# Patient Record
Sex: FEMALE | Race: WHITE | NOT HISPANIC OR LATINO | Employment: UNEMPLOYED | URBAN - NONMETROPOLITAN AREA
[De-identification: names, ages, dates, MRNs, and addresses within clinical notes are randomized per-mention and may not be internally consistent; named-entity substitution may affect disease eponyms.]

---

## 2017-01-12 ENCOUNTER — TRANSCRIBE ORDERS (OUTPATIENT)
Dept: ADMINISTRATIVE | Facility: HOSPITAL | Age: 27
End: 2017-01-12

## 2017-01-12 ENCOUNTER — HOSPITAL ENCOUNTER (OUTPATIENT)
Dept: GENERAL RADIOLOGY | Facility: HOSPITAL | Age: 27
Discharge: HOME | End: 2017-01-12
Admitting: PHYSICIAN ASSISTANT

## 2017-01-12 DIAGNOSIS — J18.9 PNEUMONIA DUE TO INFECTIOUS ORGANISM, UNSPECIFIED LATERALITY, UNSPECIFIED PART OF LUNG: Primary | ICD-10-CM

## 2017-01-12 DIAGNOSIS — J18.9 PNEUMONIA DUE TO INFECTIOUS ORGANISM, UNSPECIFIED LATERALITY, UNSPECIFIED PART OF LUNG: ICD-10-CM

## 2017-01-12 PROCEDURE — 71020 HC CHEST PA AND LATERAL: CPT

## 2017-01-13 ENCOUNTER — OFFICE VISIT (OUTPATIENT)
Dept: SURGERY | Age: 27
End: 2017-01-13

## 2017-01-13 VITALS — HEART RATE: 76 BPM | SYSTOLIC BLOOD PRESSURE: 104 MMHG | DIASTOLIC BLOOD PRESSURE: 60 MMHG

## 2017-01-13 DIAGNOSIS — D22.5 ATYPICAL NEVUS OF ABDOMINAL WALL: Primary | ICD-10-CM

## 2017-01-13 DIAGNOSIS — D22.5 ATYPICAL NEVUS OF FEMALE BREAST: ICD-10-CM

## 2017-01-13 PROCEDURE — 99024 POSTOP FOLLOW-UP VISIT: CPT | Performed by: PHYSICIAN ASSISTANT

## 2017-02-06 ENCOUNTER — HOSPITAL ENCOUNTER (EMERGENCY)
Facility: HOSPITAL | Age: 27
Discharge: HOME | End: 2017-02-06
Admitting: FAMILY MEDICINE

## 2017-02-06 ENCOUNTER — APPOINTMENT (OUTPATIENT)
Dept: CT IMAGING | Facility: HOSPITAL | Age: 27
End: 2017-02-06

## 2017-02-06 VITALS
HEART RATE: 96 BPM | DIASTOLIC BLOOD PRESSURE: 66 MMHG | RESPIRATION RATE: 20 BRPM | SYSTOLIC BLOOD PRESSURE: 131 MMHG | WEIGHT: 159 LBS | OXYGEN SATURATION: 100 % | BODY MASS INDEX: 28.17 KG/M2 | HEIGHT: 63 IN | TEMPERATURE: 97.9 F

## 2017-02-06 DIAGNOSIS — N39.0 URINARY TRACT INFECTION WITHOUT HEMATURIA, SITE UNSPECIFIED: ICD-10-CM

## 2017-02-06 DIAGNOSIS — R10.9 LEFT FLANK PAIN: Primary | ICD-10-CM

## 2017-02-06 LAB
ALBUMIN SERPL-MCNC: 3.9 G/DL (ref 3.5–5)
ALBUMIN/GLOB SERPL: 1.2 G/DL (ref 1.1–2.5)
ALP SERPL-CCNC: 73 U/L (ref 24–120)
ALT SERPL W P-5'-P-CCNC: 31 U/L (ref 0–54)
AMYLASE SERPL-CCNC: 63 U/L (ref 30–110)
ANION GAP SERPL CALCULATED.3IONS-SCNC: 8 MMOL/L (ref 4–13)
AST SERPL-CCNC: 18 U/L (ref 7–45)
B-HCG UR QL: NEGATIVE
BACTERIA UR QL AUTO: ABNORMAL /HPF
BASOPHILS # BLD AUTO: 0.02 10*3/MM3 (ref 0–0.2)
BASOPHILS NFR BLD AUTO: 0.3 % (ref 0–2)
BILIRUB SERPL-MCNC: 0.4 MG/DL (ref 0.1–1)
BILIRUB UR QL STRIP: ABNORMAL
BUN BLD-MCNC: 14 MG/DL (ref 5–21)
BUN/CREAT SERPL: 18.9 (ref 7–25)
CALCIUM SPEC-SCNC: 9.1 MG/DL (ref 8.4–10.4)
CHLORIDE SERPL-SCNC: 105 MMOL/L (ref 98–110)
CLARITY UR: CLEAR
CO2 SERPL-SCNC: 27 MMOL/L (ref 24–31)
COD CRY URNS QL: ABNORMAL /HPF
COLOR UR: ABNORMAL
CREAT BLD-MCNC: 0.74 MG/DL (ref 0.5–1.4)
DEPRECATED RDW RBC AUTO: 43.6 FL (ref 40–54)
EOSINOPHIL # BLD AUTO: 0.15 10*3/MM3 (ref 0–0.7)
EOSINOPHIL NFR BLD AUTO: 2.4 % (ref 0–4)
ERYTHROCYTE [DISTWIDTH] IN BLOOD BY AUTOMATED COUNT: 12.9 % (ref 12–15)
GFR SERPL CREATININE-BSD FRML MDRD: 96 ML/MIN/1.73
GLOBULIN UR ELPH-MCNC: 3.3 GM/DL
GLUCOSE BLD-MCNC: 103 MG/DL (ref 70–100)
GLUCOSE UR STRIP-MCNC: NEGATIVE MG/DL
HCT VFR BLD AUTO: 34 % (ref 37–47)
HGB BLD-MCNC: 11.2 G/DL (ref 12–16)
HGB UR QL STRIP.AUTO: NEGATIVE
HYALINE CASTS UR QL AUTO: ABNORMAL /LPF
IMM GRANULOCYTES # BLD: 0.01 10*3/MM3 (ref 0–0.03)
IMM GRANULOCYTES NFR BLD: 0.2 % (ref 0–5)
INTERNAL NEGATIVE CONTROL: NEGATIVE
INTERNAL POSITIVE CONTROL: POSITIVE
KETONES UR QL STRIP: NEGATIVE
LEUKOCYTE ESTERASE UR QL STRIP.AUTO: ABNORMAL
LIPASE SERPL-CCNC: 138 U/L (ref 23–203)
LYMPHOCYTES # BLD AUTO: 2.3 10*3/MM3 (ref 0.72–4.86)
LYMPHOCYTES NFR BLD AUTO: 36.3 % (ref 15–45)
Lab: NORMAL
MCH RBC QN AUTO: 30.4 PG (ref 28–32)
MCHC RBC AUTO-ENTMCNC: 32.9 G/DL (ref 33–36)
MCV RBC AUTO: 92.1 FL (ref 82–98)
MONOCYTES # BLD AUTO: 0.6 10*3/MM3 (ref 0.19–1.3)
MONOCYTES NFR BLD AUTO: 9.5 % (ref 4–12)
NEUTROPHILS # BLD AUTO: 3.26 10*3/MM3 (ref 1.87–8.4)
NEUTROPHILS NFR BLD AUTO: 51.3 % (ref 39–78)
NITRITE UR QL STRIP: POSITIVE
PH UR STRIP.AUTO: 5.5 [PH] (ref 5–8)
PLATELET # BLD AUTO: 234 10*3/MM3 (ref 130–400)
PMV BLD AUTO: 10.4 FL (ref 6–12)
POTASSIUM BLD-SCNC: 3.5 MMOL/L (ref 3.5–5.3)
PROT SERPL-MCNC: 7.2 G/DL (ref 6.3–8.7)
PROT UR QL STRIP: ABNORMAL
RBC # BLD AUTO: 3.69 10*6/MM3 (ref 4.2–5.4)
RBC # UR: ABNORMAL /HPF
REF LAB TEST METHOD: ABNORMAL
SODIUM BLD-SCNC: 140 MMOL/L (ref 135–145)
SP GR UR STRIP: 1.02 (ref 1–1.03)
SQUAMOUS #/AREA URNS HPF: ABNORMAL /HPF
UROBILINOGEN UR QL STRIP: ABNORMAL
WBC NRBC COR # BLD: 6.34 10*3/MM3 (ref 4.8–10.8)
WBC UR QL AUTO: ABNORMAL /HPF

## 2017-02-06 PROCEDURE — 82150 ASSAY OF AMYLASE: CPT | Performed by: NURSE PRACTITIONER

## 2017-02-06 PROCEDURE — 80053 COMPREHEN METABOLIC PANEL: CPT | Performed by: NURSE PRACTITIONER

## 2017-02-06 PROCEDURE — 96376 TX/PRO/DX INJ SAME DRUG ADON: CPT

## 2017-02-06 PROCEDURE — 85025 COMPLETE CBC W/AUTO DIFF WBC: CPT | Performed by: NURSE PRACTITIONER

## 2017-02-06 PROCEDURE — 25010000002 MEPERIDINE PER 100 MG: Performed by: NURSE PRACTITIONER

## 2017-02-06 PROCEDURE — 96374 THER/PROPH/DIAG INJ IV PUSH: CPT

## 2017-02-06 PROCEDURE — 96375 TX/PRO/DX INJ NEW DRUG ADDON: CPT

## 2017-02-06 PROCEDURE — 99283 EMERGENCY DEPT VISIT LOW MDM: CPT

## 2017-02-06 PROCEDURE — 81001 URINALYSIS AUTO W/SCOPE: CPT | Performed by: NURSE PRACTITIONER

## 2017-02-06 PROCEDURE — 87086 URINE CULTURE/COLONY COUNT: CPT | Performed by: NURSE PRACTITIONER

## 2017-02-06 PROCEDURE — 25010000002 HYDROMORPHONE PER 4 MG: Performed by: NURSE PRACTITIONER

## 2017-02-06 PROCEDURE — 36415 COLL VENOUS BLD VENIPUNCTURE: CPT | Performed by: NURSE PRACTITIONER

## 2017-02-06 PROCEDURE — 25010000002 HYDROMORPHONE PER 4 MG: Performed by: FAMILY MEDICINE

## 2017-02-06 PROCEDURE — 83690 ASSAY OF LIPASE: CPT | Performed by: NURSE PRACTITIONER

## 2017-02-06 PROCEDURE — 96361 HYDRATE IV INFUSION ADD-ON: CPT

## 2017-02-06 PROCEDURE — 74176 CT ABD & PELVIS W/O CONTRAST: CPT

## 2017-02-06 PROCEDURE — 25010000002 ONDANSETRON PER 1 MG: Performed by: NURSE PRACTITIONER

## 2017-02-06 RX ORDER — MEPERIDINE HYDROCHLORIDE 25 MG/ML
25 INJECTION INTRAMUSCULAR; INTRAVENOUS; SUBCUTANEOUS ONCE
Status: COMPLETED | OUTPATIENT
Start: 2017-02-06 | End: 2017-02-06

## 2017-02-06 RX ORDER — KETOROLAC TROMETHAMINE 30 MG/ML
30 INJECTION, SOLUTION INTRAMUSCULAR; INTRAVENOUS ONCE
Status: DISCONTINUED | OUTPATIENT
Start: 2017-02-06 | End: 2017-02-06

## 2017-02-06 RX ORDER — PHENAZOPYRIDINE HYDROCHLORIDE 200 MG/1
200 TABLET, FILM COATED ORAL 3 TIMES DAILY PRN
Qty: 6 TABLET | Refills: 0 | Status: SHIPPED | OUTPATIENT
Start: 2017-02-06 | End: 2018-11-29

## 2017-02-06 RX ORDER — OXYCODONE AND ACETAMINOPHEN 10; 325 MG/1; MG/1
1 TABLET ORAL EVERY 6 HOURS PRN
Qty: 12 TABLET | Refills: 0 | Status: SHIPPED | OUTPATIENT
Start: 2017-02-06 | End: 2017-02-06 | Stop reason: HOSPADM

## 2017-02-06 RX ORDER — ONDANSETRON 2 MG/ML
4 INJECTION INTRAMUSCULAR; INTRAVENOUS ONCE
Status: COMPLETED | OUTPATIENT
Start: 2017-02-06 | End: 2017-02-06

## 2017-02-06 RX ORDER — CEFUROXIME AXETIL 500 MG/1
500 TABLET ORAL 2 TIMES DAILY
Qty: 20 TABLET | Refills: 0 | Status: SHIPPED | OUTPATIENT
Start: 2017-02-06 | End: 2017-02-06

## 2017-02-06 RX ORDER — CEFUROXIME AXETIL 500 MG/1
500 TABLET ORAL 2 TIMES DAILY
Qty: 20 TABLET | Refills: 0 | Status: SHIPPED | OUTPATIENT
Start: 2017-02-06 | End: 2017-02-16

## 2017-02-06 RX ORDER — PHENAZOPYRIDINE HYDROCHLORIDE 200 MG/1
200 TABLET, FILM COATED ORAL 3 TIMES DAILY PRN
Qty: 6 TABLET | Refills: 0 | Status: SHIPPED | OUTPATIENT
Start: 2017-02-06 | End: 2017-02-06

## 2017-02-06 RX ADMIN — ONDANSETRON HYDROCHLORIDE 4 MG: 2 SOLUTION INTRAMUSCULAR; INTRAVENOUS at 18:37

## 2017-02-06 RX ADMIN — SODIUM CHLORIDE 1000 ML: 9 INJECTION, SOLUTION INTRAVENOUS at 18:36

## 2017-02-06 RX ADMIN — HYDROMORPHONE HYDROCHLORIDE 1 MG: 1 INJECTION, SOLUTION INTRAMUSCULAR; INTRAVENOUS; SUBCUTANEOUS at 21:54

## 2017-02-06 RX ADMIN — MEPERIDINE HYDROCHLORIDE 25 MG: 25 INJECTION, SOLUTION INTRAMUSCULAR; INTRAVENOUS; SUBCUTANEOUS at 18:36

## 2017-02-06 RX ADMIN — HYDROMORPHONE HYDROCHLORIDE 1 MG: 1 INJECTION, SOLUTION INTRAMUSCULAR; INTRAVENOUS; SUBCUTANEOUS at 19:05

## 2017-02-07 NOTE — ED NOTES
Patient discharged home  with family at side ambulatory to personal car. No distress noted. Personal belongings with patient. Patient/family voice understanding to instructions given.        Maddy Lee RN  02/06/17 1077

## 2017-02-07 NOTE — ED PROVIDER NOTES
Subjective   HPI Comments: Patient's 26-year-old white female presents with left flank pain for the past days.  She has been on Cipro for urinary tract infection and was called by her PCP today and advised to go the emergency department because she had a moderate amount of blood in her urine and that she had Escherichia coli in her urine and there were that she had a kidney stone.  She denies any fever or chills.  She has had some nausea no vomiting.  She does have a history of multiple kidney stones and urinary tract infections.    Patient is a 26 y.o. female presenting with flank pain.   History provided by:  Patient   used: No    Flank Pain       Review of Systems   Constitutional: Negative.    HENT: Negative.    Eyes: Negative.    Respiratory: Negative.    Cardiovascular: Negative.    Gastrointestinal: Negative.    Endocrine: Negative.    Genitourinary: Positive for flank pain.        Pt has had left flank pain for the past 6 days. She states that she was seen by her pcp 4 days ago and started on cipro. She states that her pcp called her today and advised her that she needed to go to er for further eval because of blood in urine and that she has ecoli in urine as well. She has had nausea- no vomiting. Pt has hx of kidney stones and mult uti. She denies any fever or chills.    Skin: Negative.    Allergic/Immunologic: Negative.    Neurological: Negative.    Hematological: Negative.    Psychiatric/Behavioral: Negative.    All other systems reviewed and are negative.      Past Medical History   Diagnosis Date   • Intracranial hypertension    • Kidney stones        Allergies   Allergen Reactions   • Morphine And Related Shortness Of Breath       Past Surgical History   Procedure Laterality Date   • Other surgical history       Intracranial shunt   • Cystoscopy electrohydraulic lithotripsy     •  shunt insertion         History reviewed. No pertinent family history.    Social History     Social  "History   • Marital status: Single     Spouse name: N/A   • Number of children: 1   • Years of education: N/A     Social History Main Topics   • Smoking status: Current Every Day Smoker     Types: Cigarettes   • Smokeless tobacco: Never Used      Comment: 5 CIGARETTS/DAY   • Alcohol use No   • Drug use: No   • Sexual activity: Defer     Other Topics Concern   • None     Social History Narrative   • None       Prior to Admission medications    Medication Sig Start Date End Date Taking? Authorizing Provider   ALPRAZolam (XANAX) 0.5 MG tablet Take 0.5 mg by mouth Daily.    Historical Provider, MD   diazepam (VALIUM) 2 MG tablet Take 2 mg by mouth daily.    Historical Provider, MD   fexofenadine (ALLEGRA) 60 MG tablet Take 60 mg by mouth Daily.    Historical Provider, MD   HYDROcodone-acetaminophen (NORCO)  MG per tablet Take 1 tablet by mouth 3 (three) times a day.    Historical Provider, MD   linaclotide (LINZESS) 290 MCG capsule capsule Take 290 mcg by mouth as needed.    Historical Provider, MD   NON FORMULARY daily. BIRTH CONTROL PILL    Historical Provider, MD       Visit Vitals   • /68 (BP Location: Left arm, Patient Position: Sitting)   • Pulse 114   • Temp 97.3 °F (36.3 °C) (Tympanic)   • Resp 20   • Ht 63\" (160 cm)   • Wt 159 lb (72.1 kg)   • LMP 01/16/2017 (Exact Date)   • SpO2 99%   • Breastfeeding No   • BMI 28.17 kg/m2       Objective   Physical Exam   Constitutional: She is oriented to person, place, and time. Vital signs are normal. She appears well-developed and well-nourished.  Non-toxic appearance. No distress.   HENT:   Head: Normocephalic. Head is without raccoon's eyes, without Alvares's sign, without abrasion, without contusion and without laceration.   Right Ear: Tympanic membrane and external ear normal.   Left Ear: Tympanic membrane and external ear normal.   Nose: Nose normal.   Mouth/Throat: Oropharynx is clear and moist.   Eyes: Conjunctivae and EOM are normal. Pupils are equal, " round, and reactive to light.   Neck: Trachea normal, normal range of motion and full passive range of motion without pain. Neck supple. No JVD present. No spinous process tenderness and no muscular tenderness present. Carotid bruit is not present. No tracheal deviation and normal range of motion present.   Cardiovascular: Normal rate, regular rhythm, normal heart sounds, intact distal pulses and normal pulses.  PMI is not displaced.    Pulmonary/Chest: Effort normal and breath sounds normal. No accessory muscle usage or stridor. No apnea and no tachypnea. No respiratory distress. Chest wall is not dull to percussion. She exhibits no mass, no tenderness, no bony tenderness, no laceration, no crepitus, no deformity and no swelling.   Abdominal: Soft. Normal aorta and bowel sounds are normal. There is no hepatosplenomegaly. There is no tenderness. There is no CVA tenderness.   Left cva tenderness on percussion. abd soft, nondistended.    Musculoskeletal: Normal range of motion.        Cervical back: Normal. She exhibits normal range of motion, no tenderness, no bony tenderness, no spasm and normal pulse.        Thoracic back: Normal. She exhibits normal range of motion, no tenderness, no bony tenderness, no spasm and normal pulse.        Lumbar back: She exhibits tenderness. She exhibits normal range of motion, no bony tenderness, no pain, no spasm and normal pulse.   Neurological: She is alert and oriented to person, place, and time. She has normal strength and normal reflexes. No cranial nerve deficit or sensory deficit. GCS eye subscore is 4. GCS verbal subscore is 5. GCS motor subscore is 6.   Skin: Skin is warm, dry and intact. No abrasion, no ecchymosis and no laceration noted.   Psychiatric: She has a normal mood and affect. Her speech is normal and behavior is normal.   Nursing note and vitals reviewed.      Procedures         Lab Results (last 24 hours)     ** No results found for the last 24 hours. **           CT Abdomen Pelvis Without Contrast    (Results Pending)       ED Course  ED Course   Comment By Time   Pending urinalysis and ct scan of abd/pelvis. Reviewed pt and pt care plan with dr nalini ferrari. Care of pt transferred at this time Martha Wolff, APRN 02/06 1932          [facility]    Final diagnoses:   Left flank pain   Urinary tract infection without hematuria, site unspecified          Charles Reese PA-C  02/06/17 [id number]

## 2017-02-08 LAB — BACTERIA SPEC AEROBE CULT: NORMAL

## 2017-02-10 ENCOUNTER — TELEPHONE (OUTPATIENT)
Dept: UROLOGY | Facility: CLINIC | Age: 27
End: 2017-02-10

## 2017-02-10 DIAGNOSIS — N20.0 KIDNEY STONE: Primary | ICD-10-CM

## 2017-02-15 ENCOUNTER — HOSPITAL ENCOUNTER (OUTPATIENT)
Dept: GENERAL RADIOLOGY | Facility: HOSPITAL | Age: 27
Discharge: HOME | End: 2017-02-15
Attending: UROLOGY | Admitting: UROLOGY

## 2017-02-15 ENCOUNTER — OFFICE VISIT (OUTPATIENT)
Dept: UROLOGY | Facility: CLINIC | Age: 27
End: 2017-02-15

## 2017-02-15 VITALS
BODY MASS INDEX: 28.99 KG/M2 | DIASTOLIC BLOOD PRESSURE: 66 MMHG | SYSTOLIC BLOOD PRESSURE: 118 MMHG | HEIGHT: 63 IN | TEMPERATURE: 96.3 F | WEIGHT: 163.6 LBS

## 2017-02-15 DIAGNOSIS — R10.9 FLANK PAIN: ICD-10-CM

## 2017-02-15 DIAGNOSIS — N20.0 KIDNEY STONE: Primary | ICD-10-CM

## 2017-02-15 DIAGNOSIS — N20.0 KIDNEY STONE: ICD-10-CM

## 2017-02-15 LAB
BILIRUB BLD-MCNC: NEGATIVE MG/DL
CLARITY, POC: CLEAR
COLOR UR: YELLOW
GLUCOSE UR STRIP-MCNC: NEGATIVE MG/DL
KETONES UR QL: NEGATIVE
LEUKOCYTE EST, POC: ABNORMAL
NITRITE UR-MCNC: NEGATIVE MG/ML
PH UR: 5.5 [PH] (ref 5–8)
PROT UR STRIP-MCNC: NEGATIVE MG/DL
RBC # UR STRIP: NEGATIVE /UL
SP GR UR: 1.02 (ref 1–1.03)
UROBILINOGEN UR QL: NORMAL

## 2017-02-15 PROCEDURE — 74000 HC ABDOMEN KUB: CPT

## 2017-02-15 PROCEDURE — 99204 OFFICE O/P NEW MOD 45 MIN: CPT | Performed by: UROLOGY

## 2017-02-15 PROCEDURE — 81003 URINALYSIS AUTO W/O SCOPE: CPT | Performed by: UROLOGY

## 2017-02-15 NOTE — PROGRESS NOTES
Subjective    Ms. Mcqueen is 26 y.o. female    Chief Complaint: Flank Pain    History of Present Illness     Urolithiasis  Patient complains of left flank pain with radiation to the abdomen. Onset of symptoms was abrupt starting 1 week ago with stable course since that time. Patient describes the pain as cramping, continuous and rated as moderate. The patient has had no nausea and no vomiting. There has been no fever or chills. The patient is complaining of dysuria, frequency, or urgency.  Previous management of stones includes ESWL, PCNL.  Pt. Sees Dr. Sabas Jackson.        The following portions of the patient's history were reviewed and updated as appropriate: allergies, current medications, past family history, past medical history, past social history, past surgical history and problem list.    Review of Systems   Constitutional: Negative for appetite change, diaphoresis and fever.   HENT: Negative for facial swelling and sore throat.    Eyes: Negative for discharge and visual disturbance.   Respiratory: Negative for cough and shortness of breath.    Cardiovascular: Negative for chest pain and leg swelling.   Gastrointestinal: Negative for anal bleeding and vomiting.   Endocrine: Negative for cold intolerance and heat intolerance.   Genitourinary: Positive for flank pain. Negative for hematuria and pelvic pain.   Musculoskeletal: Negative for back pain and gait problem.   Skin: Negative for pallor and rash.   Allergic/Immunologic: Negative for food allergies and immunocompromised state.   Neurological: Negative for seizures and headaches.   Hematological: Negative for adenopathy. Does not bruise/bleed easily.   Psychiatric/Behavioral: Negative for dysphoric mood, self-injury and suicidal ideas.         Current Outpatient Prescriptions:   •  ALPRAZolam (XANAX) 0.5 MG tablet, Take 0.5 mg by mouth Daily., Disp: , Rfl:   •  cefuroxime (CEFTIN) 500 MG tablet, Take 1 tablet by mouth 2 (Two) Times a Day for 10 days.,  "Disp: 20 tablet, Rfl: 0  •  diazepam (VALIUM) 2 MG tablet, Take 2 mg by mouth daily., Disp: , Rfl:   •  fexofenadine (ALLEGRA) 60 MG tablet, Take 60 mg by mouth Daily., Disp: , Rfl:   •  HYDROcodone-acetaminophen (NORCO)  MG per tablet, Take 1 tablet by mouth 3 (three) times a day., Disp: , Rfl:   •  linaclotide (LINZESS) 290 MCG capsule capsule, Take 290 mcg by mouth as needed., Disp: , Rfl:   •  NON FORMULARY, daily. BIRTH CONTROL PILL, Disp: , Rfl:   •  phenazopyridine (PYRIDIUM) 200 MG tablet, Take 1 tablet by mouth 3 (Three) Times a Day As Needed for bladder spasms., Disp: 6 tablet, Rfl: 0    Past Medical History   Diagnosis Date   • Intracranial hypertension    • Kidney stones        Past Surgical History   Procedure Laterality Date   • Other surgical history       Intracranial shunt   • Cystoscopy electrohydraulic lithotripsy     •  shunt insertion         Social History     Social History   • Marital status: Single     Spouse name: N/A   • Number of children: 1   • Years of education: N/A     Social History Main Topics   • Smoking status: Current Every Day Smoker     Types: Cigarettes   • Smokeless tobacco: Never Used      Comment: 5 CIGARETTS/DAY   • Alcohol use No   • Drug use: No   • Sexual activity: Defer     Other Topics Concern   • None     Social History Narrative       History reviewed. No pertinent family history.    Objective    Visit Vitals   • /66   • Temp 96.3 °F (35.7 °C)   • Ht 63\" (160 cm)   • Wt 163 lb 9.6 oz (74.2 kg)   • LMP 01/16/2017 (Exact Date)   • BMI 28.98 kg/m2       Physical Exam   Constitutional: She is oriented to person, place, and time. She appears well-developed and well-nourished. No distress.   HENT:   Head: Normocephalic and atraumatic.   Right Ear: External ear and ear canal normal.   Left Ear: External ear and ear canal normal.   Nose: No nasal deformity. No epistaxis.   Mouth/Throat: Oropharynx is clear and moist. Mucous membranes are not pale, not dry and " not cyanotic. Normal dentition. No oropharyngeal exudate.   Neck: Trachea normal. No tracheal tenderness present. No tracheal deviation present. No thyroid mass and no thyromegaly present.   Pulmonary/Chest: Effort normal. No accessory muscle usage. No respiratory distress. Chest wall is not dull to percussion (No flatness or hyperresonance). She exhibits no mass and no tenderness.   On palpation, no tactile fremitus. All movements are symmetric. No intercostal retraction noted.    Abdominal: Soft. Normal appearance. She exhibits no distension and no mass. There is no hepatosplenomegaly. There is no tenderness. No hernia.   Rectal examination or stool specimen is not indicated.    Musculoskeletal:   Normal gait and station. The spine, ribs, and pelvis are examined. No obvious misalignment or asymmetry. ROM is reasonable for age. No instability. No obvious atrophy, flaccidity or spasticity.    Lymphadenopathy:     She has no cervical adenopathy.        Right: No inguinal adenopathy present.        Left: No inguinal adenopathy present.   Neurological: She is alert and oriented to person, place, and time.   Skin: Skin is warm, dry and intact. No lesion and no rash noted. She is not diaphoretic. No cyanosis. No pallor. Nails show no clubbing.   On palpation, there were no induration, subcutaneous nodules, or tightening   Psychiatric: Her speech is normal and behavior is normal. Judgment and thought content normal. Her mood appears not anxious. Her affect is not labile. She does not exhibit a depressed mood.   Vitals reviewed.          Results for orders placed or performed in visit on 02/15/17   POC Urinalysis Dipstick, Automated   Result Value Ref Range    Color Yellow Yellow, Straw, Dark Yellow, Deborah    Clarity, UA Clear Clear    Glucose, UA Negative Negative, 1000 mg/dL (3+) mg/dL    Bilirubin Negative Negative    Ketones, UA Negative Negative    Specific Gravity  1.025 1.005 - 1.030    Blood, UA Negative Negative     pH, Urine 5.5 5.0 - 8.0    Protein, POC Negative Negative mg/dL    Urobilinogen, UA Normal Normal    Leukocytes Small (1+) (A) Negative    Nitrite, UA Negative Negative   CT independent review  The CT scan of the abdomen/pelvis done without contrast is available for me to review.  Treatment recommendations require an independent review.  First I scanned the liver, spleen, and bowel pattern.  The retroperitoneum including the major vessels and lymphatic packages are briefly reviewed.  This film as been reviewed by the radiologist to determine any non urologic abnormalities that are present.  The kidneys are closely inspected for size, symmetry, contour, parenchymal thickness, perinephric reaction, presence of calcifications, and intrarenal dilation of the collecting system.  The ureters are inspected for their course, caliber, and any calcifications.  The bladder is inspected for its thickness, size, and presence of any calcifications.  This scan shows:    The right kidney appears multiple non obstructing stones    The left kidney appears multiple non obstructing stones    The bladder appears normal on this non-contrasted CT scan.  The bladder appears normal in thickness.  There no masses or stones seen on this exam.    KUB independent review    A KUB is available for me to review today.  The image is inspected for a bowel gas pattern and the general bone structure of the spine and pelvis. The kidneys are then inspected closely.  Renal outline is noted if identifiable. The kidney, collecting system, and anticipated path of the ureter are examined for calcifications including those in the true pelvis.  This film reveals:    On the right there are no calcificaitons seen in the kidney or the expected course of the ureter. .    On the left there are multiple renal stones. .        Assessment and Plan    Kelsey was seen today for flank pain.    Diagnoses and all orders for this visit:    Kidney stone    Flank pain  -      POC Urinalysis Dipstick, Automated      I reviewed records from her ER visit.  She had a urine culture on that was negative.  Her flank pain is improving.  I reviewed her CT scan as well.  See report above.  She does see Dr. Sabas Rodríguez at [place] and she is going to see him in 3 months.  She makes brushite stones.  Follow-up in 1 year with [facility].

## 2017-02-21 ENCOUNTER — OFFICE VISIT (OUTPATIENT)
Dept: NEUROSURGERY | Facility: CLINIC | Age: 27
End: 2017-02-21

## 2017-02-21 VITALS
BODY MASS INDEX: 28 KG/M2 | SYSTOLIC BLOOD PRESSURE: 122 MMHG | DIASTOLIC BLOOD PRESSURE: 74 MMHG | HEIGHT: 63 IN | WEIGHT: 158 LBS

## 2017-02-21 DIAGNOSIS — R51.9 CHRONIC NONINTRACTABLE HEADACHE, UNSPECIFIED HEADACHE TYPE: ICD-10-CM

## 2017-02-21 DIAGNOSIS — F17.200 SMOKER: ICD-10-CM

## 2017-02-21 DIAGNOSIS — G89.29 CHRONIC NONINTRACTABLE HEADACHE, UNSPECIFIED HEADACHE TYPE: ICD-10-CM

## 2017-02-21 DIAGNOSIS — G93.2 PSEUDOTUMOR CEREBRI: Primary | ICD-10-CM

## 2017-02-21 DIAGNOSIS — E66.3 OVER WEIGHT: ICD-10-CM

## 2017-02-21 PROCEDURE — 99213 OFFICE O/P EST LOW 20 MIN: CPT | Performed by: NURSE PRACTITIONER

## 2017-02-21 NOTE — PROGRESS NOTES
"    Chief complaint:   Chief Complaint   Patient presents with   • pseudotumor     Patient returns today for routine followup for her Pseudotumor, she states she is doing better, her headaches are better.         Subjective     HPI: This is a 46-year-old who went to the operating room for a  shunt placement for pseudotumor cerebri.  She is here in follow-up today.  She states that she has been doing that she is not complaining of any headaches.  She says that she will get an occasional headache after reading for a long period time.  She not complaining of any vision problems at this time.  She continues to be seen by Dr. Garcia have routine checks from him.  The complaining of any nausea or vomiting.  Overall she is having satisfied with the results from the shunt.  She is not had any MRIs recently.  The complaining of any pain from the shunt.    Review of Systems   Musculoskeletal: Negative.    Neurological: Positive for headaches.         Objective      Vital Signs  Visit Vitals   • /74 (BP Location: Right arm, Patient Position: Sitting)   • Ht 63\" (160 cm)   • Wt 158 lb (71.7 kg)   • LMP 01/16/2017 (Exact Date)   • BMI 27.99 kg/m2       Physical Exam   Constitutional: She is oriented to person, place, and time. She appears well-developed and well-nourished.   HENT:   Head: Normocephalic.   Eyes: EOM are normal. Pupils are equal, round, and reactive to light.   Neck: Normal range of motion.   Pulmonary/Chest: Effort normal.   Musculoskeletal: Normal range of motion.   Neurological: She is alert and oriented to person, place, and time. She has normal strength and normal reflexes. No cranial nerve deficit or sensory deficit. Gait normal. GCS eye subscore is 4. GCS verbal subscore is 5. GCS motor subscore is 6.   Skin: Skin is warm.   Psychiatric: She has a normal mood and affect. Her speech is normal and behavior is normal. Thought content normal.       Results Review: Her shunt was checked and it was shown " to be at 1.5 in the settings which is where was from the last time.          Assessment/Plan: The patient appears to be doing very well from her shunt placement.  At this point we will remove her visits out to one year and follow her on a yearly basis for her shunt to check her setting and to make sure she is not having any symptoms from this.  BMI shows that she is overweight.  BMI chart was given the patient.  She is a nonsmoker.    I discussed the patients findings and my recommendations with patient  Ean Treviño, APRN  02/21/17  4:56 PM

## 2017-04-25 ENCOUNTER — OFFICE VISIT (OUTPATIENT)
Dept: NEUROSURGERY | Facility: CLINIC | Age: 27
End: 2017-04-25

## 2017-04-25 VITALS
WEIGHT: 156 LBS | BODY MASS INDEX: 27.64 KG/M2 | DIASTOLIC BLOOD PRESSURE: 68 MMHG | SYSTOLIC BLOOD PRESSURE: 105 MMHG | HEIGHT: 63 IN

## 2017-04-25 DIAGNOSIS — G93.2 PSEUDOTUMOR CEREBRI: Primary | ICD-10-CM

## 2017-04-25 DIAGNOSIS — G44.229 CHRONIC TENSION-TYPE HEADACHE, NOT INTRACTABLE: ICD-10-CM

## 2017-04-25 DIAGNOSIS — E66.3 OVER WEIGHT: ICD-10-CM

## 2017-04-25 DIAGNOSIS — F17.200 SMOKER: ICD-10-CM

## 2017-04-25 PROCEDURE — 99213 OFFICE O/P EST LOW 20 MIN: CPT | Performed by: NURSE PRACTITIONER

## 2017-04-25 NOTE — PROGRESS NOTES
"    Chief complaint:   Chief Complaint   Patient presents with   • Headache     Patient returns today with an increase in symptoms, she is having headaches, vision issues and she states she just feels odd and funny, this has been going on for about 4 days.         Subjective     HPI: This is a 26-year-old who had pseudotumor cerebri and had a  shunt placement in November 2015.  She comes in today stating that she is had worsening vision over the last 4 days.  She says that her headaches do not appear to be any worse than normal but she says that which tries to focus on reading that she does have difficulty in making outwards and numbers.  She says that light does appear to be hurting her eyes.  She says her head does feel more tender in that she is also been having what it feels like a crick in her neck which is some the same symptoms that she was having before the shunt was put in.  She rates the pain a scale 0 - 10 at a 5.  She has not been to see her ophthalmologist as of yet but does have an appointment with him tomorrow.    Review of Systems   Eyes: Positive for visual disturbance.   Neurological: Positive for headaches.         Objective      Vital Signs  /68 (BP Location: Right arm, Patient Position: Sitting)  Ht 63\" (160 cm)  Wt 156 lb (70.8 kg)  BMI 27.63 kg/m2    Physical Exam   Constitutional: She is oriented to person, place, and time. She appears well-developed and well-nourished.   HENT:   Head: Normocephalic.   Eyes: EOM are normal. Pupils are equal, round, and reactive to light.   Neck: Normal range of motion.   Pulmonary/Chest: Effort normal.   Musculoskeletal: Normal range of motion.   Neurological: She is alert and oriented to person, place, and time. She has normal strength and normal reflexes. No cranial nerve deficit or sensory deficit. Gait normal. GCS eye subscore is 4. GCS verbal subscore is 5. GCS motor subscore is 6.   Skin: Skin is warm.   Psychiatric: She has a normal mood and " affect. Her speech is normal and behavior is normal. Thought content normal.       Results Review: No new imaging          Assessment/Plan: I did go ahead and just Kelsey's shunt from 1.5-1.0 to see if this will help with the vision issues that she is been having.  We will see what the ophthalmologist has to say about her vision after his exam.  I will follow-up with her in 1 week to see if things have improved if they have not been we will consider doing a CT scan of her head along with a nuclear med shunt tap.  BMI shows that she is overweight.  BMI chart was given the patient.  She is a nonsmoker.    I discussed the patients findings and my recommendations with patient  Ean Treviño, GREYSON  04/25/17  2:22 PM

## 2017-04-27 DIAGNOSIS — G93.2 PSEUDOTUMOR CEREBRI: Primary | ICD-10-CM

## 2017-04-27 DIAGNOSIS — G44.229 CHRONIC TENSION-TYPE HEADACHE, NOT INTRACTABLE: ICD-10-CM

## 2017-05-15 ENCOUNTER — HOSPITAL ENCOUNTER (OUTPATIENT)
Dept: CT IMAGING | Facility: HOSPITAL | Age: 27
Discharge: HOME | End: 2017-05-15
Admitting: NURSE PRACTITIONER

## 2017-05-15 ENCOUNTER — HOSPITAL ENCOUNTER (OUTPATIENT)
Dept: NUCLEAR MEDICINE | Facility: HOSPITAL | Age: 27
Discharge: HOME | End: 2017-05-15

## 2017-05-15 DIAGNOSIS — G93.2 PSEUDOTUMOR CEREBRI: ICD-10-CM

## 2017-05-15 DIAGNOSIS — G44.229 CHRONIC TENSION-TYPE HEADACHE, NOT INTRACTABLE: ICD-10-CM

## 2017-05-15 DIAGNOSIS — T85.09XD MALFUNCTION OF VENTRICULOPERITONEAL SHUNT, SUBSEQUENT ENCOUNTER: ICD-10-CM

## 2017-05-15 DIAGNOSIS — T85.09XD MALFUNCTION OF VENTRICULOPERITONEAL SHUNT, SUBSEQUENT ENCOUNTER: Primary | ICD-10-CM

## 2017-05-15 LAB
APPEARANCE CSF: CLEAR
COLOR CSF: COLORLESS
COLOR SPUN CSF: COLORLESS
GLUCOSE CSF-MCNC: 59 MG/DL (ref 40–70)
METHOD: ABNORMAL
NUC CELL # CSF MANUAL: 0 /MM3 (ref 0–5)
PROT CSF-MCNC: 31 MG/DL
RBC # CSF MANUAL: 8 /MM3 (ref 0–0)
SPECIMEN VOL CSF: 4 ML
TUBE # CSF: 3

## 2017-05-15 PROCEDURE — 78645 CSF SHUNT EVALUATION: CPT

## 2017-05-15 PROCEDURE — 82945 GLUCOSE OTHER FLUID: CPT | Performed by: NURSE PRACTITIONER

## 2017-05-15 PROCEDURE — 70450 CT HEAD/BRAIN W/O DYE: CPT

## 2017-05-15 PROCEDURE — 87015 SPECIMEN INFECT AGNT CONCNTJ: CPT | Performed by: NURSE PRACTITIONER

## 2017-05-15 PROCEDURE — 87070 CULTURE OTHR SPECIMN AEROBIC: CPT | Performed by: NURSE PRACTITIONER

## 2017-05-15 PROCEDURE — 84157 ASSAY OF PROTEIN OTHER: CPT

## 2017-05-15 PROCEDURE — 89050 BODY FLUID CELL COUNT: CPT

## 2017-05-15 PROCEDURE — 86612 BLASTOMYCES ANTIBODY: CPT | Performed by: NURSE PRACTITIONER

## 2017-05-15 PROCEDURE — A9540 TC99M MAA: HCPCS | Performed by: NEUROLOGICAL SURGERY

## 2017-05-15 PROCEDURE — 87205 SMEAR GRAM STAIN: CPT | Performed by: NURSE PRACTITIONER

## 2017-05-15 PROCEDURE — 86606 ASPERGILLUS ANTIBODY: CPT | Performed by: NURSE PRACTITIONER

## 2017-05-15 PROCEDURE — 83605 ASSAY OF LACTIC ACID: CPT | Performed by: NURSE PRACTITIONER

## 2017-05-15 PROCEDURE — 0 TECHNETIUM ALBUMIN AGGREGATED: Performed by: NEUROLOGICAL SURGERY

## 2017-05-15 PROCEDURE — 86635 COCCIDIOIDES ANTIBODY: CPT | Performed by: NURSE PRACTITIONER

## 2017-05-15 PROCEDURE — 86698 HISTOPLASMA ANTIBODY: CPT | Performed by: NURSE PRACTITIONER

## 2017-05-15 RX ADMIN — Medication 1 DOSE: at 08:15

## 2017-05-16 LAB — LACTIC ACID, CSF: 12 MG/DL (ref 10–22)

## 2017-05-20 LAB
ASPERGILLUS AB TITR CSF CF: NORMAL {TITER}
BLASTOMYCES DERMATITIDIS AB [PRESENCE] IN CEREBRAL SPINAL FLUID BY COMPLEMENT FIXATION: NORMAL
C IMMITIS AB TITR CSF CF: NORMAL {TITER}
H CAPSUL YST AB TITR CSF CF: NORMAL {TITER}
Lab: NORMAL

## 2017-05-25 LAB
BACTERIA SPEC AEROBE CULT: NO GROWTH
GRAM STN SPEC: NORMAL
REF LAB TEST METHOD: NORMAL

## 2017-06-09 ENCOUNTER — TELEPHONE (OUTPATIENT)
Dept: NEUROSURGERY | Facility: CLINIC | Age: 27
End: 2017-06-09

## 2017-06-09 ENCOUNTER — HOSPITAL ENCOUNTER (INPATIENT)
Age: 27
LOS: 5 days | Discharge: HOME | DRG: 885 | End: 2017-06-14
Attending: EMERGENCY MEDICINE | Admitting: PSYCHIATRY & NEUROLOGY
Payer: COMMERCIAL

## 2017-06-09 DIAGNOSIS — R45.851 SUICIDAL IDEATION: Primary | ICD-10-CM

## 2017-06-09 DIAGNOSIS — F19.10 POLYSUBSTANCE ABUSE (HCC): ICD-10-CM

## 2017-06-09 LAB
ALBUMIN SERPL-MCNC: 4.8 G/DL (ref 3.5–5.2)
ALP BLD-CCNC: 87 U/L (ref 35–104)
ALT SERPL-CCNC: 15 U/L (ref 5–33)
AMPHETAMINE SCREEN, URINE: POSITIVE
ANION GAP SERPL CALCULATED.3IONS-SCNC: 16 MMOL/L (ref 7–19)
AST SERPL-CCNC: 16 U/L (ref 5–32)
BACTERIA: ABNORMAL /HPF
BARBITURATE SCREEN URINE: NEGATIVE
BASOPHILS ABSOLUTE: 0 K/UL (ref 0–0.2)
BASOPHILS RELATIVE PERCENT: 0.3 % (ref 0–1)
BENZODIAZEPINE SCREEN, URINE: POSITIVE
BILIRUB SERPL-MCNC: 0.7 MG/DL (ref 0.2–1.2)
BILIRUBIN URINE: NEGATIVE
BLOOD, URINE: NEGATIVE
BUN BLDV-MCNC: 13 MG/DL (ref 6–20)
CALCIUM SERPL-MCNC: 9.7 MG/DL (ref 8.6–10)
CANNABINOID SCREEN URINE: POSITIVE
CHLORIDE BLD-SCNC: 102 MMOL/L (ref 98–111)
CLARITY: ABNORMAL
CO2: 22 MMOL/L (ref 22–29)
COCAINE METABOLITE SCREEN URINE: NEGATIVE
COLOR: YELLOW
CREAT SERPL-MCNC: 0.7 MG/DL (ref 0.5–0.9)
EOSINOPHILS ABSOLUTE: 0.1 K/UL (ref 0–0.6)
EOSINOPHILS RELATIVE PERCENT: 1.2 % (ref 0–5)
EPITHELIAL CELLS, UA: 7 /HPF (ref 0–5)
ETHANOL: <10 MG/DL (ref 0–0.08)
GFR NON-AFRICAN AMERICAN: >60
GLUCOSE BLD-MCNC: 99 MG/DL (ref 74–109)
GLUCOSE URINE: NEGATIVE MG/DL
HCG(URINE) PREGNANCY TEST: NEGATIVE
HCT VFR BLD CALC: 41.4 % (ref 37–47)
HEMOGLOBIN: 14.4 G/DL (ref 12–16)
HYALINE CASTS: 36 /HPF (ref 0–8)
KETONES, URINE: 15 MG/DL
LEUKOCYTE ESTERASE, URINE: ABNORMAL
LYMPHOCYTES ABSOLUTE: 2.7 K/UL (ref 1.1–4.5)
LYMPHOCYTES RELATIVE PERCENT: 29 % (ref 20–40)
Lab: ABNORMAL
MCH RBC QN AUTO: 31.7 PG (ref 27–31)
MCHC RBC AUTO-ENTMCNC: 34.8 G/DL (ref 33–37)
MCV RBC AUTO: 91.2 FL (ref 81–99)
MONOCYTES ABSOLUTE: 0.5 K/UL (ref 0–0.9)
MONOCYTES RELATIVE PERCENT: 5.3 % (ref 0–10)
NEUTROPHILS ABSOLUTE: 6 K/UL (ref 1.5–7.5)
NEUTROPHILS RELATIVE PERCENT: 63.9 % (ref 50–65)
NITRITE, URINE: NEGATIVE
OPIATE SCREEN URINE: POSITIVE
PDW BLD-RTO: 11.9 % (ref 11.5–14.5)
PH UA: 6
PLATELET # BLD: 284 K/UL (ref 130–400)
PMV BLD AUTO: 9.3 FL (ref 9.4–12.3)
POTASSIUM SERPL-SCNC: 3.4 MMOL/L (ref 3.5–5)
PROTEIN UA: 30 MG/DL
RBC # BLD: 4.54 M/UL (ref 4.2–5.4)
RBC UA: 6 /HPF (ref 0–4)
SODIUM BLD-SCNC: 140 MMOL/L (ref 136–145)
SPECIFIC GRAVITY UA: 1.03
TOTAL PROTEIN: 7.8 G/DL (ref 6.6–8.7)
UROBILINOGEN, URINE: 0.2 E.U./DL
WBC # BLD: 9.4 K/UL (ref 4.8–10.8)
WBC UA: 76 /HPF (ref 0–5)

## 2017-06-09 PROCEDURE — 99285 EMERGENCY DEPT VISIT HI MDM: CPT

## 2017-06-09 PROCEDURE — 99282 EMERGENCY DEPT VISIT SF MDM: CPT | Performed by: EMERGENCY MEDICINE

## 2017-06-09 PROCEDURE — 36415 COLL VENOUS BLD VENIPUNCTURE: CPT

## 2017-06-09 PROCEDURE — 87185 SC STD ENZYME DETCJ PER NZM: CPT

## 2017-06-09 PROCEDURE — 80053 COMPREHEN METABOLIC PANEL: CPT

## 2017-06-09 PROCEDURE — 1240000000 HC EMOTIONAL WELLNESS R&B

## 2017-06-09 PROCEDURE — 81025 URINE PREGNANCY TEST: CPT

## 2017-06-09 PROCEDURE — 80307 DRUG TEST PRSMV CHEM ANLYZR: CPT

## 2017-06-09 PROCEDURE — 85025 COMPLETE CBC W/AUTO DIFF WBC: CPT

## 2017-06-09 PROCEDURE — 87077 CULTURE AEROBIC IDENTIFY: CPT

## 2017-06-09 PROCEDURE — G0480 DRUG TEST DEF 1-7 CLASSES: HCPCS

## 2017-06-09 PROCEDURE — 87086 URINE CULTURE/COLONY COUNT: CPT

## 2017-06-09 RX ORDER — CETIRIZINE HYDROCHLORIDE 10 MG/1
10 TABLET ORAL DAILY
Status: DISCONTINUED | OUTPATIENT
Start: 2017-06-10 | End: 2017-06-14 | Stop reason: HOSPADM

## 2017-06-09 RX ORDER — ALBUTEROL SULFATE 90 UG/1
2 AEROSOL, METERED RESPIRATORY (INHALATION) EVERY 4 HOURS PRN
Status: DISCONTINUED | OUTPATIENT
Start: 2017-06-09 | End: 2017-06-14 | Stop reason: HOSPADM

## 2017-06-09 RX ORDER — TRAZODONE HYDROCHLORIDE 50 MG/1
50 TABLET ORAL NIGHTLY PRN
Status: DISCONTINUED | OUTPATIENT
Start: 2017-06-09 | End: 2017-06-14 | Stop reason: HOSPADM

## 2017-06-09 RX ORDER — IBUPROFEN 400 MG/1
800 TABLET ORAL EVERY 6 HOURS PRN
Status: CANCELLED | OUTPATIENT
Start: 2017-06-09

## 2017-06-09 RX ORDER — IBUPROFEN 800 MG/1
800 TABLET ORAL EVERY 6 HOURS PRN
COMMUNITY
End: 2017-11-28

## 2017-06-09 RX ORDER — IBUPROFEN 400 MG/1
800 TABLET ORAL EVERY 6 HOURS PRN
Status: DISCONTINUED | OUTPATIENT
Start: 2017-06-09 | End: 2017-06-14 | Stop reason: HOSPADM

## 2017-06-09 RX ORDER — BUSPIRONE HYDROCHLORIDE 10 MG/1
10 TABLET ORAL 2 TIMES DAILY
Status: ON HOLD | COMMUNITY
End: 2017-06-14 | Stop reason: HOSPADM

## 2017-06-09 RX ORDER — BUSPIRONE HYDROCHLORIDE 10 MG/1
10 TABLET ORAL 2 TIMES DAILY
Status: CANCELLED | OUTPATIENT
Start: 2017-06-09

## 2017-06-09 RX ORDER — BUSPIRONE HYDROCHLORIDE 10 MG/1
10 TABLET ORAL 2 TIMES DAILY
Status: DISCONTINUED | OUTPATIENT
Start: 2017-06-10 | End: 2017-06-10

## 2017-06-09 RX ORDER — ALBUTEROL SULFATE 90 UG/1
2 AEROSOL, METERED RESPIRATORY (INHALATION) EVERY 4 HOURS PRN
Status: CANCELLED | OUTPATIENT
Start: 2017-06-09

## 2017-06-09 RX ORDER — ACETAMINOPHEN 325 MG/1
650 TABLET ORAL EVERY 4 HOURS PRN
Status: DISCONTINUED | OUTPATIENT
Start: 2017-06-09 | End: 2017-06-14 | Stop reason: HOSPADM

## 2017-06-09 RX ORDER — CETIRIZINE HYDROCHLORIDE 10 MG/1
10 TABLET ORAL DAILY
Status: CANCELLED | OUTPATIENT
Start: 2017-06-09

## 2017-06-09 ASSESSMENT — SLEEP AND FATIGUE QUESTIONNAIRES
DO YOU USE A SLEEP AID: NO
DO YOU HAVE DIFFICULTY SLEEPING: NO

## 2017-06-10 PROCEDURE — 90791 PSYCH DIAGNOSTIC EVALUATION: CPT | Performed by: PSYCHIATRY & NEUROLOGY

## 2017-06-10 PROCEDURE — 6370000000 HC RX 637 (ALT 250 FOR IP): Performed by: PSYCHIATRY & NEUROLOGY

## 2017-06-10 PROCEDURE — 1240000000 HC EMOTIONAL WELLNESS R&B

## 2017-06-10 PROCEDURE — 99222 1ST HOSP IP/OBS MODERATE 55: CPT | Performed by: NURSE PRACTITIONER

## 2017-06-10 PROCEDURE — 6370000000 HC RX 637 (ALT 250 FOR IP): Performed by: NURSE PRACTITIONER

## 2017-06-10 RX ORDER — DIAZEPAM 10 MG/1
5 TABLET ORAL 3 TIMES DAILY
Status: DISCONTINUED | OUTPATIENT
Start: 2017-06-10 | End: 2017-06-12

## 2017-06-10 RX ORDER — SULFAMETHOXAZOLE AND TRIMETHOPRIM 800; 160 MG/1; MG/1
1 TABLET ORAL EVERY 12 HOURS SCHEDULED
Status: DISCONTINUED | OUTPATIENT
Start: 2017-06-10 | End: 2017-06-14 | Stop reason: HOSPADM

## 2017-06-10 RX ORDER — OLANZAPINE 5 MG/1
5 TABLET, ORALLY DISINTEGRATING ORAL 2 TIMES DAILY
Status: DISCONTINUED | OUTPATIENT
Start: 2017-06-10 | End: 2017-06-13

## 2017-06-10 RX ADMIN — TRAZODONE HYDROCHLORIDE 50 MG: 50 TABLET ORAL at 22:52

## 2017-06-10 RX ADMIN — OLANZAPINE 5 MG: 5 TABLET, ORALLY DISINTEGRATING ORAL at 17:30

## 2017-06-10 RX ADMIN — DIAZEPAM 5 MG: 10 TABLET ORAL at 22:52

## 2017-06-10 RX ADMIN — SULFAMETHOXAZOLE AND TRIMETHOPRIM 1 TABLET: 800; 160 TABLET ORAL at 22:51

## 2017-06-10 RX ADMIN — CETIRIZINE HYDROCHLORIDE 10 MG: 10 TABLET ORAL at 09:34

## 2017-06-10 RX ADMIN — DIAZEPAM 5 MG: 10 TABLET ORAL at 17:31

## 2017-06-10 ASSESSMENT — LIFESTYLE VARIABLES: HISTORY_ALCOHOL_USE: NO

## 2017-06-11 LAB
ORGANISM: ABNORMAL
URINE CULTURE, ROUTINE: ABNORMAL
URINE CULTURE, ROUTINE: ABNORMAL

## 2017-06-11 PROCEDURE — 6370000000 HC RX 637 (ALT 250 FOR IP): Performed by: PSYCHIATRY & NEUROLOGY

## 2017-06-11 PROCEDURE — 1240000000 HC EMOTIONAL WELLNESS R&B

## 2017-06-11 PROCEDURE — 6370000000 HC RX 637 (ALT 250 FOR IP): Performed by: NURSE PRACTITIONER

## 2017-06-11 RX ADMIN — SULFAMETHOXAZOLE AND TRIMETHOPRIM 1 TABLET: 800; 160 TABLET ORAL at 22:17

## 2017-06-11 RX ADMIN — DIAZEPAM 5 MG: 10 TABLET ORAL at 22:17

## 2017-06-11 RX ADMIN — DIAZEPAM 5 MG: 10 TABLET ORAL at 09:08

## 2017-06-11 RX ADMIN — CETIRIZINE HYDROCHLORIDE 10 MG: 10 TABLET ORAL at 09:07

## 2017-06-11 RX ADMIN — OLANZAPINE 5 MG: 5 TABLET, ORALLY DISINTEGRATING ORAL at 09:08

## 2017-06-11 RX ADMIN — DIAZEPAM 5 MG: 10 TABLET ORAL at 13:32

## 2017-06-11 RX ADMIN — OLANZAPINE 5 MG: 5 TABLET, ORALLY DISINTEGRATING ORAL at 22:17

## 2017-06-11 RX ADMIN — SULFAMETHOXAZOLE AND TRIMETHOPRIM 1 TABLET: 800; 160 TABLET ORAL at 09:08

## 2017-06-11 ASSESSMENT — PAIN SCALES - GENERAL
PAINLEVEL_OUTOF10: 4
PAINLEVEL_OUTOF10: 4

## 2017-06-11 ASSESSMENT — PAIN DESCRIPTION - DESCRIPTORS
DESCRIPTORS: THROBBING
DESCRIPTORS: THROBBING

## 2017-06-12 PROBLEM — F39 MOOD DISORDER (HCC): Status: ACTIVE | Noted: 2017-06-12

## 2017-06-12 PROBLEM — F15.10 METHAMPHETAMINE ABUSE (HCC): Status: ACTIVE | Noted: 2017-06-12

## 2017-06-12 PROCEDURE — 6370000000 HC RX 637 (ALT 250 FOR IP): Performed by: PSYCHIATRY & NEUROLOGY

## 2017-06-12 PROCEDURE — 6370000000 HC RX 637 (ALT 250 FOR IP): Performed by: NURSE PRACTITIONER

## 2017-06-12 PROCEDURE — 99231 SBSQ HOSP IP/OBS SF/LOW 25: CPT | Performed by: NURSE PRACTITIONER

## 2017-06-12 PROCEDURE — 1240000000 HC EMOTIONAL WELLNESS R&B

## 2017-06-12 RX ORDER — CLONAZEPAM 0.5 MG/1
0.5 TABLET ORAL 2 TIMES DAILY
Status: DISCONTINUED | OUTPATIENT
Start: 2017-06-12 | End: 2017-06-14 | Stop reason: HOSPADM

## 2017-06-12 RX ADMIN — OLANZAPINE 5 MG: 5 TABLET, ORALLY DISINTEGRATING ORAL at 21:54

## 2017-06-12 RX ADMIN — SULFAMETHOXAZOLE AND TRIMETHOPRIM 1 TABLET: 800; 160 TABLET ORAL at 21:54

## 2017-06-12 RX ADMIN — DIAZEPAM 5 MG: 10 TABLET ORAL at 07:55

## 2017-06-12 RX ADMIN — CLONAZEPAM 0.5 MG: 0.5 TABLET ORAL at 14:24

## 2017-06-12 RX ADMIN — CLONAZEPAM 0.5 MG: 0.5 TABLET ORAL at 21:53

## 2017-06-12 RX ADMIN — SULFAMETHOXAZOLE AND TRIMETHOPRIM 1 TABLET: 800; 160 TABLET ORAL at 07:55

## 2017-06-12 RX ADMIN — OLANZAPINE 5 MG: 5 TABLET, ORALLY DISINTEGRATING ORAL at 07:55

## 2017-06-12 RX ADMIN — CETIRIZINE HYDROCHLORIDE 10 MG: 10 TABLET ORAL at 07:55

## 2017-06-13 LAB
ANION GAP SERPL CALCULATED.3IONS-SCNC: 17 MMOL/L (ref 7–19)
BUN BLDV-MCNC: 10 MG/DL (ref 6–20)
CALCIUM SERPL-MCNC: 9.4 MG/DL (ref 8.6–10)
CHLORIDE BLD-SCNC: 106 MMOL/L (ref 98–111)
CO2: 23 MMOL/L (ref 22–29)
CREAT SERPL-MCNC: 0.8 MG/DL (ref 0.5–0.9)
GFR NON-AFRICAN AMERICAN: >60
GLUCOSE BLD-MCNC: 89 MG/DL (ref 74–109)
POTASSIUM SERPL-SCNC: 4.2 MMOL/L (ref 3.5–5)
SODIUM BLD-SCNC: 146 MMOL/L (ref 136–145)
TSH SERPL DL<=0.05 MIU/L-ACNC: 0.97 UIU/ML (ref 0.27–4.2)
VITAMIN B-12: 568 PG/ML (ref 211–946)
VITAMIN D 25-HYDROXY: 34.4 NG/ML

## 2017-06-13 PROCEDURE — 80048 BASIC METABOLIC PNL TOTAL CA: CPT

## 2017-06-13 PROCEDURE — 6370000000 HC RX 637 (ALT 250 FOR IP): Performed by: PSYCHIATRY & NEUROLOGY

## 2017-06-13 PROCEDURE — 82306 VITAMIN D 25 HYDROXY: CPT

## 2017-06-13 PROCEDURE — 6370000000 HC RX 637 (ALT 250 FOR IP): Performed by: NURSE PRACTITIONER

## 2017-06-13 PROCEDURE — 82607 VITAMIN B-12: CPT

## 2017-06-13 PROCEDURE — 84443 ASSAY THYROID STIM HORMONE: CPT

## 2017-06-13 PROCEDURE — 36415 COLL VENOUS BLD VENIPUNCTURE: CPT

## 2017-06-13 PROCEDURE — 1240000000 HC EMOTIONAL WELLNESS R&B

## 2017-06-13 PROCEDURE — 99231 SBSQ HOSP IP/OBS SF/LOW 25: CPT | Performed by: NURSE PRACTITIONER

## 2017-06-13 RX ORDER — OLANZAPINE 5 MG/1
5 TABLET, ORALLY DISINTEGRATING ORAL NIGHTLY
Status: DISCONTINUED | OUTPATIENT
Start: 2017-06-14 | End: 2017-06-14

## 2017-06-13 RX ADMIN — TRAZODONE HYDROCHLORIDE 50 MG: 50 TABLET ORAL at 21:11

## 2017-06-13 RX ADMIN — SULFAMETHOXAZOLE AND TRIMETHOPRIM 1 TABLET: 800; 160 TABLET ORAL at 21:11

## 2017-06-13 RX ADMIN — ACETAMINOPHEN 650 MG: 325 TABLET, FILM COATED ORAL at 11:16

## 2017-06-13 RX ADMIN — CLONAZEPAM 0.5 MG: 0.5 TABLET ORAL at 21:11

## 2017-06-13 RX ADMIN — SULFAMETHOXAZOLE AND TRIMETHOPRIM 1 TABLET: 800; 160 TABLET ORAL at 09:20

## 2017-06-13 RX ADMIN — OLANZAPINE 5 MG: 5 TABLET, ORALLY DISINTEGRATING ORAL at 09:20

## 2017-06-13 RX ADMIN — CETIRIZINE HYDROCHLORIDE 10 MG: 10 TABLET ORAL at 09:20

## 2017-06-13 RX ADMIN — CLONAZEPAM 0.5 MG: 0.5 TABLET ORAL at 09:20

## 2017-06-13 ASSESSMENT — PAIN SCALES - GENERAL
PAINLEVEL_OUTOF10: 5
PAINLEVEL_OUTOF10: 4

## 2017-06-14 VITALS
OXYGEN SATURATION: 100 % | TEMPERATURE: 98 F | SYSTOLIC BLOOD PRESSURE: 101 MMHG | RESPIRATION RATE: 18 BRPM | DIASTOLIC BLOOD PRESSURE: 58 MMHG | HEART RATE: 104 BPM

## 2017-06-14 PROCEDURE — 99238 HOSP IP/OBS DSCHRG MGMT 30/<: CPT | Performed by: NURSE PRACTITIONER

## 2017-06-14 PROCEDURE — 5130000000 HC BRIDGE APPOINTMENT

## 2017-06-14 PROCEDURE — 6370000000 HC RX 637 (ALT 250 FOR IP): Performed by: NURSE PRACTITIONER

## 2017-06-14 PROCEDURE — 6370000000 HC RX 637 (ALT 250 FOR IP): Performed by: PSYCHIATRY & NEUROLOGY

## 2017-06-14 RX ORDER — NICOTINE 21 MG/24HR
1 PATCH, TRANSDERMAL 24 HOURS TRANSDERMAL DAILY
Status: DISCONTINUED | OUTPATIENT
Start: 2017-06-14 | End: 2017-06-14 | Stop reason: HOSPADM

## 2017-06-14 RX ORDER — TRAZODONE HYDROCHLORIDE 50 MG/1
50 TABLET ORAL NIGHTLY PRN
Qty: 30 TABLET | Refills: 0 | Status: SHIPPED | OUTPATIENT
Start: 2017-06-14 | End: 2017-11-28

## 2017-06-14 RX ORDER — NICOTINE 21 MG/24HR
1 PATCH, TRANSDERMAL 24 HOURS TRANSDERMAL DAILY
Qty: 30 PATCH | Refills: 0 | Status: SHIPPED | OUTPATIENT
Start: 2017-06-14 | End: 2017-08-23 | Stop reason: ALTCHOICE

## 2017-06-14 RX ORDER — CLONAZEPAM 0.5 MG/1
0.5 TABLET ORAL 2 TIMES DAILY
Qty: 60 TABLET | Refills: 0 | Status: SHIPPED | OUTPATIENT
Start: 2017-06-14 | End: 2017-12-19 | Stop reason: SDUPTHER

## 2017-06-14 RX ADMIN — CLONAZEPAM 0.5 MG: 0.5 TABLET ORAL at 08:43

## 2017-06-14 RX ADMIN — CETIRIZINE HYDROCHLORIDE 10 MG: 10 TABLET ORAL at 08:43

## 2017-06-14 RX ADMIN — SULFAMETHOXAZOLE AND TRIMETHOPRIM 1 TABLET: 800; 160 TABLET ORAL at 08:44

## 2017-07-31 ENCOUNTER — TRANSCRIBE ORDERS (OUTPATIENT)
Dept: ADMINISTRATIVE | Facility: HOSPITAL | Age: 27
End: 2017-07-31

## 2017-07-31 DIAGNOSIS — N64.3 GALACTORRHEA NOT ASSOCIATED WITH CHILDBIRTH: ICD-10-CM

## 2017-07-31 DIAGNOSIS — R51.9 HEADACHE, UNSPECIFIED HEADACHE TYPE: ICD-10-CM

## 2017-07-31 DIAGNOSIS — F41.9 ANXIETY DISORDER, UNSPECIFIED TYPE: ICD-10-CM

## 2017-07-31 DIAGNOSIS — H10.31 ACUTE CONJUNCTIVITIS OF RIGHT EYE, UNSPECIFIED ACUTE CONJUNCTIVITIS TYPE: Primary | ICD-10-CM

## 2017-07-31 DIAGNOSIS — H53.2 DIPLOPIA: ICD-10-CM

## 2017-08-03 ENCOUNTER — OFFICE VISIT (OUTPATIENT)
Dept: NEUROSURGERY | Facility: CLINIC | Age: 27
End: 2017-08-03

## 2017-08-03 ENCOUNTER — HOSPITAL ENCOUNTER (OUTPATIENT)
Dept: MRI IMAGING | Facility: HOSPITAL | Age: 27
Discharge: HOME | End: 2017-08-03
Attending: FAMILY MEDICINE | Admitting: FAMILY MEDICINE

## 2017-08-03 VITALS
SYSTOLIC BLOOD PRESSURE: 110 MMHG | BODY MASS INDEX: 27.64 KG/M2 | WEIGHT: 156 LBS | DIASTOLIC BLOOD PRESSURE: 70 MMHG | HEIGHT: 63 IN

## 2017-08-03 DIAGNOSIS — H10.31 ACUTE CONJUNCTIVITIS OF RIGHT EYE, UNSPECIFIED ACUTE CONJUNCTIVITIS TYPE: ICD-10-CM

## 2017-08-03 DIAGNOSIS — F17.200 SMOKER: ICD-10-CM

## 2017-08-03 DIAGNOSIS — N64.3 GALACTORRHEA NOT ASSOCIATED WITH CHILDBIRTH: ICD-10-CM

## 2017-08-03 DIAGNOSIS — R51.9 HEADACHE, UNSPECIFIED HEADACHE TYPE: ICD-10-CM

## 2017-08-03 DIAGNOSIS — H53.2 DIPLOPIA: ICD-10-CM

## 2017-08-03 DIAGNOSIS — G93.2 PSEUDOTUMOR CEREBRI: Primary | ICD-10-CM

## 2017-08-03 DIAGNOSIS — F41.9 ANXIETY DISORDER, UNSPECIFIED TYPE: ICD-10-CM

## 2017-08-03 PROCEDURE — A9577 INJ MULTIHANCE: HCPCS | Performed by: FAMILY MEDICINE

## 2017-08-03 PROCEDURE — 99212 OFFICE O/P EST SF 10 MIN: CPT | Performed by: NURSE PRACTITIONER

## 2017-08-03 PROCEDURE — 70553 MRI BRAIN STEM W/O & W/DYE: CPT

## 2017-08-03 PROCEDURE — 82565 ASSAY OF CREATININE: CPT

## 2017-08-03 PROCEDURE — 0 GADOBENATE DIMEGLUMINE 529 MG/ML SOLUTION: Performed by: FAMILY MEDICINE

## 2017-08-03 RX ADMIN — GADOBENATE DIMEGLUMINE 15 ML: 529 INJECTION, SOLUTION INTRAVENOUS at 13:15

## 2017-08-03 NOTE — PROGRESS NOTES
"    Chief complaint:   Chief Complaint   Patient presents with   • Results     MRI results        Subjective     HPI: This is a 26-year-old female patient who comes in today for a check on her shunt after having an MRI done.  She says that she is been having worsening headaches over the last couple months.  She has not been to see her eye doctor over the last 2 months to check for papilledema.  She is also been having galactorrhea in her primary care doctor sent for an MRI include pituitary as well.  She says that her headaches did appear to be getting better.  Again she is here to have her shunt checked.    Review of Systems   Eyes: Positive for visual disturbance.   Neurological: Positive for headaches.         Objective      Vital Signs  /70 (BP Location: Right arm, Patient Position: Sitting)  Ht 63\" (160 cm)  Wt 156 lb (70.8 kg)  BMI 27.63 kg/m2    Physical Exam   Constitutional: She is oriented to person, place, and time. She appears well-developed and well-nourished.   HENT:   Head: Normocephalic.   Eyes: EOM are normal. Pupils are equal, round, and reactive to light.   Neck: Normal range of motion.   Pulmonary/Chest: Effort normal.   Musculoskeletal: Normal range of motion.   Neurological: She is alert and oriented to person, place, and time. She has normal strength and normal reflexes. No cranial nerve deficit or sensory deficit. Gait normal. GCS eye subscore is 4. GCS verbal subscore is 5. GCS motor subscore is 6.   Skin: Skin is warm.   Psychiatric: She has a normal mood and affect. Her speech is normal and behavior is normal. Thought content normal.       Results Review: Shunt shows being set at 0.5 the last time she was here was set at 1.0.  I am going to turn her shunt back to 1.0.          Assessment/Plan: At this point she is given a wait for the MRI results and follow-up with her primary care doctor as well as her ophthalmologist.  She does have an appointment with us in February 2018.  She " was told to call us if she have any further problems.  BMI shows that she is overweight.  BMI chart was given the patient.  She is a smoker.  Smoking cessation classes were given to the patient.         Kelsey was seen today for results.    Diagnoses and all orders for this visit:    Pseudotumor cerebri    Smoker    BMI 27.0-27.9,adult        I discussed the patients findings and my recommendations with patient  Ean Treviño, GREYSON  08/03/17  1:03 PM

## 2017-08-04 ENCOUNTER — TELEPHONE (OUTPATIENT)
Dept: NEUROSURGERY | Facility: CLINIC | Age: 27
End: 2017-08-04

## 2017-08-04 LAB — CREAT BLDA-MCNC: 0.8 MG/DL (ref 0.6–1.3)

## 2017-08-04 NOTE — TELEPHONE ENCOUNTER
Pt called this morning wanting to speak with shawna about mri results.  She was very tearful and worried that no one has contacted her for results.  I informed pt that Shawna was not in office today and that she could speak with Odilon.  She was insistent that she did not want to leave a voicemail.  I spoke with Odilon and informed her of why the pt was calling.  Odilon told me that since we were not the ordering physican then we could not be able to release any results.  I informed the pt of this information, she was not happy about not getting her results.  I informed her that she would need to contact the ordering provider for results.

## 2017-08-23 ENCOUNTER — OFFICE VISIT (OUTPATIENT)
Dept: SURGERY | Age: 27
End: 2017-08-23
Payer: COMMERCIAL

## 2017-08-23 VITALS
SYSTOLIC BLOOD PRESSURE: 112 MMHG | HEIGHT: 62 IN | RESPIRATION RATE: 16 BRPM | DIASTOLIC BLOOD PRESSURE: 74 MMHG | HEART RATE: 84 BPM | WEIGHT: 163.6 LBS | BODY MASS INDEX: 30.11 KG/M2

## 2017-08-23 DIAGNOSIS — N64.52 NIPPLE DISCHARGE: Primary | ICD-10-CM

## 2017-08-23 PROCEDURE — 99214 OFFICE O/P EST MOD 30 MIN: CPT | Performed by: SURGERY

## 2017-08-23 RX ORDER — HYDROCODONE BITARTRATE AND ACETAMINOPHEN 10; 325 MG/1; MG/1
TABLET ORAL
Refills: 0 | COMMUNITY
Start: 2017-07-31 | End: 2017-12-28

## 2017-09-13 ENCOUNTER — OFFICE VISIT (OUTPATIENT)
Dept: SURGERY | Age: 27
End: 2017-09-13
Payer: COMMERCIAL

## 2017-09-13 VITALS
SYSTOLIC BLOOD PRESSURE: 112 MMHG | BODY MASS INDEX: 30.51 KG/M2 | WEIGHT: 165.8 LBS | DIASTOLIC BLOOD PRESSURE: 68 MMHG | HEIGHT: 62 IN | HEART RATE: 88 BPM

## 2017-09-13 DIAGNOSIS — N64.3 GALACTORRHEA: ICD-10-CM

## 2017-09-13 PROCEDURE — 99213 OFFICE O/P EST LOW 20 MIN: CPT | Performed by: SURGERY

## 2017-09-14 ENCOUNTER — TELEPHONE (OUTPATIENT)
Dept: SURGERY | Age: 27
End: 2017-09-14

## 2017-09-20 ENCOUNTER — TELEPHONE (OUTPATIENT)
Dept: UROLOGY | Facility: CLINIC | Age: 27
End: 2017-09-20

## 2017-09-20 DIAGNOSIS — R10.9 FLANK PAIN: Primary | ICD-10-CM

## 2017-10-16 ENCOUNTER — TRANSCRIBE ORDERS (OUTPATIENT)
Dept: ADMINISTRATIVE | Facility: HOSPITAL | Age: 27
End: 2017-10-16

## 2017-10-16 DIAGNOSIS — N20.0 KIDNEY STONE: Primary | ICD-10-CM

## 2017-11-28 ENCOUNTER — OFFICE VISIT (OUTPATIENT)
Dept: PRIMARY CARE CLINIC | Age: 27
End: 2017-11-28
Payer: MEDICAID

## 2017-11-28 VITALS
TEMPERATURE: 97.6 F | HEIGHT: 64 IN | HEART RATE: 88 BPM | WEIGHT: 178.5 LBS | BODY MASS INDEX: 30.48 KG/M2 | DIASTOLIC BLOOD PRESSURE: 64 MMHG | OXYGEN SATURATION: 97 % | SYSTOLIC BLOOD PRESSURE: 110 MMHG

## 2017-11-28 DIAGNOSIS — M54.12 CERVICAL RADICULOPATHY: ICD-10-CM

## 2017-11-28 DIAGNOSIS — G89.29 UPPER BACK PAIN, CHRONIC: ICD-10-CM

## 2017-11-28 DIAGNOSIS — M54.50 CHRONIC MIDLINE LOW BACK PAIN WITHOUT SCIATICA: ICD-10-CM

## 2017-11-28 DIAGNOSIS — G89.29 CHRONIC MIDLINE LOW BACK PAIN WITHOUT SCIATICA: ICD-10-CM

## 2017-11-28 DIAGNOSIS — G93.2 PSEUDOTUMOR CEREBRI: Primary | ICD-10-CM

## 2017-11-28 DIAGNOSIS — H53.9 VISION CHANGES: ICD-10-CM

## 2017-11-28 DIAGNOSIS — N64.3 GALACTORRHEA: ICD-10-CM

## 2017-11-28 DIAGNOSIS — M54.9 UPPER BACK PAIN, CHRONIC: ICD-10-CM

## 2017-11-28 PROCEDURE — 99204 OFFICE O/P NEW MOD 45 MIN: CPT | Performed by: PEDIATRICS

## 2017-11-28 RX ORDER — ACETAZOLAMIDE 250 MG/1
250 TABLET ORAL 2 TIMES DAILY
Qty: 60 TABLET | Refills: 5 | Status: SHIPPED | OUTPATIENT
Start: 2017-11-28 | End: 2017-12-28

## 2017-11-28 RX ORDER — BUTALBITAL AND ACETAMINOPHEN 50; 300 MG/1; MG/1
50 TABLET ORAL 3 TIMES DAILY
COMMUNITY
End: 2017-12-28

## 2017-11-28 RX ORDER — HYDROCODONE BITARTRATE AND ACETAMINOPHEN 10; 325 MG/1; MG/1
1 TABLET ORAL EVERY 6 HOURS PRN
Qty: 55 TABLET | Refills: 0 | Status: SHIPPED | OUTPATIENT
Start: 2017-11-28 | End: 2017-12-27 | Stop reason: SDUPTHER

## 2017-11-28 RX ORDER — AMOXICILLIN AND CLAVULANATE POTASSIUM 875; 125 MG/1; MG/1
1 TABLET, FILM COATED ORAL 2 TIMES DAILY
COMMUNITY
End: 2017-12-11 | Stop reason: ALTCHOICE

## 2017-11-28 RX ORDER — IBUPROFEN 800 MG/1
800 TABLET ORAL EVERY 6 HOURS PRN
COMMUNITY
End: 2018-05-14 | Stop reason: SDUPTHER

## 2017-11-28 RX ORDER — PREDNISONE 10 MG/1
10 TABLET ORAL DAILY
COMMUNITY
End: 2017-12-28

## 2017-11-28 ASSESSMENT — ENCOUNTER SYMPTOMS
NAUSEA: 0
SORE THROAT: 0
SINUS PRESSURE: 0
COUGH: 0
ABDOMINAL PAIN: 0
VOMITING: 0
DIARRHEA: 0
EYE PAIN: 0
BACK PAIN: 0
WHEEZING: 0
SHORTNESS OF BREATH: 0

## 2017-11-29 ENCOUNTER — TELEPHONE (OUTPATIENT)
Dept: PRIMARY CARE CLINIC | Age: 27
End: 2017-11-29

## 2017-11-29 NOTE — TELEPHONE ENCOUNTER
Pt called and started Diamox last night. Took one last night and one this morning. She is having chest pains this morning. Talked with Dalia Shearer and he said for her to take 1/2 of one this evening and 1/2 in the morning and see if that helps. She will have to adjust to medication.  Pt aware and will call back if it doesn't help

## 2017-11-29 NOTE — PATIENT INSTRUCTIONS
Patient Education        Learning About Pseudotumor Cerebri  What is pseudotumor cerebri? Pseudotumor cerebri (say \"arl-avh-RIE-jairon SAIR-uh-michael\") is an increase in pressure of the fluid that surrounds the brain. Your doctor may also call the condition \"idiopathic intracranial hypertension. \" Normally, this clear fluid acts like a buffer to protect the brain. It is called cerebrospinal fluid, or CSF. It's not clear what makes the CSF pressure rise. Some medicines may cause it. Sleep apnea, obesity, and anemia may also play a part. The pressure may build over time. The rising pressure can make the optic nerve swell. The optic nerve is at the back of the eye. It carries visual information from the eye to the brain. The swelling may damage the nerve and lead to permanent loss of some or all of the eyesight. There are several symptoms of rising CSF pressure. Some symptoms may be present all the time. Some might come and go. Symptoms include:  · Severe headaches. They sometimes come with nausea and vomiting. The pain may be centered behind the eyes. · A hissing or roaring sound in the ears that keeps time with your heartbeat. · Problems with vision. You may not be able to see well at the edge of your field of view. You may also lose center vision. It may happen now and then, in one or both eyes, and last for a few seconds at a time. The word \"pseudotumor\" may sound scary. But it's not a brain tumor. The condition gets its name because the pressure inside the skull can mimic what happens when a person has a tumor. How is it treated? · If you are taking medicines that could be raising your CSF pressure, your doctor may change your medicines. · You may get medicines to help your body make less CSF. This can help lower the pressure around the brain. · Your doctor may also give you medicine for headaches.   · If sleep apnea, obesity, or anemia may be causing the CSF pressure to rise, your doctor may treat those help.  Follow-up care is a key part of your treatment and safety. Be sure to make and go to all appointments, and call your doctor if you are having problems. It's also a good idea to know your test results and keep a list of the medicines you take. How can you care for yourself at home? · Be safe with medicines. Read and follow all instructions on the label. ¨ If the doctor gave you a prescription medicine for pain, take it as prescribed. ¨ If you are not taking a prescription pain medicine, ask your doctor if you can take an over-the-counter medicine. · Try using a heating pad on a low or medium setting for 15 to 20 minutes every 2 or 3 hours. Try a warm shower in place of one session with the heating pad. You can also buy single-use heat wraps that last up to 8 hours. · You can also try an ice pack for 10 to 15 minutes every 2 to 3 hours. There isn't strong evidence that either heat or ice will help. But you can try them to see if they help you. · Don't spend too long in one position. Take short breaks to move around and change positions. · Wear a seat belt and shoulder harness when you are in a car. · Sleep with a pillow under your head and neck that keeps your neck straight. · If you were given a neck brace (cervical collar) to limit neck motion, wear it as instructed for as many days as your doctor tells you to. Do not wear it longer than you were told to. Wearing a brace for too long can lead to neck stiffness and can weaken the neck muscles. · Follow your doctor's instructions for gentle neck-stretching exercises. · Do not smoke. Smoking can slow healing of your discs. If you need help quitting, talk to your doctor about stop-smoking programs and medicines. These can increase your chances of quitting for good. · Avoid strenuous work or exercise until your doctor says it is okay. When should you call for help? Call 911 anytime you think you may need emergency care.  For example, call if:  · You

## 2017-11-29 NOTE — PROGRESS NOTES
[address] [facility], [address]  Phone [phone]   Fax [phone]      OFFICE VISIT: 2017    Kirti Britt- : 1990      HPI  Reason For Visit:  Carlos Garrison is a 33 y.o. Health Maintenance flu-decline  Pap-2 mnths ago  pneumo-decline  HIV- decline  Date of Most Recent Physical: over a year    The patient presents today for est care    She has a history of pseudotumor cerebri. She had a shunt placed by Dr. Deveron Primrose at [facility].   She is on prednisone daily    irregular periods  She recently had a Mirena IUD placed due to her irregular periods    She follows with opthalmology and recently lost some of there peripheral vision. She had a visual field test last week and it was normal.  She also notes that her pseudotumor has been acting up lately  She has vision changes that seem to be getting worse. She has double vision in the mornings. She is also having Neck pain    She is unable to take topamax due to kidney stones   She has not been on acetazolamide. She is having problems with pain in her neck and upper back and radiation to her upper arms. She states that this is a burning sensation. height is 5' 4\" (1.626 m) and weight is 178 lb 8 oz (81 kg). Her temporal temperature is 97.6 °F (36.4 °C). Her blood pressure is 110/64 and her pulse is 88. Her oxygen saturation is 97%. Body mass index is 30.64 kg/m². I have reviewed the following with the Ms. Merino   Lab Review   No visits with results within 6 Month(s) from this visit.    Latest known visit with results is:   Admission on 2015, Discharged on 2015   Component Date Value    WBC 2015 9.33     RBC 2015 4.49     Hemoglobin 2015 13.6     Hematocrit 2015 40.0     MCV 2015 89.1     MCH 2015 30.3     MCHC 2015 34.0     RDW 2015 12.2     Platelets  247     MPV 2015 9.9     Neutrophils # 2015 6.58     Lymphocytes 11/11/2015 2.08     Monocytes 11/11/2015 0.47     Eosinophils % 11/11/2015 0.18     Basophils 11/11/2015 0.02     Neutrophils % 11/11/2015 70.6*    Lymphocytes 11/11/2015 22.3     Monocytes % 11/11/2015 5.0     Eosinophils % 11/11/2015 1.9     Basophils % 11/11/2015 0.2     Calcium 11/11/2015 9.2     Glucose 11/11/2015 96     CREATININE 11/11/2015 0.7     Gfr Calculated 11/11/2015 109     BUN 11/11/2015 11     Sodium 11/11/2015 141     Potassium 11/11/2015 3.8     Chloride 11/11/2015 103     CO2 11/11/2015 24     Anion Gap 11/11/2015 14     Protime 11/11/2015 13.3     INR 11/11/2015 1.04     PTT 11/11/2015 27.8     HCG(Urine) Pregnancy Test 11/11/2015 NEGATIVE     Color, UA 11/11/2015 YELLOW     Character, Urine 11/11/2015 CLOUDY*    Glucose, Ur 11/11/2015 NEGATIVE     Bilirubin Urine 11/11/2015 NEGATIVE     Ketones, Urine 11/11/2015 NEGATIVE     Specific Gravity, Urine 11/11/2015 1.019     Occult Blood,Urine 11/11/2015 NEGATIVE     pH, UA 11/11/2015 6.0     Protein, UA 11/11/2015 NEGATIVE     Urobilinogen, Urine 11/11/2015 0.2     Nitrite, Urine 11/11/2015 NEGATIVE     Leukocyte Esterase, Urine 11/11/2015 SMALL*    Urin Manual Y/N 11/11/2015 NO     RBC, UA 11/11/2015 5*    WBC, UA 11/11/2015 16*    Epithelial Cells, Wet Pr* 11/11/2015 8     Bacteria, UA 11/11/2015 TRACE     Hyaline Casts, UA 11/11/2015 6     Sed Rate 11/11/2015 12     PTT 11/11/2015 26.8     AntiThromb III Func 11/11/2015 100     WBC 11/12/2015 6.81     RBC 11/12/2015 4.31     Hemoglobin 11/12/2015 12.7     Hematocrit 11/12/2015 38.9     MCV 11/12/2015 90.3     MCH 11/12/2015 29.5     MCHC 11/12/2015 32.6*    RDW 11/12/2015 12.1     Platelets 87/22/7675 232     MPV 11/12/2015 10.1     Neutrophils # 11/12/2015 3.10     Lymphocytes 11/12/2015 2.88     Monocytes 11/12/2015 0.55     Eosinophils % 11/12/2015 0.27     Basophils 11/12/2015 0.01     Neutrophils % 11/12/2015 45.5*    Lymphocytes 11/12/2015 42.3*    Monocytes % 11/12/2015 8.1     Eosinophils % 11/12/2015 4.0     Basophils % 11/12/2015 0.1     Calcium 11/12/2015 9.3     Glucose 11/12/2015 95     CREATININE 11/12/2015 0.8     Gfr Calculated 11/12/2015 94     BUN 11/12/2015 10     Sodium 11/12/2015 143     Potassium 11/12/2015 3.8     Chloride 11/12/2015 105     CO2 11/12/2015 25     Anion Gap 11/12/2015 14     WBC, CSF 11/12/2015 1     RBC, CSF 11/12/2015 0     Appearance, CSF 11/12/2015 CLEAR,COLORLESS     Supernate 11/12/2015 COLORLESS,CLEAR     Tube Number + CELL CT + * 11/12/2015 2     Protein,Total CSF 11/12/2015 17     Glucose, CSF 11/12/2015 65     Protein C-Functional 11/14/2015 154     Protein S Activity 11/14/2015 70     Multiple Sclerosis Panel 11/16/2015       Copies of these are in the chart. Current Outpatient Prescriptions   Medication Sig Dispense Refill    predniSONE (DELTASONE) 10 MG tablet Take 10 mg by mouth daily      amoxicillin-clavulanate (AUGMENTIN) 875-125 MG per tablet Take 1 tablet by mouth 2 times daily      ibuprofen (ADVIL;MOTRIN) 800 MG tablet Take 800 mg by mouth every 6 hours as needed for Pain      Butalbital-Acetaminophen  MG TABS Take 50 mg by mouth 3 times daily      acetaZOLAMIDE (DIAMOX) 250 MG tablet Take 1 tablet by mouth 2 times daily 60 tablet 5    HYDROcodone-acetaminophen (NORCO)  MG per tablet Take 1 tablet by mouth every 6 hours as needed for Pain . 55 tablet 0    HYDROcodone-acetaminophen (NORCO)  MG per tablet TAKE 1 TABLET BY MOUTH 3 TIMES A DAY AS NEEDED FOR PAIN  0    clonazePAM (KLONOPIN) 0.5 MG tablet Take 1 tablet by mouth 2 times daily 60 tablet 0    fexofenadine (ALLEGRA) 60 MG tablet Take 60 mg by mouth      linaclotide (LINZESS) 290 MCG CAPS capsule Take 290 mcg by mouth      PROAIR  (90 BASE) MCG/ACT inhaler INHALE 2 PUFFS Q 4 H  0     No current facility-administered medications for this visit. Management Donald Desouza MD       PLAN      ICD-10-CM ICD-9-CM    1. Pseudotumor cerebri G93.2 348. [address] [facility] Raffy Farr MD   2. Vision changes H53.9 368.9 She is following with ophthalmology. Has symptoms consistent with glaucoma. History this is a woman I should help with this as well. 3. Galactorrhea O92.6 611.6 She recently had hormonal labs done at her primary care physician prior to today. We will check these records and review them instead of ordering complete blood work today   She states that she believes her prolactin level was normal    4. Cervical radiculopathy M54.12 723.4 XR CERVICAL SPINE (4-5 VIEWS)   5. Upper back pain, chronic M54.9 724.5 XR THORACIC SPINE (3 VIEWS)    G89.29 338.29    6. Chronic midline low back pain without sciatica M54.5 724.2 XR LUMBAR SPINE (MIN 4 VIEWS)    G89.29 338.29 HYDROcodone-acetaminophen (NORCO)  MG per tablet      [address] Vianey Chow MD       Orders Placed This Encounter   Procedures    XR CERVICAL SPINE (4-5 VIEWS)    XR THORACIC SPINE (3 VIEWS)    XR LUMBAR SPINE (MIN 4 VIEWS)   [address] Vianey Chow MD        Return in about 2 weeks (around 12/12/2017). Patient Instructions     Patient Education        Learning About Pseudotumor Cerebri  What is pseudotumor cerebri? Pseudotumor cerebri (say \"dhv-czu-BTR-jairon SAIR--michael\") is an increase in pressure of the fluid that surrounds the brain. Your doctor may also call the condition \"idiopathic intracranial hypertension. \" Normally, this clear fluid acts like a buffer to protect the brain. It is called cerebrospinal fluid, or CSF. It's not clear what makes the CSF pressure rise. Some medicines may cause it. Sleep apnea, obesity, and anemia may also play a part. The pressure may build over time. The rising pressure can make the optic nerve swell. The optic nerve is at the back of the eye.  It carries visual information from the eye to the brain. The swelling may damage the nerve and lead to permanent loss of some or all of the eyesight. There are several symptoms of rising CSF pressure. Some symptoms may be present all the time. Some might come and go. Symptoms include:  · Severe headaches. They sometimes come with nausea and vomiting. The pain may be centered behind the eyes. · A hissing or roaring sound in the ears that keeps time with your heartbeat. · Problems with vision. You may not be able to see well at the edge of your field of view. You may also lose center vision. It may happen now and then, in one or both eyes, and last for a few seconds at a time. The word \"pseudotumor\" may sound scary. But it's not a brain tumor. The condition gets its name because the pressure inside the skull can mimic what happens when a person has a tumor. How is it treated? · If you are taking medicines that could be raising your CSF pressure, your doctor may change your medicines. · You may get medicines to help your body make less CSF. This can help lower the pressure around the brain. · Your doctor may also give you medicine for headaches. · If sleep apnea, obesity, or anemia may be causing the CSF pressure to rise, your doctor may treat those problems. · If your vision is getting worse, you may have surgery to make a small opening on the optic nerve to reduce swelling. · Your doctor may implant small tubes inside the skull to drain the extra fluid. This helps lower the pressure around the brain. Follow-up care is a key part of your treatment and safety. Be sure to make and go to all appointments, and call your doctor if you are having problems. It's also a good idea to know your test results and keep a list of the medicines you take. Where can you learn more? Go to https://chpepiceweb.[employer]. org and sign in to your [employer] account.  Enter [phone] in the [employer] box to learn more about \"Learning About Pseudotumor Cerebri. \"     If you do not have an account, please click on the \"Sign Up Now\" link. Current as of: March 3, 2017  Content Version: 11.3  © 0186-7543 [employer], [employer]. Care instructions adapted under license by [facility] ([facility]). If you have questions about a medical condition or this instruction, always ask your healthcare professional. [address] any warranty or liability for your use of this information. Patient Education        Pinched Nerve in the Neck: Care Instructions  Your Care Instructions  A pinched nerve in the neck happens when a vertebra or disc in the upper part of your spine is damaged. This damage can happen because of an injury. Or it can just happen with age. The changes caused by the damage may put pressure on a nearby nerve root, pinching it. This causes symptoms such as sharp pain in your neck, shoulder, arm, or back. You may also have tingling or numbness. Sometimes it makes your arm weaker. The symptoms are usually worse when you turn your head or strain your neck. For many people, the symptoms get better over time and finally go away. Early treatment usually includes medicines for pain and swelling. Sometimes physical therapy and special exercises may help. Follow-up care is a key part of your treatment and safety. Be sure to make and go to all appointments, and call your doctor if you are having problems. It's also a good idea to know your test results and keep a list of the medicines you take. How can you care for yourself at home? · Be safe with medicines. Read and follow all instructions on the label. ¨ If the doctor gave you a prescription medicine for pain, take it as prescribed. ¨ If you are not taking a prescription pain medicine, ask your doctor if you can take an over-the-counter medicine. · Try using a heating pad on a low or medium setting for 15 to 20 minutes every 2 or 3 hours.  Try a warm shower in place of one session with the heating pad. You can also buy single-use heat wraps that last up to 8 hours. · You can also try an ice pack for 10 to 15 minutes every 2 to 3 hours. There isn't strong evidence that either heat or ice will help. But you can try them to see if they help you. · Don't spend too long in one position. Take short breaks to move around and change positions. · Wear a seat belt and shoulder harness when you are in a car. · Sleep with a pillow under your head and neck that keeps your neck straight. · If you were given a neck brace (cervical collar) to limit neck motion, wear it as instructed for as many days as your doctor tells you to. Do not wear it longer than you were told to. Wearing a brace for too long can lead to neck stiffness and can weaken the neck muscles. · Follow your doctor's instructions for gentle neck-stretching exercises. · Do not smoke. Smoking can slow healing of your discs. If you need help quitting, talk to your doctor about stop-smoking programs and medicines. These can increase your chances of quitting for good. · Avoid strenuous work or exercise until your doctor says it is okay. When should you call for help? Call 911 anytime you think you may need emergency care. For example, call if:  · You are unable to move an arm or a leg at all. Call your doctor now or seek immediate medical care if:  · You have new or worse symptoms in your arms, legs, chest, belly, or buttocks. Symptoms may include:  ¨ Numbness or tingling. ¨ Weakness. ¨ Pain. · You lose bladder or bowel control. Watch closely for changes in your health, and be sure to contact your doctor if:  · You are not getting better as expected. Where can you learn more? Go to https://[employer]Media.com.[employer]og. org and sign in to your [employer] account. Enter D217 in the [employer] box to learn more about \"Pinched Nerve in the Neck: Care Instructions. \"     If you do not have an account, please click on the \"Sign Up Now\"

## 2017-11-30 ENCOUNTER — HOSPITAL ENCOUNTER (OUTPATIENT)
Dept: GENERAL RADIOLOGY | Age: 27
Discharge: HOME | End: 2017-11-30
Payer: MEDICAID

## 2017-11-30 DIAGNOSIS — M54.12 CERVICAL RADICULOPATHY: ICD-10-CM

## 2017-11-30 DIAGNOSIS — M54.9 UPPER BACK PAIN, CHRONIC: ICD-10-CM

## 2017-11-30 DIAGNOSIS — G89.29 CHRONIC MIDLINE LOW BACK PAIN WITHOUT SCIATICA: ICD-10-CM

## 2017-11-30 DIAGNOSIS — M54.50 CHRONIC MIDLINE LOW BACK PAIN WITHOUT SCIATICA: ICD-10-CM

## 2017-11-30 DIAGNOSIS — G89.29 UPPER BACK PAIN, CHRONIC: ICD-10-CM

## 2017-12-05 ENCOUNTER — APPOINTMENT (OUTPATIENT)
Dept: LAB | Facility: HOSPITAL | Age: 27
End: 2017-12-05
Attending: NEUROLOGICAL SURGERY

## 2017-12-05 ENCOUNTER — OFFICE VISIT (OUTPATIENT)
Dept: NEUROSURGERY | Facility: CLINIC | Age: 27
End: 2017-12-05

## 2017-12-05 VITALS
HEIGHT: 63 IN | SYSTOLIC BLOOD PRESSURE: 134 MMHG | WEIGHT: 168 LBS | DIASTOLIC BLOOD PRESSURE: 64 MMHG | BODY MASS INDEX: 29.77 KG/M2

## 2017-12-05 DIAGNOSIS — M54.12 CERVICAL RADICULOPATHY: ICD-10-CM

## 2017-12-05 DIAGNOSIS — M54.41 ACUTE BILATERAL LOW BACK PAIN WITH BILATERAL SCIATICA: ICD-10-CM

## 2017-12-05 DIAGNOSIS — G89.29 CHRONIC NONINTRACTABLE HEADACHE, UNSPECIFIED HEADACHE TYPE: ICD-10-CM

## 2017-12-05 DIAGNOSIS — G93.2 PSEUDOTUMOR CEREBRI: Primary | ICD-10-CM

## 2017-12-05 DIAGNOSIS — R51.9 CHRONIC NONINTRACTABLE HEADACHE, UNSPECIFIED HEADACHE TYPE: ICD-10-CM

## 2017-12-05 DIAGNOSIS — F17.200 SMOKER: ICD-10-CM

## 2017-12-05 DIAGNOSIS — M54.42 ACUTE BILATERAL LOW BACK PAIN WITH BILATERAL SCIATICA: ICD-10-CM

## 2017-12-05 PROBLEM — M54.40 ACUTE BILATERAL LOW BACK PAIN WITH SCIATICA: Status: ACTIVE | Noted: 2017-12-05

## 2017-12-05 PROCEDURE — 86617 LYME DISEASE ANTIBODY: CPT | Performed by: NEUROLOGICAL SURGERY

## 2017-12-05 PROCEDURE — 99214 OFFICE O/P EST MOD 30 MIN: CPT | Performed by: NEUROLOGICAL SURGERY

## 2017-12-05 PROCEDURE — 36415 COLL VENOUS BLD VENIPUNCTURE: CPT | Performed by: NEUROLOGICAL SURGERY

## 2017-12-05 RX ORDER — BUTALBITAL, ACETAMINOPHEN AND CAFFEINE 300; 40; 50 MG/1; MG/1; MG/1
CAPSULE ORAL
Refills: 0 | COMMUNITY
Start: 2017-11-20 | End: 2018-02-13

## 2017-12-05 RX ORDER — FEXOFENADINE HCL AND PSEUDOEPHEDRINE HCI 180; 240 MG/1; MG/1
1 TABLET, EXTENDED RELEASE ORAL
COMMUNITY
End: 2019-05-30

## 2017-12-05 RX ORDER — ALBUTEROL SULFATE 90 UG/1
AEROSOL, METERED RESPIRATORY (INHALATION)
Refills: 2 | COMMUNITY
Start: 2017-10-10 | End: 2019-08-13

## 2017-12-05 RX ORDER — PREDNISONE 10 MG/1
10 TABLET ORAL
COMMUNITY
End: 2018-02-13

## 2017-12-05 RX ORDER — IBUPROFEN 800 MG/1
TABLET ORAL
Refills: 0 | COMMUNITY
Start: 2017-11-20 | End: 2019-08-13

## 2017-12-05 RX ORDER — HYDROCODONE BITARTRATE AND ACETAMINOPHEN 10; 325 MG/1; MG/1
TABLET ORAL
Refills: 0 | COMMUNITY
Start: 2017-11-27 | End: 2020-10-01 | Stop reason: DRUGHIGH

## 2017-12-05 RX ORDER — AMOXICILLIN AND CLAVULANATE POTASSIUM 875; 125 MG/1; MG/1
TABLET, FILM COATED ORAL
COMMUNITY
End: 2018-02-13

## 2017-12-05 RX ORDER — CLONAZEPAM 0.5 MG/1
TABLET ORAL
Refills: 0 | COMMUNITY
Start: 2017-11-20 | End: 2018-02-13

## 2017-12-05 RX ORDER — ACETAZOLAMIDE 250 MG/1
TABLET ORAL
Refills: 5 | COMMUNITY
Start: 2017-11-28 | End: 2018-02-13

## 2017-12-05 NOTE — PATIENT INSTRUCTIONS
Steps to Quit Smoking   Smoking tobacco can be harmful to your health and can affect almost every organ in your body. Smoking puts you, and those around you, at risk for developing many serious chronic diseases. Quitting smoking is difficult, but it is one of the best things that you can do for your health. It is never too late to quit.  WHAT ARE THE BENEFITS OF QUITTING SMOKING?  When you quit smoking, you lower your risk of developing serious diseases and conditions, such as:  · Lung cancer or lung disease, such as COPD.  · Heart disease.  · Stroke.  · Heart attack.  · Infertility.  · Osteoporosis and bone fractures.  Additionally, symptoms such as coughing, wheezing, and shortness of breath may get better when you quit. You may also find that you get sick less often because your body is stronger at fighting off colds and infections. If you are pregnant, quitting smoking can help to reduce your chances of having a baby of low birth weight.  HOW DO I GET READY TO QUIT?  When you decide to quit smoking, create a plan to make sure that you are successful. Before you quit:  · Pick a date to quit. Set a date within the next two weeks to give you time to prepare.  · Write down the reasons why you are quitting. Keep this list in places where you will see it often, such as on your bathroom mirror or in your car or wallet.  · Identify the people, places, things, and activities that make you want to smoke (triggers) and avoid them. Make sure to take these actions:    Throw away all cigarettes at home, at work, and in your car.    Throw away smoking accessories, such as ashtrays and lighters.    Clean your car and make sure to empty the ashtray.    Clean your home, including curtains and carpets.  · Tell your family, friends, and coworkers that you are quitting. Support from your loved ones can make quitting easier.  · Talk with your health care provider about your options for quitting smoking.  · Find out what treatment  "options are covered by your health insurance.  WHAT STRATEGIES CAN I USE TO QUIT SMOKING?   Talk with your healthcare provider about different strategies to quit smoking. Some strategies include:  · Quitting smoking altogether instead of gradually lessening how much you smoke over a period of time. Research shows that quitting \"cold turkey\" is more successful than gradually quitting.  · Attending in-person counseling to help you build problem-solving skills. You are more likely to have success in quitting if you attend several counseling sessions. Even short sessions of 10 minutes can be effective.  · Finding resources and support systems that can help you to quit smoking and remain smoke-free after you quit. These resources are most helpful when you use them often. They can include:    Online chats with a counselor.    Telephone quitlines.    Printed self-help materials.    Support groups or group counseling.    Text messaging programs.    Mobile phone applications.  · Taking medicines to help you quit smoking. (If you are pregnant or breastfeeding, talk with your health care provider first.) Some medicines contain nicotine and some do not. Both types of medicines help with cravings, but the medicines that include nicotine help to relieve withdrawal symptoms. Your health care provider may recommend:    Nicotine patches, gum, or lozenges.    Nicotine inhalers or sprays.    Non-nicotine medicine that is taken by mouth.  Talk with your health care provider about combining strategies, such as taking medicines while you are also receiving in-person counseling. Using these two strategies together makes you more likely to succeed in quitting than if you used either strategy on its own.  If you are pregnant or breastfeeding, talk with your health care provider about finding counseling or other support strategies to quit smoking. Do not take medicine to help you quit smoking unless told to do so by your health care " provider.  WHAT THINGS CAN I DO TO MAKE IT EASIER TO QUIT?  Quitting smoking might feel overwhelming at first, but there is a lot that you can do to make it easier. Take these important actions:  · Reach out to your family and friends and ask that they support and encourage you during this time. Call telephone quitlines, reach out to support groups, or work with a counselor for support.  · Ask people who smoke to avoid smoking around you.  · Avoid places that trigger you to smoke, such as bars, parties, or smoke-break areas at work.  · Spend time around people who do not smoke.  · Lessen stress in your life, because stress can be a smoking trigger for some people. To lessen stress, try:    Exercising regularly.    Deep-breathing exercises.    Yoga.    Meditating.    Performing a body scan. This involves closing your eyes, scanning your body from head to toe, and noticing which parts of your body are particularly tense. Purposefully relax the muscles in those areas.  · Download or purchase mobile phone or tablet apps (applications) that can help you stick to your quit plan by providing reminders, tips, and encouragement. There are many free apps, such as QuitGuide from the CDC (Centers for Disease Control and Prevention). You can find other support for quitting smoking (smoking cessation) through smokefree.gov and other websites.  HOW WILL I FEEL WHEN I QUIT SMOKING?  Within the first 24 hours of quitting smoking, you may start to feel some withdrawal symptoms. These symptoms are usually most noticeable 2-3 days after quitting, but they usually do not last beyond 2-3 weeks. Changes or symptoms that you might experience include:  · Mood swings.  · Restlessness, anxiety, or irritation.  · Difficulty concentrating.  · Dizziness.  · Strong cravings for sugary foods in addition to nicotine.  · Mild weight gain.  · Constipation.  · Nausea.  · Coughing or a sore throat.  · Changes in how your medicines work in your  body.  · A depressed mood.  · Difficulty sleeping (insomnia).  After the first 2-3 weeks of quitting, you may start to notice more positive results, such as:  · Improved sense of smell and taste.  · Decreased coughing and sore throat.  · Slower heart rate.  · Lower blood pressure.  · Clearer skin.  · The ability to breathe more easily.  · Fewer sick days.  Quitting smoking is very challenging for most people. Do not get discouraged if you are not successful the first time. Some people need to make many attempts to quit before they achieve long-term success. Do your best to stick to your quit plan, and talk with your health care provider if you have any questions or concerns.     This information is not intended to replace advice given to you by your health care provider. Make sure you discuss any questions you have with your health care provider.     Document Released: 12/12/2002 Document Revised: 05/03/2016 Document Reviewed: 05/03/2016  [employer] Interactive Patient Education ©2017 [employer]  BMI for Adults  Body mass index (BMI) is a number that is calculated from a person's weight and height. In most adults, the number is used to find how much of an adult's weight is made up of fat. BMI is not as accurate as a direct measure of body fat.  HOW IS BMI CALCULATED?  BMI is calculated by dividing weight in kilograms by height in meters squared. It can also be calculated by dividing weight in pounds by height in inches squared, then multiplying the resulting number by 703. Charts are available to help you find your BMI quickly and easily without doing this calculation.   HOW IS BMI INTERPRETED?  Health care professionals use BMI charts to identify whether an adult is underweight, at a normal weight, or overweight based on the following guidelines:  · Underweight: BMI less than 18.5.  · Normal weight: BMI between 18.5 and 24.9.  · Overweight: BMI between 25 and 29.9.  · Obese: BMI of 30 and above.  BMI is usually  interpreted the same for males and females.  Weight includes both fat and muscle, so someone with a muscular build, such as an athlete, may have a BMI that is higher than 24.9. In cases like these, BMI may not accurately depict body fat. To determine if excess body fat is the cause of a BMI of 25 or higher, further assessments may need to be done by a health care provider.  WHY IS BMI A USEFUL TOOL?  BMI is used to identify a possible weight problem that may be related to a medical problem or may increase the risk for medical problems. BMI can also be used to promote changes to reach a healthy weight.     This information is not intended to replace advice given to you by your health care provider. Make sure you discuss any questions you have with your health care provider.     Document Released: 08/29/2005 Document Revised: 01/08/2016 Document Reviewed: 05/15/2015  [employer] Interactive Patient Education ©2017 [employer]

## 2017-12-05 NOTE — PROGRESS NOTES
SUBJECTIVE:  Patient ID: Kelsey Mcqueen is a 27 y.o. female is here today for follow-up.    Chief Complaint: Numbness  Chief Complaint   Patient presents with   • Headache     continued migraines; she is not following with any neurologist at this time   • NECK & BACK PAIN     patient complains of arm numbness when she would wake up; a couple weeks ago she was on her way home from [place] and began having leg numbness & feeling of water running down her legs       HPI  27-year-old female with a diagnosis of pseudotumor status post  shunt 2015.  She most recently had a shunt tap nuclear medicine study in April 2017 which was read as normal.  He comes in with multiple complaints.  She is complaining of headaches that are worse now that her menstrual cycle has returned.  She is complaining of galactorrhea.  She has had workup by an endocrinologist in [place] which per report had a normal prolactin level.  She has seen Dr. Garcia just a couple days ago who was satisfied with her eye exam with no new findings or worries.  She complains of neck stiffness and migratory numbness and tingling in her upper extremities bilaterally.  Occasionally in her lower extremities.  She also complains of thoracic spine pain between his shoulder blades.  She denies any fevers.  She describes just a generalized malaise.  She does not really relate that her headaches or any worse.  She is basically says that they wax and wane in intensity since we have seen her last.  Her vision also she says kind of at times is fine at times worse.    The following portions of the patient's history were reviewed and updated as appropriate: allergies, current medications, past family history, past medical history, past social history, past surgical history and problem list.    OBJECTIVE:    Review of Systems   Eyes: Positive for visual disturbance.   Genitourinary: Positive for menstrual problem and vaginal bleeding.   Musculoskeletal: Positive for  back pain, neck pain and neck stiffness.   Neurological: Positive for numbness.   All other systems reviewed and are negative.         Physical Exam   Constitutional: She is oriented to person, place, and time. She appears well-developed and well-nourished.   HENT:   Head: Normocephalic and atraumatic.   Right Ear: Hearing normal.   Left Ear: Hearing normal.   Eyes: EOM are normal. Pupils are equal, round, and reactive to light.   Neck: Normal range of motion.   Neurological: She is alert and oriented to person, place, and time. She has normal strength and normal reflexes. No cranial nerve deficit or sensory deficit. She displays a negative Romberg sign. GCS eye subscore is 4. GCS verbal subscore is 5. GCS motor subscore is 6. She displays no Babinski's sign on the right side. She displays no Babinski's sign on the left side.   Psychiatric: Her speech is normal. Judgment normal. Cognition and memory are normal.       Neurologic Exam     Mental Status   Oriented to person, place, and time.   Speech: speech is normal     Cranial Nerves     CN III, IV, VI   Pupils are equal, round, and reactive to light.  Extraocular motions are normal.     Motor Exam     Strength   Strength 5/5 throughout.       Independent Review of Radiographic Studies:   MRI from August 2017 and is essentially unremarkable.    ASSESSMENT/PLAN:  The patient does not really relate any worsening of her headaches.  Her ophthalmologic exam is unremarkable with no evidence of papilledema.  The ophthalmologist confirms this.  I do not see a reason right now to tap her shunt.  She has had an ESR CRP and rheumatoid factor workup recently.  I think would be reasonable to rule out Lyme disease with her  disconnected nonlocalizing subjective neurologic complaints.  We will also get an MRI of her cervical spine to rule out a structural issue explaining the numbness and tingling and neck pain.  At some point it may be reasonable to obtain spinal fluid for a MS  workup.  We will see him in follow-up after this workup is completed      1. Pseudotumor cerebri    2. Chronic nonintractable headache, unspecified headache type    3. Acute bilateral low back pain with bilateral sciatica    4. Cervical radiculopathy    5. BMI 27.0-27.9,adult    6. Smoker            Return for test results w/DR HOLCOMB.      Joe Holcomb MD

## 2017-12-11 ENCOUNTER — OFFICE VISIT (OUTPATIENT)
Dept: SURGERY | Age: 27
End: 2017-12-11
Payer: MEDICAID

## 2017-12-11 VITALS
SYSTOLIC BLOOD PRESSURE: 118 MMHG | RESPIRATION RATE: 18 BRPM | HEIGHT: 64 IN | DIASTOLIC BLOOD PRESSURE: 72 MMHG | BODY MASS INDEX: 30.08 KG/M2 | HEART RATE: 80 BPM | WEIGHT: 176.2 LBS

## 2017-12-11 DIAGNOSIS — N64.3 GALACTORRHEA: Primary | ICD-10-CM

## 2017-12-11 LAB
B BURGDOR IGG PATRN SER IB-IMP: NEGATIVE
B BURGDOR IGM PATRN SER IB-IMP: NEGATIVE
B BURGDOR18KD IGG SER QL IB: ABNORMAL
B BURGDOR23KD IGG SER QL IB: ABNORMAL
B BURGDOR23KD IGM SER QL IB: ABNORMAL
B BURGDOR28KD IGG SER QL IB: ABNORMAL
B BURGDOR30KD IGG SER QL IB: ABNORMAL
B BURGDOR39KD IGG SER QL IB: ABNORMAL
B BURGDOR39KD IGM SER QL IB: ABNORMAL
B BURGDOR41KD IGG SER QL IB: ABNORMAL
B BURGDOR41KD IGM SER QL IB: PRESENT
B BURGDOR45KD IGG SER QL IB: ABNORMAL
B BURGDOR58KD IGG SER QL IB: ABNORMAL
B BURGDOR66KD IGG SER QL IB: ABNORMAL
B BURGDOR93KD IGG SER QL IB: ABNORMAL

## 2017-12-11 PROCEDURE — 99213 OFFICE O/P EST LOW 20 MIN: CPT | Performed by: SURGERY

## 2017-12-11 NOTE — PROGRESS NOTES
HISTORY OF PRESENT ILLNESS:    Ms. Etta Gatica is a 33 y.o. white  female who presents in follow-up of milky discharge bilaterally. She breast-fed her younger child but had been stopped for quite some time prior to this starting up again a few months ago. She had a prolactin level done which was normal.    She's had no imaging abnormalities. She has a family history of breast cancer in her mother. .    She is a daughter of Sergio Og who is one of our regular patients. She is premenopausal.    She saw an endocrinologist at [facility] who recommended she have her Mirena IUD removed. This may have an effect on her abnormal periods and her hormonal issues. She also has a history of pseudotumor cerebri with a resulting ventriculoperitoneal shunt. BREAST EXAM:    Renata Lopez  has fibrocystic changes throughout both breasts. There are no dominant masses, no skin or nipple changes and no axillary adenopathy. I see nothing suspicious on physical examination. Her milk production seem significantly decreased though it is still present. It does seem less than it was 3 months ago. She is also having some peculiar neurologic complaints that are in the process of being worked up for possible MS. IMPRESSION: Galactorrhea                           Benign fibrocystic changes                           Low probability of malignancy      PLAN: Return in 2 months for physical examination. We will discuss how removal of her IUD has helped her symptoms or not. I would recommend referral to [facility] for evaluation by an endocrinologist. She hasn't interesting constellation of problems which take some time to really sort out. Over 50% of visit time was spent counseling patient. 15 minutes of face to face time spent with patient.

## 2017-12-13 ENCOUNTER — HOSPITAL ENCOUNTER (OUTPATIENT)
Dept: GENERAL RADIOLOGY | Age: 27
Discharge: HOME | End: 2017-12-13
Payer: MEDICAID

## 2017-12-13 DIAGNOSIS — M54.12 CERVICAL RADICULOPATHY: ICD-10-CM

## 2017-12-13 DIAGNOSIS — G89.29 CHRONIC MIDLINE LOW BACK PAIN WITHOUT SCIATICA: ICD-10-CM

## 2017-12-13 DIAGNOSIS — M54.50 CHRONIC MIDLINE LOW BACK PAIN WITHOUT SCIATICA: ICD-10-CM

## 2017-12-13 PROCEDURE — 72072 X-RAY EXAM THORAC SPINE 3VWS: CPT

## 2017-12-13 PROCEDURE — 72110 X-RAY EXAM L-2 SPINE 4/>VWS: CPT

## 2017-12-13 PROCEDURE — 72050 X-RAY EXAM NECK SPINE 4/5VWS: CPT

## 2017-12-14 ENCOUNTER — TELEPHONE (OUTPATIENT)
Dept: PRIMARY CARE CLINIC | Age: 27
End: 2017-12-14

## 2017-12-14 NOTE — TELEPHONE ENCOUNTER
Recommend try tizanidine 4 mg  Tablet 1 by mouth every 6 hours when necessary spasm.   Dispense #60 with 1 refill    This will help by calming down any additional muscle spasm caused by the scoliosis or inflammation

## 2017-12-14 NOTE — TELEPHONE ENCOUNTER
Pt notified of her xray results. She states she has numbness in her arms at times, but it happens in both. Her pain is getting worse though in her back. Her next appt with you is Dec 28th. Notes Recorded by Jada Chand DO on 12/13/2017 at 8:20 PM CST  Lumbar spine x-rays show normal lumbar spine without any bony abnormalities. Ventriculoperitoneal shunt tubing projects over the right abdomen with no evidence of any fractures or abnormalities. There does appear to be some kidney stones over the area of the left kidney  There is an IUD visualized in the pelvis      Notes Recorded by ASHISH Dunbar DO on 12/13/2017 at 8:17 PM CST  X-ray of the thoracic spine shows mild scoliosis. There is  shunt tubing projecting over the right chest. This appears to be intact. There is no evidence of any other bony abnormalities.

## 2017-12-14 NOTE — TELEPHONE ENCOUNTER
----- Message from Christoph Hudson DO sent at 12/13/2017  8:18 PM CST -----  X-ray of the cervical spine shows straightening of the cervical spine. This may be due to muscle spasm or potentially positional.  Ventriculoperitoneal shunt projects over the right neck with no evidence of any fractures or problems.   There are no bony abnormalities noted

## 2017-12-15 RX ORDER — TIZANIDINE 4 MG/1
4 TABLET ORAL EVERY 6 HOURS PRN
Qty: 60 TABLET | Refills: 1 | Status: SHIPPED | OUTPATIENT
Start: 2017-12-15

## 2017-12-19 ENCOUNTER — OFFICE VISIT (OUTPATIENT)
Dept: PRIMARY CARE CLINIC | Age: 27
End: 2017-12-19
Payer: MEDICAID

## 2017-12-19 VITALS
HEIGHT: 64 IN | WEIGHT: 177 LBS | OXYGEN SATURATION: 99 % | SYSTOLIC BLOOD PRESSURE: 102 MMHG | BODY MASS INDEX: 30.22 KG/M2 | DIASTOLIC BLOOD PRESSURE: 64 MMHG | HEART RATE: 97 BPM | TEMPERATURE: 98.4 F

## 2017-12-19 DIAGNOSIS — M41.9 SCOLIOSIS OF THORACIC SPINE, UNSPECIFIED SCOLIOSIS TYPE: ICD-10-CM

## 2017-12-19 DIAGNOSIS — S46.812D STRAIN OF LEFT TRAPEZIUS MUSCLE, SUBSEQUENT ENCOUNTER: ICD-10-CM

## 2017-12-19 DIAGNOSIS — F41.1 GAD (GENERALIZED ANXIETY DISORDER): ICD-10-CM

## 2017-12-19 DIAGNOSIS — M54.9 BACK PAIN, UNSPECIFIED BACK LOCATION, UNSPECIFIED BACK PAIN LATERALITY, UNSPECIFIED CHRONICITY: Primary | ICD-10-CM

## 2017-12-19 PROCEDURE — 99213 OFFICE O/P EST LOW 20 MIN: CPT | Performed by: NURSE PRACTITIONER

## 2017-12-19 RX ORDER — METAXALONE 800 MG/1
800 TABLET ORAL 3 TIMES DAILY
Qty: 90 TABLET | Refills: 0 | Status: SHIPPED | OUTPATIENT
Start: 2017-12-19 | End: 2017-12-27 | Stop reason: ALTCHOICE

## 2017-12-19 RX ORDER — CLONAZEPAM 0.5 MG/1
0.5 TABLET ORAL 2 TIMES DAILY
Qty: 60 TABLET | Refills: 0 | Status: SHIPPED | OUTPATIENT
Start: 2017-12-19 | End: 2017-12-21 | Stop reason: SDUPTHER

## 2017-12-19 ASSESSMENT — ENCOUNTER SYMPTOMS
BACK PAIN: 1
DIARRHEA: 0
CONSTIPATION: 0
SHORTNESS OF BREATH: 0
ABDOMINAL PAIN: 0
VOMITING: 0
SINUS PRESSURE: 0
NAUSEA: 0
SORE THROAT: 0
RHINORRHEA: 0
TROUBLE SWALLOWING: 0
COUGH: 0

## 2017-12-19 NOTE — PROGRESS NOTES
- 5 %    Basophils % 0.2 0 - 1 %   BASIC METABOLIC PANEL   Result Value Ref Range    Calcium 9.2 8.6 - 10.0 MG/DL    Glucose 96 74 - 109 MG/DL    CREATININE 0.7 0.50 - 0.90 MG/DL    Gfr Calculated 109 59 - 300 mL/min    BUN 11 6 - 20 MG/DL    Sodium 141 136 - 145 mmol/L    Potassium 3.8 3.5 - 5.0 mmol/L    Chloride 103 98 - 111 mmol/L    CO2 24 22 - 29 mmol/L    Anion Gap 14 7 - 19 mmol/L   Protime-INR   Result Value Ref Range    Protime 13.3 11.8 - 14.6 SEC    INR 1.04 0.87 - 1.14   APTT   Result Value Ref Range    PTT 27.8 25.5 - 37.8 SEC   HCGUR PROFILE   Result Value Ref Range    HCG(Urine) Pregnancy Test NEGATIVE    Urinalysis   Result Value Ref Range    Color, UA YELLOW STRW-YELLOW    Character, Urine CLOUDY (H) CLEAR    Glucose, Ur NEGATIVE NEGATIVE MG/DL    Bilirubin Urine NEGATIVE NEGATIVE    Ketones, Urine NEGATIVE NEGATIVE MG/DL    Specific Gravity, Urine 1.019 1.001 - 1.035    Occult Blood,Urine NEGATIVE NEGATIVE    pH, UA 6.0 5.0 -- 8.0    Protein, UA NEGATIVE NEGATIVE MG/DL    Urobilinogen, Urine 0.2 0.2 -- 1.0 EU/DL    Nitrite, Urine NEGATIVE NEGATIVE    Leukocyte Esterase, Urine SMALL (H) NEGATIVE    Urin Manual Y/N NO     RBC, UA 5 (H) 0 - 3 /HPF    WBC, UA 16 (H) 0 - 3 /HPF    Epithelial Cells, Wet Prep 8 0 - 3 /HPF    Bacteria, UA TRACE NEGATIVE /HPF    Hyaline Casts, UA 6 0 - 3 /LPF   Sedimentation rate, automated   Result Value Ref Range    Sed Rate 12 0 - 20 MM/HR   APTT   Result Value Ref Range    PTT 26.8 25.5 - 37.8 SEC   Antithrombin III antigen   Result Value Ref Range    AntiThromb III Func 100 81 - 117 %   CBC Panel Item   Result Value Ref Range    WBC 6.81 4.8 - 10.8 TH/MM3    RBC 4.31 4.2 - 5.4 MIL/uL    Hemoglobin 12.7 12.0 - 16.0 G/DL    Hematocrit 38.9 37 - 47 %    MCV 90.3 81 - 99 FL    MCH 29.5 27 - 31 PG    MCHC 32.6 (L) 33 - 37 G/DL    RDW 12.1 11.5 - 14.5 %    Platelets 949 848 - 468 TH/MM3    MPV 10.1 7.4 - 10.4 FL    Neutrophils # 3.10 1.5 - 7.5 TH/MM3    Lymphocytes 2.88 1.1 - 4.5 TH/MM3    Monocytes 0.55 0.0 - 0.9 TH/MM3    Eosinophils % 0.27 0.0 - 0.60 TH/MM3    Basophils 0.01 0.0 - 0.20 TH/MM3    Neutrophils % 45.5 (L) 50 - 65 %    Lymphocytes 42.3 (H) 20 - 40 %    Monocytes % 8.1 0 - 10 %    Eosinophils % 4.0 0 - 5 %    Basophils % 0.1 0 - 1 %   BASIC METABOLIC PANEL   Result Value Ref Range    Calcium 9.3 8.6 - 10.0 MG/DL    Glucose 95 74 - 109 MG/DL    CREATININE 0.8 0.50 - 0.90 MG/DL    Gfr Calculated 94 59 - 300 mL/min    BUN 10 6 - 20 MG/DL    Sodium 143 136 - 145 mmol/L    Potassium 3.8 3.5 - 5.0 mmol/L    Chloride 105 98 - 111 mmol/L    CO2 25 22 - 29 mmol/L    Anion Gap 14 7 - 19 mmol/L   CSF Cell Count with Differential   Result Value Ref Range    WBC, CSF 1 0 - 8 CMM    RBC, CSF 0 0 - 5 CMM    Appearance, CSF CLEAR,COLORLESS     Supernate COLORLESS,CLEAR     Tube Number + CELL CT + DIFF-CSF 2    Protein, CSF   Result Value Ref Range    Protein,Total CSF 17 15 - 45 MG/DL   Glucose, CSF   Result Value Ref Range    Glucose, CSF 65 40 - 70 MG/DL   Protein C Functional   Result Value Ref Range    Protein C-Functional 154 83 - 168 %   Protein S Functional   Result Value Ref Range    Protein S Activity 70 57 - 131 %   Multiple sclerosis profile   Result Value Ref Range    Multiple Sclerosis Panel         I have reviewed the following with the Ms. Merino   Lab Review   No visits with results within 6 Month(s) from this visit.    Latest known visit with results is:   Admission on 11/11/2015, Discharged on 11/13/2015   Component Date Value    WBC 11/11/2015 9.33     RBC 11/11/2015 4.49     Hemoglobin 11/11/2015 13.6     Hematocrit 11/11/2015 40.0     MCV 11/11/2015 89.1     MCH 11/11/2015 30.3     MCHC 11/11/2015 34.0     RDW 11/11/2015 12.2     Platelets 25/33/3430 247     MPV 11/11/2015 9.9     Neutrophils # 11/11/2015 6.58     Lymphocytes 11/11/2015 2.08     Monocytes 11/11/2015 0.47     Eosinophils % 11/11/2015 0.18     Basophils 11/11/2015 0.02     Neutrophils % 11/11/2015 70.6*    Lymphocytes 11/11/2015 22.3     Monocytes % 11/11/2015 5.0     Eosinophils % 11/11/2015 1.9     Basophils % 11/11/2015 0.2     Calcium 11/11/2015 9.2     Glucose 11/11/2015 96     CREATININE 11/11/2015 0.7     Gfr Calculated 11/11/2015 109     BUN 11/11/2015 11     Sodium 11/11/2015 141     Potassium 11/11/2015 3.8     Chloride 11/11/2015 103     CO2 11/11/2015 24     Anion Gap 11/11/2015 14     Protime 11/11/2015 13.3     INR 11/11/2015 1.04     PTT 11/11/2015 27.8     HCG(Urine) Pregnancy Test 11/11/2015 NEGATIVE     Color, UA 11/11/2015 YELLOW     Character, Urine 11/11/2015 CLOUDY*    Glucose, Ur 11/11/2015 NEGATIVE     Bilirubin Urine 11/11/2015 NEGATIVE     Ketones, Urine 11/11/2015 NEGATIVE     Specific Gravity, Urine 11/11/2015 1.019     Occult Blood,Urine 11/11/2015 NEGATIVE     pH, UA 11/11/2015 6.0     Protein, UA 11/11/2015 NEGATIVE     Urobilinogen, Urine 11/11/2015 0.2     Nitrite, Urine 11/11/2015 NEGATIVE     Leukocyte Esterase, Urine 11/11/2015 SMALL*    Urin Manual Y/N 11/11/2015 NO     RBC, UA 11/11/2015 5*    WBC, UA 11/11/2015 16*    Epithelial Cells, Wet Pr* 11/11/2015 8     Bacteria, UA 11/11/2015 TRACE     Hyaline Casts, UA 11/11/2015 6     Sed Rate 11/11/2015 12     PTT 11/11/2015 26.8     AntiThromb III Func 11/11/2015 100     WBC 11/12/2015 6.81     RBC 11/12/2015 4.31     Hemoglobin 11/12/2015 12.7     Hematocrit 11/12/2015 38.9     MCV 11/12/2015 90.3     MCH 11/12/2015 29.5     MCHC 11/12/2015 32.6*    RDW 11/12/2015 12.1     Platelets 88/11/7735 232     MPV 11/12/2015 10.1     Neutrophils # 11/12/2015 3.10     Lymphocytes 11/12/2015 2.88     Monocytes 11/12/2015 0.55     Eosinophils % 11/12/2015 0.27     Basophils 11/12/2015 0.01     Neutrophils % 11/12/2015 45.5*    Lymphocytes 11/12/2015 42.3*    Monocytes % 11/12/2015 8.1     Eosinophils % 11/12/2015 4.0     Basophils % 11/12/2015 0.1     Calcium Allergies: Morphine and related and Other    Past Medical History:   Diagnosis Date    Chronic kidney disease     Vurshite stones    IIH (idiopathic intracranial hypertension)        Past Surgical History:   Procedure Laterality Date    ABDOMEN SURGERY Left 12/29/2016    EXCSION SKIN LESION BREAST AND ABDOMEN WALL performed by Sharyle Spiro, MD at [facility] stones    TONSILLECTOMY      VENTRICULOPERITONEAL SHUNT         Social History   Substance Use Topics    Smoking status: Current Some Day Smoker     Packs/day: 0.50     Types: E-Cigarettes, Cigarettes    Smokeless tobacco: Never Used    Alcohol use Yes      Comment: rarely       Review of Systems   Constitutional: Negative for activity change, appetite change, fatigue, fever and unexpected weight change. HENT: Negative for congestion, hearing loss, rhinorrhea, sinus pressure, sore throat and trouble swallowing. Eyes: Negative for visual disturbance. Respiratory: Negative for cough and shortness of breath. Cardiovascular: Negative for chest pain, palpitations and leg swelling. Gastrointestinal: Negative for abdominal pain, constipation, diarrhea, nausea and vomiting. Endocrine: Negative for cold intolerance and heat intolerance. Genitourinary: Negative for flank pain, menstrual problem, pelvic pain, urgency and vaginal discharge. Musculoskeletal: Positive for back pain and neck pain. Negative for arthralgias. Skin: Negative for rash. Neurological: Negative for headaches. Psychiatric/Behavioral: Negative for dysphoric mood and sleep disturbance. The patient is not nervous/anxious. Physical Exam   Constitutional: She is oriented to person, place, and time. She appears well-developed and well-nourished. HENT:   Head: Normocephalic. Neck: Normal range of motion. Neck supple.    Cardiovascular: Normal rate, regular rhythm, normal heart sounds and intact distal pulses. Pulmonary/Chest: Effort normal.   Abdominal: Soft. Bowel sounds are normal. She exhibits no distension. There is no tenderness. There is no rebound. Musculoskeletal: Normal range of motion. Right shoulder: She exhibits tenderness (left trapezius). She exhibits normal range of motion and no bony tenderness. Neurological: She is alert and oriented to person, place, and time. Skin: Skin is warm and dry. Psychiatric: She has a normal mood and affect. Her behavior is normal. Judgment and thought content normal.       ASSESSMENT      ICD-10-CM ICD-9-CM    1. Back pain, unspecified back location, unspecified back pain laterality, unspecified chronicity M54.9 724.5 POCT Urinalysis no Micro      Urine Culture      metaxalone (SKELAXIN) 800 MG tablet      External Referral To Physical Therapy   2. Scoliosis of thoracic spine, unspecified scoliosis type M41.9 737.30 External Referral To Physical Therapy   3. Strain of left trapezius muscle, subsequent encounter S46.812D V58.89 External Referral To Physical Therapy     840.8    4. MAURI (generalized anxiety disorder) F41.1 300.02 clonazePAM (KLONOPIN) 0.5 MG tablet         PLAN  1. Back pain, unspecified back location, unspecified back pain laterality, unspecified chronicity    - POCT Urinalysis no Micro  - Urine Culture; Future  - metaxalone (SKELAXIN) 800 MG tablet; Take 1 tablet by mouth 3 times daily  Dispense: 90 tablet; Refill: 0  - External Referral To Physical Therapy    2. Scoliosis of thoracic spine, unspecified scoliosis type    - External Referral To Physical Therapy    3. Strain of left trapezius muscle, subsequent encounter   External Referral To Physical Therapy    4. MAURI (generalized anxiety disorder)    - clonazePAM (KLONOPIN) 0.5 MG tablet; Take 1 tablet by mouth 2 times daily . Dispense: 60 tablet;  Refill: 0      Orders Placed This Encounter   Procedures    Urine Culture    External Referral To Physical Therapy    POCT Urinalysis no Micro        Return if symptoms worsen or fail to improve. There are no Patient Instructions on file for this visit. Controlled Substances Monitoring: Attestation: The Prescription Monitoring Report for this patient was reviewed today. (MOUSTAPHA Louis)  Documentation: Possible medication side effects, risk of tolerance and/or dependence, and alternative treatments discussed., No signs of potential drug abuse or diversion identified. ([id number]) MOUSTAPHA Louis)        Additional Instructions: As always, patient is advised to bring in medication bottles in order to correctly reconcile with our current list.    Deborah Crow received counseling on the following healthy behaviors: medication adherence    Patient given educational materials on dx    I have instructed Deborah Crow to complete a self tracking handout on n/a and instructed them to bring it with them to her next appointment. Discussed use, benefit, and side effects of prescribed medications. Barriers to medication compliance addressed. All patient questions answered. Pt voiced understanding.      MOUSTAPHA Durand

## 2017-12-20 DIAGNOSIS — M54.9 BACK PAIN, UNSPECIFIED BACK LOCATION, UNSPECIFIED BACK PAIN LATERALITY, UNSPECIFIED CHRONICITY: ICD-10-CM

## 2017-12-21 ENCOUNTER — TELEPHONE (OUTPATIENT)
Dept: PRIMARY CARE CLINIC | Age: 27
End: 2017-12-21

## 2017-12-21 DIAGNOSIS — F41.1 GAD (GENERALIZED ANXIETY DISORDER): ICD-10-CM

## 2017-12-21 RX ORDER — CLONAZEPAM 0.5 MG/1
0.5 TABLET ORAL 2 TIMES DAILY
Qty: 60 TABLET | Refills: 0 | Status: SHIPPED | OUTPATIENT
Start: 2017-12-21 | End: 2018-01-17 | Stop reason: SDUPTHER

## 2017-12-22 ENCOUNTER — TELEPHONE (OUTPATIENT)
Dept: PRIMARY CARE CLINIC | Age: 27
End: 2017-12-22

## 2017-12-22 RX ORDER — SULFAMETHOXAZOLE AND TRIMETHOPRIM 800; 160 MG/1; MG/1
1 TABLET ORAL 2 TIMES DAILY
Qty: 20 TABLET | Refills: 0 | Status: SHIPPED | OUTPATIENT
Start: 2017-12-22 | End: 2018-01-01

## 2017-12-23 LAB
ORGANISM: ABNORMAL
URINE CULTURE, ROUTINE: ABNORMAL
URINE CULTURE, ROUTINE: ABNORMAL

## 2017-12-26 ENCOUNTER — HOSPITAL ENCOUNTER (OUTPATIENT)
Dept: PHYSICAL THERAPY | Age: 27
Setting detail: THERAPIES SERIES
Discharge: HOME | End: 2017-12-26
Payer: MEDICAID

## 2017-12-26 PROCEDURE — G8982 BODY POS GOAL STATUS: HCPCS

## 2017-12-26 PROCEDURE — G8981 BODY POS CURRENT STATUS: HCPCS

## 2017-12-26 PROCEDURE — 97162 PT EVAL MOD COMPLEX 30 MIN: CPT

## 2017-12-26 ASSESSMENT — PAIN DESCRIPTION - FREQUENCY: FREQUENCY: CONTINUOUS

## 2017-12-26 ASSESSMENT — PAIN DESCRIPTION - LOCATION: LOCATION: SHOULDER;BACK

## 2017-12-26 ASSESSMENT — PAIN DESCRIPTION - DESCRIPTORS: DESCRIPTORS: BURNING;TIGHTNESS;ACHING

## 2017-12-26 ASSESSMENT — PAIN DESCRIPTION - PAIN TYPE: TYPE: CHRONIC PAIN

## 2017-12-26 ASSESSMENT — PAIN SCALES - GENERAL: PAINLEVEL_OUTOF10: 5

## 2017-12-26 ASSESSMENT — PAIN DESCRIPTION - ORIENTATION: ORIENTATION: LEFT;RIGHT

## 2017-12-26 ASSESSMENT — PAIN DESCRIPTION - PROGRESSION: CLINICAL_PROGRESSION: GRADUALLY WORSENING

## 2017-12-26 NOTE — PROGRESS NOTES
Physical Therapy  Initial Assessment  Date: 2017  Patient Name: Harry Davis  MRN: [id number]  : 1990     Treatment Diagnosis: Back pain    Subjective   General  Chart Reviewed: Yes  Patient assessed for rehabilitation services?: Yes  Additional Pertinent Hx: Patient is a 33year old female who presents with complaint of midback pain that has been present for over half a year. Additional pertinent information includes  shunt and kidney stones. Family / Caregiver Present: No  Referring Practitioner: MOUSTAPHA Malik  Referral Date : 17  Diagnosis: Back pain, unspecified back location, unspecified back pain laterality, unspecified chronicity (M54.9) Scoliosis of thoracic spine, unspecified type (M41.9), Strain of left trapezius (S46.81)  Follows Commands: Within Functional Limits  PT Visit Information  Onset Date: 17  PT Insurance Information: [facility] Medicaid (precert required)  Total # of Visits Approved:  (8-12 anticipated)  Total # of Visits to Date: 1  Progress Note Due Date: 18  Subjective  Subjective: Patient reports feeling pain in her back for over a year. She states will also wake up with her hand and arm numb after sleeping at times. Pain Screening  Patient Currently in Pain: Yes  Pain Assessment  Pain Assessment: 0-10  Pain Level: 5  Pain Type: Chronic pain  Pain Location: Shoulder;Back  Pain Orientation: Left;Right  Pain Descriptors: Burning;Tightness; Aching  Pain Frequency: Continuous  Clinical Progression: Gradually worsening  Effect of Pain on Daily Activities: Pain wakes patient from sleep, causes pain during work activities and when playing with son, increases difficulty of household ADLs  Vital Signs  Patient Currently in Pain: Yes    Vision/Hearing  Vision  Vision: Within Functional Limits  Hearing  Hearing: Within functional limits    Orientation  Orientation  Overall Orientation Status: Within Normal Limits    Social/Functional History  Social/Functional extension stretch with exercise ball (roll ball up wall bilateral arms and lean in)  Exercise 11: Arrow to L (L arm extended, R arm pulling arrow)  Exercise 12: Standing R sidebending with L arm overhead reach  Exercise 13: Prone Ts with shoulder blade squeeze  Exercise 14: Rows with theraband  Exercise 15: Forward bent rows  Exercise 16: Manual cervical traction (NO ICT d/t  shunt)  Exercise 17: Moist heat PRN for pain                 Hand Dominance  Hand Dominance: Right  Left Hand Strength -  (lbs)  Handle Setting 3: 48  Right Hand Strength -  (lbs)  Handle Setting 3: 64    Assessment   Conditions Requiring Skilled Therapeutic Intervention  Body structures, Functions, Activity limitations: Decreased functional mobility ; Decreased ADL status; Decreased ROM; Decreased strength;Decreased high-level IADLs  Assessment: Patient is a 33year old female who presents to therapy with complaint of midback pain and numbness and tingling in bilateral upper extremities. Upon initial examination, patient presents with significant guarding and tightness of the musculature surrounding bilateral shoulder blades and cervical spine, forward head and forward rounded shoulder posture, decreased strength in L wrist extension and  strength, and pain at rest. Patient will benefit from skilled therapy intervention to address the impairments listed and to improve quality of life. Treatment Diagnosis: Back pain  Prognosis: Good  Decision Making: Medium Complexity  Clinical Presentation:  Forward head posture, mm guarding/tightness/trigger points  Patient Education: Plan of care, clinical findings, postural correction, moist heat pain intervention  REQUIRES PT FOLLOW UP: Yes  Discharge Recommendations: Continue to assess pending progress  Activity Tolerance  Activity Tolerance: Patient Tolerated treatment well;Patient limited by pain         Plan   Plan  Times per week: 2  Plan weeks: 4-6 weeks  Current Treatment Recommendations:

## 2017-12-27 ENCOUNTER — TELEPHONE (OUTPATIENT)
Dept: NEUROSURGERY | Facility: CLINIC | Age: 27
End: 2017-12-27

## 2017-12-27 DIAGNOSIS — G89.29 CHRONIC MIDLINE LOW BACK PAIN WITHOUT SCIATICA: ICD-10-CM

## 2017-12-27 DIAGNOSIS — M54.50 CHRONIC MIDLINE LOW BACK PAIN WITHOUT SCIATICA: ICD-10-CM

## 2017-12-27 RX ORDER — HYDROCODONE BITARTRATE AND ACETAMINOPHEN 10; 325 MG/1; MG/1
1 TABLET ORAL EVERY 6 HOURS PRN
Qty: 55 TABLET | Refills: 0 | Status: SHIPPED | OUTPATIENT
Start: 2017-12-27 | End: 2017-12-28 | Stop reason: SDUPTHER

## 2017-12-27 NOTE — TELEPHONE ENCOUNTER
Pt called for a refill on norco. Advised her she needs to schedule and appt. Has an appt tomorrow, so wants to know if can get refilled today as she is out. Last refill 11/28/17 for #55. Last seen 12/19/17 by Breanne Sexton.

## 2017-12-27 NOTE — TELEPHONE ENCOUNTER
Flexeril 10mg tid prn muscle spasms #90 no refill  If she can not take this I do not have any other options.

## 2017-12-28 ENCOUNTER — OFFICE VISIT (OUTPATIENT)
Dept: PRIMARY CARE CLINIC | Age: 27
End: 2017-12-28
Payer: MEDICAID

## 2017-12-28 ENCOUNTER — TELEPHONE (OUTPATIENT)
Dept: NEUROSURGERY | Facility: CLINIC | Age: 27
End: 2017-12-28

## 2017-12-28 VITALS
HEIGHT: 64 IN | TEMPERATURE: 97 F | SYSTOLIC BLOOD PRESSURE: 110 MMHG | WEIGHT: 177 LBS | OXYGEN SATURATION: 98 % | HEART RATE: 92 BPM | DIASTOLIC BLOOD PRESSURE: 60 MMHG | BODY MASS INDEX: 30.22 KG/M2

## 2017-12-28 DIAGNOSIS — G89.29 CHRONIC MIDLINE LOW BACK PAIN WITHOUT SCIATICA: ICD-10-CM

## 2017-12-28 DIAGNOSIS — M62.838 MUSCLE SPASM: Primary | ICD-10-CM

## 2017-12-28 DIAGNOSIS — N39.0 ENTEROCOCCUS UTI: ICD-10-CM

## 2017-12-28 DIAGNOSIS — B95.2 ENTEROCOCCUS UTI: ICD-10-CM

## 2017-12-28 DIAGNOSIS — G93.2 PSEUDOTUMOR CEREBRI: ICD-10-CM

## 2017-12-28 DIAGNOSIS — M54.50 CHRONIC MIDLINE LOW BACK PAIN WITHOUT SCIATICA: ICD-10-CM

## 2017-12-28 PROCEDURE — 99214 OFFICE O/P EST MOD 30 MIN: CPT | Performed by: PEDIATRICS

## 2017-12-28 RX ORDER — HYDROCODONE BITARTRATE AND ACETAMINOPHEN 10; 325 MG/1; MG/1
1 TABLET ORAL EVERY 6 HOURS PRN
Qty: 25 TABLET | Refills: 0 | Status: SHIPPED | OUTPATIENT
Start: 2017-12-28 | End: 2018-01-05 | Stop reason: SDUPTHER

## 2017-12-28 RX ORDER — METHOCARBAMOL 500 MG/1
500 TABLET, FILM COATED ORAL 4 TIMES DAILY
Qty: 40 TABLET | Refills: 0 | Status: SHIPPED | OUTPATIENT
Start: 2017-12-28 | End: 2018-01-04 | Stop reason: ALTCHOICE

## 2017-12-28 RX ORDER — AMOXICILLIN 875 MG/1
875 TABLET, COATED ORAL 2 TIMES DAILY
Qty: 20 TABLET | Refills: 0 | Status: SHIPPED | OUTPATIENT
Start: 2017-12-28 | End: 2018-01-04 | Stop reason: ALTCHOICE

## 2017-12-28 ASSESSMENT — ENCOUNTER SYMPTOMS
WHEEZING: 0
BACK PAIN: 1
COUGH: 0
VOMITING: 0
ABDOMINAL PAIN: 0
DIARRHEA: 0
SHORTNESS OF BREATH: 0
SINUS PRESSURE: 0
EYE PAIN: 0
SORE THROAT: 0
NAUSEA: 0

## 2017-12-28 NOTE — PROGRESS NOTES
throat. Eyes: Negative for pain and visual disturbance. Respiratory: Negative for cough, shortness of breath and wheezing. Cardiovascular: Negative for chest pain, palpitations and leg swelling. Gastrointestinal: Negative for abdominal pain, diarrhea, nausea and vomiting. Endocrine: Negative for polyuria. Genitourinary: Negative for dysuria, frequency, hematuria and urgency. Musculoskeletal: Positive for back pain and neck pain. Skin: Negative for rash. Neurological: Negative for dizziness and headaches. Psychiatric/Behavioral: Negative for self-injury. The patient is not nervous/anxious. Physical Exam   Constitutional: She is oriented to person, place, and time. She appears well-developed and well-nourished. She is cooperative. Non-toxic appearance. No distress. Body habitus is mild ow   HENT:   Head: Normocephalic and atraumatic. Right Ear: Hearing, tympanic membrane, external ear and ear canal normal.   Left Ear: Hearing, tympanic membrane, external ear and ear canal normal.   Nose: Nose normal.   Mouth/Throat: Oropharynx is clear and moist and mucous membranes are normal.   Eyes: Conjunctivae, EOM and lids are normal. Pupils are equal, round, and reactive to light. Neck: Phonation normal. Neck supple. No JVD present. Carotid bruit is not present. No thyromegaly present. Cardiovascular: Normal rate, regular rhythm and normal heart sounds. No extrasystoles are present. PMI is not displaced. Exam reveals no gallop and no friction rub. No murmur heard. Pulmonary/Chest: Effort normal and breath sounds normal. No respiratory distress. She has no wheezes. She has no rhonchi. She has no rales. Abdominal: Soft. Bowel sounds are normal. She exhibits no distension and no mass. There is no hepatosplenomegaly. There is no tenderness. There is no CVA tenderness. Genitourinary:   Genitourinary Comments: Examination deferred   Musculoskeletal: Normal range of motion.  She exhibits no edema. Joint examination reveals no acute arthritis or synovitis. Lymphadenopathy:     She has no cervical adenopathy. Neurological: She is alert and oriented to person, place, and time. She has normal strength. She displays no atrophy and no tremor. No cranial nerve deficit (by gross examination) or sensory deficit. Gait normal.   No focal deficits appreciated   Skin: Skin is warm and dry. No rash noted. Psychiatric: She has a normal mood and affect. Her speech is normal and behavior is normal.   Vitals reviewed. ASSESSMENT and PLAN      ICD-10-CM ICD-9-CM    1. Muscle spasm M62.838 728.85 methocarbamol (ROBAXIN) 500 MG tablet   2. Enterococcus UTI N39.0 599.0 amoxicillin (AMOXIL) 875 MG tablet    B95.2 041.04    3. Chronic midline low back pain without sciatica M54.5 724.2 HYDROcodone-acetaminophen (NORCO)  MG per tablet    G89.29 338.29    4. Pseudotumor cerebri G93.2 348. 2 She is presently not receiving any treatment other than her shunt   After her treatment of her renal stones we may consider resuming treatment of her elevated intracranial pressure         No orders of the defined types were placed in this encounter. Return in about 2 months (around 2/28/2018). There are no Patient Instructions on file for this visit. Additional Instructions: As always, patient is advised to bring in medication bottles in order to correctly reconcile with our current list.    Ap Garcia received counseling on the following healthy behaviors: use narcotics sparingly    Patient given educational materials on safe opiate use. Discussed use, benefit, and side effects of prescribed medications. Barriers to medication compliance addressed. All patient questions answered. Pt voiced understanding.      If you need to reach us for an appointment or any other urgent   scheduling issue,   please press the (*) sign at the beginning of the phone tree prompt after   Dialing the normal office number.     Cheyanne Paz, DO

## 2017-12-28 NOTE — TELEPHONE ENCOUNTER
Patient called to see about her headaches.  She says that she did hit her head yesterday and thinks that this may have been the cause of her headaches Is today they are better and under control.  She was told to call us back if she had any further problems.

## 2018-01-02 ENCOUNTER — HOSPITAL ENCOUNTER (OUTPATIENT)
Dept: PHYSICAL THERAPY | Age: 28
Setting detail: THERAPIES SERIES
Discharge: HOME | End: 2018-01-02
Payer: MEDICAID

## 2018-01-02 PROCEDURE — 97110 THERAPEUTIC EXERCISES: CPT

## 2018-01-02 ASSESSMENT — PAIN SCALES - GENERAL: PAINLEVEL_OUTOF10: 6

## 2018-01-02 ASSESSMENT — PAIN DESCRIPTION - DESCRIPTORS: DESCRIPTORS: BURNING;TIGHTNESS

## 2018-01-02 ASSESSMENT — PAIN DESCRIPTION - ORIENTATION: ORIENTATION: UPPER

## 2018-01-02 ASSESSMENT — PAIN DESCRIPTION - LOCATION: LOCATION: BACK

## 2018-01-02 ASSESSMENT — PAIN DESCRIPTION - PAIN TYPE: TYPE: CHRONIC PAIN

## 2018-01-02 ASSESSMENT — PAIN DESCRIPTION - FREQUENCY: FREQUENCY: INTERMITTENT

## 2018-01-02 NOTE — PROGRESS NOTES
Physical Therapy  Daily Treatment Note  Date: 2018  Patient Name: Sandy Perez  MRN: [id number]     :   1990    Subjective:   General  Chart Reviewed: Yes  Additional Pertinent Hx: Patient is a 33year old female who presents with complaint of midback pain that has been present for over half a year. Additional pertinent information includes  shunt and kidney stones. Family / Caregiver Present: No  Referring Practitioner: MOUSTAPHA Banda  PT Visit Information  Onset Date: 17  PT Insurance Information: [facility] Medicaid (precert required)  Total # of Visits Approved: 8 (8-12 anticipated)  Total # of Visits to Date: 2  Plan of Care/Certification Expiration Date: 18  Progress Note Due Date: 18  Subjective  Subjective: No problems after the eval.  Pain Screening  Patient Currently in Pain: Yes  Pain Assessment  Pain Assessment: 0-10  Pain Level: 6 (neck pain is better, but upper back pain still a 6/10)  Pain Type: Chronic pain  Pain Location: Back  Pain Orientation: Upper  Pain Descriptors: Burning;Tightness  Pain Frequency: Intermittent  Pain Intervention(s): Medication (see eMar); Heat applied       Treatment Activities:            Exercises  Exercise 1: Chin tucks  1 x 10                                                                                     --Pt with  shunt R  Exercise 2: Slouch with overcorrection 1 x 10  Exercise 3: Levator scapula stretch bilateral 3 x 10 sec  Exercise 4: Upper trapezius stretch bilateral 3 x 10 sec  Exercise 5: Scapular retraction 1 x 10  Exercise 6: Timed gripper bilateral--green digi-squeeze and green digi-flex x 1min (switched hands 30 secs in)  Exercise 7: Wall push up with push up plus x 0 (wasnt sure how to do these)                                                    wall push up x 10  Exercise 8: Backward shoulder rolls 1 x 10  Exercise 9: Corner stretch 3 x 10 sec  Exercise 10: Thoracic extension stretch with exercise ball (roll ball up

## 2018-01-04 ENCOUNTER — HOSPITAL ENCOUNTER (OUTPATIENT)
Dept: PAIN MANAGEMENT | Age: 28
Discharge: HOME | End: 2018-01-04
Payer: MEDICAID

## 2018-01-04 ENCOUNTER — APPOINTMENT (OUTPATIENT)
Dept: PHYSICAL THERAPY | Age: 28
End: 2018-01-04
Payer: MEDICAID

## 2018-01-04 ENCOUNTER — TELEPHONE (OUTPATIENT)
Dept: PRIMARY CARE CLINIC | Age: 28
End: 2018-01-04

## 2018-01-04 VITALS
HEART RATE: 60 BPM | DIASTOLIC BLOOD PRESSURE: 62 MMHG | OXYGEN SATURATION: 100 % | TEMPERATURE: 97.3 F | RESPIRATION RATE: 18 BRPM | SYSTOLIC BLOOD PRESSURE: 104 MMHG | WEIGHT: 178 LBS | BODY MASS INDEX: 30.39 KG/M2 | HEIGHT: 64 IN

## 2018-01-04 PROCEDURE — 99204 OFFICE O/P NEW MOD 45 MIN: CPT

## 2018-01-04 RX ORDER — HYDROCODONE BITARTRATE AND ACETAMINOPHEN 10; 325 MG/1; MG/1
1 TABLET ORAL EVERY 6 HOURS PRN
Qty: 12 TABLET | Refills: 0 | Status: CANCELLED | OUTPATIENT
Start: 2018-01-04 | End: 2018-01-11

## 2018-01-04 ASSESSMENT — PAIN DESCRIPTION - ORIENTATION: ORIENTATION: UPPER

## 2018-01-04 ASSESSMENT — PAIN DESCRIPTION - DESCRIPTORS: DESCRIPTORS: BURNING;NUMBNESS

## 2018-01-04 ASSESSMENT — PAIN DESCRIPTION - LOCATION: LOCATION: BACK

## 2018-01-04 ASSESSMENT — PAIN DESCRIPTION - ONSET: ONSET: ON-GOING

## 2018-01-04 ASSESSMENT — PAIN SCALES - GENERAL: PAINLEVEL_OUTOF10: 7

## 2018-01-04 ASSESSMENT — PAIN DESCRIPTION - FREQUENCY: FREQUENCY: CONTINUOUS

## 2018-01-04 ASSESSMENT — PAIN DESCRIPTION - PROGRESSION: CLINICAL_PROGRESSION: GRADUALLY WORSENING

## 2018-01-04 ASSESSMENT — PAIN DESCRIPTION - PAIN TYPE: TYPE: CHRONIC PAIN

## 2018-01-04 NOTE — PROGRESS NOTES
Nursing Admission Record    Current Issues / Falls / ER Visits:  New patient with upper back and neck pain that started about 6 months ago. Has numbness that radiates down bilateral arms at times. Low back pain and left flank pain from chronic kidney stones. Has  shunt for Pseudotumor cerebri on right side of neck. Has had recent xrays of spine ordered by PCP. Percentage of Pain Relief after Last Procedure:  na %    How long lasted:  na     Radiology exams received during the last 12 months: Yes Cervical, Thoracic and Lumbar xrays       When December 2017                                              Where [facility]       Imaging on chart: Yes         Imaging records requested: No  MRI exams received in the past 2 years:  No  Physical therapy during the last 6 months: Yes       When: Currently                                             Where [facility] during the last 12 months: Yes    Education Provided:  [x] Review of Olivia Hemp  [] Agreement Review  [] Compliance Issues Discussed    [] Cognitive Behavior Needs [x] Exercise [] Review of Test [] Financial Issues  [x] Tobacco/Alcohol Use [x] Teaching [x] New Patient [] Picture Obtained    Physician Plan:  [] Outgoing Referral  [] Pharmacy Consult  [] Test Ordered   [] Obtained Test Results / Consult Notes  [x] UDS due at next visit, verified per EPIC      [] Suspected Physical Abuse or Suicide Risk assessed - IF YES COMPLETE QUESTIONS BELOW    If any of the following questions are answered yes - contact attending physician for referral:    Has been considering harming self to escape stress, pain problems? [] YES  [] NO  Has a suicide plan? [] YES  [] NO  Has attempted suicide in the past?   [] YES  [] NO  Has a close friend or family member who committed suicide? [] YES  [] NO    Patient Referred To :      Additional Notes:    Assessment Completed by:  Electronically signed by Mook Byrne RN on 1/4/2018 at 11:29 AM

## 2018-01-05 DIAGNOSIS — G89.29 CHRONIC MIDLINE LOW BACK PAIN WITHOUT SCIATICA: ICD-10-CM

## 2018-01-05 DIAGNOSIS — M54.50 CHRONIC MIDLINE LOW BACK PAIN WITHOUT SCIATICA: ICD-10-CM

## 2018-01-05 RX ORDER — HYDROCODONE BITARTRATE AND ACETAMINOPHEN 10; 325 MG/1; MG/1
1 TABLET ORAL EVERY 6 HOURS PRN
Qty: 12 TABLET | Refills: 0 | Status: SHIPPED | OUTPATIENT
Start: 2018-01-05 | End: 2018-01-08

## 2018-01-05 NOTE — TELEPHONE ENCOUNTER
Plan:   [x]  Patient is to call with any questions or concerns which may arise prior to the next office visit    [x]  No medications prescribed today, (AVA reviewed), aberrant UDS per referring provider, will repeat today   []  Add    []  Imaging order given to patient   []  Imaging reports requested   []  PT order given to patient   []  Procedure scheduled for next visit, see encounter details   [x]  UDS done today   []  UDS next visit   [x]  Next visit with Dr. Keesha Napier only to discuss results of UDS and determine pain management plan    FROM visit yesterday

## 2018-01-05 NOTE — TELEPHONE ENCOUNTER
Per dr Duc Rosario [facility] for 3 days of norco. Will pend for approval. Spoke with him personally. This was suppose to be put in yesterday.

## 2018-01-09 ENCOUNTER — APPOINTMENT (OUTPATIENT)
Dept: CT IMAGING | Facility: HOSPITAL | Age: 28
End: 2018-01-09

## 2018-01-09 ENCOUNTER — OFFICE VISIT (OUTPATIENT)
Dept: PRIMARY CARE CLINIC | Age: 28
End: 2018-01-09
Payer: MEDICAID

## 2018-01-09 ENCOUNTER — HOSPITAL ENCOUNTER (EMERGENCY)
Facility: HOSPITAL | Age: 28
Discharge: HOME | End: 2018-01-09
Attending: EMERGENCY MEDICINE | Admitting: EMERGENCY MEDICINE

## 2018-01-09 VITALS
RESPIRATION RATE: 14 BRPM | WEIGHT: 184 LBS | OXYGEN SATURATION: 98 % | DIASTOLIC BLOOD PRESSURE: 55 MMHG | SYSTOLIC BLOOD PRESSURE: 99 MMHG | TEMPERATURE: 98.9 F | BODY MASS INDEX: 31.41 KG/M2 | HEART RATE: 61 BPM | HEIGHT: 64 IN

## 2018-01-09 VITALS
DIASTOLIC BLOOD PRESSURE: 74 MMHG | BODY MASS INDEX: 31.07 KG/M2 | HEART RATE: 73 BPM | TEMPERATURE: 96.6 F | OXYGEN SATURATION: 98 % | WEIGHT: 182 LBS | SYSTOLIC BLOOD PRESSURE: 110 MMHG | HEIGHT: 64 IN

## 2018-01-09 DIAGNOSIS — N20.1 LEFT URETERAL STONE: ICD-10-CM

## 2018-01-09 DIAGNOSIS — N23 RENAL COLIC ON LEFT SIDE: Primary | ICD-10-CM

## 2018-01-09 DIAGNOSIS — N39.0 URINARY TRACT INFECTION WITHOUT HEMATURIA, SITE UNSPECIFIED: Primary | ICD-10-CM

## 2018-01-09 DIAGNOSIS — B37.31 YEAST VAGINITIS: ICD-10-CM

## 2018-01-09 LAB
ALBUMIN SERPL-MCNC: 3.9 G/DL (ref 3.5–5)
ALBUMIN/GLOB SERPL: 1.4 G/DL (ref 1.1–2.5)
ALP SERPL-CCNC: 56 U/L (ref 24–120)
ALT SERPL W P-5'-P-CCNC: 22 U/L (ref 0–54)
ANION GAP SERPL CALCULATED.3IONS-SCNC: 10 MMOL/L (ref 4–13)
APPEARANCE FLUID: ABNORMAL
AST SERPL-CCNC: 18 U/L (ref 7–45)
BACTERIA UR QL AUTO: ABNORMAL /HPF
BASOPHILS # BLD AUTO: 0.02 10*3/MM3 (ref 0–0.2)
BASOPHILS NFR BLD AUTO: 0.4 % (ref 0–2)
BILIRUB SERPL-MCNC: 0.4 MG/DL (ref 0.1–1)
BILIRUB UR QL STRIP: NEGATIVE
BILIRUBIN, POC: ABNORMAL
BLOOD URINE, POC: ABNORMAL
BUN BLD-MCNC: 11 MG/DL (ref 5–21)
BUN/CREAT SERPL: 16.2 (ref 7–25)
CALCIUM SPEC-SCNC: 8.9 MG/DL (ref 8.4–10.4)
CHLORIDE SERPL-SCNC: 108 MMOL/L (ref 98–110)
CLARITY UR: ABNORMAL
CLARITY, POC: ABNORMAL
CO2 SERPL-SCNC: 26 MMOL/L (ref 24–31)
COLOR UR: YELLOW
COLOR, POC: ABNORMAL
CREAT BLD-MCNC: 0.68 MG/DL (ref 0.5–1.4)
DEPRECATED RDW RBC AUTO: 38.7 FL (ref 40–54)
EOSINOPHIL # BLD AUTO: 0.29 10*3/MM3 (ref 0–0.7)
EOSINOPHIL NFR BLD AUTO: 6.1 % (ref 0–4)
ERYTHROCYTE [DISTWIDTH] IN BLOOD BY AUTOMATED COUNT: 11.9 % (ref 12–15)
GFR SERPL CREATININE-BSD FRML MDRD: 105 ML/MIN/1.73
GLOBULIN UR ELPH-MCNC: 2.7 GM/DL
GLUCOSE BLD-MCNC: 92 MG/DL (ref 70–100)
GLUCOSE UR STRIP-MCNC: NEGATIVE MG/DL
GLUCOSE URINE, POC: ABNORMAL
HCG SERPL QL: NEGATIVE
HCT VFR BLD AUTO: 35.4 % (ref 37–47)
HGB BLD-MCNC: 12.1 G/DL (ref 12–16)
HGB UR QL STRIP.AUTO: NEGATIVE
HYALINE CASTS UR QL AUTO: ABNORMAL /LPF
IMM GRANULOCYTES # BLD: 0.01 10*3/MM3 (ref 0–0.03)
IMM GRANULOCYTES NFR BLD: 0.2 % (ref 0–5)
KETONES UR QL STRIP: NEGATIVE
KETONES, POC: ABNORMAL
LEUKOCYTE EST, POC: ABNORMAL
LEUKOCYTE ESTERASE UR QL STRIP.AUTO: ABNORMAL
LYMPHOCYTES # BLD AUTO: 1.82 10*3/MM3 (ref 0.72–4.86)
LYMPHOCYTES NFR BLD AUTO: 38.1 % (ref 15–45)
MCH RBC QN AUTO: 31 PG (ref 28–32)
MCHC RBC AUTO-ENTMCNC: 34.2 G/DL (ref 33–36)
MCV RBC AUTO: 90.8 FL (ref 82–98)
MONOCYTES # BLD AUTO: 0.28 10*3/MM3 (ref 0.19–1.3)
MONOCYTES NFR BLD AUTO: 5.9 % (ref 4–12)
NEUTROPHILS # BLD AUTO: 2.36 10*3/MM3 (ref 1.87–8.4)
NEUTROPHILS NFR BLD AUTO: 49.3 % (ref 39–78)
NITRITE UR QL STRIP: NEGATIVE
NITRITE, POC: ABNORMAL
NRBC BLD MANUAL-RTO: 0 /100 WBC (ref 0–0)
PH UR STRIP.AUTO: 5.5 [PH] (ref 5–8)
PH, POC: 6
PLATELET # BLD AUTO: 202 10*3/MM3 (ref 130–400)
PMV BLD AUTO: 10.3 FL (ref 6–12)
POTASSIUM BLD-SCNC: 4.3 MMOL/L (ref 3.5–5.3)
PROT SERPL-MCNC: 6.6 G/DL (ref 6.3–8.7)
PROT UR QL STRIP: NEGATIVE
PROTEIN, POC: ABNORMAL
RBC # BLD AUTO: 3.9 10*6/MM3 (ref 4.2–5.4)
RBC # UR: ABNORMAL /HPF
REF LAB TEST METHOD: ABNORMAL
SODIUM BLD-SCNC: 144 MMOL/L (ref 135–145)
SP GR UR STRIP: 1.02 (ref 1–1.03)
SPECIFIC GRAVITY, POC: 1.03
SQUAMOUS #/AREA URNS HPF: ABNORMAL /HPF
UROBILINOGEN UR QL STRIP: ABNORMAL
UROBILINOGEN, POC: 1
WBC NRBC COR # BLD: 4.78 10*3/MM3 (ref 4.8–10.8)
WBC UR QL AUTO: ABNORMAL /HPF

## 2018-01-09 PROCEDURE — 25010000002 HYDROMORPHONE PER 4 MG: Performed by: EMERGENCY MEDICINE

## 2018-01-09 PROCEDURE — 96374 THER/PROPH/DIAG INJ IV PUSH: CPT

## 2018-01-09 PROCEDURE — 99283 EMERGENCY DEPT VISIT LOW MDM: CPT

## 2018-01-09 PROCEDURE — 4004F PT TOBACCO SCREEN RCVD TLK: CPT | Performed by: PEDIATRICS

## 2018-01-09 PROCEDURE — 81001 URINALYSIS AUTO W/SCOPE: CPT | Performed by: EMERGENCY MEDICINE

## 2018-01-09 PROCEDURE — 87086 URINE CULTURE/COLONY COUNT: CPT | Performed by: EMERGENCY MEDICINE

## 2018-01-09 PROCEDURE — 99213 OFFICE O/P EST LOW 20 MIN: CPT | Performed by: PEDIATRICS

## 2018-01-09 PROCEDURE — 84703 CHORIONIC GONADOTROPIN ASSAY: CPT | Performed by: EMERGENCY MEDICINE

## 2018-01-09 PROCEDURE — 85025 COMPLETE CBC W/AUTO DIFF WBC: CPT | Performed by: EMERGENCY MEDICINE

## 2018-01-09 PROCEDURE — G8427 DOCREV CUR MEDS BY ELIG CLIN: HCPCS | Performed by: PEDIATRICS

## 2018-01-09 PROCEDURE — G8484 FLU IMMUNIZE NO ADMIN: HCPCS | Performed by: PEDIATRICS

## 2018-01-09 PROCEDURE — 74176 CT ABD & PELVIS W/O CONTRAST: CPT

## 2018-01-09 PROCEDURE — 96375 TX/PRO/DX INJ NEW DRUG ADDON: CPT

## 2018-01-09 PROCEDURE — 25010000002 ONDANSETRON PER 1 MG: Performed by: EMERGENCY MEDICINE

## 2018-01-09 PROCEDURE — 25010000002 KETOROLAC TROMETHAMINE PER 15 MG: Performed by: EMERGENCY MEDICINE

## 2018-01-09 PROCEDURE — 80053 COMPREHEN METABOLIC PANEL: CPT | Performed by: EMERGENCY MEDICINE

## 2018-01-09 PROCEDURE — G8417 CALC BMI ABV UP PARAM F/U: HCPCS | Performed by: PEDIATRICS

## 2018-01-09 RX ORDER — HYDROCODONE BITARTRATE AND ACETAMINOPHEN 10; 325 MG/1; MG/1
1 TABLET ORAL EVERY 8 HOURS PRN
COMMUNITY
End: 2018-01-17 | Stop reason: SDUPTHER

## 2018-01-09 RX ORDER — ONDANSETRON 2 MG/ML
4 INJECTION INTRAMUSCULAR; INTRAVENOUS ONCE
Status: COMPLETED | OUTPATIENT
Start: 2018-01-09 | End: 2018-01-09

## 2018-01-09 RX ORDER — OXYCODONE AND ACETAMINOPHEN 7.5; 325 MG/1; MG/1
1 TABLET ORAL EVERY 4 HOURS PRN
Qty: 20 TABLET | Refills: 0 | Status: SHIPPED | OUTPATIENT
Start: 2018-01-09 | End: 2018-02-13

## 2018-01-09 RX ORDER — KETOROLAC TROMETHAMINE 30 MG/ML
30 INJECTION, SOLUTION INTRAMUSCULAR; INTRAVENOUS ONCE
Status: COMPLETED | OUTPATIENT
Start: 2018-01-09 | End: 2018-01-09

## 2018-01-09 RX ORDER — SODIUM CHLORIDE 0.9 % (FLUSH) 0.9 %
10 SYRINGE (ML) INJECTION AS NEEDED
Status: DISCONTINUED | OUTPATIENT
Start: 2018-01-09 | End: 2018-01-09 | Stop reason: HOSPADM

## 2018-01-09 RX ORDER — FLUCONAZOLE 150 MG/1
150 TABLET ORAL ONCE
Qty: 3 TABLET | Refills: 0 | Status: SHIPPED | OUTPATIENT
Start: 2018-01-09 | End: 2018-01-09

## 2018-01-09 RX ORDER — OXYCODONE AND ACETAMINOPHEN 7.5; 325 MG/1; MG/1
1 TABLET ORAL EVERY 4 HOURS PRN
COMMUNITY
End: 2018-01-25

## 2018-01-09 RX ORDER — CIPROFLOXACIN 500 MG/1
500 TABLET, FILM COATED ORAL 2 TIMES DAILY
Qty: 20 TABLET | Refills: 0 | Status: SHIPPED | OUTPATIENT
Start: 2018-01-09 | End: 2018-01-17

## 2018-01-09 RX ADMIN — HYDROMORPHONE HYDROCHLORIDE 1 MG: 1 INJECTION, SOLUTION INTRAMUSCULAR; INTRAVENOUS; SUBCUTANEOUS at 09:41

## 2018-01-09 RX ADMIN — ONDANSETRON 4 MG: 2 INJECTION, SOLUTION INTRAMUSCULAR; INTRAVENOUS at 09:09

## 2018-01-09 RX ADMIN — KETOROLAC TROMETHAMINE 30 MG: 30 INJECTION, SOLUTION INTRAMUSCULAR at 09:09

## 2018-01-09 ASSESSMENT — ENCOUNTER SYMPTOMS
EYE PAIN: 0
ABDOMINAL PAIN: 1
NAUSEA: 0
CONSTIPATION: 1
SINUS PRESSURE: 0
SHORTNESS OF BREATH: 0
BACK PAIN: 1
VOMITING: 0
DIARRHEA: 0
SORE THROAT: 0
COUGH: 0
WHEEZING: 0

## 2018-01-09 NOTE — PROGRESS NOTES
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 There are a couple of larger stones in the lower pole collecting  system measuring up to 4 mm. There is mild to moderate dilatation of the  left renal collecting system and mild dilatation of the proximal left  ureter. There is a tiny 1 mm stone in the proximal left ureter at the L3  level. The urinary bladder is unremarkable. BOWEL: No oral contrast was administered. The appendix is normal. Small  bowel loops are nondilated. There is moderate stool in the colon. OTHER: There is ventriculoperitoneal shunt tubing. There is a small  amount of free fluid in the pelvis that may be associated. The fluid  could also be physiologic and related to ablation. The visualized uterus  is unremarkable. There is a 2.4 cm dominant follicle within the right  ovary. No abnormality is seen in the region of the left ovary. Status Results Details            She did see pain management today, but they said that they will not be able to give her any pain medication until march        height is 5' 4\" (1.626 m) and weight is 182 lb (82.6 kg). Her temporal temperature is 96.6 °F (35.9 °C). Her blood pressure is 110/74 and her pulse is 73. Her oxygen saturation is 98%. Body mass index is 31.24 kg/m². Results for orders placed or performed in visit on 01/09/18   POCT Urinalysis no Micro   Result Value Ref Range    Color, UA      Clarity, UA      Glucose, UA POC neg     Bilirubin, UA small     Ketones, UA neg     Spec Grav, UA 1.030     Blood, UA POC neg     pH, UA 6.0     Protein, UA POC trace     Urobilinogen, UA 1.0     Leukocytes, UA trace     Nitrite, UA neg     Appearance, Fluid  Clear, Slightly Cloudy       I have reviewed the following with the Ms. Merino   Lab Review   Orders Only on 12/20/2017   Component Date Value    Urine Culture, Routine 12/23/2017 *                    Value:>100,000 CFU/ml  Mixed skin manjit present      Organism 12/23/2017 Enterococcus species*    Urine Culture, Routine 12/23/2017 Heavy growth      Copies of these are in the chart. Current Outpatient Prescriptions   Medication Sig Dispense Refill    HYDROcodone-acetaminophen (NORCO)  MG per tablet Take 1 tablet by mouth every 8 hours as needed for Pain.  oxyCODONE-acetaminophen (PERCOCET) 7.5-325 MG per tablet Take 1 tablet by mouth every 4 hours as needed for Pain.  fluconazole (DIFLUCAN) 150 MG tablet Take 1 tablet by mouth once for 1 dose 3 tablet 0    ciprofloxacin (CIPRO) 500 MG tablet Take 1 tablet by mouth 2 times daily for 10 days 20 tablet 0    clonazePAM (KLONOPIN) 0.5 MG tablet Take 1 tablet by mouth 2 times daily . 60 tablet 0    tiZANidine (ZANAFLEX) 4 MG tablet Take 1 tablet by mouth every 6 hours as needed (muscle spasm) 60 tablet 1    ibuprofen (ADVIL;MOTRIN) 800 MG tablet Take 800 mg by mouth every 6 hours as needed for Pain      fexofenadine (ALLEGRA) 60 MG tablet Take 60 mg by mouth      linaclotide (LINZESS) 290 MCG CAPS capsule Take 290 mcg by mouth      PROAIR  (90 BASE) MCG/ACT inhaler INHALE 2 PUFFS Q 4 H  0     No current facility-administered medications for this visit.         Allergies: Morphine and related and Other    Past Medical History:   Diagnosis Date    Back pain     from kidney stones    Chronic kidney disease     Vurshite stones    Depression     IIH (idiopathic intracranial hypertension)     Kidney stone     Ovarian cyst        Past Surgical History:   Procedure Laterality Date    ABDOMEN SURGERY Left 12/29/2016    EXCSION SKIN LESION BREAST AND ABDOMEN WALL performed by Liang Houston MD at [facility] stones    TONSILLECTOMY      VENTRICULOPERITONEAL SHUNT         Social History   Substance Use Topics    Smoking status: Current Some Day Smoker     Packs/day: 0.50     Types: E-Cigarettes, Cigarettes    Smokeless tobacco: Never Used    Alcohol use Yes      Comment: rarely       Review of Systems   Constitutional: Negative for fatigue and unexpected weight change. HENT: Negative for congestion, ear pain, sinus pressure and sore throat. Eyes: Negative for pain and visual disturbance. Respiratory: Negative for cough, shortness of breath and wheezing. Cardiovascular: Negative for chest pain, palpitations and leg swelling. Gastrointestinal: Positive for abdominal pain and constipation. Negative for diarrhea, nausea and vomiting. Endocrine: Negative for polyuria. Genitourinary: Negative for dysuria, frequency, hematuria and urgency. Musculoskeletal: Positive for back pain. Negative for neck pain. Skin: Negative for rash. Neurological: Negative for dizziness and headaches. Psychiatric/Behavioral: Negative for self-injury. The patient is not nervous/anxious. Physical Exam   Constitutional: She is oriented to person, place, and time. She appears well-developed and well-nourished. She is cooperative. Non-toxic appearance. No distress. Body habitus is mild ow   HENT:   Head: Normocephalic and atraumatic. Right Ear: Hearing, tympanic membrane, external ear and ear canal normal.   Left Ear: Hearing, tympanic membrane, external ear and ear canal normal.   Nose: Nose normal.   Mouth/Throat: Oropharynx is clear and moist and mucous membranes are normal.   Eyes: Conjunctivae, EOM and lids are normal. Pupils are equal, round, and reactive to light. Neck: Phonation normal. Neck supple. No JVD present. Carotid bruit is not present. No thyromegaly present. Cardiovascular: Normal rate, regular rhythm and normal heart sounds. No extrasystoles are present. PMI is not displaced. Exam reveals no gallop and no friction rub. No murmur heard. Pulmonary/Chest: Effort normal and breath sounds normal. No respiratory distress. She has no wheezes. She has no rhonchi. She has no rales. Abdominal: Soft. Bowel sounds are normal. She exhibits no distension and no mass. There is no hepatosplenomegaly. There is tenderness in the left upper quadrant. There is CVA tenderness (on Left). Genitourinary:   Genitourinary Comments: Examination deferred   Musculoskeletal: Normal range of motion. She exhibits no edema. Joint examination reveals no acute arthritis or synovitis. Lymphadenopathy:     She has no cervical adenopathy. Neurological: She is alert and oriented to person, place, and time. She has normal strength. She displays no atrophy and no tremor. No cranial nerve deficit (by gross examination) or sensory deficit. Gait normal.   No focal deficits appreciated   Skin: Skin is warm and dry. No rash noted. Psychiatric: She has a normal mood and affect. Her speech is normal and behavior is normal.   Vitals reviewed. ASSESSMENT      ICD-10-CM ICD-9-CM    1. Urinary tract infection without hematuria, site unspecified N39.0 599.0 POCT Urinalysis no Micro      ciprofloxacin (CIPRO) 500 MG tablet   2. Yeast vaginitis B37.3 112.1 fluconazole (DIFLUCAN) 150 MG tablet       PLAN      ICD-10-CM ICD-9-CM    1. Urinary tract infection without hematuria, site unspecified N39.0 599.0 POCT Urinalysis no Micro      ciprofloxacin (CIPRO) 500 MG tablet   2. Yeast vaginitis B37.3 112.1 fluconazole (DIFLUCAN) 150 MG tablet       Orders Placed This Encounter   Procedures    POCT Urinalysis no Micro        Return in about 2 weeks (around 1/23/2018). There are no Patient Instructions on file for this visit. Additional Instructions: As always, patient is advised to bring in medication bottles in order to correctly reconcile with our current list.    Annamarie Dee received counseling on the following healthy behaviors: take cipro bid, but dont take in close proximity to tizanidine. Patient given educational materials on uti and kidney stones.     Discussed use, benefit, and side effects of prescribed

## 2018-01-09 NOTE — ED PROVIDER NOTES
Subjective   HPI Comments: C/o pain in left flank that has now moved to LLQ that started yesterday and worse today.  Similar to kidney stones of past.  Has known stones in left kidney and is scheduled for surgery to remove them at [place].    Patient is a 27 y.o. female presenting with abdominal pain.   History provided by:  Patient   used: No    Abdominal Pain   Pain location:  L flank  Pain quality: aching    Pain radiates to:  LLQ  Pain severity:  Severe  Onset quality:  Sudden  Duration:  1 day  Timing:  Constant  Progression:  Worsening  Chronicity:  Recurrent  Context: not alcohol use, not awakening from sleep, not diet changes, not eating, not laxative use, not medication withdrawal, not previous surgeries, not recent illness, not recent sexual activity, not recent travel, not retching, not sick contacts, not suspicious food intake and not trauma    Relieved by:  Nothing  Worsened by:  Nothing  Ineffective treatments:  None tried  Associated symptoms: dysuria and nausea    Associated symptoms: no anorexia, no belching, no chest pain, no chills, no constipation, no cough, no diarrhea, no fatigue, no fever, no flatus, no hematemesis, no hematochezia, no hematuria, no melena, no shortness of breath, no sore throat, no vaginal bleeding, no vaginal discharge and no vomiting    Risk factors: no alcohol abuse, no aspirin use, not elderly, has not had multiple surgeries, no NSAID use, not obese, not pregnant and no recent hospitalization        Review of Systems   Constitutional: Negative.  Negative for chills, fatigue and fever.   HENT: Negative.  Negative for sore throat.    Respiratory: Negative.  Negative for cough and shortness of breath.    Cardiovascular: Negative.  Negative for chest pain.   Gastrointestinal: Positive for abdominal pain and nausea. Negative for anorexia, constipation, diarrhea, flatus, hematemesis, hematochezia, melena and vomiting.   Genitourinary: Positive for  dysuria. Negative for hematuria, vaginal bleeding and vaginal discharge.   Musculoskeletal: Negative.    Neurological: Negative.    Hematological: Negative.    Psychiatric/Behavioral: Negative.    All other systems reviewed and are negative.      Past Medical History:   Diagnosis Date   • Intracranial hypertension    • Kidney stones        Allergies   Allergen Reactions   • Morphine And Related Shortness Of Breath   • Other Other (See Comments)     Dissolvable sutures. She developed an abscess from the last ones. Ended up in ER.    • Demerol [Meperidine] Rash       Past Surgical History:   Procedure Laterality Date   • CYSTOSCOPY ELECTROHYDRAULIC LITHOTRIPSY     • OTHER SURGICAL HISTORY      Intracranial shunt   •  SHUNT INSERTION         History reviewed. No pertinent family history.    Social History     Social History   • Marital status: Single     Spouse name: N/A   • Number of children: 1   • Years of education: N/A     Social History Main Topics   • Smoking status: Current Every Day Smoker     Types: Cigarettes   • Smokeless tobacco: Never Used      Comment: 5 CIGARETTS/DAY   • Alcohol use No   • Drug use: No   • Sexual activity: Defer     Other Topics Concern   • None     Social History Narrative       Prior to Admission medications    Medication Sig Start Date End Date Taking? Authorizing Provider   acetaZOLAMIDE (DIAMOX) 250 MG tablet TAKE 1 TABLET BY MOUTH TWICE A DAY 11/28/17   Historical Provider, MD   ALPRAZolam (XANAX) 0.5 MG tablet Take 0.5 mg by mouth Daily.    Historical Provider, MD   amoxicillin-clavulanate (AUGMENTIN) 875-125 MG per tablet Take  by mouth.    Historical Provider, MD   butalbital-acetaminophen-caffeine (ORBIVAN) -40 MG capsule capsule TAKE ONE CAPSULE BY MOUTH 3 TIMES A DAY AS NEEDED 11/20/17   Historical Provider, MD   clonazePAM (KlonoPIN) 0.5 MG tablet TAKE 1 TABLET BY MOUTH TWICE A DAY 11/20/17   Historical Provider, MD   diazepam (VALIUM) 2 MG tablet Take 2 mg by mouth  daily.    Historical Provider, MD   fexofenadine-pseudoephedrine (ALLEGRA-D 24) 180-240 MG per 24 hr tablet Take 1 tablet by mouth.    Historical Provider, MD   HYDROcodone-acetaminophen (NORCO)  MG per tablet TAKE 1/2-1 TABLET BY MOUTH 3 TIMES A DAY AS NEEDED FOR PAIN 11/27/17   Historical Provider, MD   ibuprofen (ADVIL,MOTRIN) 800 MG tablet TAKE 1 TABLET BY MOUTH 3 TIMES A DAY AS NEEDED FOR SEVERE HEADACHES, (DO NOT TAKE WITH OTHER NSAIDS) 11/20/17   Historical Provider, MD   linaclotide (LINZESS) 290 MCG capsule capsule Take 290 mcg by mouth as needed.    Historical Provider, MD   NON FORMULARY daily. BIRTH CONTROL PILL    Historical Provider, MD   phenazopyridine (PYRIDIUM) 200 MG tablet Take 1 tablet by mouth 3 (Three) Times a Day As Needed for bladder spasms. 2/6/17   Charles Reese PA-C   predniSONE (DELTASONE) 10 MG tablet Take 10 mg by mouth.    Historical Provider, MD   VENTOLIN  (90 Base) MCG/ACT inhaler INHALE 1-2 PUFFS PO Q 6 H PRN 10/10/17   Historical Provider, MD       Medications   sodium chloride 0.9 % flush 10 mL (not administered)   ketorolac (TORADOL) injection 30 mg (30 mg Intravenous Given 1/9/18 0909)   ondansetron (ZOFRAN) injection 4 mg (4 mg Intravenous Given 1/9/18 0909)   HYDROmorphone (DILAUDID) injection 1 mg (1 mg Intravenous Given 1/9/18 0941)       Vitals:    01/09/18 1237   BP: 99/55   Pulse: 61   Resp: 14   Temp: 98.9 °F (37.2 °C)   SpO2: 98%         Objective   Physical Exam   Constitutional: She is oriented to person, place, and time. She appears well-developed and well-nourished.   HENT:   Head: Normocephalic and atraumatic.   Mouth/Throat: Oropharynx is clear and moist.   Eyes: EOM are normal. Pupils are equal, round, and reactive to light.   Neck: Normal range of motion. Neck supple.   Cardiovascular: Normal rate and regular rhythm.    Pulmonary/Chest: Effort normal and breath sounds normal.   Abdominal: Soft. Bowel sounds are normal.   Musculoskeletal:  Normal range of motion.   Neurological: She is alert and oriented to person, place, and time.   Skin: Skin is warm and dry.   Psychiatric: She has a normal mood and affect. Her behavior is normal.   Nursing note and vitals reviewed.      Procedures         Lab Results (last 24 hours)     Procedure Component Value Units Date/Time    CBC & Differential [[id number]] Collected:  01/09/18 0911    Specimen:  Blood Updated:  01/09/18 0926    Narrative:       The following orders were created for panel order CBC & Differential.  Procedure                               Abnormality         Status                     ---------                               -----------         ------                     CBC Auto Differential[[id number]]        Abnormal            Final result                 Please view results for these tests on the individual orders.    Comprehensive Metabolic Panel [[id number]] Collected:  01/09/18 0911    Specimen:  Blood Updated:  01/09/18 0938     Glucose 92 mg/dL      BUN 11 mg/dL      Creatinine 0.68 mg/dL      Sodium 144 mmol/L      Potassium 4.3 mmol/L      Chloride 108 mmol/L      CO2 26.0 mmol/L      Calcium 8.9 mg/dL      Total Protein 6.6 g/dL      Albumin 3.90 g/dL      ALT (SGPT) 22 U/L      AST (SGOT) 18 U/L      Alkaline Phosphatase 56 U/L      Total Bilirubin 0.4 mg/dL      eGFR Non African Amer 105 mL/min/1.73      Globulin 2.7 gm/dL      A/G Ratio 1.4 g/dL      BUN/Creatinine Ratio 16.2     Anion Gap 10.0 mmol/L     hCG, Serum, Qualitative [[id number]]  (Normal) Collected:  01/09/18 0911    Specimen:  Blood Updated:  01/09/18 0938     HCG Qualitative Negative    CBC Auto Differential [[id number]]  (Abnormal) Collected:  01/09/18 0911    Specimen:  Blood Updated:  01/09/18 0926     WBC 4.78 (L) 10*3/mm3      RBC 3.90 (L) 10*6/mm3      Hemoglobin 12.1 g/dL      Hematocrit 35.4 (L) %      MCV 90.8 fL      MCH 31.0 pg      MCHC 34.2 g/dL      RDW 11.9 (L) %      RDW-SD 38.7 (L) fl      MPV 10.3 fL       Platelets 202 10*3/mm3      Neutrophil % 49.3 %      Lymphocyte % 38.1 %      Monocyte % 5.9 %      Eosinophil % 6.1 (H) %      Basophil % 0.4 %      Immature Grans % 0.2 %      Neutrophils, Absolute 2.36 10*3/mm3      Lymphocytes, Absolute 1.82 10*3/mm3      Monocytes, Absolute 0.28 10*3/mm3      Eosinophils, Absolute 0.29 10*3/mm3      Basophils, Absolute 0.02 10*3/mm3      Immature Grans, Absolute 0.01 10*3/mm3      nRBC 0.0 /100 WBC     Urinalysis With / Culture If Indicated - Urine, Clean Catch [[id number]]  (Abnormal) Collected:  01/09/18 0912    Specimen:  Urine from Urine, Clean Catch Updated:  01/09/18 0931     Color, UA Yellow     Appearance, UA Cloudy (A)     pH, UA 5.5     Specific Gravity, UA 1.018     Glucose, UA Negative     Ketones, UA Negative     Bilirubin, UA Negative     Blood, UA Negative     Protein, UA Negative     Leuk Esterase, UA Moderate (2+) (A)     Nitrite, UA Negative     Urobilinogen, UA 0.2 E.U./dL    Urine Culture - Urine, Urine, Clean Catch [[id number]] Collected:  01/09/18 0912    Specimen:  Urine from Urine, Clean Catch Updated:  01/09/18 0928    Urinalysis, Microscopic Only - Urine, Clean Catch [[id number]]  (Abnormal) Collected:  01/09/18 0912    Specimen:  Urine from Urine, Clean Catch Updated:  01/09/18 0931     RBC, UA 3-5 (A) /HPF      WBC, UA 6-12 (A) /HPF      Bacteria, UA 1+ (A) /HPF      Squamous Epithelial Cells, UA 13-20 (A) /HPF      Hyaline Casts, UA 0-2 /LPF      Methodology Automated Microscopy          CT Abdomen Pelvis Without Contrast   Final Result   1. Renal stones on the left. There is a 1 mm stone in the proximal left   ureter at the L3 level. Is mild to moderate dilatation of the left renal   collecting system.   2. Prior cholecystectomy.   3. There is a 2.4 cm dominant follicle within the right ovary.   4. Small amount of free fluid in the pelvis may be related to   correlation. It could also be related to ventriculoperitoneal shunt   tubing.       The  full report of this exam was immediately signed and available to the   emergency room. The patient is currently in the emergency room.   This report was finalized on 01/09/2018 11:44 by Dr. Leonardo dAams MD.          ED Course  ED Course   Comment By Time   Told patient and mother of results and will treat her pain but we did have long talk about chronic pain and treatment and I was clear to her that I did not consider her a drug seeker but was giving advice to make sure she was not classified as one and proper pain control at her young age. Joe Maya Jr., MD 01/09 1418          MDM  Number of Diagnoses or Management Options  Left ureteral stone: new and requires workup  Renal colic on left side: new and requires workup     Amount and/or Complexity of Data Reviewed  Clinical lab tests: ordered and reviewed  Tests in the radiology section of CPT®: ordered and reviewed    Risk of Complications, Morbidity, and/or Mortality  Presenting problems: moderate  Diagnostic procedures: moderate  Management options: moderate    Patient Progress  Patient progress: stable      Final diagnoses:   Renal colic on left side   Left ureteral stone          Joe Maya Jr., MD  01/09/18 1416

## 2018-01-10 ENCOUNTER — HOSPITAL ENCOUNTER (OUTPATIENT)
Dept: PHYSICAL THERAPY | Age: 28
Setting detail: THERAPIES SERIES
Discharge: HOME | End: 2018-01-10
Payer: MEDICAID

## 2018-01-10 PROCEDURE — 97161 PT EVAL LOW COMPLEX 20 MIN: CPT

## 2018-01-10 PROCEDURE — G8982 BODY POS GOAL STATUS: HCPCS

## 2018-01-10 PROCEDURE — 97110 THERAPEUTIC EXERCISES: CPT

## 2018-01-10 PROCEDURE — G8981 BODY POS CURRENT STATUS: HCPCS

## 2018-01-10 ASSESSMENT — PAIN DESCRIPTION - ORIENTATION
ORIENTATION: UPPER
ORIENTATION: UPPER

## 2018-01-10 ASSESSMENT — PAIN DESCRIPTION - PAIN TYPE
TYPE: CHRONIC PAIN
TYPE: CHRONIC PAIN

## 2018-01-10 ASSESSMENT — PAIN DESCRIPTION - DESCRIPTORS: DESCRIPTORS: BURNING;NUMBNESS;TIGHTNESS

## 2018-01-10 ASSESSMENT — PAIN SCALES - GENERAL: PAINLEVEL_OUTOF10: 3

## 2018-01-10 ASSESSMENT — PAIN DESCRIPTION - LOCATION
LOCATION: BACK
LOCATION: BACK

## 2018-01-10 NOTE — PROGRESS NOTES
Physical Therapy  Daily Treatment Note  Date: 1/10/2018  Patient Name: Yvan De Dios  MRN: [id number]     :   1990    Subjective:   General  Chart Reviewed: Yes  Additional Pertinent Hx: Patient is a 33year old female who presents with complaint of midback pain that has been present for over half a year. Additional pertinent information includes  shunt and kidney stones. Response To Previous Treatment: Not applicable  Family / Caregiver Present: No  PT Visit Information  Onset Date: 17  PT Insurance Information: [employer] (pre-cert req'd)  Total # of Visits to Date: 1  Progress Note Due Date: 18  Subjective  Subjective: States upper back is not bothering her as much today as it usually does. Also feels that this may be due to being distracted by pain from kidney stone.   Pain Screening  Patient Currently in Pain: Yes  Pain Assessment  Pain Assessment: 0-10  Pain Level:  (3/10 pre, 0/10 post.)  Pain Type: Chronic pain  Pain Location: Back  Pain Orientation: Upper  Pain Descriptors: Burning;Numbness;Tightness  Vital Signs  Patient Currently in Pain: Yes       Treatment Activities:                  Ambulation  Ambulation?: Yes  Ambulation 1  Surface: level tile  Device: No Device  Assistance: Independent  Quality of Gait: Forward head, forward rounded shoulders posture           Spine  Cervical: Flexion 0-60 (Stretching/pain), Extension 0-60, Rotation bilaterally 75%  Strength RUE  R Shoulder Flexion: 5/5  R Shoulder ABduction: 5/5  R Elbow Flexion: 5/5  R Elbow Extension: 5/5  R Wrist Flexion: 5/5  R Wrist Extension: 5/5  Strength LUE  L Shoulder Flexion: 5/5  L Shoulder ABduction: 5/5  L Elbow Flexion: 5/5  L Elbow Extension: 5/5  L Wrist Flexion: 5/5  L Wrist Extension: 5/5    Exercises  Exercise 1: Chin tucks  1 x 10                                                                                     --Pt with  shunt R  Exercise 2: Slouch with overcorrection 1 x 10  Exercise 3: Levator scapula stretch bilateral 3 x 10 sec  Exercise 4: Upper trapezius stretch bilateral 3 x 10 sec  Exercise 5: Scapular retraction 1 x 10  Exercise 6: Timed gripper bilateral--green digi-squeeze and green digi-flex x 1min (switched hands 30 secs in)  Exercise 7: Wall push up with push up plus x 10  Exercise 8: Backward shoulder rolls 1 x 10  Exercise 9: Corner stretch 3 x 10 sec  Exercise 10: Thoracic extension stretch with exercise ball (roll ball up wall bilateral arms and lean in) 5 x 5 secs  Exercise 11: Arrow to L (L arm extended, R arm pulling arrow) 1 x 10  Exercise 12: Standing R sidebending with L arm overhead reach 1 x 10  Exercise 13: Prone Ts with shoulder blade squeeze 1 x 10  Exercise 14: Rows with theraband 1 x 10 with red t-band  Exercise 15: Forward bent rows 1 x 10 bilateral  Exercise 16: Manual cervical traction (NO ICT d/t  shunt) 3 x 20 secs  Exercise 17: Moist heat PRN for pain--x 10' in sitting                          Hand Dominance  Hand Dominance: Right  Left Hand Strength -  (lbs)  Handle Setting 2: 63#, 65#, 63#  Right Hand Strength -  (lbs)  Handle Setting 2: 63#, 65#, 67#        Assessment:   Conditions Requiring Skilled Therapeutic Intervention  Body structures, Functions, Activity limitations: Decreased functional mobility ; Decreased ADL status; Decreased ROM; Decreased strength;Decreased high-level IADLs  Assessment: Exercises per POC. Pt requires occasional cuing for proper technique- asks about HEP and discussed with her that we would provide HEP and review at next visit, provided that she tolerated ther ex from today. Reports reduction of symptoms from manual cervical traction.   Treatment Diagnosis: Back pain  Prognosis: Good  Decision Making: Medium Complexity  Patient Education: Plan of care, clinical findings, postural correction, moist heat pain intervention  REQUIRES PT FOLLOW UP: Yes  Discharge Recommendations: Continue to assess pending progress      G-Code:  PT G-Codes  Functional Assessment Tool Used: Oswestry Disability Questionnaire  Score: 26% impairment  Functional Limitation: Changing and maintaining body position  Changing and Maintaining Body Position Current Status (): At least 20 percent but less than 40 percent impaired, limited or restricted  Changing and Maintaining Body Position Goal Status (): At least 1 percent but less than 20 percent impaired, limited or restricted  OutComes Score                                           Goals:  Short term goals  Time Frame for Short term goals: 2-3 weeks  Short term goal 1: Patient will be independent with HEP  Short term goal 2: Patient will have no pain/stretch with cervical flexion to improve quality of functional mobility  Short term goal 3: Patient will report at least 50% decrease in instances of waking up with N/T in arms/hands  Long term goals  Time Frame for Long term goals : 4-6 weeks  Long term goal 1: Patient will improve score on Oswestry to 15% impairment or less to demonstrate decreased impairment  Long term goal 2: Patient will increase L  strength to within 10# of R  strength (75# or greater) to demonstrate strength needed for functional tasks such as gripping and opening jars  Long term goal 3: Patient with improve posture as shown by decreased forward head carriage, forward rounded shoulders to improve neck/shoulder biomechanics  Patient Goals   Patient goals : To have less back pain    Plan:    Plan  Times per week: 2  Plan weeks: 4-6 weeks  Specific instructions for Next Treatment:  Add windmills  Current Treatment Recommendations: Strengthening, ROM, Functional Mobility Training, Neuromuscular Re-education, Manual Therapy - Soft Tissue Mobilization, Manual Therapy - Joint Manipulation, Pain Management, Home Exercise Program, Modalities  Timed Code Treatment Minutes: 35 Minutes     Therapy Time   Individual Concurrent Group Co-treatment   Time In 1550         Time Out 1856

## 2018-01-11 ENCOUNTER — APPOINTMENT (OUTPATIENT)
Dept: CT IMAGING | Facility: HOSPITAL | Age: 28
End: 2018-01-11

## 2018-01-11 LAB
BACTERIA SPEC AEROBE CULT: ABNORMAL

## 2018-01-15 NOTE — TELEPHONE ENCOUNTER
I am unable to do that. We cannot call in narcotics without seeing the patient.   That is against the law in the state [facility]

## 2018-01-15 NOTE — ED NOTES
"ED Call Back Questions    1. How are you doing since leaving the Emergency Department?    Doing good,still have some lingering pain  2. Do you have any questions about your discharge instructions? No     3. Have you filled your new prescriptions yet? Yes   a. Do you have any questions about those medications? No     4. Were you able to make a follow-up appointment with the physician? Yes     5. Do you have a primary care physician? Yes   a. If No, would you like for me to set you up with one? N/A  i. If Yes, “I will have our ED  give you a call right back at this number to work with you on the best time for an appointment.”    6. We are always looking to get better at what we do. Do you have any suggestions for what we can do to be even better? N/A  a. If Yes, \"Thank you for sharing your concerns. I apologize. I will follow up with our manager and patient . Would you like someone to call you back?\" No     7. Is there anything else I can do for you? Yes   Tell everyone thank you, visit was good     Lex Lundy  01/15/18 [id number]    "

## 2018-01-17 ENCOUNTER — TELEPHONE (OUTPATIENT)
Dept: PRIMARY CARE CLINIC | Age: 28
End: 2018-01-17

## 2018-01-17 ENCOUNTER — OFFICE VISIT (OUTPATIENT)
Dept: PRIMARY CARE CLINIC | Age: 28
End: 2018-01-17
Payer: MEDICAID

## 2018-01-17 VITALS
BODY MASS INDEX: 31.07 KG/M2 | OXYGEN SATURATION: 100 % | HEART RATE: 86 BPM | TEMPERATURE: 98.6 F | SYSTOLIC BLOOD PRESSURE: 108 MMHG | WEIGHT: 182 LBS | DIASTOLIC BLOOD PRESSURE: 78 MMHG | HEIGHT: 64 IN

## 2018-01-17 DIAGNOSIS — S46.812A STRAIN OF LEFT TRAPEZIUS MUSCLE, INITIAL ENCOUNTER: ICD-10-CM

## 2018-01-17 DIAGNOSIS — Z79.899 MEDICATION MANAGEMENT: Primary | ICD-10-CM

## 2018-01-17 DIAGNOSIS — M41.9 SCOLIOSIS OF THORACIC SPINE, UNSPECIFIED SCOLIOSIS TYPE: ICD-10-CM

## 2018-01-17 DIAGNOSIS — N30.01 ACUTE CYSTITIS WITH HEMATURIA: ICD-10-CM

## 2018-01-17 DIAGNOSIS — M54.5 CHRONIC LOW BACK PAIN, UNSPECIFIED BACK PAIN LATERALITY, WITH SCIATICA PRESENCE UNSPECIFIED: Primary | ICD-10-CM

## 2018-01-17 DIAGNOSIS — R30.0 DYSURIA: ICD-10-CM

## 2018-01-17 DIAGNOSIS — F41.1 GAD (GENERALIZED ANXIETY DISORDER): ICD-10-CM

## 2018-01-17 DIAGNOSIS — G89.29 CHRONIC LOW BACK PAIN, UNSPECIFIED BACK PAIN LATERALITY, WITH SCIATICA PRESENCE UNSPECIFIED: Primary | ICD-10-CM

## 2018-01-17 LAB
AMPHETAMINE SCREEN, URINE: NEGATIVE
BARBITURATE SCREEN, URINE: NEGATIVE
BENZODIAZEPINE SCREEN, URINE: NORMAL
COCAINE METABOLITE SCREEN URINE: NEGATIVE
MDMA URINE: NEGATIVE
METHADONE SCREEN, URINE: NEGATIVE
METHAMPHETAMINE, URINE: NEGATIVE
OPIATE SCREEN URINE: NEGATIVE
OXYCODONE SCREEN URINE: POSITIVE
PHENCYCLIDINE SCREEN URINE: NEGATIVE
PROPOXYPHENE SCREEN, URINE: NEGATIVE
THC: NEGATIVE
TRICYCLIC ANTIDEPRESSANTS, UR: NEGATIVE

## 2018-01-17 PROCEDURE — 80305 DRUG TEST PRSMV DIR OPT OBS: CPT | Performed by: PEDIATRICS

## 2018-01-17 PROCEDURE — G8484 FLU IMMUNIZE NO ADMIN: HCPCS | Performed by: PEDIATRICS

## 2018-01-17 PROCEDURE — 4004F PT TOBACCO SCREEN RCVD TLK: CPT | Performed by: PEDIATRICS

## 2018-01-17 PROCEDURE — G8427 DOCREV CUR MEDS BY ELIG CLIN: HCPCS | Performed by: PEDIATRICS

## 2018-01-17 PROCEDURE — 99213 OFFICE O/P EST LOW 20 MIN: CPT | Performed by: PEDIATRICS

## 2018-01-17 PROCEDURE — 81002 URINALYSIS NONAUTO W/O SCOPE: CPT | Performed by: PEDIATRICS

## 2018-01-17 PROCEDURE — G8417 CALC BMI ABV UP PARAM F/U: HCPCS | Performed by: PEDIATRICS

## 2018-01-17 RX ORDER — HYDROCODONE BITARTRATE AND ACETAMINOPHEN 10; 325 MG/1; MG/1
1 TABLET ORAL EVERY 8 HOURS PRN
Qty: 12 TABLET | Refills: 0 | Status: SHIPPED | OUTPATIENT
Start: 2018-01-17 | End: 2018-01-25 | Stop reason: SDUPTHER

## 2018-01-17 RX ORDER — CLONAZEPAM 0.5 MG/1
0.5 TABLET ORAL 2 TIMES DAILY
Qty: 60 TABLET | Refills: 0 | Status: SHIPPED | OUTPATIENT
Start: 2018-01-17 | End: 2018-02-28 | Stop reason: SDUPTHER

## 2018-01-17 ASSESSMENT — ENCOUNTER SYMPTOMS
SORE THROAT: 0
WHEEZING: 0
CHEST TIGHTNESS: 0
ABDOMINAL DISTENTION: 0
BACK PAIN: 1
ABDOMINAL PAIN: 0
CONSTIPATION: 0
CHOKING: 0
VOMITING: 0
TROUBLE SWALLOWING: 0
DIARRHEA: 0
NAUSEA: 0
COUGH: 0
SINUS PRESSURE: 0
SHORTNESS OF BREATH: 0
VOICE CHANGE: 0

## 2018-01-17 NOTE — TELEPHONE ENCOUNTER
[facility] called needing a new order. States they called about 2 weeks ago and never got one.  (Lauren put this in and [facility] closed out encounter without doing referral) will do new referral and fax to [phone]

## 2018-01-23 ENCOUNTER — HOSPITAL ENCOUNTER (OUTPATIENT)
Dept: PHYSICAL THERAPY | Age: 28
Setting detail: THERAPIES SERIES
Discharge: HOME | End: 2018-01-23
Payer: MEDICAID

## 2018-01-23 PROCEDURE — 97110 THERAPEUTIC EXERCISES: CPT

## 2018-01-23 ASSESSMENT — PAIN DESCRIPTION - PAIN TYPE: TYPE: CHRONIC PAIN

## 2018-01-23 ASSESSMENT — PAIN DESCRIPTION - ORIENTATION: ORIENTATION: UPPER

## 2018-01-23 ASSESSMENT — PAIN DESCRIPTION - LOCATION: LOCATION: BACK

## 2018-01-23 NOTE — PROGRESS NOTES
Physical Therapy  Daily Treatment Note  Date: 2018  Patient Name: Feng Loving  MRN: [id number]     :   1990    Subjective:   General  Chart Reviewed: Yes  Additional Pertinent Hx: Patient is a 33year old female who presents with complaint of midback pain that has been present for over half a year. Additional pertinent information includes  shunt and kidney stones. Response To Previous Treatment: Patient with no complaints from previous session. Family / Caregiver Present: No  Referring Practitioner: MOUSTAPHA Maxwell  PT Visit Information  Onset Date: 17  PT Insurance Information: [facility] (pre-cert req'd)  Total # of Visits to Date: 2  Plan of Care/Certification Expiration Date: 18  Subjective  Subjective: Upper back is \"burning\" today. She is here straight from work and feels that her frequent positioning at work plays a part in this.   Pain Screening  Patient Currently in Pain: Yes  Pain Assessment  Pain Assessment: 0-10  Pain Level:  (8/10 pre, 6/10 post)  Pain Type: Chronic pain  Pain Location: Back  Pain Orientation: Upper  Vital Signs  Patient Currently in Pain: Yes       Treatment Activities:                                      Exercises  Exercise 1: Chin tucks  1 x 10                                                                                     --Pt with  shunt R  Exercise 2: Slouch with overcorrection 1 x 10  Exercise 3: Levator scapula stretch bilateral 3 x 10 sec  Exercise 4: Upper trapezius stretch bilateral 3 x 10 sec  Exercise 5: Scapular retraction 1 x 10 red t-band   Exercise 6: Timed gripper bilateral--green digi-squeeze and green digi-flex x 1min (switched hands 30 secs in)  Exercise 7: Wall push up with push up plus x 10  Exercise 8: Backward shoulder rolls 2# 1 x 10  Exercise 9: Corner stretch 3 x 10 sec  Exercise 10: Thoracic extension stretch with exercise ball (roll ball up wall bilateral arms and lean in) 5 x 5 secs  Exercise 11: Arrow to L (L

## 2018-01-25 ENCOUNTER — HOSPITAL ENCOUNTER (OUTPATIENT)
Dept: PHYSICAL THERAPY | Age: 28
Setting detail: THERAPIES SERIES
End: 2018-01-25
Payer: MEDICAID

## 2018-01-25 ENCOUNTER — OFFICE VISIT (OUTPATIENT)
Dept: PRIMARY CARE CLINIC | Age: 28
End: 2018-01-25
Payer: MEDICAID

## 2018-01-25 VITALS
TEMPERATURE: 97.5 F | OXYGEN SATURATION: 99 % | HEART RATE: 83 BPM | DIASTOLIC BLOOD PRESSURE: 60 MMHG | WEIGHT: 182.5 LBS | BODY MASS INDEX: 31.16 KG/M2 | HEIGHT: 64 IN | SYSTOLIC BLOOD PRESSURE: 110 MMHG

## 2018-01-25 DIAGNOSIS — N20.0 RENAL LITHIASIS: ICD-10-CM

## 2018-01-25 DIAGNOSIS — N39.0 URINARY TRACT INFECTION WITHOUT HEMATURIA, SITE UNSPECIFIED: Primary | ICD-10-CM

## 2018-01-25 LAB
APPEARANCE FLUID: NORMAL
BILIRUBIN, POC: NORMAL
BLOOD URINE, POC: NORMAL
CLARITY, POC: NORMAL
COLOR, POC: NORMAL
GLUCOSE URINE, POC: NORMAL
KETONES, POC: NORMAL
LEUKOCYTE EST, POC: NORMAL
NITRITE, POC: NORMAL
PH, POC: 6.5
PROTEIN, POC: NORMAL
SPECIFIC GRAVITY, POC: 1.01
UROBILINOGEN, POC: 0.2

## 2018-01-25 PROCEDURE — G8484 FLU IMMUNIZE NO ADMIN: HCPCS | Performed by: PEDIATRICS

## 2018-01-25 PROCEDURE — G8427 DOCREV CUR MEDS BY ELIG CLIN: HCPCS | Performed by: PEDIATRICS

## 2018-01-25 PROCEDURE — 4004F PT TOBACCO SCREEN RCVD TLK: CPT | Performed by: PEDIATRICS

## 2018-01-25 PROCEDURE — 99213 OFFICE O/P EST LOW 20 MIN: CPT | Performed by: PEDIATRICS

## 2018-01-25 PROCEDURE — G8417 CALC BMI ABV UP PARAM F/U: HCPCS | Performed by: PEDIATRICS

## 2018-01-25 RX ORDER — CEFDINIR 300 MG/1
300 CAPSULE ORAL 2 TIMES DAILY
Qty: 20 CAPSULE | Refills: 0 | Status: SHIPPED | OUTPATIENT
Start: 2018-01-25 | End: 2018-02-04

## 2018-01-25 RX ORDER — HYDROCODONE BITARTRATE AND ACETAMINOPHEN 10; 325 MG/1; MG/1
1 TABLET ORAL EVERY 8 HOURS PRN
Qty: 12 TABLET | Refills: 0 | Status: SHIPPED | OUTPATIENT
Start: 2018-01-25 | End: 2018-02-05 | Stop reason: SDUPTHER

## 2018-01-25 ASSESSMENT — ENCOUNTER SYMPTOMS
SINUS PRESSURE: 0
WHEEZING: 0
EYE PAIN: 0
NAUSEA: 0
COUGH: 0
SORE THROAT: 0
BACK PAIN: 0
DIARRHEA: 0
SHORTNESS OF BREATH: 0
VOMITING: 0
ABDOMINAL PAIN: 0

## 2018-01-25 NOTE — PROGRESS NOTES
on uti      Discussed use, benefit, and side effects of prescribed medications. Barriers to medication compliance addressed. All patient questions answered. Pt voiced understanding. If you need to reach us for an appointment or any other urgent   scheduling issue,   please press the (*) sign at the beginning of the phone tree prompt after   Dialing the normal office number.     Neymar Gutierrez, DO

## 2018-01-29 ENCOUNTER — HOSPITAL ENCOUNTER (OUTPATIENT)
Dept: PHYSICAL THERAPY | Age: 28
Setting detail: THERAPIES SERIES
End: 2018-01-29
Payer: MEDICAID

## 2018-01-30 ENCOUNTER — HOSPITAL ENCOUNTER (OUTPATIENT)
Dept: PHYSICAL THERAPY | Age: 28
Setting detail: THERAPIES SERIES
Discharge: HOME | End: 2018-01-30
Payer: MEDICAID

## 2018-01-30 PROCEDURE — 97110 THERAPEUTIC EXERCISES: CPT

## 2018-01-30 ASSESSMENT — PAIN DESCRIPTION - LOCATION: LOCATION: BACK

## 2018-01-30 ASSESSMENT — PAIN SCALES - GENERAL: PAINLEVEL_OUTOF10: 2

## 2018-01-30 ASSESSMENT — PAIN DESCRIPTION - ORIENTATION: ORIENTATION: UPPER

## 2018-01-30 ASSESSMENT — PAIN DESCRIPTION - PAIN TYPE: TYPE: CHRONIC PAIN

## 2018-01-30 NOTE — PROGRESS NOTES
stretch with exercise ball (roll ball up wall bilateral arms and lean in) 5 x 5 secs  Exercise 11: Arrow to L (L arm extended, R arm pulling arrow) 1 x 10  Exercise 12: Standing R sidebending with L arm overhead reach 1 x 10  Exercise 13: Prone Ts with shoulder blade squeeze 1 x 10  Exercise 14: Rows with theraband 1 x 10 with red t-band  Exercise 15: Forward bent rows 2# 1 x 10 bilateral  Exercise 16: Manual cervical traction (NO ICT d/t  shunt) 3 x 20 secs  Exercise 17: Moist heat PRN for pain--x 8' in sitting                                   Assessment:   Conditions Requiring Skilled Therapeutic Intervention  Body structures, Functions, Activity limitations: Decreased functional mobility ; Decreased ADL status; Decreased ROM; Decreased strength;Decreased high-level IADLs  Assessment: Shows increasing familiarity with exercises. Pt awaiting call from [place] for appt re: kidney stones and frequent UTIs. States she feels this is contributing to lower/mid back pain, as well as general feeling of weakness.   Treatment Diagnosis: Back pain  REQUIRES PT FOLLOW UP: Yes  Discharge Recommendations: Continue to assess pending progress      G-Code:     OutComes Score                                           Goals:  Short term goals  Time Frame for Short term goals: 2-3 weeks  Short term goal 1: Patient will be independent with HEP  Short term goal 2: Patient will have no pain/stretch with cervical flexion to improve quality of functional mobility  Short term goal 3: Patient will report at least 50% decrease in instances of waking up with N/T in arms/hands  Long term goals  Time Frame for Long term goals : 4-6 weeks  Long term goal 1: Patient will improve score on Oswestry to 15% impairment or less to demonstrate decreased impairment  Long term goal 2: Patient will increase L  strength to within 10# of R  strength (75# or greater) to demonstrate strength needed for functional tasks such as gripping and opening

## 2018-02-01 ENCOUNTER — HOSPITAL ENCOUNTER (OUTPATIENT)
Dept: PHYSICAL THERAPY | Age: 28
Setting detail: THERAPIES SERIES
Discharge: HOME | End: 2018-02-01
Payer: MEDICAID

## 2018-02-01 PROCEDURE — 97110 THERAPEUTIC EXERCISES: CPT

## 2018-02-01 PROCEDURE — 97124 MASSAGE THERAPY: CPT

## 2018-02-01 ASSESSMENT — PAIN DESCRIPTION - FREQUENCY: FREQUENCY: INTERMITTENT

## 2018-02-01 ASSESSMENT — PAIN DESCRIPTION - LOCATION: LOCATION: BACK;HEAD;NECK

## 2018-02-01 ASSESSMENT — PAIN DESCRIPTION - PAIN TYPE: TYPE: CHRONIC PAIN

## 2018-02-01 ASSESSMENT — PAIN DESCRIPTION - ORIENTATION: ORIENTATION: UPPER

## 2018-02-01 ASSESSMENT — PAIN DESCRIPTION - DESCRIPTORS: DESCRIPTORS: TIGHTNESS;ACHING

## 2018-02-01 NOTE — PROGRESS NOTES
improve score on Oswestry to 15% impairment or less to demonstrate decreased impairment  Long term goal 2: Patient will increase L  strength to within 10# of R  strength (75# or greater) to demonstrate strength needed for functional tasks such as gripping and opening jars  Long term goal 3: Patient with improve posture as shown by decreased forward head carriage, forward rounded shoulders to improve neck/shoulder biomechanics  Patient Goals   Patient goals : To have less back pain    Plan:    Plan  Times per week: 2  Plan weeks: 4-6 weeks  Specific instructions for Next Treatment: Add windmills. Needs HEP. Increase resistance.   Current Treatment Recommendations: Strengthening, ROM, Functional Mobility Training, Neuromuscular Re-education, Manual Therapy - Soft Tissue Mobilization, Manual Therapy - Joint Manipulation, Pain Management, Home Exercise Program, Modalities  Timed Code Treatment Minutes: 50 Minutes (8 min HP)     Therapy Time   Individual Concurrent Group Co-treatment   Time In 7920         Time Out 1645         Minutes 60         Timed Code Treatment Minutes: 50 Minutes (8 min HP)       Gadiel Wilkins PTA     Electronically signed by Gadiel Wilkins PTA on 2/1/2018 at 5:11 PM

## 2018-02-02 DIAGNOSIS — N20.0 RENAL LITHIASIS: ICD-10-CM

## 2018-02-02 RX ORDER — FLUCONAZOLE 150 MG/1
150 TABLET ORAL DAILY
Qty: 3 TABLET | Refills: 0 | Status: SHIPPED | OUTPATIENT
Start: 2018-02-02 | End: 2018-02-09

## 2018-02-05 RX ORDER — HYDROCODONE BITARTRATE AND ACETAMINOPHEN 10; 325 MG/1; MG/1
1 TABLET ORAL EVERY 8 HOURS PRN
Qty: 12 TABLET | Refills: 0 | Status: SHIPPED | OUTPATIENT
Start: 2018-02-05 | End: 2018-02-08

## 2018-02-06 ENCOUNTER — HOSPITAL ENCOUNTER (OUTPATIENT)
Dept: PHYSICAL THERAPY | Age: 28
Setting detail: THERAPIES SERIES
Discharge: HOME | End: 2018-02-06
Payer: MEDICAID

## 2018-02-06 PROCEDURE — 97110 THERAPEUTIC EXERCISES: CPT

## 2018-02-06 PROCEDURE — G8981 BODY POS CURRENT STATUS: HCPCS

## 2018-02-06 PROCEDURE — G8982 BODY POS GOAL STATUS: HCPCS

## 2018-02-06 PROCEDURE — 97124 MASSAGE THERAPY: CPT

## 2018-02-06 ASSESSMENT — PAIN SCALES - GENERAL: PAINLEVEL_OUTOF10: 7

## 2018-02-06 ASSESSMENT — PAIN DESCRIPTION - LOCATION: LOCATION: BACK;HEAD;NECK

## 2018-02-06 ASSESSMENT — PAIN DESCRIPTION - DESCRIPTORS: DESCRIPTORS: TIGHTNESS;ACHING

## 2018-02-06 ASSESSMENT — PAIN DESCRIPTION - ORIENTATION: ORIENTATION: UPPER

## 2018-02-06 NOTE — PROGRESS NOTES
Physical Therapy  Daily Treatment Note/Reassessment  Date: 2018  Patient Name: Carola Hsu  MRN: [id number]     :   1990    Subjective:   General  Chart Reviewed: Yes  Additional Pertinent Hx: Patient is a 33year old female who presents with complaint of midback pain that has been present for over half a year. Additional pertinent information includes  shunt and kidney stones. Response To Previous Treatment: Patient with no complaints from previous session. Family / Caregiver Present: No  Referring Practitioner: MOUSTAPHA Ware  PT Visit Information  Onset Date: 17  PT Insurance Information: [employer] (pre-cert req'd)  Total # of Visits Approved: 8  Total # of Visits to Date: 5  Plan of Care/Certification Expiration Date: 18  Progress Note Due Date: 18  Subjective  Subjective: Patient states that massage helped a great deal but she is still having migraines and burning between shoulder blades. She states overall that therapy has been helping. Pain Screening  Patient Currently in Pain: Yes  Pain Assessment  Pain Assessment: 0-10  Pain Level: 7 (Worse by the end of day.)  Pain Location: Back;Head;Neck  Pain Orientation: Upper  Pain Descriptors: Tightness; Aching  Pain Intervention(s): Medication (see eMar)  Vital Signs  Patient Currently in Pain: Yes       Treatment Activities:                                      Exercises  Exercise 1: Chin tucks  1 x 10                                                                                  Note++   --Pt with  shunt R++ (full exercise routine not performed on 18 due to reassessment)  Exercise 2: Slouch with overcorrection 1 x 10  Exercise 3: Levator scapula stretch bilateral 3 x 10 sec  Exercise 4: Upper trapezius stretch bilateral 3 x 10 sec  Exercise 5: Scapular retraction 1 x 10 red t-band   Exercise 6: Timed gripper bilateral--green digi-squeeze and green digi-flex x 1min (switched hands 30 secs in)  Exercise 7: Wall

## 2018-02-08 ENCOUNTER — HOSPITAL ENCOUNTER (OUTPATIENT)
Dept: PHYSICAL THERAPY | Age: 28
Setting detail: THERAPIES SERIES
End: 2018-02-08
Payer: MEDICAID

## 2018-02-08 ENCOUNTER — APPOINTMENT (OUTPATIENT)
Dept: PHYSICAL THERAPY | Age: 28
End: 2018-02-08
Payer: MEDICAID

## 2018-02-09 ENCOUNTER — OFFICE VISIT (OUTPATIENT)
Dept: PRIMARY CARE CLINIC | Age: 28
End: 2018-02-09
Payer: MEDICAID

## 2018-02-09 VITALS
HEART RATE: 101 BPM | TEMPERATURE: 96.8 F | SYSTOLIC BLOOD PRESSURE: 120 MMHG | HEIGHT: 64 IN | BODY MASS INDEX: 30.35 KG/M2 | OXYGEN SATURATION: 98 % | WEIGHT: 177.75 LBS | DIASTOLIC BLOOD PRESSURE: 60 MMHG

## 2018-02-09 DIAGNOSIS — R11.0 NAUSEA: ICD-10-CM

## 2018-02-09 DIAGNOSIS — Z30.011 ORAL CONTRACEPTION INITIAL PRESCRIPTION: ICD-10-CM

## 2018-02-09 DIAGNOSIS — G89.29 CHRONIC NONINTRACTABLE HEADACHE, UNSPECIFIED HEADACHE TYPE: ICD-10-CM

## 2018-02-09 DIAGNOSIS — G89.29 CHRONIC LEFT-SIDED LOW BACK PAIN WITHOUT SCIATICA: ICD-10-CM

## 2018-02-09 DIAGNOSIS — M41.34 THORACOGENIC SCOLIOSIS OF THORACIC REGION: ICD-10-CM

## 2018-02-09 DIAGNOSIS — N39.0 RECURRENT UTI: ICD-10-CM

## 2018-02-09 DIAGNOSIS — M54.50 CHRONIC LEFT-SIDED LOW BACK PAIN WITHOUT SCIATICA: ICD-10-CM

## 2018-02-09 DIAGNOSIS — N20.0 KIDNEY STONES: Primary | ICD-10-CM

## 2018-02-09 DIAGNOSIS — R51.9 CHRONIC NONINTRACTABLE HEADACHE, UNSPECIFIED HEADACHE TYPE: ICD-10-CM

## 2018-02-09 DIAGNOSIS — G93.2 PSEUDOTUMOR CEREBRI: ICD-10-CM

## 2018-02-09 LAB
APPEARANCE FLUID: ABNORMAL
BILIRUBIN, POC: ABNORMAL
BLOOD URINE, POC: ABNORMAL
CLARITY, POC: ABNORMAL
COLOR, POC: ABNORMAL
GLUCOSE URINE, POC: ABNORMAL
KETONES, POC: ABNORMAL
LEUKOCYTE EST, POC: ABNORMAL
NITRITE, POC: ABNORMAL
PH, POC: 6
PROTEIN, POC: 30
SPECIFIC GRAVITY, POC: 1.02
UROBILINOGEN, POC: 1

## 2018-02-09 PROCEDURE — 81002 URINALYSIS NONAUTO W/O SCOPE: CPT | Performed by: PEDIATRICS

## 2018-02-09 PROCEDURE — 99214 OFFICE O/P EST MOD 30 MIN: CPT | Performed by: PEDIATRICS

## 2018-02-09 PROCEDURE — G8427 DOCREV CUR MEDS BY ELIG CLIN: HCPCS | Performed by: PEDIATRICS

## 2018-02-09 PROCEDURE — G8484 FLU IMMUNIZE NO ADMIN: HCPCS | Performed by: PEDIATRICS

## 2018-02-09 PROCEDURE — G8417 CALC BMI ABV UP PARAM F/U: HCPCS | Performed by: PEDIATRICS

## 2018-02-09 PROCEDURE — 4004F PT TOBACCO SCREEN RCVD TLK: CPT | Performed by: PEDIATRICS

## 2018-02-09 RX ORDER — ONDANSETRON 4 MG/1
4 TABLET, FILM COATED ORAL DAILY PRN
Qty: 20 TABLET | Refills: 0 | Status: SHIPPED | OUTPATIENT
Start: 2018-02-09 | End: 2018-03-08

## 2018-02-09 RX ORDER — HYDROCODONE BITARTRATE AND ACETAMINOPHEN 10; 325 MG/1; MG/1
1 TABLET ORAL EVERY 6 HOURS PRN
COMMUNITY
End: 2018-02-09 | Stop reason: SDUPTHER

## 2018-02-09 RX ORDER — DESOGESTREL AND ETHINYL ESTRADIOL 0.15-0.03
1 KIT ORAL DAILY
Qty: 1 PACKET | Refills: 3 | Status: SHIPPED | OUTPATIENT
Start: 2018-02-09 | End: 2018-03-23 | Stop reason: ALTCHOICE

## 2018-02-09 RX ORDER — HYDROCODONE BITARTRATE AND ACETAMINOPHEN 10; 325 MG/1; MG/1
1 TABLET ORAL EVERY 8 HOURS PRN
Qty: 90 TABLET | Refills: 0 | Status: SHIPPED | OUTPATIENT
Start: 2018-02-09 | End: 2018-03-08 | Stop reason: SDUPTHER

## 2018-02-09 ASSESSMENT — ENCOUNTER SYMPTOMS
EYE PAIN: 0
VOMITING: 0
COUGH: 0
NAUSEA: 0
DIARRHEA: 0
ABDOMINAL PAIN: 0
SORE THROAT: 0
SINUS PRESSURE: 0
WHEEZING: 0
SHORTNESS OF BREATH: 0
BACK PAIN: 0

## 2018-02-09 NOTE — PROGRESS NOTES
[address] [facility],  [address]  Phone [phone]   Fax [phone]      OFFICE VISIT: 2/9/2018    [address] 1990      HPI  Reason For Visit:  Chuy Das is a 33 y.o. Health Maintenance flu-decline  pneumo-decline  Pap-one month ago  HIV- decline  Date of Most Recent Physical:  Over a year    The patient presents today for kidney stone  She is also being followed by pain management   See office note from January 4, 2018   They have not prescribed any pain medication for her. Last fill of hydrocodone 10 mg was on 2/5/2018 for #12. I explained at that time that I was unable to continue prescribing narcotics on an ongoing basis. She has follow up with them in March. She has 1 session of physical therapy then she will have completed that   The trigger point therapy has been the most helpful   She has not been given any home exercises. They are going to try to keep this going if insurance will allow. Needs ref to OB  She needs a new Ob gyn due to insurance change. Needs ref to her kidney doc  See office note from 1/9/2018. She had a urinary tract infection at that time. She was seen at [facility] emergency department prior to that was pending cystoscopy/ureteroscopy  She states that she feels like her uti has never gone away. She had CT scan done at [facility] on 1/9/18  [place] called her and told her that she will need to do CT scan down there. She has follow up in mid March. Dr. Gonzáles Line  She has been trying to drink a lot of water. She is having pain in her back and now it is radiating to L groin area now. She has not noticed any blood in urine  This is typical for her kidney stones      height is 5' 4\" (1.626 m) and weight is 177 lb 12 oz (80.6 kg). Her temporal temperature is 96.8 °F (36 °C). Her blood pressure is 120/60 and her pulse is 101. Her oxygen saturation is 98%. She has had 5lb involuntary wt loss.   Body mass Only on 12/20/2017   Component Date Value    Urine Culture, Routine 12/23/2017 *                    Value:>100,000 CFU/ml  Mixed skin manjit present      Organism 12/23/2017 Enterococcus species*    Urine Culture, Routine 12/23/2017 Heavy growth      Copies of these are in the chart. Current Outpatient Prescriptions   Medication Sig Dispense Refill    desogestrel-ethinyl estradiol (ORTHO-CEPT, 28,) 0.15-30 MG-MCG per tablet Take 1 tablet by mouth daily 1 packet 3    HYDROcodone-acetaminophen (NORCO)  MG per tablet Take 1 tablet by mouth every 8 hours as needed for Pain for up to 30 days. 90 tablet 0    ondansetron (ZOFRAN) 4 MG tablet Take 1 tablet by mouth daily as needed for Nausea or Vomiting 20 tablet 0    linaclotide (LINZESS) 290 MCG CAPS capsule Take 1 capsule by mouth every morning (before breakfast) 30 capsule 3    clonazePAM (KLONOPIN) 0.5 MG tablet Take 1 tablet by mouth 2 times daily for 30 days. 60 tablet 0    tiZANidine (ZANAFLEX) 4 MG tablet Take 1 tablet by mouth every 6 hours as needed (muscle spasm) 60 tablet 1    ibuprofen (ADVIL;MOTRIN) 800 MG tablet Take 800 mg by mouth every 6 hours as needed for Pain      fexofenadine (ALLEGRA) 60 MG tablet Take 60 mg by mouth      PROAIR  (90 BASE) MCG/ACT inhaler INHALE 2 PUFFS Q 4 H  0     No current facility-administered medications for this visit.         Allergies: Morphine and related and Other    Past Medical History:   Diagnosis Date    Back pain     from kidney stones    Chronic kidney disease     Vurshite stones    Depression     IIH (idiopathic intracranial hypertension)     Kidney stone     Ovarian cyst        Past Surgical History:   Procedure Laterality Date    ABDOMEN SURGERY Left 12/29/2016    EXCSION SKIN LESION BREAST AND ABDOMEN WALL performed by Dara Rider MD at . [address]      Vurshite stones    TONSILLECTOMY      VENTRICULOPERITONEAL SHUNT         Social History   Substance Use Topics    Smoking status: Current Some Day Smoker     Packs/day: 0.25     Types: E-Cigarettes, Cigarettes    Smokeless tobacco: Never Used    Alcohol use Yes      Comment: rarely       Review of Systems   Constitutional: Negative for fatigue and unexpected weight change. HENT: Negative for congestion, ear pain, sinus pressure and sore throat. Eyes: Negative for pain and visual disturbance. Respiratory: Negative for cough, shortness of breath and wheezing. Cardiovascular: Negative for chest pain, palpitations and leg swelling. Gastrointestinal: Negative for abdominal pain, diarrhea, nausea and vomiting. Endocrine: Negative for polyuria. Genitourinary: Negative for dysuria, frequency, hematuria and urgency. Musculoskeletal: Negative for back pain and neck pain. Skin: Negative for rash. Neurological: Negative for dizziness and headaches. Psychiatric/Behavioral: Negative for self-injury. The patient is not nervous/anxious. Physical Exam   Constitutional: She is oriented to person, place, and time. She appears well-developed and well-nourished. She is cooperative. Non-toxic appearance. No distress. Body habitus is mild ow   HENT:   Head: Normocephalic and atraumatic. Right Ear: Hearing, tympanic membrane, external ear and ear canal normal.   Left Ear: Hearing, tympanic membrane, external ear and ear canal normal.   Nose: Nose normal.   Mouth/Throat: Oropharynx is clear and moist and mucous membranes are normal.   Eyes: Conjunctivae, EOM and lids are normal. Pupils are equal, round, and reactive to light. Neck: Phonation normal. Neck supple. No JVD present. Carotid bruit is not present. No thyromegaly present. Cardiovascular: Normal rate, regular rhythm and normal heart sounds. No extrasystoles are present. PMI is not displaced. Exam reveals no gallop and no friction rub. No murmur heard.   Pulmonary/Chest: Effort normal and breath sounds normal. No respiratory distress. She has no wheezes. She has no rhonchi. She has no rales. Abdominal: Soft. Bowel sounds are normal. She exhibits no distension and no mass. There is no hepatosplenomegaly. There is tenderness (over her bladder) in the left upper quadrant. There is no CVA tenderness. Genitourinary:   Genitourinary Comments: Examination deferred   Musculoskeletal: Normal range of motion. She exhibits no edema. Joint examination reveals no acute arthritis or synovitis. Lymphadenopathy:     She has no cervical adenopathy. Neurological: She is alert and oriented to person, place, and time. She has normal strength. She displays no atrophy and no tremor. No cranial nerve deficit (by gross examination) or sensory deficit. Gait normal.   No focal deficits appreciated   Skin: Skin is warm and dry. No rash noted. Psychiatric: She has a normal mood and affect. Her speech is normal and behavior is normal.   Vitals reviewed. ASSESSMENT      ICD-10-CM ICD-9-CM    1. Kidney stones N20.0 592.0 POCT Urinalysis no Micro      External Referral To Urology   2. Oral contraception initial prescription Z30.011 V25.01 desogestrel-ethinyl estradiol (ORTHO-CEPT, 28,) 0.15-30 MG-MCG per tablet   3. Chronic left-sided low back pain without sciatica M54.5 724.2 HYDROcodone-acetaminophen (NORCO)  MG per tablet    G89.29 338.29    4. Pseudotumor cerebri G93.2 348.2 HYDROcodone-acetaminophen (NORCO)  MG per tablet   5. Chronic nonintractable headache, unspecified headache type R51 784.0 HYDROcodone-acetaminophen (NORCO)  MG per tablet   6. Thoracogenic scoliosis of thoracic region M41.34 737.34 HYDROcodone-acetaminophen (NORCO)  MG per tablet   7. Recurrent UTI N39.0 599.0    8. Nausea R11.0 787.02 ondansetron (ZOFRAN) 4 MG tablet       PLAN      ICD-10-CM ICD-9-CM    1. Kidney stones N20.0 592.0 POCT Urinalysis no Micro      External Referral To Urology   2.  Oral contraception initial prescription

## 2018-02-13 ENCOUNTER — OFFICE VISIT (OUTPATIENT)
Dept: NEUROSURGERY | Facility: CLINIC | Age: 28
End: 2018-02-13

## 2018-02-13 ENCOUNTER — HOSPITAL ENCOUNTER (OUTPATIENT)
Dept: MRI IMAGING | Facility: HOSPITAL | Age: 28
Discharge: HOME | End: 2018-02-13
Attending: NEUROLOGICAL SURGERY | Admitting: NEUROLOGICAL SURGERY

## 2018-02-13 VITALS
HEIGHT: 64 IN | WEIGHT: 184 LBS | BODY MASS INDEX: 31.41 KG/M2 | DIASTOLIC BLOOD PRESSURE: 62 MMHG | SYSTOLIC BLOOD PRESSURE: 118 MMHG

## 2018-02-13 DIAGNOSIS — M54.12 CERVICAL RADICULOPATHY: ICD-10-CM

## 2018-02-13 DIAGNOSIS — G93.2 PSEUDOTUMOR CEREBRI: ICD-10-CM

## 2018-02-13 DIAGNOSIS — M54.42 ACUTE BILATERAL LOW BACK PAIN WITH BILATERAL SCIATICA: ICD-10-CM

## 2018-02-13 DIAGNOSIS — M54.41 ACUTE BILATERAL LOW BACK PAIN WITH BILATERAL SCIATICA: ICD-10-CM

## 2018-02-13 DIAGNOSIS — F17.200 SMOKER: ICD-10-CM

## 2018-02-13 DIAGNOSIS — M54.2 NECK PAIN: Primary | ICD-10-CM

## 2018-02-13 PROCEDURE — 99213 OFFICE O/P EST LOW 20 MIN: CPT | Performed by: NEUROLOGICAL SURGERY

## 2018-02-13 PROCEDURE — 72141 MRI NECK SPINE W/O DYE: CPT

## 2018-02-13 RX ORDER — CLONAZEPAM 0.5 MG/1
0.5 TABLET ORAL
COMMUNITY
Start: 2018-01-17 | End: 2018-02-16

## 2018-02-13 RX ORDER — ONDANSETRON 4 MG/1
4 TABLET, FILM COATED ORAL
COMMUNITY
Start: 2018-02-09 | End: 2018-11-29

## 2018-02-13 RX ORDER — DESOGESTREL AND ETHINYL ESTRADIOL 0.15-0.03
KIT ORAL
COMMUNITY
Start: 2018-02-09 | End: 2018-02-13

## 2018-02-13 RX ORDER — TIZANIDINE 4 MG/1
TABLET ORAL
Refills: 1 | COMMUNITY
Start: 2017-12-31

## 2018-02-13 NOTE — PATIENT INSTRUCTIONS
Steps to Quit Smoking  Smoking tobacco can be harmful to your health and can affect almost every organ in your body. Smoking puts you, and those around you, at risk for developing many serious chronic diseases. Quitting smoking is difficult, but it is one of the best things that you can do for your health. It is never too late to quit.  What are the benefits of quitting smoking?  When you quit smoking, you lower your risk of developing serious diseases and conditions, such as:  · Lung cancer or lung disease, such as COPD.  · Heart disease.  · Stroke.  · Heart attack.  · Infertility.  · Osteoporosis and bone fractures.  Additionally, symptoms such as coughing, wheezing, and shortness of breath may get better when you quit. You may also find that you get sick less often because your body is stronger at fighting off colds and infections. If you are pregnant, quitting smoking can help to reduce your chances of having a baby of low birth weight.  How do I get ready to quit?  When you decide to quit smoking, create a plan to make sure that you are successful. Before you quit:  · Pick a date to quit. Set a date within the next two weeks to give you time to prepare.  · Write down the reasons why you are quitting. Keep this list in places where you will see it often, such as on your bathroom mirror or in your car or wallet.  · Identify the people, places, things, and activities that make you want to smoke (triggers) and avoid them. Make sure to take these actions:  ¨ Throw away all cigarettes at home, at work, and in your car.  ¨ Throw away smoking accessories, such as ashtrays and lighters.  ¨ Clean your car and make sure to empty the ashtray.  ¨ Clean your home, including curtains and carpets.  · Tell your family, friends, and coworkers that you are quitting. Support from your loved ones can make quitting easier.  · Talk with your health care provider about your options for quitting smoking.  · Find out what treatment  options are covered by your health insurance.  What strategies can I use to quit smoking?  Talk with your healthcare provider about different strategies to quit smoking. Some strategies include:  · Quitting smoking altogether instead of gradually lessening how much you smoke over a period of time. Research shows that quitting “cold turkey” is more successful than gradually quitting.  · Attending in-person counseling to help you build problem-solving skills. You are more likely to have success in quitting if you attend several counseling sessions. Even short sessions of 10 minutes can be effective.  · Finding resources and support systems that can help you to quit smoking and remain smoke-free after you quit. These resources are most helpful when you use them often. They can include:  ¨ Online chats with a counselor.  ¨ Telephone quitlines.  ¨ Printed self-help materials.  ¨ Support groups or group counseling.  ¨ Text messaging programs.  ¨ Mobile phone applications.  · Taking medicines to help you quit smoking. (If you are pregnant or breastfeeding, talk with your health care provider first.) Some medicines contain nicotine and some do not. Both types of medicines help with cravings, but the medicines that include nicotine help to relieve withdrawal symptoms. Your health care provider may recommend:  ¨ Nicotine patches, gum, or lozenges.  ¨ Nicotine inhalers or sprays.  ¨ Non-nicotine medicine that is taken by mouth.  Talk with your health care provider about combining strategies, such as taking medicines while you are also receiving in-person counseling. Using these two strategies together makes you more likely to succeed in quitting than if you used either strategy on its own.  If you are pregnant or breastfeeding, talk with your health care provider about finding counseling or other support strategies to quit smoking. Do not take medicine to help you quit smoking unless told to do so by your health care  provider.  What things can I do to make it easier to quit?  Quitting smoking might feel overwhelming at first, but there is a lot that you can do to make it easier. Take these important actions:  · Reach out to your family and friends and ask that they support and encourage you during this time. Call telephone quitlines, reach out to support groups, or work with a counselor for support.  · Ask people who smoke to avoid smoking around you.  · Avoid places that trigger you to smoke, such as bars, parties, or smoke-break areas at work.  · Spend time around people who do not smoke.  · Lessen stress in your life, because stress can be a smoking trigger for some people. To lessen stress, try:  ¨ Exercising regularly.  ¨ Deep-breathing exercises.  ¨ Yoga.  ¨ Meditating.  ¨ Performing a body scan. This involves closing your eyes, scanning your body from head to toe, and noticing which parts of your body are particularly tense. Purposefully relax the muscles in those areas.  · Download or purchase mobile phone or tablet apps (applications) that can help you stick to your quit plan by providing reminders, tips, and encouragement. There are many free apps, such as QuitGuide from the CDC (Centers for Disease Control and Prevention). You can find other support for quitting smoking (smoking cessation) through smokefree.gov and other websites.  How will I feel when I quit smoking?  Within the first 24 hours of quitting smoking, you may start to feel some withdrawal symptoms. These symptoms are usually most noticeable 2-3 days after quitting, but they usually do not last beyond 2-3 weeks. Changes or symptoms that you might experience include:  · Mood swings.  · Restlessness, anxiety, or irritation.  · Difficulty concentrating.  · Dizziness.  · Strong cravings for sugary foods in addition to nicotine.  · Mild weight gain.  · Constipation.  · Nausea.  · Coughing or a sore throat.  · Changes in how your medicines work in your  body.  · A depressed mood.  · Difficulty sleeping (insomnia).  After the first 2-3 weeks of quitting, you may start to notice more positive results, such as:  · Improved sense of smell and taste.  · Decreased coughing and sore throat.  · Slower heart rate.  · Lower blood pressure.  · Clearer skin.  · The ability to breathe more easily.  · Fewer sick days.  Quitting smoking is very challenging for most people. Do not get discouraged if you are not successful the first time. Some people need to make many attempts to quit before they achieve long-term success. Do your best to stick to your quit plan, and talk with your health care provider if you have any questions or concerns.  This information is not intended to replace advice given to you by your health care provider. Make sure you discuss any questions you have with your health care provider.  Document Released: 12/12/2002 Document Revised: 08/15/2017 Document Reviewed: 05/03/2016  [employer] Interactive Patient Education © 2017 [employer]  BMI for Adults  Body mass index (BMI) is a number that is calculated from a person's weight and height. In most adults, the number is used to find how much of an adult's weight is made up of fat. BMI is not as accurate as a direct measure of body fat.  How is BMI calculated?  BMI is calculated by dividing weight in kilograms by height in meters squared. It can also be calculated by dividing weight in pounds by height in inches squared, then multiplying the resulting number by 703. Charts are available to help you find your BMI quickly and easily without doing this calculation.  How is BMI interpreted?  Health care professionals use BMI charts to identify whether an adult is underweight, at a normal weight, or overweight based on the following guidelines:  · Underweight: BMI less than 18.5.  · Normal weight: BMI between 18.5 and 24.9.  · Overweight: BMI between 25 and 29.9.  · Obese: BMI of 30 and above.  BMI is usually  interpreted the same for males and females.  Weight includes both fat and muscle, so someone with a muscular build, such as an athlete, may have a BMI that is higher than 24.9. In cases like these, BMI may not accurately depict body fat. To determine if excess body fat is the cause of a BMI of 25 or higher, further assessments may need to be done by a health care provider.  Why is BMI a useful tool?  BMI is used to identify a possible weight problem that may be related to a medical problem or may increase the risk for medical problems. BMI can also be used to promote changes to reach a healthy weight.  This information is not intended to replace advice given to you by your health care provider. Make sure you discuss any questions you have with your health care provider.  Document Released: 08/29/2005 Document Revised: 04/27/2017 Document Reviewed: 05/15/2015  [employer] Interactive Patient Education © 2017 [employer]

## 2018-02-13 NOTE — PROGRESS NOTES
SUBJECTIVE:  Patient ID: Kelsey Mcqueen is a 27 y.o. female is here today for follow-up.    Chief Complaint: Neck pain  Chief Complaint   Patient presents with   • neck pain & headaches     patient had MRI today @ [facility] and is here to discuss results; she will need the setting on her shunt checked since she had an MRI       HPI  7-year-old female that we follow for pseudotumor status post  shunt.  Her last [facility] shunt study was the middle of last year.  She has a variety of nonspecific complaints.  She complains of numbness and tingling she complains of neck pain in thoracic spine pain.  She has a significant problem with kidney stones it is required surgery at [facility] in the past.  We sent her for a dedicated course of physical therapy which she said was very helpful after 8 sessions.  She would like to continue therapy.  We sent her for a MRI of her cervical spine and she is here to discuss the results.  SHe states that her primary care doctor recently get a set of images that suggested scoliosis.  We do not have those images available today    The following portions of the patient's history were reviewed and updated as appropriate: allergies, current medications, past family history, past medical history, past social history, past surgical history and problem list.    OBJECTIVE:    Review of Systems   Musculoskeletal: Positive for back pain and neck pain.   Neurological: Positive for numbness and headaches.   All other systems reviewed and are negative.         Physical Exam   Constitutional: She is oriented to person, place, and time. She appears well-developed and well-nourished.   HENT:   Head: Normocephalic and atraumatic.   Right Ear: Hearing normal.   Left Ear: Hearing normal.   Eyes: EOM are normal. Pupils are equal, round, and reactive to light.   Neck: Normal range of motion.   Neurological: She is alert and oriented to person, place, and time. She has normal strength and normal reflexes.  No cranial nerve deficit or sensory deficit. She displays a negative Romberg sign. GCS eye subscore is 4. GCS verbal subscore is 5. GCS motor subscore is 6. She displays no Babinski's sign on the right side. She displays no Babinski's sign on the left side.   Psychiatric: Her speech is normal. Judgment normal. Cognition and memory are normal.       Neurologic Exam     Mental Status   Oriented to person, place, and time.   Speech: speech is normal     Cranial Nerves     CN III, IV, VI   Pupils are equal, round, and reactive to light.  Extraocular motions are normal.     Motor Exam     Strength   Strength 5/5 throughout.       Independent Review of Radiographic Studies:   MRI the cervical spine shows no significant degenerative changes no significant neuroforaminal compromise or compression    ASSESSMENT/PLAN:  The patient I think is suffering from a myofascial pain syndrome regarding the pain in her neck and shoulder blades.  Her MRI did not show any structural issue that requires surgery.  We discussed further physical therapy which she is interested in.  He also discussed a consult with Dr. Izaguirre for consideration of injection therapy for her headaches and her neck pain.  We are also getting a full set of  Scoliosis films.  We will see her in follow-up in about a month      1. Neck pain    2. Cervical radiculopathy    3. Acute bilateral low back pain with bilateral sciatica    4. Pseudotumor cerebri    5. BMI 31.0-31.9,adult    6. Smoker            Return in about 6 weeks (around 3/27/2018) for follow up w/DR HOLCOMB.      Joe Holcomb MD

## 2018-02-14 ENCOUNTER — DOCUMENTATION (OUTPATIENT)
Dept: NEUROSURGERY | Facility: CLINIC | Age: 28
End: 2018-02-14

## 2018-02-28 ENCOUNTER — OFFICE VISIT (OUTPATIENT)
Dept: PRIMARY CARE CLINIC | Age: 28
End: 2018-02-28
Payer: MEDICAID

## 2018-02-28 VITALS
TEMPERATURE: 97.6 F | BODY MASS INDEX: 31.2 KG/M2 | HEART RATE: 71 BPM | HEIGHT: 64 IN | SYSTOLIC BLOOD PRESSURE: 100 MMHG | OXYGEN SATURATION: 99 % | DIASTOLIC BLOOD PRESSURE: 56 MMHG | WEIGHT: 182.75 LBS

## 2018-02-28 DIAGNOSIS — M21.42 FLAT FEET, BILATERAL: ICD-10-CM

## 2018-02-28 DIAGNOSIS — Z79.899 MEDICATION MANAGEMENT: Primary | ICD-10-CM

## 2018-02-28 DIAGNOSIS — M72.2 PLANTAR FASCIITIS OF LEFT FOOT: ICD-10-CM

## 2018-02-28 DIAGNOSIS — F41.1 GAD (GENERALIZED ANXIETY DISORDER): ICD-10-CM

## 2018-02-28 DIAGNOSIS — M21.41 FLAT FEET, BILATERAL: ICD-10-CM

## 2018-02-28 LAB
AMPHETAMINE SCREEN, URINE: NEGATIVE
BARBITURATE SCREEN, URINE: NEGATIVE
BENZODIAZEPINE SCREEN, URINE: NEGATIVE
COCAINE METABOLITE SCREEN URINE: NEGATIVE
MDMA URINE: NEGATIVE
METHADONE SCREEN, URINE: NEGATIVE
METHAMPHETAMINE, URINE: NEGATIVE
OPIATE SCREEN URINE: POSITIVE
OXYCODONE SCREEN URINE: NEGATIVE
PHENCYCLIDINE SCREEN URINE: NEGATIVE
PROPOXYPHENE SCREEN, URINE: NEGATIVE
THC: NEGATIVE
TRICYCLIC ANTIDEPRESSANTS, UR: NEGATIVE

## 2018-02-28 PROCEDURE — 99213 OFFICE O/P EST LOW 20 MIN: CPT | Performed by: PEDIATRICS

## 2018-02-28 PROCEDURE — G8484 FLU IMMUNIZE NO ADMIN: HCPCS | Performed by: PEDIATRICS

## 2018-02-28 PROCEDURE — G8427 DOCREV CUR MEDS BY ELIG CLIN: HCPCS | Performed by: PEDIATRICS

## 2018-02-28 PROCEDURE — G8417 CALC BMI ABV UP PARAM F/U: HCPCS | Performed by: PEDIATRICS

## 2018-02-28 PROCEDURE — 80305 DRUG TEST PRSMV DIR OPT OBS: CPT | Performed by: PEDIATRICS

## 2018-02-28 PROCEDURE — 4004F PT TOBACCO SCREEN RCVD TLK: CPT | Performed by: PEDIATRICS

## 2018-02-28 RX ORDER — CLONAZEPAM 0.5 MG/1
0.5 TABLET ORAL 2 TIMES DAILY
Qty: 60 TABLET | Refills: 0 | Status: SHIPPED | OUTPATIENT
Start: 2018-02-28 | End: 2018-03-29 | Stop reason: SDUPTHER

## 2018-02-28 RX ORDER — CLONAZEPAM 0.5 MG/1
0.5 TABLET ORAL 2 TIMES DAILY
Qty: 60 TABLET | Refills: 0 | Status: CANCELLED | OUTPATIENT
Start: 2018-02-28 | End: 2018-03-30

## 2018-02-28 ASSESSMENT — ENCOUNTER SYMPTOMS
SINUS PRESSURE: 0
WHEEZING: 0
SHORTNESS OF BREATH: 0
BACK PAIN: 1
EYE PAIN: 0
SORE THROAT: 0
NAUSEA: 0
COUGH: 0
DIARRHEA: 0
ABDOMINAL PAIN: 0
VOMITING: 0

## 2018-02-28 NOTE — PROGRESS NOTES
stand or walk. Plantar fasciitis can be caused by running or other sports. It also may occur in people who are overweight or who have high arches or flat feet. You may get plantar fasciitis if you walk or stand for long periods, or have a tight Achilles tendon or calf muscles. You can improve your foot pain with rest and other care at home. It might take a few weeks to a few months for your foot to heal completely. Follow-up care is a key part of your treatment and safety. Be sure to make and go to all appointments, and call your doctor if you are having problems. It's also a good idea to know your test results and keep a list of the medicines you take. How can you care for yourself at home? · Rest your feet often. Reduce your activity to a level that lets you avoid pain. If possible, do not run or walk on hard surfaces. · Take pain medicines exactly as directed. ¨ If the doctor gave you a prescription medicine for pain, take it as prescribed. ¨ If you are not taking a prescription pain medicine, take an over-the-counter anti-inflammatory medicine for pain and swelling, such as ibuprofen (Advil, Motrin) or naproxen (Aleve). Read and follow all instructions on the label. · Use ice massage to help with pain and swelling. You can use an ice cube or an ice cup several times a day. To make an ice cup, fill a paper cup with water and freeze it. Cut off the top of the cup until a half-inch of ice shows. Hold onto the remaining paper to use the cup. Rub the ice in small circles over the area for 5 to 7 minutes. · Contrast baths, which alternate hot and cold water, can also help reduce swelling. But because heat alone may make pain and swelling worse, end a contrast bath with a soak in cold water. · Wear a night splint if your doctor suggests it. A night splint holds your foot with the toes pointed up and the foot and ankle at a 90-degree angle.  This position gives the bottom of your foot a constant, gentle 11.5  © 1665-7016 Healthwise, [employer]. Care instructions adapted under license by [facility] ([facility]). If you have questions about a medical condition or this instruction, always ask your healthcare professional. [address] any warranty or liability for your use of this information. Patient Education        Plantar Fasciitis: Exercises  Your Care Instructions  Here are some examples of typical rehabilitation exercises for your condition. Start each exercise slowly. Ease off the exercise if you start to have pain. Your doctor or physical therapist will tell you when you can start these exercises and which ones will work best for you. How to do the exercises  Towel stretch    1. Sit with your legs extended and knees straight. 2. Place a towel around your foot just under the toes. 3. Hold each end of the towel in each hand, with your hands above your knees. 4. Pull back with the towel so that your foot stretches toward you. 5. Hold the position for at least 15 to 30 seconds. 6. Repeat 2 to 4 times a session, up to 5 sessions a day. Calf stretch    This exercise stretches the muscles at the back of the lower leg (the calf) and the Achilles tendon. Do this exercise 3 or 4 times a day, 5 days a week. 1. Stand facing a wall with your hands on the wall at about eye level. Put the leg you want to stretch about a step behind your other leg. 2. Keeping your back heel on the floor, bend your front knee until you feel a stretch in the back leg. 3. Hold the stretch for 15 to 30 seconds. Repeat 2 to 4 times. Plantar fascia and calf stretch    Stretching the plantar fascia and calf muscles can increase flexibility and decrease heel pain. You can do this exercise several times each day and before and after activity. 1. Stand on a step as shown above. Be sure to hold on to the banister. 2. Slowly let your heels down over the edge of the step as you relax your calf muscles.  You should feel a

## 2018-02-28 NOTE — PATIENT INSTRUCTIONS
Patient Education        Plantar Fasciitis: Care Instructions  Your Care Instructions    Plantar fasciitis is pain and inflammation of the plantar fascia, the tissue at the bottom of your foot that connects the heel bone to the toes. The plantar fascia also supports the arch. If you strain the plantar fascia, it can develop small tears and cause heel pain when you stand or walk. Plantar fasciitis can be caused by running or other sports. It also may occur in people who are overweight or who have high arches or flat feet. You may get plantar fasciitis if you walk or stand for long periods, or have a tight Achilles tendon or calf muscles. You can improve your foot pain with rest and other care at home. It might take a few weeks to a few months for your foot to heal completely. Follow-up care is a key part of your treatment and safety. Be sure to make and go to all appointments, and call your doctor if you are having problems. It's also a good idea to know your test results and keep a list of the medicines you take. How can you care for yourself at home? · Rest your feet often. Reduce your activity to a level that lets you avoid pain. If possible, do not run or walk on hard surfaces. · Take pain medicines exactly as directed. ¨ If the doctor gave you a prescription medicine for pain, take it as prescribed. ¨ If you are not taking a prescription pain medicine, take an over-the-counter anti-inflammatory medicine for pain and swelling, such as ibuprofen (Advil, Motrin) or naproxen (Aleve). Read and follow all instructions on the label. · Use ice massage to help with pain and swelling. You can use an ice cube or an ice cup several times a day. To make an ice cup, fill a paper cup with water and freeze it. Cut off the top of the cup until a half-inch of ice shows. Hold onto the remaining paper to use the cup. Rub the ice in small circles over the area for 5 to 7 minutes.   · Contrast baths, which alternate hot and stretch    Stretching the plantar fascia and calf muscles can increase flexibility and decrease heel pain. You can do this exercise several times each day and before and after activity. 1. Stand on a step as shown above. Be sure to hold on to the banister. 2. Slowly let your heels down over the edge of the step as you relax your calf muscles. You should feel a gentle stretch across the bottom of your foot and up the back of your leg to your knee. 3. Hold the stretch about 15 to 30 seconds, and then tighten your calf muscle a little to bring your heel back up to the level of the step. Repeat 2 to 4 times. Towel curls    Make this exercise more challenging by placing a weighted object, such as a soup can, on the other end of the towel. 1. While sitting, place your foot on a towel on the floor and scrunch the towel toward you with your toes. 2. Then, also using your toes, push the towel away from you. [place] pickups    1. Put marbles on the floor next to a cup.  2. Using your toes, try to lift the marbles up from the floor and put them in the cup. Follow-up care is a key part of your treatment and safety. Be sure to make and go to all appointments, and call your doctor if you are having problems. It's also a good idea to know your test results and keep a list of the medicines you take. Where can you learn more? Go to https://[employer]. org and sign in to your [employer] account. Enter G673 in the [employer] box to learn more about \"Plantar Fasciitis: Exercises. \"     If you do not have an account, please click on the \"Sign Up Now\" link. Current as of: March 21, 2017  Content Version: 11.5  © 4950-4352 Healthwise, [employer]. Care instructions adapted under license by [facility] ([facility]).  If you have questions about a medical condition or this instruction, always ask your healthcare professional. Raegan Neal any warranty or liability for your use of this

## 2018-03-02 ENCOUNTER — HOSPITAL ENCOUNTER (OUTPATIENT)
Dept: PHYSICAL THERAPY | Facility: HOSPITAL | Age: 28
Setting detail: THERAPIES SERIES
Discharge: HOME | End: 2018-03-02

## 2018-03-02 DIAGNOSIS — M54.41 ACUTE BILATERAL LOW BACK PAIN WITH BILATERAL SCIATICA: Primary | ICD-10-CM

## 2018-03-02 DIAGNOSIS — M54.42 ACUTE BILATERAL LOW BACK PAIN WITH BILATERAL SCIATICA: Primary | ICD-10-CM

## 2018-03-02 DIAGNOSIS — M54.6 MIDLINE THORACIC BACK PAIN, UNSPECIFIED CHRONICITY: ICD-10-CM

## 2018-03-02 DIAGNOSIS — M54.12 CERVICAL RADICULOPATHY: ICD-10-CM

## 2018-03-02 PROCEDURE — 97162 PT EVAL MOD COMPLEX 30 MIN: CPT | Performed by: PHYSICAL THERAPIST

## 2018-03-02 NOTE — THERAPY EVALUATION
Outpatient Physical Therapy Ortho Initial Evaluation  [facility]     Patient Name: Kelsey Mcqueen  : 1990  MRN: [id number]  Today's Date: 3/2/2018      Visit Date: 2018    Patient Active Problem List   Diagnosis   • Pseudotumor cerebri   • Over weight   • Tobacco abuse   • Chronic nonintractable headache   • Smoker   • BMI 27.0-27.9,adult   • Acute bilateral low back pain with sciatica   • Cervical radiculopathy   • BMI 31.0-31.9,adult   • Neck pain        Past Medical History:   Diagnosis Date   • Intracranial hypertension    • Kidney stones         Past Surgical History:   Procedure Laterality Date   • CYSTOSCOPY ELECTROHYDRAULIC LITHOTRIPSY     • OTHER SURGICAL HISTORY      Intracranial shunt   •  SHUNT INSERTION  2015       Visit Dx:     ICD-10-CM ICD-9-CM   1. Acute bilateral low back pain with bilateral sciatica M54.42 724.2    M54.41 724.3   2. Cervical radiculopathy M54.12 723.4   3. Midline thoracic back pain, unspecified chronicity M54.6 724.1             Patient History       18 1530          History    Chief Complaint Pain;Numbness  -TB      Type of Pain Back pain;Lower Extremity / Leg;Upper Extremity / Arm  -TB      Date Current Problem(s) Began 10/02/17  -TB      Brief Description of Current Complaint About 5 months ago, she noticed that she was having numbness in her arms (it wouldn't be the arm she was lying on). About a month later, she noticed more thoracic and neck pain and lumbar pain. She has also had numbness in her legs, once for 15 minutes and she couldn't move her legs either. She denies any patterns to her numbness in her legs and incontinence. 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 She says she will occasionally have blurred vision when her symptoms flare.  -TB      Previous treatment for THIS PROBLEM Massage;Rehabilitation  -TB      Patient/Caregiver Goals Relieve pain;Return to prior level of function;Improve mobility;Know  what to do to help the symptoms  -TB      Patient's Rating of General Health Fair  -TB      Hand Dominance right-handed  -TB      Occupation/sports/leisure activities works at   -TB      Patient seeing anyone else for problem(s)? Yes  -TB      How has patient tried to help current problem? PT at [facility] x 4 weeks; helped but stopped when insurance ran out  -TB      What clinical tests have you had for this problem? MRI  -TB      Results of Clinical Tests straightening of Cspine; no stenosis  -TB      Pain     Pain Location Back;Neck  -TB      Pain at Present 4;5  -TB      Pain at Best 0  -TB      Pain at Worst 8  -TB      Pain Frequency Intermittent  -TB      Pain Description Burning  -TB      Pain Comments the center of her pain is around her mid thoracic spine; it will burn here and then go numb; she also has pain from her neck up into the back of her head and her lumbar  -TB      Is your sleep disturbed? Yes  -TB      Fall Risk Assessment    Any falls in the past year: No  -TB      Services    Prior Rehab/Home Health Experiences Yes  -TB      When was the prior experience with Rehab/Home Health 2018  -TB      [facility]  -[facility]      Are you currently receiving Home Health services No  -TB      Do you plan to receive Home Health services in the near future No  -TB      Daily Activities    Primary Language English  -TB      Are you able to read Yes  -TB      Are you able to write Yes  -TB      How does patient learn best? Demonstration  -TB      Teaching needs identified Home Exercise Program;Management of Condition  -TB      Patient is concerned about/has problems with Performing home management (household chores, shopping, care of dependents);Performing job responsibilities/community activities (work, school,;Difficulty with self care (i.e. bathing, dressing, toileting:  -TB      Does patient have problems with the following? Depression;Anxiety  -TB       Barriers to learning None  -TB      Pt Participated in POC and Goals Yes  -TB      Safety    Are you being hurt, hit, or frightened by anyone at home or in your life? No  -TB      Are you being neglected by a caregiver No  -TB        User Key  (r) = Recorded By, (t) = Taken By, (c) = Cosigned By    Initials Name Provider Type    TB Vahe Mendoza, PT Physical Therapist                PT Ortho       03/02/18 1600    Posture/Observations    Posture/Observations Comments she tends to  a swayback with her   -TB    Quarter Clearing    Quarter Clearing Upper Quarter Clearing;Lower Quarter Clearing  -TB    DTR- Upper Quarter Clearing    Biceps (C5/6) Bilateral:;2- Normal response  -TB    Brachioradialis (C6) Bilateral:;2- Normal response  -TB    Triceps (C7) Bilateral:;2- Normal response  -TB    Sensory Screen for Light Touch- Upper Quarter Clearing    C4 (posterior shoulder) Bilateral:;Intact  -TB    C5 (lateral upper arm) Bilateral:;Intact  -TB    C6 (tip of thumb) Bilateral:;Intact  -TB    C7 (tip of 3rd finger) Bilateral:;Intact  -TB    C8 (tip of 5th finger) Bilateral:;Intact  -TB    T1 (medial lower arm) Bilateral:;Intact  -TB    Myotomal Screen- Upper Quarter Clearing    Shoulder flexion (C5) Bilateral:;WNL  -TB    Elbow flexion/wrist extension (C6) Bilateral:;WNL  -TB    Elbow extension/wrist flexion (C7) Bilateral:;WNL  -TB    Finger flexion/ (C8) Bilateral:;WNL  -TB    Finger abduction (T1) Bilateral:;WNL  -TB     Bilateral:;WNL  -TB    Cervical/Shoulder ROM Screen    Cervical flexion Normal  -TB    Cervical extension Impaired   50%  -TB    Cervical rotation Impaired   tae 75%  -TB    Sensory Screen for Light Touch- Lower Quarter Clearing    L1 (inguinal area) Bilateral:;Intact  -TB    L2 (anterior mid thigh) Bilateral:;Intact  -TB    L3 (distal anterior thigh) Bilateral:;Intact  -TB    L4 (medial lower leg/foot) Bilateral:;Intact  -TB    L5 (lateral lower leg/great toe) Bilateral:;Intact  -TB     S1 (bottom of foot) Bilateral:;Intact  -TB    Myotomal Screen- Lower Quarter Clearing    Hip flexion (L2) Bilateral:;WNL  -TB    Knee extension (L3) Bilateral:;WNL  -TB    Ankle DF (L4) Bilateral:;WNL  -TB    Great toe extension (L5) Bilateral:;WNL  -TB    Ankle PF (S1) Bilateral:;WNL  -TB    Knee flexion (S2) Bilateral:;WNL  -TB    Lumbar ROM Screen- Lower Quarter Clearing    Lumbar Flexion Normal  -TB    Lumbar Extension Impaired   75%  -TB    Cervical Palpation    Cervical Palpation- Location? Suboccipital;Upper traps;Levator scapula  -TB    Suboccipital Bilateral:;Tender  -TB    Levator Scapula Bilateral:;Tender;Guarded/taut  -TB    Upper Traps Bilateral:;Tender;Guarded/taut  -TB    Cervical Accessory Motions    Cervical Accessory Motions Tested? Yes  -TB    AA Rotation WNL  -TB    Facet closing Hypomobile  -TB    Facet opening Hypomobile  -TB    Thoracic Accessory Motions    Thoracic Accessory Motions Tested? Yes  -TB    Pa glide- Upper thoracic Hypomobile  -TB    Pa glide- Middle thoracic Hypomobile  -TB    Pa glide- Lower thoracic Hypomobile  -TB    Cervical/Thoracic Special Tests    Spurlings (Foraminal Compression) Bilateral:;Negative  -TB    Cervical Compression (Forarminal Compression vs. Facet Pain) Bilateral:;Negative  -TB    Cervical Distraction (Foraminal Compression vs. Facet Pain) Bilateral:;Negative   gave relieve in thoracic, no change in arms  -TB    1st Rib Mobility (TOS) Right:;Positive   tender and elevated  -TB    Shannan's Test (TOS) Bilateral:;Negative  -TB    Lumbar/SI Special Tests    Slump Test (Neural Tension) Bilateral:;Negative  -TB    Lyssa Sawyer Test (HNP) Bilateral:;Negative  -TB    SLR (Neural Tension) Bilateral:;Negative  -TB    JC (hip vs. SI Dysfunction) Bilateral:;Negative  -TB    MMT (Manual Muscle Testing)    General MMT Assessment Detail tae hip abd 4/5  -TB    Flexibility    Flexibility Tested? Upper Extremity  -TB    Upper Extremity Flexibility    Pect Minor  Bilateral:;Mildly limited  -TB    Pathomechanics    Spine Pathomechanics Hinges into extension at one segment in lumbar;Limited lumbar flattening with forward bend;Excessive thoracic kyphosis with forward bend;Limited upper thoracic motion with cervical ROM;Hinges into extension in lower cervical;Cervical protrusion/OA hyperextension with cervical extension  -TB    Balance Skills Training    SLS WNL tae  -TB    Gait Assessment/Treatment    Gait, Comment normal gait  -TB      User Key  (r) = Recorded By, (t) = Taken By, (c) = Cosigned By    Initials Name Provider Type    TB Vahe Mendoza, PT Physical Therapist                      Therapy Education  Education Details: posture adn prayer stretch, kneeling and standing every 2 hours  Given: HEP, Symptoms/condition management, Posture/body mechanics  Program: New  How Provided: Verbal, Demonstration  Provided to: Patient  Level of Understanding: Teach back education performed, Verbalized, Demonstrated           PT OP Goals       03/02/18 1700       Long Term Goals    LTG Date to Achieve 04/13/18  -TB     LTG 1 Improve thoracic mobility to allow for improve posture  -TB     LTG 1 Progress New  -TB     LTG 2 Able to hold neutral upright posture consistently  -TB     LTG 2 Progress New  -TB     LTG 3 No back pain except occasional minor twinges no more than 1-2/10 relieved with stretching  -TB     LTG 3 Progress New  -TB     LTG 4 No LE radicular symptoms for a week  -TB     LTG 4 Progress New  -TB     LTG 5 No UE radicular symptoms for a week  -TB     LTG 5 Progress New  -TB     LTG 6 Ind with HEP for flexibility and postural stability  -TB     LTG 6 Progress New  -TB     Time Calculation    PT Goal Re-Cert Due Date 04/01/18  -TB       User Key  (r) = Recorded By, (t) = Taken By, (c) = Cosigned By    Initials Name Provider Type    HARINDER Mendoza PT Physical Therapist                PT Assessment/Plan       03/02/18 1700       PT Assessment    Functional Limitations  "Limitation in home management;Limitations in community activities;Limitations in functional capacity and performance;Performance in work activities;Performance in leisure activities  -TB     Impairments Joint mobility;Posture;Pain;Muscle strength;Peripheral nerve integrity;Impaired flexibility  -TB     Assessment Comments Her problem is a bit perplexing. I couldn't find anything that would reproduce her UE or LE radicular symptoms. Her back pain is consistent with a postural strain as the center of her back pain is also the area where she is most kyphotic. I tested for TOS and none of this reproduced her arm symptoms. There could be more obscure issues like a \"T4 Syndrome\" but this doesn't exactly jump out either. I know MS was brought up and there certainly could be some other neuro problem than hasn't been detected yet.   -TB     Please refer to paper survey for additional self-reported information Yes  -TB     Rehab Potential Good  -TB     Patient/caregiver participated in establishment of treatment plan and goals Yes  -TB     Patient would benefit from skilled therapy intervention Yes  -TB     PT Plan    PT Frequency 2x/week  -TB     Predicted Duration of Therapy Intervention (days/wks) 6 weeks  -TB     Planned CPT's? PT EVAL MOD COMPLELITY: 56159;PT THER PROC EA 15 MIN: 49033;PT MANUAL THERAPY EA 15 MIN: 07115;PT ELECTRICAL STIM UNATTEND: ;PT ELECTRICAL STIM ATTD EA 15 MIN: 19243;PT ULTRASOUND EA 15 MIN: 51560;PT TRACTION CERVICAL: 02495  -TB     PT Plan Comments We will focus on what we have control over which for now is her posture and mobility, particularly through her thoracic spine. We can also work on TrP release and we will progress her HEP for the same. She was getting this at [facility] and felt like it was successful for her. We will see what kind of a difference it can make for her pain and hopefully her UE/LE symptoms as well.   -TB       User Key  (r) = Recorded By, (t) = Taken By, (c) = Cosigned " By    Initials Name Provider Type    TB Vahe Mendoza, PT Physical Therapist                                        Time Calculation:   Start Time: 1530  Stop Time: 1630  Time Calculation (min): 60 min     Therapy Charges for Today     Code Description Service Date Service Provider Modifiers Qty    92797949438 HC PT EVAL MOD COMPLEXITY 4 3/2/2018 Vahe Mendoza, PT GP 1                    Vahe Mendoza, PT  3/2/2018

## 2018-03-08 ENCOUNTER — OFFICE VISIT (OUTPATIENT)
Dept: PRIMARY CARE CLINIC | Age: 28
End: 2018-03-08
Payer: MEDICAID

## 2018-03-08 VITALS
BODY MASS INDEX: 31.07 KG/M2 | HEIGHT: 64 IN | SYSTOLIC BLOOD PRESSURE: 120 MMHG | OXYGEN SATURATION: 98 % | WEIGHT: 182 LBS | TEMPERATURE: 98 F | HEART RATE: 94 BPM | DIASTOLIC BLOOD PRESSURE: 70 MMHG

## 2018-03-08 DIAGNOSIS — R51.9 CHRONIC NONINTRACTABLE HEADACHE, UNSPECIFIED HEADACHE TYPE: ICD-10-CM

## 2018-03-08 DIAGNOSIS — G89.29 CHRONIC LEFT-SIDED LOW BACK PAIN WITHOUT SCIATICA: ICD-10-CM

## 2018-03-08 DIAGNOSIS — G89.29 CHRONIC NONINTRACTABLE HEADACHE, UNSPECIFIED HEADACHE TYPE: ICD-10-CM

## 2018-03-08 DIAGNOSIS — N64.3 GALACTORRHEA: ICD-10-CM

## 2018-03-08 DIAGNOSIS — N20.0 KIDNEY STONE: Primary | ICD-10-CM

## 2018-03-08 DIAGNOSIS — M41.34 THORACOGENIC SCOLIOSIS OF THORACIC REGION: ICD-10-CM

## 2018-03-08 DIAGNOSIS — F33.1 MODERATE EPISODE OF RECURRENT MAJOR DEPRESSIVE DISORDER (HCC): ICD-10-CM

## 2018-03-08 DIAGNOSIS — M54.50 CHRONIC LEFT-SIDED LOW BACK PAIN WITHOUT SCIATICA: ICD-10-CM

## 2018-03-08 DIAGNOSIS — N30.01 ACUTE CYSTITIS WITH HEMATURIA: ICD-10-CM

## 2018-03-08 DIAGNOSIS — G93.2 PSEUDOTUMOR CEREBRI: ICD-10-CM

## 2018-03-08 DIAGNOSIS — G93.2 PSEUDOTUMOR CEREBRI SYNDROME: ICD-10-CM

## 2018-03-08 LAB
BILIRUBIN, POC: NEGATIVE
BLOOD URINE, POC: ABNORMAL
CLARITY, POC: CLEAR
COLOR, POC: YELLOW
GLUCOSE URINE, POC: NEGATIVE
KETONES, POC: NEGATIVE
LEUKOCYTE EST, POC: ABNORMAL
NITRITE, POC: NEGATIVE
PH, POC: 5.5
PROTEIN, POC: NEGATIVE
SPECIFIC GRAVITY, POC: 1.02
UROBILINOGEN, POC: 0.2

## 2018-03-08 PROCEDURE — 81002 URINALYSIS NONAUTO W/O SCOPE: CPT | Performed by: PEDIATRICS

## 2018-03-08 PROCEDURE — 99213 OFFICE O/P EST LOW 20 MIN: CPT | Performed by: PEDIATRICS

## 2018-03-08 PROCEDURE — 4004F PT TOBACCO SCREEN RCVD TLK: CPT | Performed by: PEDIATRICS

## 2018-03-08 PROCEDURE — G8417 CALC BMI ABV UP PARAM F/U: HCPCS | Performed by: PEDIATRICS

## 2018-03-08 PROCEDURE — G8427 DOCREV CUR MEDS BY ELIG CLIN: HCPCS | Performed by: PEDIATRICS

## 2018-03-08 PROCEDURE — G8484 FLU IMMUNIZE NO ADMIN: HCPCS | Performed by: PEDIATRICS

## 2018-03-08 RX ORDER — ESCITALOPRAM OXALATE 10 MG/1
10 TABLET ORAL DAILY
Qty: 30 TABLET | Refills: 11 | Status: SHIPPED | OUTPATIENT
Start: 2018-03-08 | End: 2018-03-29

## 2018-03-08 RX ORDER — HYDROCODONE BITARTRATE AND ACETAMINOPHEN 10; 325 MG/1; MG/1
1 TABLET ORAL EVERY 8 HOURS PRN
Qty: 90 TABLET | Refills: 0 | Status: SHIPPED | OUTPATIENT
Start: 2018-03-08 | End: 2018-04-04 | Stop reason: SDUPTHER

## 2018-03-08 ASSESSMENT — ENCOUNTER SYMPTOMS
WHEEZING: 0
SHORTNESS OF BREATH: 0
SINUS PRESSURE: 0
DIARRHEA: 0
EYE PAIN: 0
ABDOMINAL PAIN: 0
BACK PAIN: 1
VOMITING: 0
NAUSEA: 0
SORE THROAT: 0
COUGH: 0

## 2018-03-08 NOTE — PROGRESS NOTES
4. Acute cystitis with hematuria N30.01 595.0    5. Chronic left-sided low back pain without sciatica M54.5 724.2 HYDROcodone-acetaminophen (NORCO)  MG per tablet    G89.29 338.29 External Referral To Pain Clinic   6. Pseudotumor cerebri G93.2 348.2 HYDROcodone-acetaminophen (NORCO)  MG per tablet      External Referral To Pain Clinic   7. Chronic nonintractable headache, unspecified headache type R51 784.0 HYDROcodone-acetaminophen (NORCO)  MG per tablet      External Referral To Pain Clinic   8. Thoracogenic scoliosis of thoracic region M41.34 737.34 HYDROcodone-acetaminophen (NORCO)  MG per tablet      External Referral To Pain Clinic   9. Moderate episode of recurrent major depressive disorder (HCC) F33.1 296.32 escitalopram (LEXAPRO) 10 MG tablet       Orders Placed This Encounter   Procedures    External Referral To Pain Clinic    POCT Urinalysis no Micro        Return in about 1 month (around 4/8/2018). There are no Patient Instructions on file for this visit.

## 2018-03-09 ENCOUNTER — TELEPHONE (OUTPATIENT)
Dept: PRIMARY CARE CLINIC | Age: 28
End: 2018-03-09

## 2018-03-09 RX ORDER — CIPROFLOXACIN 500 MG/1
500 TABLET, FILM COATED ORAL 2 TIMES DAILY
Qty: 20 TABLET | Refills: 0 | Status: SHIPPED | OUTPATIENT
Start: 2018-03-09 | End: 2018-03-19

## 2018-03-13 ENCOUNTER — TELEPHONE (OUTPATIENT)
Dept: PRIMARY CARE CLINIC | Age: 28
End: 2018-03-13

## 2018-03-13 DIAGNOSIS — N20.0 KIDNEY STONES: Primary | ICD-10-CM

## 2018-03-14 ENCOUNTER — HOSPITAL ENCOUNTER (OUTPATIENT)
Dept: PHYSICAL THERAPY | Facility: HOSPITAL | Age: 28
Setting detail: THERAPIES SERIES
Discharge: HOME | End: 2018-03-14

## 2018-03-14 DIAGNOSIS — M54.42 ACUTE BILATERAL LOW BACK PAIN WITH BILATERAL SCIATICA: Primary | ICD-10-CM

## 2018-03-14 DIAGNOSIS — M54.41 ACUTE BILATERAL LOW BACK PAIN WITH BILATERAL SCIATICA: Primary | ICD-10-CM

## 2018-03-14 DIAGNOSIS — M54.6 MIDLINE THORACIC BACK PAIN, UNSPECIFIED CHRONICITY: ICD-10-CM

## 2018-03-14 DIAGNOSIS — M54.12 CERVICAL RADICULOPATHY: ICD-10-CM

## 2018-03-14 PROCEDURE — 97140 MANUAL THERAPY 1/> REGIONS: CPT

## 2018-03-14 PROCEDURE — 97110 THERAPEUTIC EXERCISES: CPT

## 2018-03-14 NOTE — THERAPY TREATMENT NOTE
Outpatient Physical Therapy Ortho Treatment Note  [facility]     Patient Name: Kelsey Mcqueen  : 1990  MRN: [id number]  Today's Date: 3/14/2018      Visit Date: 2018    Visit Dx:    ICD-10-CM ICD-9-CM   1. Acute bilateral low back pain with bilateral sciatica M54.42 724.2    M54.41 724.3   2. Cervical radiculopathy M54.12 723.4   3. Midline thoracic back pain, unspecified chronicity M54.6 724.1       Patient Active Problem List   Diagnosis   • Pseudotumor cerebri   • Over weight   • Tobacco abuse   • Chronic nonintractable headache   • Smoker   • BMI 27.0-27.9,adult   • Acute bilateral low back pain with sciatica   • Cervical radiculopathy   • BMI 31.0-31.9,adult   • Neck pain        Past Medical History:   Diagnosis Date   • Intracranial hypertension    • Kidney stones         Past Surgical History:   Procedure Laterality Date   • CYSTOSCOPY ELECTROHYDRAULIC LITHOTRIPSY     • OTHER SURGICAL HISTORY      Intracranial shunt   •  SHUNT INSERTION  2015                             PT Assessment/Plan     Row Name 18 1050          PT Assessment    Assessment Comments This is her first visit since the initial evaluation, and therefore she has not met any goals.  She reports today has been a bad day with more pain, but she denies symptoms into her arms today.  She does have increased thoracic kyphosis and anterior pelvic tilt.  Therefore, postural education was reviewed today.  She also has increased guarding along bilateral levator scapula, but not as much in upper trapezius muscles.  -FAITH        PT Plan    PT Plan Comments We will continue to work on decreasing soft tissue restrictions and improving thoracic mobility.  We will also continue with postural education.  -FAITH       User Key  (r) = Recorded By, (t) = Taken By, (c) = Cosigned By    Initials Name Provider Type    FAITH Robles PTA Physical Therapy Assistant                    Exercises     Row Name 18 2946              Subjective Comments    Subjective Comments Patient reports she has numbness in her arms, but mostly in the right arm the last couple nights.  She reports this occurs mostly at night.  She reports she has a constant burning sensation in between shoulder blades.  -FAITH         Subjective Pain    Able to rate subjective pain? yes  -FAITH      Pre-Treatment Pain Level 8   6/10 HA  -FAITH      Post-Treatment Pain Level 6   6/10 HA  -FAITH      Subjective Pain Comment Numbness and tingling in middle back and neck.  -FAITH         Exercise 1    Exercise Name 1 hooklying thoracic towel roll stretch  -FAITH      Cueing 1 Verbal;Tactile  -FAITH      Time 1 3 minutes  -FAITH         Exercise 2    Exercise Name 2 seated unweighted UE's on pillows   -FAITH      Cueing 2 Verbal;Tactile;Demo  -FAITH      Time 2 1 minute  -FAITH      Additional Comments added to HEP  -FAITH         Exercise 3    Exercise Name 3 seated unweighted UE's on pillows   -FAITH      Cueing 3 Verbal;Tactile  -FAITH      Sets 3 1  -FAITH      Reps 3 15  -FAITH      Additional Comments added to HEP  -FAITH         Exercise 4    Exercise Name 4 standing at wall: neutral spine position  -FAITH      Cueing 4 Verbal;Tactile;Demo  -FAITH      Sets 4 1  -FAITH      Reps 4 3  -FAITH      Time 4 20 seconds each  -FAITH        User Key  (r) = Recorded By, (t) = Taken By, (c) = Cosigned By    Initials Name Provider Type    FAITH Robles, PTA Physical Therapy Assistant                        Manual Rx (last 36 hours)      Manual Treatments     Row Name 03/14/18 1557             Manual Rx 1    Manual Rx 1 Location prone UT/LS/thoracic paraspinals  -FAITH      Manual Rx 1 Type STM  -FAITH      Manual Rx 1 Grade min  -FAITH      Manual Rx 1 Duration 12  -FAITH         Manual Rx 2    Manual Rx 2 Location prone thoracic  -FAITH      Manual Rx 2 Type extension mobilizations  -FAITH      Manual Rx 2 Grade 1-2  -FAITH      Manual Rx 2 Duration 5  -FAITH         Manual Rx 3    Manual Rx 3 Location supine suboccipital release  -FAITH      Manual Rx 3 Type STM  -FAITH       Manual Rx 3 Grade min  -FAITH      Manual Rx 3 Duration 6  -FAITH        User Key  (r) = Recorded By, (t) = Taken By, (c) = Cosigned By    Initials Name Provider Type    FAITH Robles PTA Physical Therapy Assistant                PT OP Goals     Row Name 03/14/18 1553          Long Term Goals    LTG Date to Achieve 04/13/18  -FAITH     LTG 1 Improve thoracic mobility to allow for improve posture  -FAITH     LTG 1 Progress Ongoing  -FAITH     LTG 1 Progress Comments She sits with increased forward shoulder and thoracic kyphosis  -FAITH     LTG 2 Able to hold neutral upright posture consistently  -FAITH     LTG 2 Progress Ongoing  -FAITH     LTG 2 Progress Comments Worked on at wall today  -FAITH     LTG 3 No back pain except occasional minor twinges no more than 1-2/10 relieved with stretching  -FAITH     LTG 3 Progress Ongoing  -FAITH     LTG 4 No LE radicular symptoms for a week  -FAITH     LTG 4 Progress Ongoing  -FAITH     LTG 5 No UE radicular symptoms for a week  -FAITH     LTG 5 Progress Ongoing  -FAITH     LTG 6 Ind with HEP for flexibility and postural stability  -FAITH     LTG 6 Progress Ongoing  -FAITH        Time Calculation    PT Goal Re-Cert Due Date 04/01/18  -FAITH       User Key  (r) = Recorded By, (t) = Taken By, (c) = Cosigned By    Initials Name Provider Type    FAITH Robles PTA Physical Therapy Assistant          Therapy Education  Education Details: seated with UE's unweighted and cervical rotation 1-2x a day  Given: HEP  Program: New  How Provided: Verbal  Provided to: Patient  Level of Understanding: Verbalized              Time Calculation:   Start Time: 1553  Stop Time: 1637  Time Calculation (min): 44 min  Total Timed Code Minutes- PT: 44 minute(s)    Therapy Charges for Today     Code Description Service Date Service Provider Modifiers Qty    28648958461 HC PT MANUAL THERAPY EA 15 MIN 3/14/2018 Jackson Robles PTA GP 2    48629584732 HC PT THER PROC EA 15 MIN 3/14/2018 Jackson Robles PTA GP 1                    Jackson  LOVE Robles, PTA  3/14/2018

## 2018-03-16 ENCOUNTER — HOSPITAL ENCOUNTER (OUTPATIENT)
Dept: PHYSICAL THERAPY | Facility: HOSPITAL | Age: 28
Setting detail: THERAPIES SERIES
Discharge: HOME | End: 2018-03-16

## 2018-03-16 DIAGNOSIS — M54.41 ACUTE BILATERAL LOW BACK PAIN WITH BILATERAL SCIATICA: Primary | ICD-10-CM

## 2018-03-16 DIAGNOSIS — M54.42 ACUTE BILATERAL LOW BACK PAIN WITH BILATERAL SCIATICA: Primary | ICD-10-CM

## 2018-03-16 DIAGNOSIS — M54.12 CERVICAL RADICULOPATHY: ICD-10-CM

## 2018-03-16 DIAGNOSIS — M54.6 MIDLINE THORACIC BACK PAIN, UNSPECIFIED CHRONICITY: ICD-10-CM

## 2018-03-16 PROCEDURE — 97140 MANUAL THERAPY 1/> REGIONS: CPT

## 2018-03-16 NOTE — THERAPY TREATMENT NOTE
Outpatient Physical Therapy Ortho Treatment Note  [facility]     Patient Name: Kelsey Mcqueen  : 1990  MRN: [id number]  Today's Date: 3/16/2018      Visit Date: 2018    Visit Dx:    ICD-10-CM ICD-9-CM   1. Acute bilateral low back pain with bilateral sciatica M54.42 724.2    M54.41 724.3   2. Cervical radiculopathy M54.12 723.4   3. Midline thoracic back pain, unspecified chronicity M54.6 724.1       Patient Active Problem List   Diagnosis   • Pseudotumor cerebri   • Over weight   • Tobacco abuse   • Chronic nonintractable headache   • Smoker   • BMI 27.0-27.9,adult   • Acute bilateral low back pain with sciatica   • Cervical radiculopathy   • BMI 31.0-31.9,adult   • Neck pain        Past Medical History:   Diagnosis Date   • Intracranial hypertension    • Kidney stones         Past Surgical History:   Procedure Laterality Date   • CYSTOSCOPY ELECTROHYDRAULIC LITHOTRIPSY     • OTHER SURGICAL HISTORY      Intracranial shunt   •  SHUNT INSERTION  2015                             PT Assessment/Plan     Row Name 18 1552          PT Assessment    Assessment Comments Patient was in more pain today partly due to specific activities at work such as washing dishes, sweeping, and mopping.  She continues to be stiff along her thoracic spine and continues to have soft tissue restrictions with guarding along upper trapezius, levator scapula, and suboccipitals today.  Her right side was more guarded than left today.  The manual techniques were relieving per patient, and she had improved pain and symptoms following this treatment.  -FAITH        PT Plan    PT Plan Comments We will continue with manual techniques to improve soft tissue restrictions and mobility in cervicothroracic spine.  We will gradually progress with postural education as symptoms improve.  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  She reports her pain increases as the day goes on.  Her pain is in the neck and upper back with burning sensation in between shoulder blades.  She reports she does have HA.  She also reports intermittent shooting pain down right arm, but none currently.  -FAITH         Subjective Pain    Able to rate subjective pain? yes  -FAITH      Pre-Treatment Pain Level 8  -FAITH      Post-Treatment Pain Level 5  -FAITH      Subjective Pain Comment She reports she has loosened up a lot after treatment and does not have burning sensation in between shoulder blades, just tension.  -FAITH        User Key  (r) = Recorded By, (t) = Taken By, (c) = Cosigned By    Initials Name Provider Type    FAITH Robles PTA Physical Therapy Assistant                        Manual Rx (last 36 hours)      Manual Treatments     Row Name 03/16/18 2837             Manual Rx 1    Manual Rx 1 Location prone UT/LS/thoracic paraspinals  -FAITH      Manual Rx 1 Type STM manually and with blue ridged foam roller  -FAITH      Manual Rx 1 Grade min-mod  -FAITH      Manual Rx 1 Duration 15  -FAITH         Manual Rx 2    Manual Rx 2 Location prone thoracic  -FAITH      Manual Rx 2 Type extension mobilizations  -FAITH      Manual Rx 2 Grade 2-3  -FAITH      Manual Rx 2 Duration 10  -FAITH         Manual Rx 3    Manual Rx 3 Location supine suboccipital release  -FAITH      Manual Rx 3 Type STM with intermittent traction  -FAITH      Manual Rx 3 Grade min-mod with grade 1-2 traction  -FAITH      Manual Rx 3 Duration 3  -FAITH         Manual Rx 4    Manual Rx 4 Location hooklying lower cervical traction  -FAITH      Manual Rx 4 Grade grade 2 sustained  -FAITH      Manual Rx 4 Duration 8  -FAITH         Manual Rx 5    Manual Rx 5 Location hooklying UT/LS  -FAITH      Manual Rx 5 Type STM  -FAITH       Manual Rx 5 Grade mod  -FAITH      Manual Rx 5 Duration 2  -FAITH        User Key  (r) = Recorded By, (t) = Taken By, (c) = Cosigned By    Initials Name Provider Type    FAITH Robles PTA Physical Therapy Assistant                PT OP Goals     Row Name 03/16/18 1552          Long Term Goals    LTG Date to Achieve 04/13/18  -FAITH     LTG 1 Improve thoracic mobility to allow for improve posture  -FAITH     LTG 1 Progress Ongoing  -FAITH     LTG 1 Progress Comments She continues to be stiff in thoracic spine  -FAITH     LTG 2 Able to hold neutral upright posture consistently  -FAITH     LTG 2 Progress Ongoing  -FAITH     LTG 3 No back pain except occasional minor twinges no more than 1-2/10 relieved with stretching  -FAITH     LTG 3 Progress Ongoing  -FAITH     LTG 4 No LE radicular symptoms for a week  -FAITH     LTG 4 Progress Ongoing  -FAITH     LTG 5 No UE radicular symptoms for a week  -FAITH     LTG 5 Progress Ongoing  -FAITH     LTG 6 Ind with HEP for flexibility and postural stability  -FIATH     LTG 6 Progress Ongoing  -FAITH        Time Calculation    PT Goal Re-Cert Due Date 04/01/18  -FAITH       User Key  (r) = Recorded By, (t) = Taken By, (c) = Cosigned By    Initials Name Provider Type    FAITH Robles PTA Physical Therapy Assistant          Therapy Education  Given: HEP  Program: Reinforced  How Provided: Verbal  Provided to: Patient  Level of Understanding: Verbalized              Time Calculation:   Start Time: 1552  Stop Time: 1637  Time Calculation (min): 45 min  Total Timed Code Minutes- PT: 45 minute(s)    Therapy Charges for Today     Code Description Service Date Service Provider Modifiers Qty    72819553425 HC PT MANUAL THERAPY EA 15 MIN 3/16/2018 Jackson Robles PTA GP 3                    Jackson Robles PTA  3/16/2018

## 2018-03-19 ENCOUNTER — TELEPHONE (OUTPATIENT)
Dept: PRIMARY CARE CLINIC | Age: 28
End: 2018-03-19

## 2018-03-19 ENCOUNTER — APPOINTMENT (OUTPATIENT)
Dept: PHYSICAL THERAPY | Facility: HOSPITAL | Age: 28
End: 2018-03-19

## 2018-03-21 ENCOUNTER — HOSPITAL ENCOUNTER (OUTPATIENT)
Dept: PHYSICAL THERAPY | Facility: HOSPITAL | Age: 28
Setting detail: THERAPIES SERIES
Discharge: HOME | End: 2018-03-21

## 2018-03-21 DIAGNOSIS — M54.41 ACUTE BILATERAL LOW BACK PAIN WITH BILATERAL SCIATICA: Primary | ICD-10-CM

## 2018-03-21 DIAGNOSIS — M54.6 MIDLINE THORACIC BACK PAIN, UNSPECIFIED CHRONICITY: ICD-10-CM

## 2018-03-21 DIAGNOSIS — M54.12 CERVICAL RADICULOPATHY: ICD-10-CM

## 2018-03-21 DIAGNOSIS — M54.42 ACUTE BILATERAL LOW BACK PAIN WITH BILATERAL SCIATICA: Primary | ICD-10-CM

## 2018-03-21 PROCEDURE — 97110 THERAPEUTIC EXERCISES: CPT

## 2018-03-21 PROCEDURE — 97140 MANUAL THERAPY 1/> REGIONS: CPT

## 2018-03-21 NOTE — THERAPY TREATMENT NOTE
Outpatient Physical Therapy Ortho Treatment Note  [facility]     Patient Name: Kelsey Mcqueen  : 1990  MRN: [id number]  Today's Date: 3/21/2018      Visit Date: 2018    Visit Dx:    ICD-10-CM ICD-9-CM   1. Acute bilateral low back pain with bilateral sciatica M54.42 724.2    M54.41 724.3   2. Cervical radiculopathy M54.12 723.4   3. Midline thoracic back pain, unspecified chronicity M54.6 724.1       Patient Active Problem List   Diagnosis   • Pseudotumor cerebri   • Over weight   • Tobacco abuse   • Chronic nonintractable headache   • Smoker   • BMI 27.0-27.9,adult   • Acute bilateral low back pain with sciatica   • Cervical radiculopathy   • BMI 31.0-31.9,adult   • Neck pain        Past Medical History:   Diagnosis Date   • Intracranial hypertension    • Kidney stones         Past Surgical History:   Procedure Laterality Date   • CYSTOSCOPY ELECTROHYDRAULIC LITHOTRIPSY     • OTHER SURGICAL HISTORY      Intracranial shunt   •  SHUNT INSERTION  2015                             PT Assessment/Plan     Row Name 18 1549          PT Assessment    Assessment Comments Patient reported she felt better after her last treatment, but the last two days have been back to baseline with a bad HA and increased tightness into her neck. She did demonstrate scapular dyskinesia today with the right scapula having decreased control and recruitment.   She also demonstrated decreased scapular rotation bilaterally today.  Overall,  her symptoms are staying about the same, but she continues to have relief following treatment.  -FAITH        PT Plan    PT Plan Comments We will continue to work on soft tissue restrictions, manual techniques for centralization, and continue to progress with scapular strengthening to carry over into improved mechanics and posture.  -FIATH       User Key  (r) = Recorded By, (t) = Taken By, (c) = Cosigned By    Initials Name Provider Type    FAITH Robles PTA Physical Therapy  Assistant                    Exercises     Row Name 03/21/18 1542             Subjective Comments    Subjective Comments Patient reports she felt good after her last session for a couple days, but she has been back to baseline for the last two days.  She reports today she is having burning into the back and pain into the neck.  She has been having numbness into the right arm today.  She currently also has HA.  -FAITH         Subjective Pain    Able to rate subjective pain? yes  -FAITH      Pre-Treatment Pain Level 8  -FAITH      Post-Treatment Pain Level 6  -FAITH      Subjective Pain Comment No burning into back or symptoms down arm after treatment.   She only reports tension into neck after treatment.  -FAITH         Exercise 1    Exercise Name 1 prone bilateral scapular retraction  -FAITH      Cueing 1 Verbal;Tactile  -FAITH      Sets 1 1  -FAITH      Reps 1 10  -FAITH         Exercise 2    Exercise Name 2 prone unilateral scapular retraction  -FAITH      Cueing 2 Verbal;Tactile  -FAITH      Sets 2 1  -FAITH      Reps 2 3  -FAITH      Additional Comments she reported increased pain into right shoulder blade  -FAITH         Exercise 3    Exercise Name 3 hooklying SA punches  -FAITH      Cueing 3 Verbal;Tactile  -FAITH      Sets 3 (R) 3; (L) 1 set  -FAITH      Reps 3 10 each  -FAITH      Additional Comments added to HEP  -FAITH         Exercise 4    Exercise Name 4 standing SA wall slides on small white foam roller  -FAITH      Cueing 4 Verbal;Tactile;Demo  -FAITH      Sets 4 1  -FAITH      Reps 4 2  -FAITH      Additional Comments She reported increased shooting pain into right arm and therefore stopped  -FAITH        User Key  (r) = Recorded By, (t) = Taken By, (c) = Cosigned By    Initials Name Provider Type    FAITH Robles PTA Physical Therapy Assistant                        Manual Rx (last 36 hours)      Manual Treatments     Row Name 03/21/18 7375             Manual Rx 1    Manual Rx 1 Location prone UT/LS/thoracic paraspinals  -FAITH      Manual Rx 1 Type STM manually and  with blue ridged foam roller  -FAITH      Manual Rx 1 Grade min-mod  -FAITH      Manual Rx 1 Duration 10  -FAITH         Manual Rx 2    Manual Rx 2 Location prone thoracic  -FAITH      Manual Rx 2 Type extension mobilizations  -FAITH      Manual Rx 2 Grade 2-3  -FAITH      Manual Rx 2 Duration 5  -FAITH         Manual Rx 3    Manual Rx 3 Location prone: (R) scapula around medial border  -FAITH      Manual Rx 3 Type STM  -FAITH      Manual Rx 3 Grade mod  -FAITH      Manual Rx 3 Duration 3  -FAITH         Manual Rx 4    Manual Rx 4 Location supine suboccipital release  -FAITH      Manual Rx 4 Type STM with intermittent traction  -FAITH      Manual Rx 4 Grade min-mod with grade 1-2 traction  -FAITH      Manual Rx 4 Duration 5  -FAITH         Manual Rx 5    Manual Rx 5 Location hooklying lower cervical traction  -FAITH      Manual Rx 5 Grade grade 2 sustained  -FAITH      Manual Rx 5 Duration 5  -FAITH        User Key  (r) = Recorded By, (t) = Taken By, (c) = Cosigned By    Initials Name Provider Type    FAITH Robles, PTA Physical Therapy Assistant                PT OP Goals     Row Name 03/21/18 1549          Long Term Goals    LTG Date to Achieve 04/13/18  -FAITH     LTG 1 Improve thoracic mobility to allow for improve posture  -FAITH     LTG 1 Progress Ongoing  -FAITH     LTG 2 Able to hold neutral upright posture consistently  -FAITH     LTG 2 Progress Ongoing  -FAITH     LTG 3 No back pain except occasional minor twinges no more than 1-2/10 relieved with stretching  -FAITH     LTG 3 Progress Ongoing  -FAITH     LTG 4 No LE radicular symptoms for a week  -FAITH     LTG 4 Progress Ongoing  -FAITH     LTG 5 No UE radicular symptoms for a week  -FAITH     LTG 5 Progress Ongoing  -FAITH     LTG 5 Progress Comments Symptoms into R arm at beginning treatment, but denied any symptoms post treatment  -FAITH     LTG 6 Ind with HEP for flexibility and postural stability  -FAITH     LTG 6 Progress Ongoing  -FAITH        Time Calculation    PT Goal Re-Cert Due Date 04/01/18  -FAITH       User Key  (r) = Recorded By,  (t) = Taken By, (c) = Cosigned By    Initials Name Provider Type    FAITH Jackson Robles PTA Physical Therapy Assistant          Therapy Education  Education Details: justin lewis  Given: HEP  Program: New  How Provided: Verbal, Written  Provided to: Patient  Level of Understanding: Demonstrated, Verbalized              Time Calculation:   Start Time: 1549  Stop Time: 1637  Time Calculation (min): 48 min  Total Timed Code Minutes- PT: 48 minute(s)    Therapy Charges for Today     Code Description Service Date Service Provider Modifiers Qty    24583388231 HC PT MANUAL THERAPY EA 15 MIN 3/21/2018 Jackson Robles PTA GP 2    20648988080 HC PT THER PROC EA 15 MIN 3/21/2018 Jackson Robles PTA GP 1                    Jackson Robles PTA  3/21/2018

## 2018-03-23 ENCOUNTER — TELEPHONE (OUTPATIENT)
Dept: PRIMARY CARE CLINIC | Age: 28
End: 2018-03-23

## 2018-03-23 RX ORDER — NORETHINDRONE ACETATE AND ETHINYL ESTRADIOL 1.5-30(21)
1 KIT ORAL DAILY
Qty: 1 PACKET | Refills: 11 | Status: SHIPPED | OUTPATIENT
Start: 2018-03-23 | End: 2018-10-17 | Stop reason: ALTCHOICE

## 2018-03-27 ENCOUNTER — OFFICE VISIT (OUTPATIENT)
Dept: NEUROSURGERY | Facility: CLINIC | Age: 28
End: 2018-03-27

## 2018-03-27 VITALS
BODY MASS INDEX: 31.41 KG/M2 | WEIGHT: 184 LBS | SYSTOLIC BLOOD PRESSURE: 112 MMHG | HEIGHT: 64 IN | DIASTOLIC BLOOD PRESSURE: 66 MMHG

## 2018-03-27 DIAGNOSIS — F17.200 SMOKER: ICD-10-CM

## 2018-03-27 DIAGNOSIS — G93.2 PSEUDOTUMOR CEREBRI: Primary | ICD-10-CM

## 2018-03-27 DIAGNOSIS — R51.9 CHRONIC NONINTRACTABLE HEADACHE, UNSPECIFIED HEADACHE TYPE: ICD-10-CM

## 2018-03-27 DIAGNOSIS — G89.29 CHRONIC NONINTRACTABLE HEADACHE, UNSPECIFIED HEADACHE TYPE: ICD-10-CM

## 2018-03-27 PROCEDURE — 99213 OFFICE O/P EST LOW 20 MIN: CPT | Performed by: NEUROLOGICAL SURGERY

## 2018-03-27 RX ORDER — CLONAZEPAM 0.5 MG/1
0.5 TABLET ORAL 2 TIMES DAILY
Refills: 0 | COMMUNITY
Start: 2018-02-28 | End: 2018-05-01

## 2018-03-27 RX ORDER — ALBUTEROL SULFATE 90 UG/1
2 AEROSOL, METERED RESPIRATORY (INHALATION)
COMMUNITY
Start: 2016-11-28 | End: 2020-10-01

## 2018-03-27 RX ORDER — OXYCODONE AND ACETAMINOPHEN 7.5; 325 MG/1; MG/1
1 TABLET ORAL
COMMUNITY
Start: 2018-01-09 | End: 2018-11-29

## 2018-03-27 RX ORDER — DESOGESTREL AND ETHINYL ESTRADIOL 0.15-0.03
1 KIT ORAL DAILY
Refills: 3 | COMMUNITY
Start: 2018-03-05 | End: 2018-05-01

## 2018-03-27 RX ORDER — ACETAZOLAMIDE 250 MG/1
250 TABLET ORAL
COMMUNITY
Start: 2017-12-25 | End: 2018-11-29

## 2018-03-27 RX ORDER — ESCITALOPRAM OXALATE 10 MG/1
10 TABLET ORAL DAILY
Refills: 11 | COMMUNITY
Start: 2018-03-08 | End: 2018-05-01 | Stop reason: DRUGHIGH

## 2018-03-27 RX ORDER — NORETHINDRONE ACETATE AND ETHINYL ESTRADIOL AND FERROUS FUMARATE 1.5-30(21)
KIT ORAL
COMMUNITY
Start: 2018-03-23 | End: 2018-11-29

## 2018-03-27 RX ORDER — TRAZODONE HYDROCHLORIDE 50 MG/1
50 TABLET ORAL
COMMUNITY
Start: 2017-06-14 | End: 2019-05-30

## 2018-03-27 RX ORDER — SULFAMETHOXAZOLE AND TRIMETHOPRIM 800; 160 MG/1; MG/1
1 TABLET ORAL
COMMUNITY
Start: 2017-12-22 | End: 2018-11-29

## 2018-03-27 RX ORDER — ALPRAZOLAM 0.5 MG/1
0.5 TABLET ORAL
COMMUNITY
End: 2018-05-01

## 2018-03-27 NOTE — PATIENT INSTRUCTIONS
Steps to Quit Smoking  Smoking tobacco can be harmful to your health and can affect almost every organ in your body. Smoking puts you, and those around you, at risk for developing many serious chronic diseases. Quitting smoking is difficult, but it is one of the best things that you can do for your health. It is never too late to quit.  What are the benefits of quitting smoking?  When you quit smoking, you lower your risk of developing serious diseases and conditions, such as:  · Lung cancer or lung disease, such as COPD.  · Heart disease.  · Stroke.  · Heart attack.  · Infertility.  · Osteoporosis and bone fractures.  Additionally, symptoms such as coughing, wheezing, and shortness of breath may get better when you quit. You may also find that you get sick less often because your body is stronger at fighting off colds and infections. If you are pregnant, quitting smoking can help to reduce your chances of having a baby of low birth weight.  How do I get ready to quit?  When you decide to quit smoking, create a plan to make sure that you are successful. Before you quit:  · Pick a date to quit. Set a date within the next two weeks to give you time to prepare.  · Write down the reasons why you are quitting. Keep this list in places where you will see it often, such as on your bathroom mirror or in your car or wallet.  · Identify the people, places, things, and activities that make you want to smoke (triggers) and avoid them. Make sure to take these actions:  ¨ Throw away all cigarettes at home, at work, and in your car.  ¨ Throw away smoking accessories, such as ashtrays and lighters.  ¨ Clean your car and make sure to empty the ashtray.  ¨ Clean your home, including curtains and carpets.  · Tell your family, friends, and coworkers that you are quitting. Support from your loved ones can make quitting easier.  · Talk with your health care provider about your options for quitting smoking.  · Find out what treatment  options are covered by your health insurance.  What strategies can I use to quit smoking?  Talk with your healthcare provider about different strategies to quit smoking. Some strategies include:  · Quitting smoking altogether instead of gradually lessening how much you smoke over a period of time. Research shows that quitting “cold turkey” is more successful than gradually quitting.  · Attending in-person counseling to help you build problem-solving skills. You are more likely to have success in quitting if you attend several counseling sessions. Even short sessions of 10 minutes can be effective.  · Finding resources and support systems that can help you to quit smoking and remain smoke-free after you quit. These resources are most helpful when you use them often. They can include:  ¨ Online chats with a counselor.  ¨ Telephone quitlines.  ¨ Printed self-help materials.  ¨ Support groups or group counseling.  ¨ Text messaging programs.  ¨ Mobile phone applications.  · Taking medicines to help you quit smoking. (If you are pregnant or breastfeeding, talk with your health care provider first.) Some medicines contain nicotine and some do not. Both types of medicines help with cravings, but the medicines that include nicotine help to relieve withdrawal symptoms. Your health care provider may recommend:  ¨ Nicotine patches, gum, or lozenges.  ¨ Nicotine inhalers or sprays.  ¨ Non-nicotine medicine that is taken by mouth.  Talk with your health care provider about combining strategies, such as taking medicines while you are also receiving in-person counseling. Using these two strategies together makes you more likely to succeed in quitting than if you used either strategy on its own.  If you are pregnant or breastfeeding, talk with your health care provider about finding counseling or other support strategies to quit smoking. Do not take medicine to help you quit smoking unless told to do so by your health care  provider.  What things can I do to make it easier to quit?  Quitting smoking might feel overwhelming at first, but there is a lot that you can do to make it easier. Take these important actions:  · Reach out to your family and friends and ask that they support and encourage you during this time. Call telephone quitlines, reach out to support groups, or work with a counselor for support.  · Ask people who smoke to avoid smoking around you.  · Avoid places that trigger you to smoke, such as bars, parties, or smoke-break areas at work.  · Spend time around people who do not smoke.  · Lessen stress in your life, because stress can be a smoking trigger for some people. To lessen stress, try:  ¨ Exercising regularly.  ¨ Deep-breathing exercises.  ¨ Yoga.  ¨ Meditating.  ¨ Performing a body scan. This involves closing your eyes, scanning your body from head to toe, and noticing which parts of your body are particularly tense. Purposefully relax the muscles in those areas.  · Download or purchase mobile phone or tablet apps (applications) that can help you stick to your quit plan by providing reminders, tips, and encouragement. There are many free apps, such as QuitGuide from the CDC (Centers for Disease Control and Prevention). You can find other support for quitting smoking (smoking cessation) through smokefree.gov and other websites.  How will I feel when I quit smoking?  Within the first 24 hours of quitting smoking, you may start to feel some withdrawal symptoms. These symptoms are usually most noticeable 2-3 days after quitting, but they usually do not last beyond 2-3 weeks. Changes or symptoms that you might experience include:  · Mood swings.  · Restlessness, anxiety, or irritation.  · Difficulty concentrating.  · Dizziness.  · Strong cravings for sugary foods in addition to nicotine.  · Mild weight gain.  · Constipation.  · Nausea.  · Coughing or a sore throat.  · Changes in how your medicines work in your  body.  · A depressed mood.  · Difficulty sleeping (insomnia).  After the first 2-3 weeks of quitting, you may start to notice more positive results, such as:  · Improved sense of smell and taste.  · Decreased coughing and sore throat.  · Slower heart rate.  · Lower blood pressure.  · Clearer skin.  · The ability to breathe more easily.  · Fewer sick days.  Quitting smoking is very challenging for most people. Do not get discouraged if you are not successful the first time. Some people need to make many attempts to quit before they achieve long-term success. Do your best to stick to your quit plan, and talk with your health care provider if you have any questions or concerns.  This information is not intended to replace advice given to you by your health care provider. Make sure you discuss any questions you have with your health care provider.  Document Released: 12/12/2002 Document Revised: 08/15/2017 Document Reviewed: 05/03/2016  [employer] Interactive Patient Education © 2017 [employer]  BMI for Adults  Body mass index (BMI) is a number that is calculated from a person's weight and height. In most adults, the number is used to find how much of an adult's weight is made up of fat. BMI is not as accurate as a direct measure of body fat.  How is BMI calculated?  BMI is calculated by dividing weight in kilograms by height in meters squared. It can also be calculated by dividing weight in pounds by height in inches squared, then multiplying the resulting number by 703. Charts are available to help you find your BMI quickly and easily without doing this calculation.  How is BMI interpreted?  Health care professionals use BMI charts to identify whether an adult is underweight, at a normal weight, or overweight based on the following guidelines:  · Underweight: BMI less than 18.5.  · Normal weight: BMI between 18.5 and 24.9.  · Overweight: BMI between 25 and 29.9.  · Obese: BMI of 30 and above.  BMI is usually  interpreted the same for males and females.  Weight includes both fat and muscle, so someone with a muscular build, such as an athlete, may have a BMI that is higher than 24.9. In cases like these, BMI may not accurately depict body fat. To determine if excess body fat is the cause of a BMI of 25 or higher, further assessments may need to be done by a health care provider.  Why is BMI a useful tool?  BMI is used to identify a possible weight problem that may be related to a medical problem or may increase the risk for medical problems. BMI can also be used to promote changes to reach a healthy weight.  This information is not intended to replace advice given to you by your health care provider. Make sure you discuss any questions you have with your health care provider.  Document Released: 08/29/2005 Document Revised: 04/27/2017 Document Reviewed: 05/15/2015  [employer] Interactive Patient Education © 2017 [employer]

## 2018-03-27 NOTE — PROGRESS NOTES
SUBJECTIVE:  Patient ID: Kelsey Mcqueen is a 27 y.o. female is here today for follow-up.    Chief Complaint: Headache  Chief Complaint   Patient presents with   • pseudotumor cerebri     Patient having increasing headaches, dizziness which began last week and worsened on Friday.   • Nephrolithiasis     of note: patient going to [place] next Tuesday for surgery       HPI  27-year-old female with a  shunt for benign intracranial hypertension.  She describes a 1 week history after starting phentermine of worsening headache generalized weakness and blurred vision.  She denies any abdominal pain no constipation    The following portions of the patient's history were reviewed and updated as appropriate: allergies, current medications, past family history, past medical history, past social history, past surgical history and problem list.    OBJECTIVE:    Review of Systems   Eyes: Positive for visual disturbance.   Musculoskeletal: Positive for back pain.   Neurological: Positive for numbness and headaches.   All other systems reviewed and are negative.         Physical Exam   Constitutional: She is oriented to person, place, and time. She appears well-developed and well-nourished.   HENT:   Head: Normocephalic and atraumatic.   Right Ear: Hearing normal.   Left Ear: Hearing normal.   Eyes: EOM are normal. Pupils are equal, round, and reactive to light.   Neck: Normal range of motion.   Neurological: She is alert and oriented to person, place, and time. She has normal strength and normal reflexes. No cranial nerve deficit or sensory deficit. She displays a negative Romberg sign. GCS eye subscore is 4. GCS verbal subscore is 5. GCS motor subscore is 6. She displays no Babinski's sign on the right side. She displays no Babinski's sign on the left side.   Psychiatric: Her speech is normal. Judgment normal. Cognition and memory are normal.       Neurologic Exam     Mental Status   Oriented to person, place, and time.    Speech: speech is normal     Cranial Nerves     CN III, IV, VI   Pupils are equal, round, and reactive to light.  Extraocular motions are normal.     Motor Exam     Strength   Strength 5/5 throughout.     Funduscopic exam does show a optic disc margin with no florid papilledema in my opinion  Independent Review of Radiographic Studies:       ASSESSMENT/PLAN:  Under sterile conditions the ventricular peritoneal shunt reservoir was tapped.  There was spontaneous proximal flow spinal fluid.  The pressure was 3 cm of water to 4 cm of water with good respiratory variations.    The patient is having worsening headaches some vision changes with generalized weakness.  Her proximal shunt appears to be working with normal pressure.  I really do not think that there is a shunt malfunction at this point.  Her eye exam does not M history of overwhelming papilledema.  She scheduled to see Dr. Garcia this afternoon for a funduscopic exam.  I recommend that she stop the phentermine.  We will see her in a year for her shunt.      1. Pseudotumor cerebri    2. Chronic nonintractable headache, unspecified headache type    3. Smoker    4. BMI 31.0-31.9,adult            Return in about 1 year (around 3/27/2019) for follow up w/DR HOLCOMB.      Joe Holcomb MD

## 2018-03-28 ENCOUNTER — TELEPHONE (OUTPATIENT)
Dept: SURGERY | Age: 28
End: 2018-03-28

## 2018-03-28 ENCOUNTER — HOSPITAL ENCOUNTER (OUTPATIENT)
Dept: PHYSICAL THERAPY | Facility: HOSPITAL | Age: 28
Setting detail: THERAPIES SERIES
Discharge: HOME | End: 2018-03-28

## 2018-03-28 DIAGNOSIS — M54.12 CERVICAL RADICULOPATHY: ICD-10-CM

## 2018-03-28 DIAGNOSIS — M54.41 ACUTE BILATERAL LOW BACK PAIN WITH BILATERAL SCIATICA: Primary | ICD-10-CM

## 2018-03-28 DIAGNOSIS — M54.42 ACUTE BILATERAL LOW BACK PAIN WITH BILATERAL SCIATICA: Primary | ICD-10-CM

## 2018-03-28 DIAGNOSIS — M54.6 MIDLINE THORACIC BACK PAIN, UNSPECIFIED CHRONICITY: ICD-10-CM

## 2018-03-28 PROCEDURE — 97110 THERAPEUTIC EXERCISES: CPT

## 2018-03-28 PROCEDURE — 97140 MANUAL THERAPY 1/> REGIONS: CPT

## 2018-03-28 NOTE — THERAPY PROGRESS REPORT/RE-CERT
Outpatient Physical Therapy Ortho Progress Note  [facility]     Patient Name: Kelsey Mcqueen  : 1990  MRN: [id number]  Today's Date: 3/28/2018      Visit Date: 2018    Visit Dx:    ICD-10-CM ICD-9-CM   1. Acute bilateral low back pain with bilateral sciatica M54.42 724.2    M54.41 724.3   2. Cervical radiculopathy M54.12 723.4   3. Midline thoracic back pain, unspecified chronicity M54.6 724.1       Patient Active Problem List   Diagnosis   • Pseudotumor cerebri   • Over weight   • Tobacco abuse   • Chronic nonintractable headache   • Smoker   • BMI 27.0-27.9,adult   • Acute bilateral low back pain with sciatica   • Cervical radiculopathy   • BMI 31.0-31.9,adult   • Neck pain        Past Medical History:   Diagnosis Date   • Intracranial hypertension    • Kidney stones         Past Surgical History:   Procedure Laterality Date   • CYSTOSCOPY ELECTROHYDRAULIC LITHOTRIPSY     • OTHER SURGICAL HISTORY      Intracranial shunt   •  SHUNT INSERTION  2015                             PT Assessment/Plan     Row Name 18 1550          PT Assessment    Functional Limitations Limitation in home management;Limitations in community activities;Limitations in functional capacity and performance;Performance in work activities;Performance in leisure activities  -TB     Impairments Joint mobility;Posture;Pain;Muscle strength;Peripheral nerve integrity;Impaired flexibility  -TB     Assessment Comments Patient has had follow up appointments with Dr. Power and Dr. Garcia this past week, and they are both aware of her constant headache and blurred peripheral vision of the right eye.  Se has been taken off a medication, which they both believe are contributing to her headache and vision. Her headache was reported as a 10/10 since Friday without relief from manual techniques today.  On a positive note, she has not had radicular symptoms for the past 3 days and decreased pain in her back for the past 3 days.   However, she has not been to work since last Friday, which has been 6 days.  She continues to have forward shoulder and head posturing in sitting and this was addressed with education today.  Her upper and middle thoracic spine also continue to be stiff.  We did progress with more postural strengthening activities today without increased symptoms.    -FAITH     Rehab Potential Good  -TB     Patient/caregiver participated in establishment of treatment plan and goals Yes  -TB     Patient would benefit from skilled therapy intervention Yes  -TB        PT Plan    PT Frequency 2x/week  -TB     Predicted Duration of Therapy Intervention (OT Eval) 2-4 weeks  -TB     Planned CPT's? PT THER PROC EA 15 MIN: 61889;PT MANUAL THERAPY EA 15 MIN: 66106;PT ELECTRICAL STIM UNATTEND: ;PT ELECTRICAL STIM ATTD EA 15 MIN: 13293;PT ULTRASOUND EA 15 MIN: 64323;PT TRACTION CERVICAL: 99789  -TB     PT Plan Comments We will continue to monitor symptoms, especially HA and blurred vision and see if these improve once she has been off her medicine for a while longer.  We will also continue with postural and scapular strengthening.  -FAITH       User Key  (r) = Recorded By, (t) = Taken By, (c) = Cosigned By    Initials Name Provider Type    TB Vahe Mendoza, PT Physical Therapist    FAITH Robles, PTA Physical Therapy Assistant                    Exercises     Row Name 03/28/18 4548             Subjective Comments    Subjective Comments Patient reports she had seen Dr. Power last week for a shunt tap and follow up, and the shunt is working as it should be.  She also has seen the eye doctor and her vision was worsened slightly.  Both doctors are aware of her blurred vision of the right eye and believe it is from a medicine, which she has now been taken off of.  As of now she continues to have blurred vision with a constant HA, which she rates as a 10/10 even with reeducation of the pain scale.  She reports this coming Tuesday she goes to  [facility] for [facility] stone surgery.   She denies radicular symptoms currently.  -FAITH         Subjective Pain    Able to rate subjective pain? yes  -FAITH      Pre-Treatment Pain Level --   3/10 back, 10/10 HA  -FAITH      Post-Treatment Pain Level --   3/10 back, 10/10 HA  -FAITH         Exercise 1    Exercise Name 1 prone bilateral scapular retraction  -FAITH      Cueing 1 Verbal;Tactile  -FAITH      Sets 1 2  -FAITH      Reps 1 10  -FAITH      Time 1 5 seconds  -FAITH         Exercise 2    Exercise Name 2 hooklying SA punches  -FAITH      Cueing 2 Verbal;Tactile  -FAITH      Sets 2 1  -FAITH      Reps 2 20 each  -FAITH      Additional Comments reviewed for HEP  -FAITH         Exercise 3    Exercise Name 3 unilateral horizontal abduction  -FAITH      Cueing 3 Verbal;Tactile  -FAITH      Sets 3 2  -FAITH      Reps 3 10  -FAITH      Additional Comments each arm.  Added yellow band on second set.  Added to HEP  -FAITH         Exercise 4    Exercise Name 4 Assessed goals for progress note.  -FAITH      Cueing 4 --  -FAITH      Sets 4 --  -FAITH      Reps 4 --  -FAITH         Exercise 6    Exercise Name 6 Education of correct sitting posture.  -FAITH      Cueing 6 Verbal;Tactile;Demo  -FAITH        User Key  (r) = Recorded By, (t) = Taken By, (c) = Cosigned By    Initials Name Provider Type    FAITH Robles, PTA Physical Therapy Assistant                        Manual Rx (last 36 hours)      Manual Treatments     Row Name 03/28/18 1556             Manual Rx 1    Manual Rx 1 Location prone UT/LS/thoracic paraspinals  -FAITH      Manual Rx 1 Type STM  -FAITH      Manual Rx 1 Grade min-mod  -FAITH      Manual Rx 1 Duration 8  -FAIHT         Manual Rx 2    Manual Rx 2 Location prone thoracic  -FAITH      Manual Rx 2 Type extension mobilizations  -FAITH      Manual Rx 2 Grade 2-3  -FAITH      Manual Rx 2 Duration 5  -FAITH         Manual Rx 3    Manual Rx 3 Location supine suboccipital release  -FAITH      Manual Rx 3 Type STM  -FAITH      Manual Rx 3 Grade min-mod  -FAITH      Manual Rx 3 Duration 4  -FAITH         Manual  Rx 4    Manual Rx 4 Location hooklying lower cervical traction  -FAITH      Manual Rx 4 Grade grade 2 sustained  -FAITH      Manual Rx 4 Duration 4  -FAITH        User Key  (r) = Recorded By, (t) = Taken By, (c) = Cosigned By    Initials Name Provider Type    FAITH Robles PTA Physical Therapy Assistant                PT OP Goals     Row Name 03/28/18 1550          Long Term Goals    LTG Date to Achieve 05/11/18  -FAITH     LTG 1 Improve thoracic mobility to allow for improve posture  -FAITH     LTG 1 Progress Ongoing  -FAITH     LTG 1 Progress Comments She continues to be stiff in the upper and middle thoracic spine.  Her lower thoracic is WNL.  -FAITH     LTG 2 Able to hold neutral upright posture consistently  -FAITH     LTG 2 Progress Ongoing  -FAITH     LTG 2 Progress Comments She needs cueing to correct forward shoulder and forward head posturing.  -FAITH     LTG 3 No back pain except occasional minor twinges no more than 1-2/10 relieved with stretching  -FAITH     LTG 3 Progress Ongoing  -FAITH     LTG 3 Progress Comments She reports it has been 3 days without back pain, but she also hasn't worked since last Friday.  She reported it as 3/10 today.  -FAITH     LTG 4 No LE radicular symptoms for a week  -FAITH     LTG 4 Progress Ongoing  -FAITH     LTG 4 Progress Comments She had complete numbness down left leg 4 days ago, which lasted a couple of minutes.  -FAITH     LTG 5 No UE radicular symptoms for a week  -FAITH     LTG 5 Progress Ongoing  -FAITH     LTG 5 Progress Comments She reports it has been 3 days since UE radicular symptoms.  -FAITH     LTG 6 Ind with HEP for flexibility and postural stability  -FAITH     LTG 6 Progress Ongoing  -FAITH     LTG 6 Progress Comments Added postural and scapular component today.  -FAITH        Time Calculation    PT Goal Re-Cert Due Date 04/01/18  -FAITH       User Key  (r) = Recorded By, (t) = Taken By, (c) = Cosigned By    Initials Name Provider Type    FAITH Robles PTA Physical Therapy Assistant          Therapy  Education  Education Details: hooklying unilateral shoulder horizontal abduction with yellow band  Given: HEP  Program: New  How Provided: Verbal, Written  Provided to: Patient  Level of Understanding: Verbalized, Demonstrated              Time Calculation:   Start Time: 1550  Stop Time: 1641  Time Calculation (min): 51 min  Total Timed Code Minutes- PT: 51 minute(s)      The plan of care and all goals were reviewed and updated as needed by Vahe Mendoza, PT 3/28/2018 5:07 PM              Vahe Mendoza, PT  3/28/2018

## 2018-03-29 ENCOUNTER — OFFICE VISIT (OUTPATIENT)
Dept: PRIMARY CARE CLINIC | Age: 28
End: 2018-03-29
Payer: MEDICAID

## 2018-03-29 VITALS
HEART RATE: 76 BPM | OXYGEN SATURATION: 98 % | BODY MASS INDEX: 29.88 KG/M2 | HEIGHT: 64 IN | DIASTOLIC BLOOD PRESSURE: 70 MMHG | TEMPERATURE: 98 F | WEIGHT: 175 LBS | SYSTOLIC BLOOD PRESSURE: 120 MMHG

## 2018-03-29 DIAGNOSIS — N20.0 RENAL STONES: ICD-10-CM

## 2018-03-29 DIAGNOSIS — G93.2 PSEUDOTUMOR CEREBRI: ICD-10-CM

## 2018-03-29 DIAGNOSIS — Z79.899 MEDICATION MANAGEMENT: Primary | ICD-10-CM

## 2018-03-29 DIAGNOSIS — N64.3 GALACTORRHEA: ICD-10-CM

## 2018-03-29 DIAGNOSIS — F41.1 GAD (GENERALIZED ANXIETY DISORDER): ICD-10-CM

## 2018-03-29 PROCEDURE — G8427 DOCREV CUR MEDS BY ELIG CLIN: HCPCS | Performed by: PEDIATRICS

## 2018-03-29 PROCEDURE — 99213 OFFICE O/P EST LOW 20 MIN: CPT | Performed by: PEDIATRICS

## 2018-03-29 PROCEDURE — G8484 FLU IMMUNIZE NO ADMIN: HCPCS | Performed by: PEDIATRICS

## 2018-03-29 PROCEDURE — 4004F PT TOBACCO SCREEN RCVD TLK: CPT | Performed by: PEDIATRICS

## 2018-03-29 PROCEDURE — G8417 CALC BMI ABV UP PARAM F/U: HCPCS | Performed by: PEDIATRICS

## 2018-03-29 RX ORDER — CLONAZEPAM 0.5 MG/1
0.5 TABLET ORAL 2 TIMES DAILY
Qty: 60 TABLET | Refills: 0 | Status: SHIPPED | OUTPATIENT
Start: 2018-03-29 | End: 2018-05-14

## 2018-03-29 RX ORDER — HYDROCODONE BITARTRATE AND ACETAMINOPHEN 10; 325 MG/1; MG/1
1 TABLET ORAL EVERY 8 HOURS PRN
Qty: 90 TABLET | Refills: 0 | Status: CANCELLED | OUTPATIENT
Start: 2018-03-29 | End: 2018-04-28

## 2018-03-29 ASSESSMENT — ENCOUNTER SYMPTOMS
NAUSEA: 0
EYE PAIN: 0
COUGH: 0
VOMITING: 0
WHEEZING: 0
SHORTNESS OF BREATH: 0
BACK PAIN: 1
SORE THROAT: 0
ABDOMINAL PAIN: 0
SINUS PRESSURE: 0
DIARRHEA: 0

## 2018-03-29 NOTE — TELEPHONE ENCOUNTER
I talked to him about this yesterday afternoon and he remembered who she was and stated that we given her a name of an endocrinologist at [facility] (Dr. Raquel Becerril)   He stated he didn't know anyone off the top of his head but he would look into this and give me information on another day. I called and spoke to the patient's mother. She stated that patient is having horrible vision problems and headaches and problems with her shunt. I made her aware they needed to contact her specialists that deal with those health issues and her PCP. She stated that patient saw Dr. Rodolfo Merino and they ran several tests and even saw an eye doctor and they said everything was ok. The patient's mom even stated that the last time she seen Dr. Bill Regalado, he stated he didn't know what to do except give her a name of an endocrinologist at [facility]. Patient did go see Dr. Raquel Becerril but they are not pleased with her. The mother stated that her daughter had an appointment with Dr. Bill Regalado on the 16th and they would talk to him more about then. I made them aware that if they felt that her condition was getting worse, that she could always take her to the local ER. She voiced her understanding.

## 2018-03-29 NOTE — PROGRESS NOTES
[facility], [address]  Phone [phone]   Fax [phone]      OFFICE VISIT: 3/29/2018    Demetris [address] 1990      HPI  Reason For Visit:  Alpesh Rhodes is a 33 y.o. Health Maintenance    1 Month Follow-Up and Medication Refill (clonazepam)    Presents for one-month follow-up and refill of her clonazepam.  Last fill of clonazepam was on 2/28/2018 for #60. Last fill of hydrocodone was on 3/8/2018 for #90. AVA was reviewed today per office protocol. Report shows No discrepancies. Fill pattern is consistent from single provider(s) at single pharmacy(s). Request #[id number]  . Controlled Substances Monitoring: The Prescription Monitoring Report for this patient was reviewed today. (ASHISH Guy DO)    Possible medication side effects, risk of tolerance/dependence & alternative treatments discussed., No signs of potential drug abuse or diversion identified. (ASHISH Guy DO)              Existing medication contract. (Eliza Barnes DO)      She has follow-up with pain management on sometime in next month    She had her shunt tapped and the pressure was normal    She did have some vision changes with phentermine, but she has stopped taking it. She notes it is better now. She did have her eyes checked and she had a very good report. She is going for surgery on kidney next tuesday. She thinks that they will give her pain medication if she needs it. She stopped lexapro. It did help her mood, but she had m. Cramps and stopped it        height is 5' 4\" (1.626 m) and weight is 175 lb (79.4 kg). Her temporal temperature is 98 °F (36.7 °C). Her blood pressure is 120/70 and her pulse is 76. Her oxygen saturation is 98%. Body mass index is 30.04 kg/m². I have reviewed the following with the MsLukasz  [facility]   Lab Review   Office Visit on 03/08/2018   Component Date Value    Color, UA 03/08/2018 yellow     Clarity, UA Urine 01/17/2018 Negative     Benzodiazepine Screen, U* 01/17/2018 Postive     COCAINE METABOLITE SCREE* 01/17/2018 Negative     THC 01/17/2018 Negative     MDMA URINE 01/17/2018 Negative     Methadone Screen, Urine 01/17/2018 Negative     Opiate Scrn, Ur 01/17/2018 Negative     Oxycodone Screen, Ur 01/17/2018 Positive     PCP Scrn, Ur 01/17/2018 Negative     Propoxyphene Screen, Uri* 01/17/2018 Negative     Tricyclic Antidepressant* 18/38/2501 Negative     Methamphetamine, Urine 01/17/2018 Negative    Office Visit on 01/09/2018   Component Date Value    Glucose, UA POC 01/09/2018 neg     Bilirubin, UA 01/09/2018 small     Ketones, UA 01/09/2018 neg     Spec Grav, UA 01/09/2018 1.030     Blood, UA POC 01/09/2018 neg     pH, UA 01/09/2018 6.0     Protein, UA POC 01/09/2018 trace     Urobilinogen, UA 01/09/2018 1.0     Leukocytes, UA 01/09/2018 trace     Nitrite, UA 01/09/2018 neg    Orders Only on 12/20/2017   Component Date Value    Urine Culture, Routine 12/20/2017 *                    Value:>100,000 CFU/ml  Mixed skin manjit present      Organism 12/20/2017 Enterococcus species*    Urine Culture, Routine 12/20/2017 Heavy growth      Copies of these are in the chart. Current Outpatient Prescriptions   Medication Sig Dispense Refill    clonazePAM (KLONOPIN) 0.5 MG tablet Take 1 tablet by mouth 2 times daily for 30 days. 60 tablet 0    norethindrone-ethinyl estradiol-iron (LOESTRIN FE 1.5/30) 1.5-30 MG-MCG tablet Take 1 tablet by mouth daily 1 packet 11    HYDROcodone-acetaminophen (NORCO)  MG per tablet Take 1 tablet by mouth every 8 hours as needed for Pain for up to 30 days.  90 tablet 0    linaclotide (LINZESS) 290 MCG CAPS capsule Take 1 capsule by mouth every morning (before breakfast) 30 capsule 3    tiZANidine (ZANAFLEX) 4 MG tablet Take 1 tablet by mouth every 6 hours as needed (muscle spasm) 60 tablet 1    ibuprofen (ADVIL;MOTRIN) 800 MG tablet Take 800 mg by mouth every 6 hours as needed for Pain      fexofenadine (ALLEGRA) 60 MG tablet Take 60 mg by mouth      PROAIR  (90 BASE) MCG/ACT inhaler INHALE 2 PUFFS Q 4 H PRN  0     No current facility-administered medications for this visit. Allergies: Morphine and related and Other     Past Medical History:   Diagnosis Date    Back pain     from kidney stones    Chronic kidney disease     Vurshite stones    Depression     IIH (idiopathic intracranial hypertension)     Kidney stone     Ovarian cyst        Past Surgical History:   Procedure Laterality Date    ABDOMEN SURGERY Left 12/29/2016    EXCSION SKIN LESION BREAST AND ABDOMEN WALL performed by Krish Agustin MD at [facility] stones    TONSILLECTOMY      VENTRICULOPERITONEAL SHUNT         Social History   Substance Use Topics    Smoking status: Current Some Day Smoker     Packs/day: 0.25     Years: 2.00     Types: E-Cigarettes, Cigarettes    Smokeless tobacco: Never Used    Alcohol use Yes      Comment: rarely        Review of Systems   Constitutional: Negative for fatigue and unexpected weight change. HENT: Negative for congestion, ear pain, sinus pressure and sore throat. Eyes: Negative for pain and visual disturbance. Respiratory: Negative for cough, shortness of breath and wheezing. Cardiovascular: Negative for chest pain, palpitations and leg swelling. Gastrointestinal: Negative for abdominal pain, diarrhea, nausea and vomiting. Endocrine: Negative for polyuria. Genitourinary: Positive for dysuria, frequency and urgency. Negative for hematuria. Musculoskeletal: Positive for back pain, myalgias and neck pain. Skin: Negative for rash. Neurological: Negative for dizziness and headaches. Psychiatric/Behavioral: Negative for self-injury. The patient is not nervous/anxious. Physical Exam   Constitutional: She is oriented to person, place, and time.  She appears

## 2018-04-02 ENCOUNTER — HOSPITAL ENCOUNTER (OUTPATIENT)
Dept: PHYSICAL THERAPY | Facility: HOSPITAL | Age: 28
Setting detail: THERAPIES SERIES
Discharge: HOME | End: 2018-04-02

## 2018-04-02 ENCOUNTER — TELEPHONE (OUTPATIENT)
Dept: PRIMARY CARE CLINIC | Age: 28
End: 2018-04-02

## 2018-04-02 DIAGNOSIS — M54.6 MIDLINE THORACIC BACK PAIN, UNSPECIFIED CHRONICITY: ICD-10-CM

## 2018-04-02 DIAGNOSIS — M54.42 ACUTE BILATERAL LOW BACK PAIN WITH BILATERAL SCIATICA: Primary | ICD-10-CM

## 2018-04-02 DIAGNOSIS — M54.41 ACUTE BILATERAL LOW BACK PAIN WITH BILATERAL SCIATICA: Primary | ICD-10-CM

## 2018-04-02 DIAGNOSIS — M54.12 CERVICAL RADICULOPATHY: ICD-10-CM

## 2018-04-02 PROCEDURE — 97110 THERAPEUTIC EXERCISES: CPT

## 2018-04-02 PROCEDURE — 97140 MANUAL THERAPY 1/> REGIONS: CPT

## 2018-04-02 NOTE — THERAPY TREATMENT NOTE
Outpatient Physical Therapy Ortho Treatment Note  [facility]     Patient Name: Kelsey Mcqueen  : 1990  MRN: [id number]  Today's Date: 2018      Visit Date: 2018    Visit Dx:    ICD-10-CM ICD-9-CM   1. Acute bilateral low back pain with bilateral sciatica M54.42 724.2    M54.41 724.3   2. Cervical radiculopathy M54.12 723.4   3. Midline thoracic back pain, unspecified chronicity M54.6 724.1       Patient Active Problem List   Diagnosis   • Pseudotumor cerebri   • Over weight   • Tobacco abuse   • Chronic nonintractable headache   • Smoker   • BMI 27.0-27.9,adult   • Acute bilateral low back pain with sciatica   • Cervical radiculopathy   • BMI 31.0-31.9,adult   • Neck pain        Past Medical History:   Diagnosis Date   • Intracranial hypertension    • Kidney stones         Past Surgical History:   Procedure Laterality Date   • CYSTOSCOPY ELECTROHYDRAULIC LITHOTRIPSY     • OTHER SURGICAL HISTORY      Intracranial shunt   •  SHUNT INSERTION  2015                             PT Assessment/Plan     Row Name 18 4969          PT Assessment    Assessment Comments Patient continues to have dyskinesia of the scapulas with less recruitment on the right compared the left. Her headache was improved today with manual techniques.   We continue to progress her to more active activities for posture and scapular strengthening, and she did not have increased s ymptoms today.  -FAITH        PT Plan    PT Plan Comments Patient will need re-eval on her next visit due to kidney stone surgery tomorrow.  Then we will continue to progress with scapular and postural strengthening.  -FAITH       User Key  (r) = Recorded By, (t) = Taken By, (c) = Cosigned By    Initials Name Provider Type    FAITH Robles PTA Physical Therapy Assistant                    Exercises     Row Name 18 4298             Subjective Comments    Subjective Comments She reports she did have a little bit of numbness down the  right arm.  She reports she did not have back pain over the weekend until today at work.  She reports what she does at work makes it worse.  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  She reports the blurred vision has eased up a little, but the headaches are the same. She denies numbness int her arm currently, but she does have slight burning in the back.   She has surgery at [facility] tomorrow.  She reports she was sore after her last session for about 2 two days.  -FAITH         Subjective Pain    Able to rate subjective pain? yes  -FAITH      Pre-Treatment Pain Level --   6/10 HA, 6/10 back and neck  -FAITH      Post-Treatment Pain Level --   4/10 HA and back/neck  -FAITH      Subjective Pain Comment tense sensation post treatment  -FAITH         Exercise 1    Exercise Name 1 prone bilateral scapular retraction  -FAITH      Cueing 1 Verbal;Tactile  -FAITH      Sets 1 2  -FAITH      Reps 1 10  -FAITH      Time 1 5 seconds  -FAITH         Exercise 2    Exercise Name 2 prone mid/low row with hands on head  -FAITH      Cueing 2 Verbal;Tactile  -FAITH      Sets 2 2  -FAITH      Reps 2 10  -FAITH      Time 2 3 seconds  -FAITH         Exercise 3    Exercise Name 3 hooklying thoracic towel roll stretch  -FAITH      Cueing 3 Verbal;Tactile  -FAITH      Time 3 3 minutes  -FAITH         Exercise 4    Exercise Name 4 bilateral horizontal abduction  -FAITH      Cueing 4 Verbal;Tactile  -FAITH      Sets 4 2  -FAITH      Reps 4 10  -FAITH      Additional Comments yellow band  -FAITH        User Key  (r) = Recorded By, (t) = Taken By, (c) = Cosigned By    Initials Name Provider Type    FAITH Robles, PTA Physical Therapy Assistant                        Manual Rx (last 36 hours)      Manual Treatments     Row Name 04/02/18 1541             Manual Rx 1    Manual Rx 1 Location prone UT/LS/thoracic paraspinals  -FAITH      Manual Rx 1 Type STM  -FAITH      Manual Rx 1 Grade mod  -FAITH      Manual Rx 1 Duration 10  -FAITH         Manual Rx 2    Manual Rx 2 Location prone  thoracic  -FAITH      Manual Rx 2 Type extension mobilizations  -FAITH      Manual Rx 2 Grade 2-3  -FAITH      Manual Rx 2 Duration 5  -FAITH         Manual Rx 3    Manual Rx 3 Location supine suboccipital release  -FAITH      Manual Rx 3 Type STM with upper cervical traction  -FAITH      Manual Rx 3 Grade min-mod, grade 1-2  -FAITH      Manual Rx 3 Duration 3  -FAITH         Manual Rx 4    Manual Rx 4 Location hooklying lower cervical traction  -FAITH      Manual Rx 4 Grade grade 2 sustained  -FAITH      Manual Rx 4 Duration 3  -FAITH        User Key  (r) = Recorded By, (t) = Taken By, (c) = Cosigned By    Initials Name Provider Type    FAITH Robles PTA Physical Therapy Assistant                PT OP Goals     Row Name 04/02/18 1538          Long Term Goals    LTG Date to Achieve 05/11/18  -FAITH     LTG 1 Improve thoracic mobility to allow for improve posture  -FAITH     LTG 1 Progress Ongoing  -FAITH     LTG 2 Able to hold neutral upright posture consistently  -FAITH     LTG 2 Progress Ongoing  -FAITH     LTG 3 No back pain except occasional minor twinges no more than 1-2/10 relieved with stretching  -FAITH     LTG 3 Progress Ongoing  -FAITH     LTG 4 No LE radicular symptoms for a week  -FAITH     LTG 4 Progress Ongoing  -FAITH     LTG 5 No UE radicular symptoms for a week  -FAITH     LTG 5 Progress Ongoing  -FAITH     LTG 5 Progress Comments She did have numbness down the arm this morning, but nothing currently.  -FAITH     LTG 6 Ind with HEP for flexibility and postural stability  -FAITH     LTG 6 Progress Ongoing  -FAITH        Time Calculation    PT Goal Re-Cert Due Date 04/27/18  -FAITH       User Key  (r) = Recorded By, (t) = Taken By, (c) = Cosigned By    Initials Name Provider Type    FAITH Robles PTA Physical Therapy Assistant          Therapy Education  Given: HEP  Program: Reinforced  How Provided: Verbal  Provided to: Patient  Level of Understanding: Verbalized              Time Calculation:   Start Time: 1538  Stop Time: 1625  Time Calculation (min): 47  min  Total Timed Code Minutes- PT: 47 minute(s)    Therapy Charges for Today     Code Description Service Date Service Provider Modifiers Qty    32536890509 HC PT MANUAL THERAPY EA 15 MIN 4/2/2018 Jackson Robles, PTA GP 1    36386505379 HC PT THER PROC EA 15 MIN 4/2/2018 Jackson Robles, PTA GP 2                    Jackson Robles, PTA  4/2/2018

## 2018-04-04 ENCOUNTER — APPOINTMENT (OUTPATIENT)
Dept: PHYSICAL THERAPY | Facility: HOSPITAL | Age: 28
End: 2018-04-04

## 2018-04-04 DIAGNOSIS — M41.34 THORACOGENIC SCOLIOSIS OF THORACIC REGION: ICD-10-CM

## 2018-04-04 DIAGNOSIS — R51.9 CHRONIC NONINTRACTABLE HEADACHE, UNSPECIFIED HEADACHE TYPE: ICD-10-CM

## 2018-04-04 DIAGNOSIS — G89.29 CHRONIC NONINTRACTABLE HEADACHE, UNSPECIFIED HEADACHE TYPE: ICD-10-CM

## 2018-04-04 DIAGNOSIS — M54.50 CHRONIC LEFT-SIDED LOW BACK PAIN WITHOUT SCIATICA: ICD-10-CM

## 2018-04-04 DIAGNOSIS — G89.29 CHRONIC LEFT-SIDED LOW BACK PAIN WITHOUT SCIATICA: ICD-10-CM

## 2018-04-04 DIAGNOSIS — G93.2 PSEUDOTUMOR CEREBRI: ICD-10-CM

## 2018-04-04 RX ORDER — HYDROCODONE BITARTRATE AND ACETAMINOPHEN 10; 325 MG/1; MG/1
1 TABLET ORAL EVERY 8 HOURS PRN
Qty: 90 TABLET | Refills: 0 | Status: SHIPPED | OUTPATIENT
Start: 2018-04-04 | End: 2018-04-30 | Stop reason: SDUPTHER

## 2018-04-07 ENCOUNTER — HOSPITAL ENCOUNTER (INPATIENT)
Age: 28
LOS: 2 days | Discharge: HOME | DRG: 690 | End: 2018-04-09
Attending: EMERGENCY MEDICINE | Admitting: HOSPITALIST
Payer: MEDICAID

## 2018-04-07 ENCOUNTER — APPOINTMENT (OUTPATIENT)
Dept: CT IMAGING | Age: 28
DRG: 690 | End: 2018-04-07
Payer: MEDICAID

## 2018-04-07 DIAGNOSIS — N12 PYELONEPHRITIS: Primary | ICD-10-CM

## 2018-04-07 DIAGNOSIS — R50.82 POST-PROCEDURAL FEVER: ICD-10-CM

## 2018-04-07 PROBLEM — F41.1 GAD (GENERALIZED ANXIETY DISORDER): Chronic | Status: ACTIVE | Noted: 2017-12-19

## 2018-04-07 PROBLEM — N20.0 LEFT NEPHROLITHIASIS: Status: ACTIVE | Noted: 2018-04-07

## 2018-04-07 PROBLEM — N30.01 ACUTE CYSTITIS WITH HEMATURIA: Status: ACTIVE | Noted: 2018-04-07

## 2018-04-07 PROBLEM — R10.9 ACUTE LEFT FLANK PAIN: Status: ACTIVE | Noted: 2018-04-07

## 2018-04-07 PROBLEM — N39.0 UTI (URINARY TRACT INFECTION): Status: ACTIVE | Noted: 2018-04-07

## 2018-04-07 LAB
ALBUMIN SERPL-MCNC: 3.3 G/DL (ref 3.5–5.2)
ALP BLD-CCNC: 73 U/L (ref 35–104)
ALT SERPL-CCNC: 9 U/L (ref 5–33)
ANION GAP SERPL CALCULATED.3IONS-SCNC: 13 MMOL/L (ref 7–19)
AST SERPL-CCNC: 13 U/L (ref 5–32)
BACTERIA: ABNORMAL /HPF
BASOPHILS ABSOLUTE: 0 K/UL (ref 0–0.2)
BASOPHILS RELATIVE PERCENT: 0.2 % (ref 0–1)
BILIRUB SERPL-MCNC: <0.2 MG/DL (ref 0.2–1.2)
BILIRUBIN URINE: NEGATIVE
BLOOD, URINE: ABNORMAL
BUN BLDV-MCNC: 8 MG/DL (ref 6–20)
CALCIUM SERPL-MCNC: 8.4 MG/DL (ref 8.6–10)
CHLORIDE BLD-SCNC: 101 MMOL/L (ref 98–111)
CLARITY: ABNORMAL
CO2: 20 MMOL/L (ref 22–29)
COLOR: YELLOW
CREAT SERPL-MCNC: 0.8 MG/DL (ref 0.5–0.9)
EOSINOPHILS ABSOLUTE: 0 K/UL (ref 0–0.6)
EOSINOPHILS RELATIVE PERCENT: 0.2 % (ref 0–5)
EPITHELIAL CELLS, UA: 18 /HPF (ref 0–5)
GFR NON-AFRICAN AMERICAN: >60
GLUCOSE BLD-MCNC: 167 MG/DL (ref 74–109)
GLUCOSE URINE: NEGATIVE MG/DL
HCG(URINE) PREGNANCY TEST: NEGATIVE
HCT VFR BLD CALC: 35 % (ref 37–47)
HEMOGLOBIN: 11.8 G/DL (ref 12–16)
HYALINE CASTS: 37 /HPF (ref 0–8)
INR BLD: 1.22 (ref 0.88–1.18)
KETONES, URINE: NEGATIVE MG/DL
LACTIC ACID: 1.2 MMOL/L (ref 0.5–1.9)
LACTIC ACID: 2.3 MMOL/L (ref 0.5–1.9)
LEUKOCYTE ESTERASE, URINE: ABNORMAL
LIPASE: 17 U/L (ref 13–60)
LYMPHOCYTES ABSOLUTE: 0.8 K/UL (ref 1.1–4.5)
LYMPHOCYTES RELATIVE PERCENT: 6.5 % (ref 20–40)
MCH RBC QN AUTO: 30.8 PG (ref 27–31)
MCHC RBC AUTO-ENTMCNC: 33.7 G/DL (ref 33–37)
MCV RBC AUTO: 91.4 FL (ref 81–99)
MONOCYTES ABSOLUTE: 0.8 K/UL (ref 0–0.9)
MONOCYTES RELATIVE PERCENT: 6.2 % (ref 0–10)
NEUTROPHILS ABSOLUTE: 11.2 K/UL (ref 1.5–7.5)
NEUTROPHILS RELATIVE PERCENT: 86.3 % (ref 50–65)
NITRITE, URINE: NEGATIVE
PDW BLD-RTO: 11.6 % (ref 11.5–14.5)
PH UA: 6
PLATELET # BLD: 201 K/UL (ref 130–400)
PMV BLD AUTO: 10.1 FL (ref 9.4–12.3)
POTASSIUM SERPL-SCNC: 3.4 MMOL/L (ref 3.5–5)
PROTEIN UA: 30 MG/DL
PROTHROMBIN TIME: 15.3 SEC (ref 12–14.6)
RBC # BLD: 3.83 M/UL (ref 4.2–5.4)
RBC UA: 37 /HPF (ref 0–4)
SODIUM BLD-SCNC: 134 MMOL/L (ref 136–145)
SPECIFIC GRAVITY UA: 1.02
TOTAL PROTEIN: 6.6 G/DL (ref 6.6–8.7)
UROBILINOGEN, URINE: 0.2 E.U./DL
WBC # BLD: 13 K/UL (ref 4.8–10.8)
WBC UA: 31 /HPF (ref 0–5)

## 2018-04-07 PROCEDURE — 6370000000 HC RX 637 (ALT 250 FOR IP): Performed by: FAMILY MEDICINE

## 2018-04-07 PROCEDURE — 85610 PROTHROMBIN TIME: CPT

## 2018-04-07 PROCEDURE — 74150 CT ABDOMEN W/O CONTRAST: CPT

## 2018-04-07 PROCEDURE — 83690 ASSAY OF LIPASE: CPT

## 2018-04-07 PROCEDURE — 96375 TX/PRO/DX INJ NEW DRUG ADDON: CPT

## 2018-04-07 PROCEDURE — 85025 COMPLETE CBC W/AUTO DIFF WBC: CPT

## 2018-04-07 PROCEDURE — 80053 COMPREHEN METABOLIC PANEL: CPT

## 2018-04-07 PROCEDURE — 6360000002 HC RX W HCPCS: Performed by: NURSE PRACTITIONER

## 2018-04-07 PROCEDURE — 96376 TX/PRO/DX INJ SAME DRUG ADON: CPT

## 2018-04-07 PROCEDURE — 6370000000 HC RX 637 (ALT 250 FOR IP): Performed by: NURSE PRACTITIONER

## 2018-04-07 PROCEDURE — G0378 HOSPITAL OBSERVATION PER HR: HCPCS

## 2018-04-07 PROCEDURE — 1210000000 HC MED SURG R&B

## 2018-04-07 PROCEDURE — 87040 BLOOD CULTURE FOR BACTERIA: CPT

## 2018-04-07 PROCEDURE — 2580000003 HC RX 258: Performed by: EMERGENCY MEDICINE

## 2018-04-07 PROCEDURE — 6360000002 HC RX W HCPCS: Performed by: EMERGENCY MEDICINE

## 2018-04-07 PROCEDURE — 99223 1ST HOSP IP/OBS HIGH 75: CPT | Performed by: FAMILY MEDICINE

## 2018-04-07 PROCEDURE — 83605 ASSAY OF LACTIC ACID: CPT

## 2018-04-07 PROCEDURE — 6370000000 HC RX 637 (ALT 250 FOR IP): Performed by: INTERNAL MEDICINE

## 2018-04-07 PROCEDURE — 2580000003 HC RX 258: Performed by: NURSE PRACTITIONER

## 2018-04-07 PROCEDURE — 87086 URINE CULTURE/COLONY COUNT: CPT

## 2018-04-07 PROCEDURE — 96366 THER/PROPH/DIAG IV INF ADDON: CPT

## 2018-04-07 PROCEDURE — 81025 URINE PREGNANCY TEST: CPT

## 2018-04-07 PROCEDURE — 99285 EMERGENCY DEPT VISIT HI MDM: CPT | Performed by: EMERGENCY MEDICINE

## 2018-04-07 PROCEDURE — 36415 COLL VENOUS BLD VENIPUNCTURE: CPT

## 2018-04-07 PROCEDURE — 6360000002 HC RX W HCPCS: Performed by: FAMILY MEDICINE

## 2018-04-07 PROCEDURE — 2580000003 HC RX 258: Performed by: FAMILY MEDICINE

## 2018-04-07 PROCEDURE — 81001 URINALYSIS AUTO W/SCOPE: CPT

## 2018-04-07 PROCEDURE — 96365 THER/PROPH/DIAG IV INF INIT: CPT

## 2018-04-07 PROCEDURE — 99284 EMERGENCY DEPT VISIT MOD MDM: CPT

## 2018-04-07 RX ORDER — TAMSULOSIN HYDROCHLORIDE 0.4 MG/1
0.4 CAPSULE ORAL NIGHTLY
COMMUNITY
End: 2018-04-30

## 2018-04-07 RX ORDER — OXYBUTYNIN CHLORIDE 5 MG/1
5 TABLET ORAL 3 TIMES DAILY PRN
COMMUNITY
End: 2018-04-30

## 2018-04-07 RX ORDER — 0.9 % SODIUM CHLORIDE 0.9 %
1000 INTRAVENOUS SOLUTION INTRAVENOUS ONCE
Status: COMPLETED | OUTPATIENT
Start: 2018-04-07 | End: 2018-04-07

## 2018-04-07 RX ORDER — IBUPROFEN 400 MG/1
400 TABLET ORAL EVERY 6 HOURS PRN
Status: DISCONTINUED | OUTPATIENT
Start: 2018-04-07 | End: 2018-04-09 | Stop reason: HOSPADM

## 2018-04-07 RX ORDER — POTASSIUM CHLORIDE 20 MEQ/1
40 TABLET, EXTENDED RELEASE ORAL PRN
Status: DISCONTINUED | OUTPATIENT
Start: 2018-04-07 | End: 2018-04-09 | Stop reason: HOSPADM

## 2018-04-07 RX ORDER — TIZANIDINE 4 MG/1
4 TABLET ORAL EVERY 6 HOURS PRN
Status: DISCONTINUED | OUTPATIENT
Start: 2018-04-07 | End: 2018-04-09 | Stop reason: HOSPADM

## 2018-04-07 RX ORDER — SODIUM CHLORIDE 9 MG/ML
INJECTION, SOLUTION INTRAVENOUS CONTINUOUS
Status: DISCONTINUED | OUTPATIENT
Start: 2018-04-07 | End: 2018-04-09 | Stop reason: HOSPADM

## 2018-04-07 RX ORDER — POTASSIUM CHLORIDE 7.45 MG/ML
10 INJECTION INTRAVENOUS PRN
Status: DISCONTINUED | OUTPATIENT
Start: 2018-04-07 | End: 2018-04-09 | Stop reason: HOSPADM

## 2018-04-07 RX ORDER — POTASSIUM CHLORIDE 20MEQ/15ML
40 LIQUID (ML) ORAL PRN
Status: DISCONTINUED | OUTPATIENT
Start: 2018-04-07 | End: 2018-04-09 | Stop reason: HOSPADM

## 2018-04-07 RX ORDER — ESCITALOPRAM OXALATE 10 MG/1
10 TABLET ORAL DAILY
COMMUNITY
End: 2018-04-30 | Stop reason: SDUPTHER

## 2018-04-07 RX ORDER — KETOROLAC TROMETHAMINE 30 MG/ML
30 INJECTION, SOLUTION INTRAMUSCULAR; INTRAVENOUS EVERY 8 HOURS
Status: COMPLETED | OUTPATIENT
Start: 2018-04-07 | End: 2018-04-08

## 2018-04-07 RX ORDER — ONDANSETRON 2 MG/ML
4 INJECTION INTRAMUSCULAR; INTRAVENOUS ONCE
Status: COMPLETED | OUTPATIENT
Start: 2018-04-07 | End: 2018-04-07

## 2018-04-07 RX ORDER — ALBUTEROL SULFATE 90 UG/1
2 AEROSOL, METERED RESPIRATORY (INHALATION) ONCE
Status: DISCONTINUED | OUTPATIENT
Start: 2018-04-07 | End: 2018-04-07

## 2018-04-07 RX ORDER — CLONAZEPAM 0.5 MG/1
0.5 TABLET ORAL 2 TIMES DAILY
Status: DISCONTINUED | OUTPATIENT
Start: 2018-04-07 | End: 2018-04-09 | Stop reason: HOSPADM

## 2018-04-07 RX ORDER — ACETAMINOPHEN 325 MG/1
650 TABLET ORAL EVERY 4 HOURS PRN
Status: DISCONTINUED | OUTPATIENT
Start: 2018-04-07 | End: 2018-04-09 | Stop reason: HOSPADM

## 2018-04-07 RX ORDER — HYDROCODONE BITARTRATE AND ACETAMINOPHEN 10; 325 MG/1; MG/1
1 TABLET ORAL EVERY 6 HOURS PRN
Status: DISCONTINUED | OUTPATIENT
Start: 2018-04-07 | End: 2018-04-09 | Stop reason: HOSPADM

## 2018-04-07 RX ORDER — ESCITALOPRAM OXALATE 10 MG/1
10 TABLET ORAL DAILY
Status: DISCONTINUED | OUTPATIENT
Start: 2018-04-07 | End: 2018-04-09 | Stop reason: HOSPADM

## 2018-04-07 RX ORDER — POTASSIUM CHLORIDE 750 MG/1
10 TABLET, EXTENDED RELEASE ORAL ONCE
Status: COMPLETED | OUTPATIENT
Start: 2018-04-07 | End: 2018-04-07

## 2018-04-07 RX ORDER — FENTANYL CITRATE 50 UG/ML
25 INJECTION, SOLUTION INTRAMUSCULAR; INTRAVENOUS
Status: DISCONTINUED | OUTPATIENT
Start: 2018-04-07 | End: 2018-04-09 | Stop reason: HOSPADM

## 2018-04-07 RX ORDER — OXYBUTYNIN CHLORIDE 5 MG/1
5 TABLET ORAL 3 TIMES DAILY PRN
Status: DISCONTINUED | OUTPATIENT
Start: 2018-04-07 | End: 2018-04-09 | Stop reason: HOSPADM

## 2018-04-07 RX ORDER — SODIUM CHLORIDE 0.9 % (FLUSH) 0.9 %
10 SYRINGE (ML) INJECTION EVERY 12 HOURS SCHEDULED
Status: DISCONTINUED | OUTPATIENT
Start: 2018-04-07 | End: 2018-04-09 | Stop reason: HOSPADM

## 2018-04-07 RX ORDER — TAMSULOSIN HYDROCHLORIDE 0.4 MG/1
0.4 CAPSULE ORAL NIGHTLY
Status: DISCONTINUED | OUTPATIENT
Start: 2018-04-07 | End: 2018-04-09 | Stop reason: HOSPADM

## 2018-04-07 RX ORDER — SODIUM CHLORIDE 0.9 % (FLUSH) 0.9 %
10 SYRINGE (ML) INJECTION PRN
Status: DISCONTINUED | OUTPATIENT
Start: 2018-04-07 | End: 2018-04-09 | Stop reason: HOSPADM

## 2018-04-07 RX ORDER — FENTANYL CITRATE 50 UG/ML
100 INJECTION, SOLUTION INTRAMUSCULAR; INTRAVENOUS ONCE
Status: COMPLETED | OUTPATIENT
Start: 2018-04-07 | End: 2018-04-07

## 2018-04-07 RX ORDER — ONDANSETRON 2 MG/ML
4 INJECTION INTRAMUSCULAR; INTRAVENOUS EVERY 6 HOURS PRN
Status: DISCONTINUED | OUTPATIENT
Start: 2018-04-07 | End: 2018-04-09 | Stop reason: HOSPADM

## 2018-04-07 RX ORDER — NORETHINDRONE ACETATE AND ETHINYL ESTRADIOL 1.5-30(21)
1 KIT ORAL DAILY
Status: DISCONTINUED | OUTPATIENT
Start: 2018-04-07 | End: 2018-04-09 | Stop reason: HOSPADM

## 2018-04-07 RX ADMIN — WATER 1 G: 1 INJECTION INTRAMUSCULAR; INTRAVENOUS; SUBCUTANEOUS at 14:21

## 2018-04-07 RX ADMIN — SODIUM CHLORIDE 1000 ML: 9 INJECTION, SOLUTION INTRAVENOUS at 15:43

## 2018-04-07 RX ADMIN — ACETAMINOPHEN 650 MG: 325 TABLET ORAL at 18:37

## 2018-04-07 RX ADMIN — FENTANYL CITRATE 25 MCG: 50 INJECTION, SOLUTION INTRAMUSCULAR; INTRAVENOUS at 23:08

## 2018-04-07 RX ADMIN — FENTANYL CITRATE 100 MCG: 50 INJECTION, SOLUTION INTRAMUSCULAR; INTRAVENOUS at 17:47

## 2018-04-07 RX ADMIN — Medication 10 ML: at 20:45

## 2018-04-07 RX ADMIN — ACETAMINOPHEN 650 MG: 325 TABLET ORAL at 22:37

## 2018-04-07 RX ADMIN — HYDROMORPHONE HYDROCHLORIDE 1 MG: 1 INJECTION, SOLUTION INTRAMUSCULAR; INTRAVENOUS; SUBCUTANEOUS at 16:17

## 2018-04-07 RX ADMIN — SODIUM CHLORIDE 1000 ML: 9 INJECTION, SOLUTION INTRAVENOUS at 14:21

## 2018-04-07 RX ADMIN — ENOXAPARIN SODIUM 40 MG: 100 INJECTION SUBCUTANEOUS at 18:36

## 2018-04-07 RX ADMIN — KETOROLAC TROMETHAMINE 30 MG: 30 INJECTION, SOLUTION INTRAMUSCULAR; INTRAVENOUS at 20:43

## 2018-04-07 RX ADMIN — FENTANYL CITRATE 25 MCG: 50 INJECTION, SOLUTION INTRAMUSCULAR; INTRAVENOUS at 20:44

## 2018-04-07 RX ADMIN — Medication 1 MG: at 14:23

## 2018-04-07 RX ADMIN — TAMSULOSIN HYDROCHLORIDE 0.4 MG: 0.4 CAPSULE ORAL at 20:44

## 2018-04-07 RX ADMIN — SODIUM CHLORIDE: 9 INJECTION, SOLUTION INTRAVENOUS at 18:36

## 2018-04-07 RX ADMIN — ONDANSETRON 4 MG: 2 INJECTION INTRAMUSCULAR; INTRAVENOUS at 14:23

## 2018-04-07 RX ADMIN — POTASSIUM CHLORIDE 10 MEQ: 10 TABLET, EXTENDED RELEASE ORAL at 15:48

## 2018-04-07 RX ADMIN — IBUPROFEN 400 MG: 400 TABLET ORAL at 21:42

## 2018-04-07 ASSESSMENT — PAIN SCALES - GENERAL
PAINLEVEL_OUTOF10: 4
PAINLEVEL_OUTOF10: 9
PAINLEVEL_OUTOF10: 9
PAINLEVEL_OUTOF10: 8
PAINLEVEL_OUTOF10: 8
PAINLEVEL_OUTOF10: 9
PAINLEVEL_OUTOF10: 8
PAINLEVEL_OUTOF10: 8
PAINLEVEL_OUTOF10: 4
PAINLEVEL_OUTOF10: 7

## 2018-04-07 ASSESSMENT — PAIN DESCRIPTION - LOCATION
LOCATION: FLANK
LOCATION: FLANK

## 2018-04-07 ASSESSMENT — PAIN DESCRIPTION - ORIENTATION
ORIENTATION: LEFT;RIGHT
ORIENTATION: LEFT

## 2018-04-07 ASSESSMENT — PAIN DESCRIPTION - PAIN TYPE: TYPE: ACUTE PAIN

## 2018-04-08 LAB
ANION GAP SERPL CALCULATED.3IONS-SCNC: 11 MMOL/L (ref 7–19)
BASOPHILS ABSOLUTE: 0 K/UL (ref 0–0.2)
BASOPHILS RELATIVE PERCENT: 0.2 % (ref 0–1)
BUN BLDV-MCNC: 6 MG/DL (ref 6–20)
CALCIUM SERPL-MCNC: 7.8 MG/DL (ref 8.6–10)
CHLORIDE BLD-SCNC: 106 MMOL/L (ref 98–111)
CO2: 22 MMOL/L (ref 22–29)
CREAT SERPL-MCNC: 0.7 MG/DL (ref 0.5–0.9)
EOSINOPHILS ABSOLUTE: 0.1 K/UL (ref 0–0.6)
EOSINOPHILS RELATIVE PERCENT: 0.5 % (ref 0–5)
GFR NON-AFRICAN AMERICAN: >60
GLUCOSE BLD-MCNC: 128 MG/DL (ref 74–109)
HCT VFR BLD CALC: 32.3 % (ref 37–47)
HEMOGLOBIN: 10.8 G/DL (ref 12–16)
LYMPHOCYTES ABSOLUTE: 1.9 K/UL (ref 1.1–4.5)
LYMPHOCYTES RELATIVE PERCENT: 17.4 % (ref 20–40)
MAGNESIUM: 1.9 MG/DL (ref 1.6–2.6)
MCH RBC QN AUTO: 30.5 PG (ref 27–31)
MCHC RBC AUTO-ENTMCNC: 33.4 G/DL (ref 33–37)
MCV RBC AUTO: 91.2 FL (ref 81–99)
MONOCYTES ABSOLUTE: 0.7 K/UL (ref 0–0.9)
MONOCYTES RELATIVE PERCENT: 6.1 % (ref 0–10)
NEUTROPHILS ABSOLUTE: 8.4 K/UL (ref 1.5–7.5)
NEUTROPHILS RELATIVE PERCENT: 75.4 % (ref 50–65)
PDW BLD-RTO: 11.4 % (ref 11.5–14.5)
PLATELET # BLD: 204 K/UL (ref 130–400)
PMV BLD AUTO: 10.1 FL (ref 9.4–12.3)
POTASSIUM REFLEX MAGNESIUM: 3.4 MMOL/L (ref 3.5–5)
RBC # BLD: 3.54 M/UL (ref 4.2–5.4)
SODIUM BLD-SCNC: 139 MMOL/L (ref 136–145)
WBC # BLD: 11.2 K/UL (ref 4.8–10.8)

## 2018-04-08 PROCEDURE — 6370000000 HC RX 637 (ALT 250 FOR IP): Performed by: INTERNAL MEDICINE

## 2018-04-08 PROCEDURE — 85025 COMPLETE CBC W/AUTO DIFF WBC: CPT

## 2018-04-08 PROCEDURE — 36415 COLL VENOUS BLD VENIPUNCTURE: CPT

## 2018-04-08 PROCEDURE — 83735 ASSAY OF MAGNESIUM: CPT

## 2018-04-08 PROCEDURE — 6360000002 HC RX W HCPCS: Performed by: FAMILY MEDICINE

## 2018-04-08 PROCEDURE — 80048 BASIC METABOLIC PNL TOTAL CA: CPT

## 2018-04-08 PROCEDURE — 6370000000 HC RX 637 (ALT 250 FOR IP): Performed by: FAMILY MEDICINE

## 2018-04-08 PROCEDURE — G0378 HOSPITAL OBSERVATION PER HR: HCPCS

## 2018-04-08 PROCEDURE — 1210000000 HC MED SURG R&B

## 2018-04-08 PROCEDURE — 6360000002 HC RX W HCPCS: Performed by: NURSE PRACTITIONER

## 2018-04-08 PROCEDURE — 99232 SBSQ HOSP IP/OBS MODERATE 35: CPT | Performed by: FAMILY MEDICINE

## 2018-04-08 PROCEDURE — 2580000003 HC RX 258: Performed by: FAMILY MEDICINE

## 2018-04-08 RX ADMIN — Medication 10 ML: at 13:57

## 2018-04-08 RX ADMIN — HYDROCODONE BITARTRATE AND ACETAMINOPHEN 1 TABLET: 10; 325 TABLET ORAL at 23:07

## 2018-04-08 RX ADMIN — CLONAZEPAM 0.5 MG: 0.5 TABLET ORAL at 19:38

## 2018-04-08 RX ADMIN — FENTANYL CITRATE 25 MCG: 50 INJECTION, SOLUTION INTRAMUSCULAR; INTRAVENOUS at 19:37

## 2018-04-08 RX ADMIN — SODIUM CHLORIDE: 9 INJECTION, SOLUTION INTRAVENOUS at 14:39

## 2018-04-08 RX ADMIN — FENTANYL CITRATE 25 MCG: 50 INJECTION, SOLUTION INTRAMUSCULAR; INTRAVENOUS at 02:40

## 2018-04-08 RX ADMIN — CLONAZEPAM 0.5 MG: 0.5 TABLET ORAL at 07:56

## 2018-04-08 RX ADMIN — ACETAMINOPHEN 650 MG: 325 TABLET ORAL at 10:42

## 2018-04-08 RX ADMIN — IBUPROFEN 400 MG: 400 TABLET ORAL at 06:47

## 2018-04-08 RX ADMIN — FENTANYL CITRATE 25 MCG: 50 INJECTION, SOLUTION INTRAMUSCULAR; INTRAVENOUS at 16:30

## 2018-04-08 RX ADMIN — ACETAMINOPHEN 650 MG: 325 TABLET ORAL at 02:43

## 2018-04-08 RX ADMIN — Medication 1 G: at 14:37

## 2018-04-08 RX ADMIN — IBUPROFEN 400 MG: 400 TABLET ORAL at 16:29

## 2018-04-08 RX ADMIN — TAMSULOSIN HYDROCHLORIDE 0.4 MG: 0.4 CAPSULE ORAL at 19:38

## 2018-04-08 RX ADMIN — KETOROLAC TROMETHAMINE 30 MG: 30 INJECTION, SOLUTION INTRAMUSCULAR; INTRAVENOUS at 02:40

## 2018-04-08 RX ADMIN — FENTANYL CITRATE 25 MCG: 50 INJECTION, SOLUTION INTRAMUSCULAR; INTRAVENOUS at 21:59

## 2018-04-08 RX ADMIN — ENOXAPARIN SODIUM 40 MG: 100 INJECTION SUBCUTANEOUS at 17:55

## 2018-04-08 RX ADMIN — FENTANYL CITRATE 25 MCG: 50 INJECTION, SOLUTION INTRAMUSCULAR; INTRAVENOUS at 13:57

## 2018-04-08 RX ADMIN — FENTANYL CITRATE 25 MCG: 50 INJECTION, SOLUTION INTRAMUSCULAR; INTRAVENOUS at 06:46

## 2018-04-08 RX ADMIN — ESCITALOPRAM OXALATE 10 MG: 10 TABLET ORAL at 07:56

## 2018-04-08 RX ADMIN — POTASSIUM CHLORIDE 40 MEQ: 20 TABLET, EXTENDED RELEASE ORAL at 07:56

## 2018-04-08 RX ADMIN — KETOROLAC TROMETHAMINE 30 MG: 30 INJECTION, SOLUTION INTRAMUSCULAR; INTRAVENOUS at 11:12

## 2018-04-08 RX ADMIN — FENTANYL CITRATE 25 MCG: 50 INJECTION, SOLUTION INTRAMUSCULAR; INTRAVENOUS at 10:42

## 2018-04-08 RX ADMIN — LINACLOTIDE 290 MCG: 145 CAPSULE, GELATIN COATED ORAL at 22:00

## 2018-04-08 RX ADMIN — ACETAMINOPHEN 650 MG: 325 TABLET ORAL at 19:38

## 2018-04-08 ASSESSMENT — PAIN SCALES - GENERAL
PAINLEVEL_OUTOF10: 7
PAINLEVEL_OUTOF10: 6
PAINLEVEL_OUTOF10: 6
PAINLEVEL_OUTOF10: 7
PAINLEVEL_OUTOF10: 3
PAINLEVEL_OUTOF10: 3
PAINLEVEL_OUTOF10: 7
PAINLEVEL_OUTOF10: 7
PAINLEVEL_OUTOF10: 3
PAINLEVEL_OUTOF10: 7
PAINLEVEL_OUTOF10: 3
PAINLEVEL_OUTOF10: 6
PAINLEVEL_OUTOF10: 4
PAINLEVEL_OUTOF10: 3
PAINLEVEL_OUTOF10: 6

## 2018-04-08 ASSESSMENT — ENCOUNTER SYMPTOMS
NAUSEA: 1
VOMITING: 0
BACK PAIN: 1

## 2018-04-09 VITALS
DIASTOLIC BLOOD PRESSURE: 68 MMHG | HEART RATE: 120 BPM | WEIGHT: 175 LBS | BODY MASS INDEX: 29.88 KG/M2 | TEMPERATURE: 97.9 F | OXYGEN SATURATION: 100 % | SYSTOLIC BLOOD PRESSURE: 110 MMHG | RESPIRATION RATE: 16 BRPM | HEIGHT: 64 IN

## 2018-04-09 LAB
ANION GAP SERPL CALCULATED.3IONS-SCNC: 10 MMOL/L (ref 7–19)
BASOPHILS ABSOLUTE: 0 K/UL (ref 0–0.2)
BASOPHILS RELATIVE PERCENT: 0.1 % (ref 0–1)
BUN BLDV-MCNC: 7 MG/DL (ref 6–20)
CALCIUM SERPL-MCNC: 8.3 MG/DL (ref 8.6–10)
CHLORIDE BLD-SCNC: 106 MMOL/L (ref 98–111)
CO2: 21 MMOL/L (ref 22–29)
CREAT SERPL-MCNC: 0.5 MG/DL (ref 0.5–0.9)
EOSINOPHILS ABSOLUTE: 0.1 K/UL (ref 0–0.6)
EOSINOPHILS RELATIVE PERCENT: 1.4 % (ref 0–5)
GFR NON-AFRICAN AMERICAN: >60
GLUCOSE BLD-MCNC: 117 MG/DL (ref 74–109)
HCT VFR BLD CALC: 31.1 % (ref 37–47)
HEMOGLOBIN: 10 G/DL (ref 12–16)
LYMPHOCYTES ABSOLUTE: 1.8 K/UL (ref 1.1–4.5)
LYMPHOCYTES RELATIVE PERCENT: 23.1 % (ref 20–40)
MCH RBC QN AUTO: 31 PG (ref 27–31)
MCHC RBC AUTO-ENTMCNC: 32.2 G/DL (ref 33–37)
MCV RBC AUTO: 96.3 FL (ref 81–99)
MONOCYTES ABSOLUTE: 0.7 K/UL (ref 0–0.9)
MONOCYTES RELATIVE PERCENT: 8.5 % (ref 0–10)
NEUTROPHILS ABSOLUTE: 5.2 K/UL (ref 1.5–7.5)
NEUTROPHILS RELATIVE PERCENT: 66.6 % (ref 50–65)
PDW BLD-RTO: 11.9 % (ref 11.5–14.5)
PLATELET # BLD: 184 K/UL (ref 130–400)
PMV BLD AUTO: 10.1 FL (ref 9.4–12.3)
POTASSIUM REFLEX MAGNESIUM: 4.2 MMOL/L (ref 3.5–5)
RBC # BLD: 3.23 M/UL (ref 4.2–5.4)
SODIUM BLD-SCNC: 137 MMOL/L (ref 136–145)
URINE CULTURE, ROUTINE: NORMAL
WBC # BLD: 7.8 K/UL (ref 4.8–10.8)

## 2018-04-09 PROCEDURE — 2580000003 HC RX 258: Performed by: FAMILY MEDICINE

## 2018-04-09 PROCEDURE — 6370000000 HC RX 637 (ALT 250 FOR IP): Performed by: FAMILY MEDICINE

## 2018-04-09 PROCEDURE — 85025 COMPLETE CBC W/AUTO DIFF WBC: CPT

## 2018-04-09 PROCEDURE — 6360000002 HC RX W HCPCS: Performed by: NURSE PRACTITIONER

## 2018-04-09 PROCEDURE — 80048 BASIC METABOLIC PNL TOTAL CA: CPT

## 2018-04-09 PROCEDURE — 6360000002 HC RX W HCPCS: Performed by: FAMILY MEDICINE

## 2018-04-09 PROCEDURE — APPSS45 APP SPLIT SHARED TIME 31-45 MINUTES: Performed by: PHYSICIAN ASSISTANT

## 2018-04-09 PROCEDURE — 99239 HOSP IP/OBS DSCHRG MGMT >30: CPT | Performed by: HOSPITALIST

## 2018-04-09 PROCEDURE — 36415 COLL VENOUS BLD VENIPUNCTURE: CPT

## 2018-04-09 RX ORDER — CEFDINIR 300 MG/1
300 CAPSULE ORAL 2 TIMES DAILY
Qty: 14 CAPSULE | Refills: 0 | Status: SHIPPED | OUTPATIENT
Start: 2018-04-09 | End: 2018-04-16

## 2018-04-09 RX ADMIN — FENTANYL CITRATE 25 MCG: 50 INJECTION, SOLUTION INTRAMUSCULAR; INTRAVENOUS at 00:03

## 2018-04-09 RX ADMIN — HYDROCODONE BITARTRATE AND ACETAMINOPHEN 1 TABLET: 10; 325 TABLET ORAL at 08:10

## 2018-04-09 RX ADMIN — FENTANYL CITRATE 25 MCG: 50 INJECTION, SOLUTION INTRAMUSCULAR; INTRAVENOUS at 02:26

## 2018-04-09 RX ADMIN — ESCITALOPRAM OXALATE 10 MG: 10 TABLET ORAL at 08:03

## 2018-04-09 RX ADMIN — SODIUM CHLORIDE: 9 INJECTION, SOLUTION INTRAVENOUS at 11:14

## 2018-04-09 RX ADMIN — Medication 1 G: at 15:36

## 2018-04-09 RX ADMIN — FENTANYL CITRATE 25 MCG: 50 INJECTION, SOLUTION INTRAMUSCULAR; INTRAVENOUS at 06:48

## 2018-04-09 RX ADMIN — FENTANYL CITRATE 25 MCG: 50 INJECTION, SOLUTION INTRAMUSCULAR; INTRAVENOUS at 11:59

## 2018-04-09 RX ADMIN — Medication 10 ML: at 08:04

## 2018-04-09 RX ADMIN — ACETAMINOPHEN 650 MG: 325 TABLET ORAL at 06:54

## 2018-04-09 RX ADMIN — HYDROCODONE BITARTRATE AND ACETAMINOPHEN 1 TABLET: 10; 325 TABLET ORAL at 15:35

## 2018-04-09 RX ADMIN — FENTANYL CITRATE 25 MCG: 50 INJECTION, SOLUTION INTRAMUSCULAR; INTRAVENOUS at 14:34

## 2018-04-09 RX ADMIN — CLONAZEPAM 0.5 MG: 0.5 TABLET ORAL at 08:03

## 2018-04-09 RX ADMIN — FENTANYL CITRATE 25 MCG: 50 INJECTION, SOLUTION INTRAMUSCULAR; INTRAVENOUS at 09:41

## 2018-04-09 RX ADMIN — FENTANYL CITRATE 25 MCG: 50 INJECTION, SOLUTION INTRAMUSCULAR; INTRAVENOUS at 16:49

## 2018-04-09 ASSESSMENT — PAIN SCALES - GENERAL
PAINLEVEL_OUTOF10: 10
PAINLEVEL_OUTOF10: 7
PAINLEVEL_OUTOF10: 5
PAINLEVEL_OUTOF10: 6
PAINLEVEL_OUTOF10: 6
PAINLEVEL_OUTOF10: 10
PAINLEVEL_OUTOF10: 6
PAINLEVEL_OUTOF10: 9
PAINLEVEL_OUTOF10: 5
PAINLEVEL_OUTOF10: 4
PAINLEVEL_OUTOF10: 6
PAINLEVEL_OUTOF10: 6

## 2018-04-09 ASSESSMENT — PAIN DESCRIPTION - LOCATION: LOCATION: FLANK

## 2018-04-09 ASSESSMENT — PAIN DESCRIPTION - ORIENTATION: ORIENTATION: RIGHT;LEFT

## 2018-04-09 ASSESSMENT — PAIN DESCRIPTION - PAIN TYPE: TYPE: ACUTE PAIN

## 2018-04-10 ENCOUNTER — APPOINTMENT (OUTPATIENT)
Dept: PHYSICAL THERAPY | Facility: HOSPITAL | Age: 28
End: 2018-04-10

## 2018-04-13 LAB
BLOOD CULTURE, ROUTINE: NORMAL
CULTURE, BLOOD 2: NORMAL

## 2018-04-16 ENCOUNTER — OFFICE VISIT (OUTPATIENT)
Dept: SURGERY | Age: 28
End: 2018-04-16
Payer: MEDICAID

## 2018-04-16 ENCOUNTER — APPOINTMENT (OUTPATIENT)
Dept: PHYSICAL THERAPY | Facility: HOSPITAL | Age: 28
End: 2018-04-16

## 2018-04-16 VITALS
DIASTOLIC BLOOD PRESSURE: 70 MMHG | SYSTOLIC BLOOD PRESSURE: 110 MMHG | HEART RATE: 88 BPM | BODY MASS INDEX: 29.88 KG/M2 | WEIGHT: 175 LBS | HEIGHT: 64 IN

## 2018-04-16 DIAGNOSIS — N64.3 GALACTORRHEA: Primary | ICD-10-CM

## 2018-04-16 DIAGNOSIS — Z80.3 FAMILY HX-BREAST MALIGNANCY: ICD-10-CM

## 2018-04-16 PROCEDURE — 4004F PT TOBACCO SCREEN RCVD TLK: CPT | Performed by: SURGERY

## 2018-04-16 PROCEDURE — 99214 OFFICE O/P EST MOD 30 MIN: CPT | Performed by: SURGERY

## 2018-04-16 PROCEDURE — G8427 DOCREV CUR MEDS BY ELIG CLIN: HCPCS | Performed by: SURGERY

## 2018-04-16 PROCEDURE — 1111F DSCHRG MED/CURRENT MED MERGE: CPT | Performed by: SURGERY

## 2018-04-16 PROCEDURE — G8417 CALC BMI ABV UP PARAM F/U: HCPCS | Performed by: SURGERY

## 2018-04-17 DIAGNOSIS — N64.3 GALACTORRHEA: Primary | ICD-10-CM

## 2018-04-18 ENCOUNTER — APPOINTMENT (OUTPATIENT)
Dept: PHYSICAL THERAPY | Facility: HOSPITAL | Age: 28
End: 2018-04-18

## 2018-04-20 ENCOUNTER — TELEPHONE (OUTPATIENT)
Dept: PAIN MANAGEMENT | Age: 28
End: 2018-04-20

## 2018-04-20 ENCOUNTER — HOSPITAL ENCOUNTER (OUTPATIENT)
Dept: PHYSICAL THERAPY | Facility: HOSPITAL | Age: 28
Setting detail: THERAPIES SERIES
Discharge: HOME | End: 2018-04-20

## 2018-04-20 DIAGNOSIS — M54.41 ACUTE BILATERAL LOW BACK PAIN WITH BILATERAL SCIATICA: Primary | ICD-10-CM

## 2018-04-20 DIAGNOSIS — M54.12 CERVICAL RADICULOPATHY: ICD-10-CM

## 2018-04-20 DIAGNOSIS — M54.42 ACUTE BILATERAL LOW BACK PAIN WITH BILATERAL SCIATICA: Primary | ICD-10-CM

## 2018-04-20 DIAGNOSIS — M54.6 MIDLINE THORACIC BACK PAIN, UNSPECIFIED CHRONICITY: ICD-10-CM

## 2018-04-20 PROCEDURE — 97164 PT RE-EVAL EST PLAN CARE: CPT | Performed by: PHYSICAL THERAPIST

## 2018-04-20 PROCEDURE — 97140 MANUAL THERAPY 1/> REGIONS: CPT | Performed by: PHYSICAL THERAPIST

## 2018-04-20 NOTE — THERAPY RE-EVALUATION
Outpatient Physical Therapy Ortho Progress Note  [facility]     Patient Name: Kelsey Mcqueen  : 1990  MRN: [id number]  Today's Date: 2018      Visit Date: 2018    Visit Dx:    ICD-10-CM ICD-9-CM   1. Acute bilateral low back pain with bilateral sciatica M54.42 724.2    M54.41 724.3   2. Cervical radiculopathy M54.12 723.4   3. Midline thoracic back pain, unspecified chronicity M54.6 724.1       Patient Active Problem List   Diagnosis   • Pseudotumor cerebri   • Over weight   • Tobacco abuse   • Chronic nonintractable headache   • Smoker   • BMI 27.0-27.9,adult   • Acute bilateral low back pain with sciatica   • Cervical radiculopathy   • BMI 31.0-31.9,adult   • Neck pain        Past Medical History:   Diagnosis Date   • Intracranial hypertension    • Kidney stones         Past Surgical History:   Procedure Laterality Date   • CYSTOSCOPY ELECTROHYDRAULIC LITHOTRIPSY     • OTHER SURGICAL HISTORY      Intracranial shunt   •  SHUNT INSERTION  2015                             PT Assessment/Plan     Row Name 18 1500          PT Assessment    Functional Limitations Limitation in home management;Limitations in community activities;Limitations in functional capacity and performance;Performance in work activities;Performance in leisure activities  -TB     Impairments Joint mobility;Posture;Pain;Muscle strength;Peripheral nerve integrity;Impaired flexibility  -TB     Assessment Comments THere were no changes in her status regarding thoracic, lumbar and neck. She is still kyphotic through her mid back and stiff and kyphotic in her lower cx with hinging into ext just above this.   -TB     Rehab Potential Good  -TB     Patient/caregiver participated in establishment of treatment plan and goals Yes  -TB     Patient would benefit from skilled therapy intervention Yes  -TB        PT Plan    PT Frequency 2x/week  -TB     Predicted Duration of Therapy Intervention (OT Eval) 2-4 weeks  -TB      Planned CPT's? PT THER PROC EA 15 MIN: 67909;PT MANUAL THERAPY EA 15 MIN: 36240;PT ELECTRICAL STIM UNATTEND: ;PT ELECTRICAL STIM ATTD EA 15 MIN: 15892;PT TRACTION CERVICAL: 62175  -TB     PT Plan Comments Resume thoracic and scapular stabilization exercises as mobilizations as needed for pain relief.   -TB       User Key  (r) = Recorded By, (t) = Taken By, (c) = Cosigned By    Initials Name Provider Type    HARINDER Mendoza, PT Physical Therapist                    Exercises     Row Name 04/20/18 1430             Subjective Comments    Subjective Comments She had kidney stone surgery but had a high fever and had to spend 3 days in the surgery. She is still having headaches pretty often. She is seeing an endocrinologist in [place].   -TB         Subjective Pain    Pre-Treatment Pain Level 7  -TB      Subjective Pain Comment pain in mid throacic  -TB         Exercise 1    Exercise Name 1 re-evaluation after kidney surgery and hospitalization  -TB      Time 1 15  -TB        User Key  (r) = Recorded By, (t) = Taken By, (c) = Cosigned By    Initials Name Provider Type    HARINDER Mendoza, PT Physical Therapist                        Manual Rx (last 36 hours)      Manual Treatments     Row Name 04/20/18 1430             Manual Rx 1    Manual Rx 1 Location prone UT/LS/thoracic paraspinals  -TB      Manual Rx 1 Type STM  -TB      Manual Rx 1 Grade mod  -TB      Manual Rx 1 Duration 5  -TB         Manual Rx 2    Manual Rx 2 Location prone thoracic  -TB      Manual Rx 2 Type extension mobilizations foam roll and manual  -TB      Manual Rx 2 Grade 3; several cavitations  -TB      Manual Rx 2 Duration 10  -TB         Manual Rx 3    Manual Rx 3 Location prone CT junction  -TB      Manual Rx 3 Type ext mob  -TB      Manual Rx 3 Grade 3 sustained  -TB      Manual Rx 3 Duration 10  -TB         Manual Rx 4    Manual Rx 4 Location prone tae C1  -TB      Manual Rx 4 Type PA mob  -TB      Manual Rx 4 Grade 2-3 sustained   -TB      Manual Rx 4 Duration 5  -TB        User Key  (r) = Recorded By, (t) = Taken By, (c) = Cosigned By    Initials Name Provider Type    TB Vahe Mendoza, PT Physical Therapist                PT OP Goals     Row Name 04/20/18 1500 04/20/18 1430       Long Term Goals    LTG Date to Achieve  -- 05/11/18  -TB    LTG 1  -- Improve thoracic mobility to allow for improve posture  -TB    LTG 1 Progress  -- Ongoing  -TB    LTG 1 Progress Comments  -- working on mobilizations of her thoracic spine  -TB    LTG 2  -- Able to hold neutral upright posture consistently  -TB    LTG 2 Progress  -- Ongoing  -TB    LTG 2 Progress Comments  -- needs cuing for upright posture  -TB    LTG 3  -- No back pain except occasional minor twinges no more than 1-2/10 relieved with stretching  -TB    LTG 3 Progress  -- Ongoing  -TB    LTG 3 Progress Comments  -- 6-7/10  -TB    LTG 4  -- No LE radicular symptoms for a week  -TB    LTG 4 Progress  -- Met  -TB    LTG 4 Progress Comments  -- no leg pain for a week  -TB    LTG 5  -- No UE radicular symptoms for a week  -TB    LTG 5 Progress  -- Ongoing  -TB    LTG 5 Progress Comments  -- her arms go numb about every night  -TB    LTG 6  -- Ind with HEP for flexibility and postural stability  -TB    LTG 6 Progress  -- Ongoing  -TB    LTG 6 Progress Comments  -- working on flexibility and postural control  -TB       Time Calculation    PT Goal Re-Cert Due Date 05/20/18  -TB 05/20/18  -TB      User Key  (r) = Recorded By, (t) = Taken By, (c) = Cosigned By    Initials Name Provider Type    HARINDER Mendoza, PT Physical Therapist          Therapy Education  Education Details: I's, T's, Y's, W's, plank off knees, prayer stretch for thor  Given: HEP  Program: Reinforced  How Provided: Verbal  Provided to: Patient  Level of Understanding: Verbalized              Time Calculation:   Start Time: 1430  Stop Time: 1515  Time Calculation (min): 45 min  Total Timed Code Minutes- PT: 30 minute(s)    Therapy  Charges for Today     Code Description Service Date Service Provider Modifiers Qty    23838929770 HC PT MANUAL THERAPY EA 15 MIN 4/20/2018 Vahe Mendoza, PT GP 2    91701488909 HC PT RE-EVAL ESTABLISHED PLAN 2 4/20/2018 Vahe Mendoza, PT GP 1                    Vahe Mendoza, PT  4/20/2018

## 2018-04-25 ENCOUNTER — TELEPHONE (OUTPATIENT)
Dept: PRIMARY CARE CLINIC | Age: 28
End: 2018-04-25

## 2018-04-25 PROBLEM — Z80.3 FAMILY HX-BREAST MALIGNANCY: Status: ACTIVE | Noted: 2018-04-25

## 2018-04-26 ENCOUNTER — HOSPITAL ENCOUNTER (OUTPATIENT)
Dept: PHYSICAL THERAPY | Facility: HOSPITAL | Age: 28
Setting detail: THERAPIES SERIES
Discharge: HOME | End: 2018-04-26

## 2018-04-26 DIAGNOSIS — M54.6 MIDLINE THORACIC BACK PAIN, UNSPECIFIED CHRONICITY: ICD-10-CM

## 2018-04-26 DIAGNOSIS — M54.42 ACUTE BILATERAL LOW BACK PAIN WITH BILATERAL SCIATICA: Primary | ICD-10-CM

## 2018-04-26 DIAGNOSIS — M54.12 CERVICAL RADICULOPATHY: ICD-10-CM

## 2018-04-26 DIAGNOSIS — M54.41 ACUTE BILATERAL LOW BACK PAIN WITH BILATERAL SCIATICA: Primary | ICD-10-CM

## 2018-04-26 PROCEDURE — 97110 THERAPEUTIC EXERCISES: CPT

## 2018-04-26 PROCEDURE — 97140 MANUAL THERAPY 1/> REGIONS: CPT

## 2018-04-26 NOTE — THERAPY TREATMENT NOTE
Outpatient Physical Therapy Ortho Treatment Note  [facility]     Patient Name: Kelsey Mcqueen  : 1990  MRN: [id number]  Today's Date: 2018      Visit Date: 2018    Visit Dx:    ICD-10-CM ICD-9-CM   1. Acute bilateral low back pain with bilateral sciatica M54.42 724.2    M54.41 724.3   2. Cervical radiculopathy M54.12 723.4   3. Midline thoracic back pain, unspecified chronicity M54.6 724.1       Patient Active Problem List   Diagnosis   • Pseudotumor cerebri   • Over weight   • Tobacco abuse   • Chronic nonintractable headache   • Smoker   • BMI 27.0-27.9,adult   • Acute bilateral low back pain with sciatica   • Cervical radiculopathy   • BMI 31.0-31.9,adult   • Neck pain        Past Medical History:   Diagnosis Date   • Intracranial hypertension    • Kidney stones         Past Surgical History:   Procedure Laterality Date   • CYSTOSCOPY ELECTROHYDRAULIC LITHOTRIPSY     • OTHER SURGICAL HISTORY      Intracranial shunt   •  SHUNT INSERTION  2015                             PT Assessment/Plan     Row Name 18 1546          PT Assessment    Assessment Comments Patient does continue to be having the same symptoms.  She does continue to have stiffness in her thoracic spine.  She also continues to demonstrate weakness in bilateral scapulas.  She was able to hold neutral spine posturing today against the wall, but she tends to want to compensate with thoracic kyphosis during functional activities.  -FAITH        PT Plan    PT Plan Comments Requesting for more authorizations to continue to work on scapular strengthening and postural education.  -FAITH       User Key  (r) = Recorded By, (t) = Taken By, (c) = Cosigned By    Initials Name Provider Type    FAITH Robles PTA Physical Therapy Assistant                    Exercises     Row Name 18 1546             Subjective Comments    Subjective Comments She reports she continues to have migraines, currently she has bad migraine.  She  reports she continues to have the burning in the right shoulder and neck with burning.  Her low back is not as painful.   She has been back to work for two days now.  -FAITH         Subjective Pain    Able to rate subjective pain? yes  -FAITH      Pre-Treatment Pain Level 7   HA, neck,  middle back  -FAITH      Post-Treatment Pain Level 4  -FAITH         Exercise 1    Exercise Name 1 thoracic stretch on 1/2 foam  -FAITH      Cueing 1 Verbal;Tactile  -FAITH      Time 1 3 minutes  -FAITH         Exercise 2    Exercise Name 2 neutral spine at wall  -FAITH      Cueing 2 Verbal;Tactile  -FAITH      Sets 2 1  -FAITH      Time 2 1 minute  -FAITH         Exercise 3    Exercise Name 3 neutral spine at wall with Dl UE flexion with 1# wand  -FAITH      Cueing 3 Verbal;Tactile  -FAITH      Sets 3 3  -FAITH      Reps 3 10  -FAITH         Exercise 4    Exercise Name 4 tall kneeling bilateral I's  -FAITH      Cueing 4 Verbal;Tactile  -FAITH      Sets 4 2  -FAITH      Reps 4 10  -FAITH      Additional Comments yellow band and regresed to peach band due to pain  -FAITH         Exercise 5    Exercise Name 5 assessed middle trap and lower trap MMT:  (L) MT 4/5, (L) Lt 3-/5.  (R) MT 4/5 (R) LT 3+/5  -FAITH         Exercise 6    Exercise Name 6 Cervical ROM:  flexion 34 deg, extension 37 deg, (R) rotation 50 degrees, (L) rotation 57 degrees  -FAITH        User Key  (r) = Recorded By, (t) = Taken By, (c) = Cosigned By    Initials Name Provider Type    FAITH Robles, PTA Physical Therapy Assistant                        Manual Rx (last 36 hours)      Manual Treatments     Row Name 04/26/18 1554             Manual Rx 1    Manual Rx 1 Location prone UT/LS/thoracic paraspinals  -FAITH      Manual Rx 1 Type STM  -FAITH      Manual Rx 1 Grade mod  -FAITH      Manual Rx 1 Duration 5  -FAITH         Manual Rx 2    Manual Rx 2 Location prone thoracic  -FAITH      Manual Rx 2 Type extension mobilizations foam roll and manual  -FAITH      Manual Rx 2 Grade 2/3: no cavitations  -FAITH      Manual Rx 2 Duration 8  -FAITH          Manual Rx 3    Manual Rx 3 Location prone CT junction  -FAITH      Manual Rx 3 Type ext mob  -FAITH      Manual Rx 3 Grade 3 sustained  -FAITH      Manual Rx 3 Duration 5  -FAITH        User Key  (r) = Recorded By, (t) = Taken By, (c) = Cosigned By    Initials Name Provider Type    FAITH Robles PTA Physical Therapy Assistant                PT OP Goals     Row Name 04/26/18 1546          Long Term Goals    LTG Date to Achieve 05/11/18  -FAITH     LTG 1 Improve thoracic mobility to allow for improve posture  -FAITH     LTG 1 Progress Ongoing  -FAITH     LTG 2 Able to hold neutral upright posture consistently  -FAITH     LTG 2 Progress Ongoing  -FAITH     LTG 2 Progress Comments Worked on neutral spine posture today  -FAITH     LTG 3 No back pain except occasional minor twinges no more than 1-2/10 relieved with stretching  -FAITH     LTG 3 Progress Ongoing  -FAITH     LTG 4 No LE radicular symptoms for a week  -FAITH     LTG 4 Progress Met  -FAITH     LTG 5 No UE radicular symptoms for a week  -FAITH     LTG 5 Progress Ongoing  -FAITH     LTG 6 Ind with HEP for flexibility and postural stability  -FAITH     LTG 6 Progress Ongoing  -FAITH        Time Calculation    PT Goal Re-Cert Due Date 05/20/18  -FAITH       User Key  (r) = Recorded By, (t) = Taken By, (c) = Cosigned By    Initials Name Provider Type    FAITH Robles PTA Physical Therapy Assistant          Therapy Education  Given: HEP  Program: Reinforced  How Provided: Verbal  Provided to: Patient  Level of Understanding: Verbalized              Time Calculation:   Start Time: 1546  Stop Time: 1634  Time Calculation (min): 48 min  Total Timed Code Minutes- PT: 48 minute(s)    Therapy Charges for Today     Code Description Service Date Service Provider Modifiers Qty    96359568915 HC PT MANUAL THERAPY EA 15 MIN 4/26/2018 Jackson Robles PTA GP 1    63956262924 HC PT THER PROC EA 15 MIN 4/26/2018 Jackson Robles PTA GP 2                    Jackson Robles PTA  4/26/2018

## 2018-04-30 ENCOUNTER — OFFICE VISIT (OUTPATIENT)
Dept: PRIMARY CARE CLINIC | Age: 28
End: 2018-04-30
Payer: MEDICAID

## 2018-04-30 VITALS
HEART RATE: 90 BPM | DIASTOLIC BLOOD PRESSURE: 70 MMHG | SYSTOLIC BLOOD PRESSURE: 112 MMHG | WEIGHT: 174 LBS | TEMPERATURE: 98.4 F | HEIGHT: 64 IN | BODY MASS INDEX: 29.71 KG/M2 | OXYGEN SATURATION: 99 %

## 2018-04-30 DIAGNOSIS — F32.A ANXIETY AND DEPRESSION: ICD-10-CM

## 2018-04-30 DIAGNOSIS — Z09 HOSPITAL DISCHARGE FOLLOW-UP: Primary | ICD-10-CM

## 2018-04-30 DIAGNOSIS — R10.2 PELVIC PAIN IN FEMALE: ICD-10-CM

## 2018-04-30 DIAGNOSIS — M41.34 THORACOGENIC SCOLIOSIS OF THORACIC REGION: ICD-10-CM

## 2018-04-30 DIAGNOSIS — G93.2 PSEUDOTUMOR CEREBRI: ICD-10-CM

## 2018-04-30 DIAGNOSIS — R51.9 CHRONIC NONINTRACTABLE HEADACHE, UNSPECIFIED HEADACHE TYPE: ICD-10-CM

## 2018-04-30 DIAGNOSIS — M54.50 CHRONIC LEFT-SIDED LOW BACK PAIN WITHOUT SCIATICA: ICD-10-CM

## 2018-04-30 DIAGNOSIS — G89.29 CHRONIC NONINTRACTABLE HEADACHE, UNSPECIFIED HEADACHE TYPE: ICD-10-CM

## 2018-04-30 DIAGNOSIS — K52.9 CHRONIC DIARRHEA: ICD-10-CM

## 2018-04-30 DIAGNOSIS — N12 PYELONEPHRITIS: ICD-10-CM

## 2018-04-30 DIAGNOSIS — G89.29 CHRONIC LEFT-SIDED LOW BACK PAIN WITHOUT SCIATICA: ICD-10-CM

## 2018-04-30 DIAGNOSIS — F41.9 ANXIETY AND DEPRESSION: ICD-10-CM

## 2018-04-30 PROCEDURE — 99214 OFFICE O/P EST MOD 30 MIN: CPT | Performed by: PEDIATRICS

## 2018-04-30 PROCEDURE — 4004F PT TOBACCO SCREEN RCVD TLK: CPT | Performed by: PEDIATRICS

## 2018-04-30 PROCEDURE — 1111F DSCHRG MED/CURRENT MED MERGE: CPT | Performed by: PEDIATRICS

## 2018-04-30 PROCEDURE — G8417 CALC BMI ABV UP PARAM F/U: HCPCS | Performed by: PEDIATRICS

## 2018-04-30 PROCEDURE — G8427 DOCREV CUR MEDS BY ELIG CLIN: HCPCS | Performed by: PEDIATRICS

## 2018-04-30 RX ORDER — HYDROCODONE BITARTRATE AND ACETAMINOPHEN 10; 325 MG/1; MG/1
1 TABLET ORAL EVERY 8 HOURS PRN
Qty: 45 TABLET | Refills: 0 | Status: SHIPPED | OUTPATIENT
Start: 2018-04-30 | End: 2018-05-30

## 2018-04-30 RX ORDER — ESCITALOPRAM OXALATE 20 MG/1
20 TABLET ORAL DAILY
Qty: 30 TABLET | Refills: 11 | Status: SHIPPED | OUTPATIENT
Start: 2018-04-30 | End: 2019-10-14

## 2018-04-30 RX ORDER — DIAZEPAM 5 MG/1
5 TABLET ORAL EVERY 8 HOURS PRN
Qty: 60 TABLET | Refills: 0 | Status: SHIPPED | OUTPATIENT
Start: 2018-04-30 | End: 2018-05-10

## 2018-04-30 ASSESSMENT — ENCOUNTER SYMPTOMS
SORE THROAT: 0
DIARRHEA: 1
WHEEZING: 0
COUGH: 0
SHORTNESS OF BREATH: 0
SINUS PRESSURE: 0
NAUSEA: 0
BACK PAIN: 1
ABDOMINAL PAIN: 0
VOMITING: 0
EYE PAIN: 0

## 2018-04-30 ASSESSMENT — PATIENT HEALTH QUESTIONNAIRE - PHQ9
2. FEELING DOWN, DEPRESSED OR HOPELESS: 1
SUM OF ALL RESPONSES TO PHQ QUESTIONS 1-9: 1
1. LITTLE INTEREST OR PLEASURE IN DOING THINGS: 0
SUM OF ALL RESPONSES TO PHQ9 QUESTIONS 1 & 2: 1

## 2018-05-01 ENCOUNTER — TRANSCRIBE ORDERS (OUTPATIENT)
Dept: ADMINISTRATIVE | Facility: HOSPITAL | Age: 28
End: 2018-05-01

## 2018-05-01 ENCOUNTER — OFFICE VISIT (OUTPATIENT)
Dept: NEUROSURGERY | Facility: CLINIC | Age: 28
End: 2018-05-01

## 2018-05-01 ENCOUNTER — TELEPHONE (OUTPATIENT)
Dept: PRIMARY CARE CLINIC | Age: 28
End: 2018-05-01

## 2018-05-01 ENCOUNTER — HOSPITAL ENCOUNTER (OUTPATIENT)
Dept: GENERAL RADIOLOGY | Facility: HOSPITAL | Age: 28
Discharge: HOME | End: 2018-05-01
Attending: NEUROLOGICAL SURGERY | Admitting: NEUROLOGICAL SURGERY

## 2018-05-01 VITALS
WEIGHT: 177.4 LBS | DIASTOLIC BLOOD PRESSURE: 76 MMHG | SYSTOLIC BLOOD PRESSURE: 122 MMHG | HEIGHT: 64 IN | BODY MASS INDEX: 30.29 KG/M2

## 2018-05-01 DIAGNOSIS — M41.115 JUVENILE IDIOPATHIC SCOLIOSIS OF THORACOLUMBAR REGION: Primary | ICD-10-CM

## 2018-05-01 DIAGNOSIS — F17.200 SMOKER: ICD-10-CM

## 2018-05-01 DIAGNOSIS — M54.12 CERVICAL RADICULOPATHY: ICD-10-CM

## 2018-05-01 DIAGNOSIS — G89.29 CHRONIC MIDLINE LOW BACK PAIN WITHOUT SCIATICA: ICD-10-CM

## 2018-05-01 DIAGNOSIS — M54.2 NECK PAIN: ICD-10-CM

## 2018-05-01 DIAGNOSIS — M54.5 ACUTE BILATERAL LOW BACK PAIN, WITH SCIATICA PRESENCE UNSPECIFIED: ICD-10-CM

## 2018-05-01 DIAGNOSIS — R51.9 CHRONIC NONINTRACTABLE HEADACHE, UNSPECIFIED HEADACHE TYPE: ICD-10-CM

## 2018-05-01 DIAGNOSIS — G89.29 CHRONIC NONINTRACTABLE HEADACHE, UNSPECIFIED HEADACHE TYPE: ICD-10-CM

## 2018-05-01 DIAGNOSIS — M54.2 NECK PAIN: Primary | ICD-10-CM

## 2018-05-01 DIAGNOSIS — M54.50 CHRONIC MIDLINE LOW BACK PAIN WITHOUT SCIATICA: ICD-10-CM

## 2018-05-01 DIAGNOSIS — R20.2 NUMBNESS AND TINGLING IN BOTH HANDS: ICD-10-CM

## 2018-05-01 DIAGNOSIS — R20.0 NUMBNESS AND TINGLING IN BOTH HANDS: ICD-10-CM

## 2018-05-01 PROCEDURE — 72082 X-RAY EXAM ENTIRE SPI 2/3 VW: CPT

## 2018-05-01 PROCEDURE — 99214 OFFICE O/P EST MOD 30 MIN: CPT | Performed by: NEUROLOGICAL SURGERY

## 2018-05-01 RX ORDER — NORETHINDRONE ACETATE AND ETHINYL ESTRADIOL 1.5-30(21)
KIT ORAL
COMMUNITY
Start: 2018-03-23 | End: 2018-05-01

## 2018-05-01 RX ORDER — IBUPROFEN 800 MG/1
800 TABLET ORAL
COMMUNITY
Start: 2017-12-15 | End: 2018-05-01

## 2018-05-01 RX ORDER — DIAZEPAM 5 MG/1
TABLET ORAL
COMMUNITY
Start: 2018-04-30 | End: 2018-11-29

## 2018-05-01 RX ORDER — ESCITALOPRAM OXALATE 20 MG/1
TABLET ORAL
COMMUNITY
Start: 2018-04-30 | End: 2019-05-30

## 2018-05-01 NOTE — PROGRESS NOTES
SUBJECTIVE:  Patient ID: Kelsey Mcqueen is a 27 y.o. female is here today for follow-up.    Chief Complaint: Back pain and neck pain  Chief Complaint   Patient presents with   • neck & back pain     patient had scoliosis xrays today @ [facility] and is here to discuss results   • BUE numbness/tingling     patient continues to have N/T; she has not had an EMG/NCV       HPI  27-year-old female with a  shunt for benign intracranial hypertension.  She has a variety of complaints including headache neck pain thoracic spine pain lumbar spine pain.  She describes numbness and tingling in her arms that occurs at night.  She describes episode of numbness in her legs were she says she was in a car but could not feel her legs and could not move him.  She has had an extensive workup for all these complaints.  Most recently we sent her for series of scoliosis films as of her back and neck pain complaints.  We recently did a  shunt tap and investigated her shunt and found to be functioning.  She has recently seen her ophthalmologist which demonstrated no papilledema and no vision changes.  She has had an MRI of her brain which did not suggest a pituitary abnormality or a demyelinating condition.    The following portions of the patient's history were reviewed and updated as appropriate: allergies, current medications, past family history, past medical history, past social history, past surgical history and problem list.    OBJECTIVE:    Review of Systems   Musculoskeletal: Positive for back pain, neck pain and neck stiffness.   Neurological: Positive for numbness and headaches.   All other systems reviewed and are negative.         Physical Exam   Constitutional: She is oriented to person, place, and time. She appears well-developed and well-nourished.   HENT:   Head: Normocephalic and atraumatic.   Right Ear: Hearing normal.   Left Ear: Hearing normal.   Eyes: EOM are normal. Pupils are equal, round, and reactive to light.    Neck: Normal range of motion.   Neurological: She is alert and oriented to person, place, and time. She has normal strength and normal reflexes. No cranial nerve deficit or sensory deficit. She displays a negative Romberg sign. GCS eye subscore is 4. GCS verbal subscore is 5. GCS motor subscore is 6. She displays no Babinski's sign on the right side. She displays no Babinski's sign on the left side.   Psychiatric: Her speech is normal. Judgment normal. Cognition and memory are normal.       Neurologic Exam     Mental Status   Oriented to person, place, and time.   Speech: speech is normal     Cranial Nerves     CN III, IV, VI   Pupils are equal, round, and reactive to light.  Extraocular motions are normal.     Motor Exam     Strength   Strength 5/5 throughout.       Independent Review of Radiographic Studies:   Scoliosis plain x-rays do demonstrate a very mild lumbar scoliotic curve.  No sagittal imbalance.    ASSESSMENT/PLAN:  T she has back pain improved when she had a kidney stone surgery at [place] recently.  Her scoliosis while present is certainly not severe enough to require treatment nor is it severe enough to be causing any of her issues.  She is going to see a endocrinologist in  [place] for a second opinion regarding her galactorrhea and menstrual abnormalities.  We are and refer her to physiatry for trigger point injections and management of what is likely a myofascial pain syndrome regarding her thoracic spine and neck pain.  We will see her in 1 year for her annual shunt check up.      1. Juvenile idiopathic scoliosis of thoracolumbar region    2. Neck pain    3. Cervical radiculopathy    4. Numbness and tingling in both hands    5. Chronic midline low back pain without sciatica    6. Chronic nonintractable headache, unspecified headache type    7. Smoker    8. BMI 30.0-30.9,adult            Return in about 9 months (around 2/1/2019) for follow up w/DR HOLCOMB after pain mgmt.      Joe PEREZ  MD Jannet

## 2018-05-01 NOTE — PATIENT INSTRUCTIONS
BMI for Adults  Body mass index (BMI) is a number that is calculated from a person's weight and height. In most adults, the number is used to find how much of an adult's weight is made up of fat. BMI is not as accurate as a direct measure of body fat.  How is BMI calculated?  BMI is calculated by dividing weight in kilograms by height in meters squared. It can also be calculated by dividing weight in pounds by height in inches squared, then multiplying the resulting number by 703. Charts are available to help you find your BMI quickly and easily without doing this calculation.  How is BMI interpreted?  Health care professionals use BMI charts to identify whether an adult is underweight, at a normal weight, or overweight based on the following guidelines:  · Underweight: BMI less than 18.5.  · Normal weight: BMI between 18.5 and 24.9.  · Overweight: BMI between 25 and 29.9.  · Obese: BMI of 30 and above.  BMI is usually interpreted the same for males and females.  Weight includes both fat and muscle, so someone with a muscular build, such as an athlete, may have a BMI that is higher than 24.9. In cases like these, BMI may not accurately depict body fat. To determine if excess body fat is the cause of a BMI of 25 or higher, further assessments may need to be done by a health care provider.  Why is BMI a useful tool?  BMI is used to identify a possible weight problem that may be related to a medical problem or may increase the risk for medical problems. BMI can also be used to promote changes to reach a healthy weight.  This information is not intended to replace advice given to you by your health care provider. Make sure you discuss any questions you have with your health care provider.  Document Released: 08/29/2005 Document Revised: 04/27/2017 Document Reviewed: 05/15/2015  [employer] Interactive Patient Education © 2017 [employer]  Steps to Quit Smoking  Smoking tobacco can be harmful to your health and can affect  almost every organ in your body. Smoking puts you, and those around you, at risk for developing many serious chronic diseases. Quitting smoking is difficult, but it is one of the best things that you can do for your health. It is never too late to quit.  What are the benefits of quitting smoking?  When you quit smoking, you lower your risk of developing serious diseases and conditions, such as:  · Lung cancer or lung disease, such as COPD.  · Heart disease.  · Stroke.  · Heart attack.  · Infertility.  · Osteoporosis and bone fractures.  Additionally, symptoms such as coughing, wheezing, and shortness of breath may get better when you quit. You may also find that you get sick less often because your body is stronger at fighting off colds and infections. If you are pregnant, quitting smoking can help to reduce your chances of having a baby of low birth weight.  How do I get ready to quit?  When you decide to quit smoking, create a plan to make sure that you are successful. Before you quit:  · Pick a date to quit. Set a date within the next two weeks to give you time to prepare.  · Write down the reasons why you are quitting. Keep this list in places where you will see it often, such as on your bathroom mirror or in your car or wallet.  · Identify the people, places, things, and activities that make you want to smoke (triggers) and avoid them. Make sure to take these actions:  ¨ Throw away all cigarettes at home, at work, and in your car.  ¨ Throw away smoking accessories, such as ashtrays and lighters.  ¨ Clean your car and make sure to empty the ashtray.  ¨ Clean your home, including curtains and carpets.  · Tell your family, friends, and coworkers that you are quitting. Support from your loved ones can make quitting easier.  · Talk with your health care provider about your options for quitting smoking.  · Find out what treatment options are covered by your health insurance.  What strategies can I use to quit  smoking?  Talk with your healthcare provider about different strategies to quit smoking. Some strategies include:  · Quitting smoking altogether instead of gradually lessening how much you smoke over a period of time. Research shows that quitting “cold turkey” is more successful than gradually quitting.  · Attending in-person counseling to help you build problem-solving skills. You are more likely to have success in quitting if you attend several counseling sessions. Even short sessions of 10 minutes can be effective.  · Finding resources and support systems that can help you to quit smoking and remain smoke-free after you quit. These resources are most helpful when you use them often. They can include:  ¨ Online chats with a counselor.  ¨ Telephone quitlines.  ¨ Printed self-help materials.  ¨ Support groups or group counseling.  ¨ Text messaging programs.  ¨ Mobile phone applications.  · Taking medicines to help you quit smoking. (If you are pregnant or breastfeeding, talk with your health care provider first.) Some medicines contain nicotine and some do not. Both types of medicines help with cravings, but the medicines that include nicotine help to relieve withdrawal symptoms. Your health care provider may recommend:  ¨ Nicotine patches, gum, or lozenges.  ¨ Nicotine inhalers or sprays.  ¨ Non-nicotine medicine that is taken by mouth.  Talk with your health care provider about combining strategies, such as taking medicines while you are also receiving in-person counseling. Using these two strategies together makes you more likely to succeed in quitting than if you used either strategy on its own.  If you are pregnant or breastfeeding, talk with your health care provider about finding counseling or other support strategies to quit smoking. Do not take medicine to help you quit smoking unless told to do so by your health care provider.  What things can I do to make it easier to quit?  Quitting smoking might feel  overwhelming at first, but there is a lot that you can do to make it easier. Take these important actions:  · Reach out to your family and friends and ask that they support and encourage you during this time. Call telephone quitlines, reach out to support groups, or work with a counselor for support.  · Ask people who smoke to avoid smoking around you.  · Avoid places that trigger you to smoke, such as bars, parties, or smoke-break areas at work.  · Spend time around people who do not smoke.  · Lessen stress in your life, because stress can be a smoking trigger for some people. To lessen stress, try:  ¨ Exercising regularly.  ¨ Deep-breathing exercises.  ¨ Yoga.  ¨ Meditating.  ¨ Performing a body scan. This involves closing your eyes, scanning your body from head to toe, and noticing which parts of your body are particularly tense. Purposefully relax the muscles in those areas.  · Download or purchase mobile phone or tablet apps (applications) that can help you stick to your quit plan by providing reminders, tips, and encouragement. There are many free apps, such as QuitGuide from the CDC (Centers for Disease Control and Prevention). You can find other support for quitting smoking (smoking cessation) through smokefree.gov and other websites.  How will I feel when I quit smoking?  Within the first 24 hours of quitting smoking, you may start to feel some withdrawal symptoms. These symptoms are usually most noticeable 2-3 days after quitting, but they usually do not last beyond 2-3 weeks. Changes or symptoms that you might experience include:  · Mood swings.  · Restlessness, anxiety, or irritation.  · Difficulty concentrating.  · Dizziness.  · Strong cravings for sugary foods in addition to nicotine.  · Mild weight gain.  · Constipation.  · Nausea.  · Coughing or a sore throat.  · Changes in how your medicines work in your body.  · A depressed mood.  · Difficulty sleeping (insomnia).  After the first 2-3 weeks of  quitting, you may start to notice more positive results, such as:  · Improved sense of smell and taste.  · Decreased coughing and sore throat.  · Slower heart rate.  · Lower blood pressure.  · Clearer skin.  · The ability to breathe more easily.  · Fewer sick days.  Quitting smoking is very challenging for most people. Do not get discouraged if you are not successful the first time. Some people need to make many attempts to quit before they achieve long-term success. Do your best to stick to your quit plan, and talk with your health care provider if you have any questions or concerns.  This information is not intended to replace advice given to you by your health care provider. Make sure you discuss any questions you have with your health care provider.  Document Released: 12/12/2002 Document Revised: 08/15/2017 Document Reviewed: 05/03/2016  [employer] Interactive Patient Education © 2017 [employer]      PATIENT NEEDS TO FOLLOW UP WITH HER PRIMARY CARE PROVIDER FOR CONTINUED YEARLY EXAMS/LABS/HEALTH MAINTENANCE.

## 2018-05-02 ENCOUNTER — TELEPHONE (OUTPATIENT)
Dept: NEUROSURGERY | Facility: CLINIC | Age: 28
End: 2018-05-02

## 2018-05-02 ENCOUNTER — TELEPHONE (OUTPATIENT)
Dept: SURGERY | Age: 28
End: 2018-05-02

## 2018-05-02 DIAGNOSIS — R51.9 CHRONIC NONINTRACTABLE HEADACHE, UNSPECIFIED HEADACHE TYPE: Primary | ICD-10-CM

## 2018-05-02 DIAGNOSIS — G89.29 CHRONIC NONINTRACTABLE HEADACHE, UNSPECIFIED HEADACHE TYPE: Primary | ICD-10-CM

## 2018-05-02 NOTE — TELEPHONE ENCOUNTER
"Patient calls the office asking if Dr. Power will take over management of her pain medications. I have spoke with Odilon regarding this and she advises me \"no\". I have explained to the patient Dr. Power does not manage chronic narcotics, however, her PCP is no longer wanting to fill these according to the patient. She questions, \"what do I do?\", I explain that she should continue getting from PCP and discuss this with Dr. Tinoco at her pain management appointment. She is upset because this appointment has not been made yet. Patient was just evaluated in our office yesterday, and we have not had time to make this referral yet. Odilon is aware of this information.   "

## 2018-05-02 NOTE — TELEPHONE ENCOUNTER
"Patient called back & left me a message asking me to call her back.  I called her back & spoke w/her.  She has several questions:  1. Who is referring her to pain mgmt (Dr Izaguirre) - I told her we discussed this w/her yesterday while she was here in the office and explained to her that we were making the referral to Dr Izaguirre.  She wanted to know if he would give her oral meds or if he would just do injections.  The reason she is asking this is b/c her primary care, Dr Ty, isn't giving her oral pain meds any more.  She stated she has been getting #90 Norco 10 every month even though she stated \"I don't need 90 of them b/c I don't take them all the time\" - yet her Yusuf shows she gets them monthly.  She then said Dr Ty had told her she would have to get them from our office and she would need it this week b/c it is due.  But I explained to her that we do not give pain medication to non-surgical patients and we do not do long-term pain mgmt.  I did tell her that we could see where she called yesterday and requested her pain medication be authorized to be refilled early (she is not due until 5/8/18 but wanted early and Dr Ty refused to let her have it early).  I told her that I had called Dr Ty's office as well.  She then stated \"well I have a script already for #45 that I can get filled on Saturday 5/5/18\".  I told her she would have to call Dr Izaguirre's office and talk to them about oral meds vs injections.  She said she would.    2. She would like Dr Power to make a referral to a neurologist - she requested to go back to Dr Patton since she had seen him in the past.  She said she quit going to him and had requested to see Dr Pizarro but now wants to go back to Dr Patton but she doesn't think he is doing anything for her and now wants to go back to Dr Patton.  I explained to her that since they are now in the same office they would have to get approval from both doctors to " switch her but we could put the referral in and see what they say.  She agreed.    romero byrd [facility]    Will route this documentation to Dr Power.....

## 2018-05-10 ENCOUNTER — TELEPHONE (OUTPATIENT)
Dept: PRIMARY CARE CLINIC | Age: 28
End: 2018-05-10

## 2018-05-14 ENCOUNTER — APPOINTMENT (OUTPATIENT)
Dept: PHYSICAL THERAPY | Facility: HOSPITAL | Age: 28
End: 2018-05-14

## 2018-05-14 ENCOUNTER — OFFICE VISIT (OUTPATIENT)
Dept: PRIMARY CARE CLINIC | Age: 28
End: 2018-05-14
Payer: MEDICAID

## 2018-05-14 VITALS
BODY MASS INDEX: 29.53 KG/M2 | WEIGHT: 173 LBS | TEMPERATURE: 98 F | DIASTOLIC BLOOD PRESSURE: 70 MMHG | HEIGHT: 64 IN | OXYGEN SATURATION: 98 % | HEART RATE: 88 BPM | SYSTOLIC BLOOD PRESSURE: 114 MMHG

## 2018-05-14 DIAGNOSIS — F41.9 ANXIETY: ICD-10-CM

## 2018-05-14 DIAGNOSIS — Z79.899 MEDICATION MANAGEMENT: Primary | ICD-10-CM

## 2018-05-14 DIAGNOSIS — F41.9 ANXIETY AND DEPRESSION: ICD-10-CM

## 2018-05-14 DIAGNOSIS — F32.A ANXIETY AND DEPRESSION: ICD-10-CM

## 2018-05-14 DIAGNOSIS — G89.4 CHRONIC PAIN SYNDROME: ICD-10-CM

## 2018-05-14 PROCEDURE — 4004F PT TOBACCO SCREEN RCVD TLK: CPT | Performed by: PEDIATRICS

## 2018-05-14 PROCEDURE — G8427 DOCREV CUR MEDS BY ELIG CLIN: HCPCS | Performed by: PEDIATRICS

## 2018-05-14 PROCEDURE — 99214 OFFICE O/P EST MOD 30 MIN: CPT | Performed by: PEDIATRICS

## 2018-05-14 PROCEDURE — G8417 CALC BMI ABV UP PARAM F/U: HCPCS | Performed by: PEDIATRICS

## 2018-05-14 RX ORDER — DIAZEPAM 5 MG/1
5 TABLET ORAL EVERY 8 HOURS PRN
Qty: 60 TABLET | Refills: 0 | Status: CANCELLED | OUTPATIENT
Start: 2018-05-14 | End: 2018-05-24

## 2018-05-14 RX ORDER — IBUPROFEN 800 MG/1
800 TABLET ORAL EVERY 6 HOURS PRN
Qty: 120 TABLET | Refills: 2 | Status: SHIPPED | OUTPATIENT
Start: 2018-05-14 | End: 2019-10-14

## 2018-05-14 RX ORDER — DIAZEPAM 5 MG/1
5 TABLET ORAL EVERY 8 HOURS PRN
COMMUNITY
End: 2018-10-17 | Stop reason: ALTCHOICE

## 2018-05-14 RX ORDER — CETIRIZINE HYDROCHLORIDE 10 MG/1
10 TABLET ORAL DAILY
COMMUNITY
End: 2018-10-17 | Stop reason: ALTCHOICE

## 2018-05-14 ASSESSMENT — ENCOUNTER SYMPTOMS
SORE THROAT: 0
DIARRHEA: 0
VOMITING: 0
WHEEZING: 0
BACK PAIN: 1
COUGH: 0
NAUSEA: 0
SHORTNESS OF BREATH: 0
EYE PAIN: 0
SINUS PRESSURE: 0
ABDOMINAL PAIN: 0

## 2018-05-18 ENCOUNTER — HOSPITAL ENCOUNTER (OUTPATIENT)
Dept: PHYSICAL THERAPY | Facility: HOSPITAL | Age: 28
Setting detail: THERAPIES SERIES
Discharge: HOME | End: 2018-05-18

## 2018-05-18 ENCOUNTER — TELEPHONE (OUTPATIENT)
Dept: PRIMARY CARE CLINIC | Age: 28
End: 2018-05-18

## 2018-05-18 DIAGNOSIS — M54.6 MIDLINE THORACIC BACK PAIN, UNSPECIFIED CHRONICITY: ICD-10-CM

## 2018-05-18 DIAGNOSIS — M54.2 NECK PAIN: ICD-10-CM

## 2018-05-18 DIAGNOSIS — M54.12 CERVICAL RADICULOPATHY: ICD-10-CM

## 2018-05-18 DIAGNOSIS — M54.41 ACUTE BILATERAL LOW BACK PAIN WITH BILATERAL SCIATICA: Primary | ICD-10-CM

## 2018-05-18 DIAGNOSIS — M54.42 ACUTE BILATERAL LOW BACK PAIN WITH BILATERAL SCIATICA: Primary | ICD-10-CM

## 2018-05-18 PROCEDURE — 97140 MANUAL THERAPY 1/> REGIONS: CPT

## 2018-05-18 PROCEDURE — 97110 THERAPEUTIC EXERCISES: CPT

## 2018-05-18 RX ORDER — AZITHROMYCIN 500 MG/1
500 TABLET, FILM COATED ORAL DAILY
Qty: 1 PACKET | Refills: 0 | Status: SHIPPED | OUTPATIENT
Start: 2018-05-18 | End: 2018-05-21

## 2018-05-18 RX ORDER — FLUCONAZOLE 150 MG/1
150 TABLET ORAL DAILY
Qty: 3 TABLET | Refills: 0 | Status: SHIPPED | OUTPATIENT
Start: 2018-05-18 | End: 2018-05-29 | Stop reason: ALTCHOICE

## 2018-05-18 NOTE — THERAPY PROGRESS REPORT/RE-CERT
Outpatient Physical Therapy Ortho Progress Note   [place]     Patient Name: Kelsey Mcqueen  : 1990  MRN: [id number]  Today's Date: 2018      Visit Date: 2018    Visit Dx:    ICD-10-CM ICD-9-CM   1. Acute bilateral low back pain with bilateral sciatica M54.42 724.2    M54.41 724.3   2. Cervical radiculopathy M54.12 723.4   3. Midline thoracic back pain, unspecified chronicity M54.6 724.1       Patient Active Problem List   Diagnosis   • Pseudotumor cerebri   • Over weight   • Tobacco abuse   • Chronic nonintractable headache   • Smoker   • BMI 27.0-27.9,adult   • Acute bilateral low back pain with sciatica   • Cervical radiculopathy   • BMI 31.0-31.9,adult   • Neck pain   • Juvenile idiopathic scoliosis of thoracolumbar region   • Chronic midline low back pain without sciatica   • BMI 30.0-30.9,adult   • Numbness and tingling in both hands        Past Medical History:   Diagnosis Date   • Intracranial hypertension    • Kidney stones         Past Surgical History:   Procedure Laterality Date   • CYSTOSCOPY ELECTROHYDRAULIC LITHOTRIPSY     • OTHER SURGICAL HISTORY      Intracranial shunt   •  SHUNT INSERTION  2015                             PT Assessment/Plan     Row Name 18 1552          PT Assessment    Functional Limitations Limitation in home management;Limitations in community activities;Limitations in functional capacity and performance;Performance in work activities;Performance in leisure activities  -TB     Impairments Joint mobility;Posture;Pain;Muscle strength;Peripheral nerve integrity;Impaired flexibility  -TB     Assessment Comments Patient has not been seen in the clinic in three weeks due to insurance being waiting to be approved and scheduling. Before this lapse she reported she felt she had improved by about 30%, but now she feels she is worse with no improvements due to time off.  Patient's progress has been limited due to two different lapse of times where she  couldn't attend for a week or more due to surgery and then insurance.  However, when she was coming consistently she was showing slow but steady improvements.  She continues to have forward head and shoulder posture, which she was re-educated on today.  She also was more guarded along her levator scapula muscles with the right being more tense than the left today.  She has been having decreased neck motion per patient, but this was improved after treatment.  Even though she has had a set back in treatment and her progress has been slow, I believe she could continue to benefit from skilled therapy now that she can be consistent with attending.    -FAITH     Rehab Potential Good  -TB     Patient/caregiver participated in establishment of treatment plan and goals Yes  -TB     Patient would benefit from skilled therapy intervention Yes  -TB        PT Plan    PT Frequency 2x/week  -TB     Predicted Duration of Therapy Intervention (OT Eval) 4 weeks  -TB     Planned CPT's? PT THER PROC EA 15 MIN: 46628;PT MANUAL THERAPY EA 15 MIN: 10203;PT ELECTRICAL STIM UNATTEND: ;PT ELECTRICAL STIM ATTD EA 15 MIN: 77757;PT TRACTION CERVICAL: 75405  -TB     PT Plan Comments We will continue to work to decrease her soft tissue restrictions and improve her cervicothoracic mobility.  We will also progress, as able, postural education. She was educated that if at some point we feel she is still not making progression we will hold POC and refer her back to her doctor.  -FAITH       User Key  (r) = Recorded By, (t) = Taken By, (c) = Cosigned By    Initials Name Provider Type    TB Vahe Mendoza, PT Physical Therapist    FAITH Robles PTA Physical Therapy Assistant                    Exercises     Row Name 05/18/18 8776             Subjective Comments    Subjective Comments Patient reports she has been very sore the last couple days in her neck and upper back with difficulty turning her head. She reports she can usually pop her neck,  but she has been unable to the last couple of days. She reports she feels like she is worse since the last time she was in therapy since the relapse of time waiting on authorization.  She reports since the break in therapy that she is back to where she started with no improvements.  She reports she has an appointment with Dr. Izaguirre.  She says Jannet reports that she has cervical root disorder and fibromylagia.  She is going to go through 6 months of injections.  If that doesn't help then they are going to send her for MS testing. She reports before the last couple weeks of no therapy she felt she was improving by about 30%, but feel she has had a set back.  -FAITH         Subjective Pain    Able to rate subjective pain? yes  -FAITH      Pre-Treatment Pain Level 8   5/10 headache  -FAITH      Post-Treatment Pain Level 7  -FAITH      Subjective Pain Comment neck and back pain  -FAITH         Exercise 1    Exercise Name 1 thoracic stretch on 1/2 foam with intermittent pectoralis minor stretch  -FAITH      Cueing 1 Verbal;Tactile  -FAITH      Time 1 3 minutes, 20 seconds pect stretches  -FAITH         Exercise 2    Exercise Name 2 assessed goals for progress note  -FAITH         Exercise 3    Exercise Name 3 Reviewed HEP and reissued a print out for her.  -FAITH        User Key  (r) = Recorded By, (t) = Taken By, (c) = Cosigned By    Initials Name Provider Type    FAITH Robles, MICHELLE Physical Therapy Assistant                        Manual Rx (last 36 hours)      Manual Treatments     Row Name 05/18/18 1609             Manual Rx 1    Manual Rx 1 Location prone UT/LS/thoracic paraspinals  -FAITH      Manual Rx 1 Type STM  -FAITH      Manual Rx 1 Grade mod to deep  -FAITH      Manual Rx 1 Duration 13  -FAITH         Manual Rx 2    Manual Rx 2 Location prone CT junction  -FAITH      Manual Rx 2 Type ext mob  -FAITH      Manual Rx 2 Grade 3 sustained   -FAITH      Manual Rx 2 Duration 5  -FAITH         Manual Rx 3    Manual Rx 3 Location prone thoracic  -FAITH       Manual Rx 3 Type extension mobilizations foam roll and manual  -FAITH      Manual Rx 3 Grade 2/3: no cavitations  -FAITH      Manual Rx 3 Duration 5  -FAITH        User Key  (r) = Recorded By, (t) = Taken By, (c) = Cosigned By    Initials Name Provider Type    FAITH Robles, PTA Physical Therapy Assistant                PT OP Goals     Row Name 05/18/18 1552          Long Term Goals    LTG Date to Achieve 06/08/18  -FAITH     LTG 1 Improve thoracic mobility to allow for improve posture  -FAITH     LTG 1 Progress Ongoing  -FAITH     LTG 1 Progress Comments She continues to tight along her upper and middle thoracic spine  -FAITH     LTG 2 Able to hold neutral upright posture consistently  -FAITH     LTG 2 Progress Ongoing  -FAITH     LTG 2 Progress Comments She continues to rest in forward head and forward shoulder posture  -FAITH     LTG 3 No back pain except occasional minor twinges no more than 1-2/10 relieved with stretching  -FAITH     LTG 3 Progress Ongoing  -FAITH     LTG 3 Progress Comments 8/10 pain today  -FAITH     LTG 4 No LE radicular symptoms for a week  -FAITH     LTG 4 Progress Partially Met;Ongoing  -FAITH     LTG 4 Progress Comments She reports she did have symptoms down the left leg about 3 days ago.  She reports this was the first time in while.  She had previously met this goal before today.  -FAITH     LTG 5 No UE radicular symptoms for a week  -FAITH     LTG 5 Progress Ongoing  -FAITH     LTG 5 Progress Comments She reports she has been having it the past few nights with complete numbness down both arms but not at the same time.  She reports this occurs just about every night.  -FAITH     LTG 6 Ind with HEP for flexibility and postural stability  -FAITH     LTG 6 Progress Ongoing  -FAITH     LTG 6 Progress Comments She reports she has slacked on her HEP and only been doing performing intermittent.  -FAITH        Time Calculation    PT Goal Re-Cert Due Date 06/17/18  -FAITH       User Key  (r) = Recorded By, (t) = Taken By, (c) = Cosigned By    Initials Name  Provider Type    FAITH Robles, MICHELLE Physical Therapy Assistant          Therapy Education  Education Details: Reissued SA punches and horizontal abduction.  We will gradually add the other exercises back into her HEP. Moist heat 10-15 minutes  Given: HEP  Program: New  How Provided: Verbal, Written  Provided to: Patient  Level of Understanding: Verbalized          The plan of care and all goals were reviewed and updated as needed by Vahe Mendoza, PT 5/18/2018 4:55 PM      Time Calculation:   Start Time: 1552  Stop Time: 1636  Time Calculation (min): 44 min  Total Timed Code Minutes- PT: 44 minute(s)                  Vahe Mendoza, PT  5/18/2018

## 2018-05-21 ENCOUNTER — TELEPHONE (OUTPATIENT)
Dept: PRIMARY CARE CLINIC | Age: 28
End: 2018-05-21

## 2018-05-23 ENCOUNTER — TELEPHONE (OUTPATIENT)
Dept: PRIMARY CARE CLINIC | Age: 28
End: 2018-05-23

## 2018-05-23 ENCOUNTER — HOSPITAL ENCOUNTER (OUTPATIENT)
Dept: PAIN MANAGEMENT | Age: 28
Discharge: HOME | End: 2018-05-23
Payer: MEDICAID

## 2018-05-23 PROCEDURE — 99201 HC NEW PT, E/M LEVEL 1: CPT

## 2018-05-25 ENCOUNTER — TELEPHONE (OUTPATIENT)
Dept: PHYSICAL THERAPY | Facility: HOSPITAL | Age: 28
End: 2018-05-25

## 2018-05-25 NOTE — TELEPHONE ENCOUNTER
She had no showed the last 2 visits. Our PTA was not able to contact her on Wednesday. I reached her today and she told me that Pain Management was wanting to hold on PT for now and that they were going to fax us the information yesterday. I checked the chart and there is no fax that I could find. I asked Kelsey to contact Pain Management to see how long they want her to hold on PT so we can either keep those appointments or cancel them. She said she would call early next week when they are open.

## 2018-05-29 ENCOUNTER — OFFICE VISIT (OUTPATIENT)
Dept: PRIMARY CARE CLINIC | Age: 28
End: 2018-05-29
Payer: MEDICAID

## 2018-05-29 VITALS
OXYGEN SATURATION: 99 % | BODY MASS INDEX: 29.79 KG/M2 | HEART RATE: 86 BPM | HEIGHT: 64 IN | TEMPERATURE: 97.9 F | SYSTOLIC BLOOD PRESSURE: 108 MMHG | DIASTOLIC BLOOD PRESSURE: 56 MMHG | WEIGHT: 174.5 LBS

## 2018-05-29 DIAGNOSIS — F41.1 GAD (GENERALIZED ANXIETY DISORDER): Chronic | ICD-10-CM

## 2018-05-29 DIAGNOSIS — G89.4 CHRONIC PAIN SYNDROME: Primary | ICD-10-CM

## 2018-05-29 DIAGNOSIS — Z91.09 ENVIRONMENTAL ALLERGIES: ICD-10-CM

## 2018-05-29 DIAGNOSIS — K59.09 CHRONIC CONSTIPATION: ICD-10-CM

## 2018-05-29 PROCEDURE — G8427 DOCREV CUR MEDS BY ELIG CLIN: HCPCS | Performed by: PEDIATRICS

## 2018-05-29 PROCEDURE — 99213 OFFICE O/P EST LOW 20 MIN: CPT | Performed by: PEDIATRICS

## 2018-05-29 PROCEDURE — G8417 CALC BMI ABV UP PARAM F/U: HCPCS | Performed by: PEDIATRICS

## 2018-05-29 PROCEDURE — 4004F PT TOBACCO SCREEN RCVD TLK: CPT | Performed by: PEDIATRICS

## 2018-05-29 RX ORDER — FLUTICASONE PROPIONATE 50 MCG
2 SPRAY, SUSPENSION (ML) NASAL DAILY
Qty: 1 BOTTLE | Refills: 5 | Status: SHIPPED | OUTPATIENT
Start: 2018-05-29 | End: 2019-10-14

## 2018-05-29 ASSESSMENT — ENCOUNTER SYMPTOMS
BACK PAIN: 1
COUGH: 0
ABDOMINAL PAIN: 0
DIARRHEA: 0
SORE THROAT: 0
VOMITING: 0
WHEEZING: 0
SINUS PRESSURE: 0
EYE PAIN: 0
NAUSEA: 0
SHORTNESS OF BREATH: 0

## 2018-06-01 ENCOUNTER — APPOINTMENT (OUTPATIENT)
Dept: PHYSICAL THERAPY | Facility: HOSPITAL | Age: 28
End: 2018-06-01

## 2018-06-04 ENCOUNTER — APPOINTMENT (OUTPATIENT)
Dept: PHYSICAL THERAPY | Facility: HOSPITAL | Age: 28
End: 2018-06-04

## 2018-06-05 ENCOUNTER — TELEPHONE (OUTPATIENT)
Dept: NEUROSURGERY | Facility: CLINIC | Age: 28
End: 2018-06-05

## 2018-06-05 ENCOUNTER — TELEPHONE (OUTPATIENT)
Dept: PRIMARY CARE CLINIC | Age: 28
End: 2018-06-05

## 2018-06-05 NOTE — TELEPHONE ENCOUNTER
Patient called & left a voicemail today at 157 pm asking that I return her call about a new medication she was given by Dr Izaguirre.  She stated the medication was Savella and it was causing her to have increasing headaches and changes in her vision.  She is concerned b/c of her pseudotumor.    Per Dr Power: must discuss w/Dr Izaguirre or Sondra and see if they can adjust the medication or switch to something else.    I tried calling the patient but kept getting a vibration when she would answer and she couldn't hear me.  I contacted her mom and she is going to have her call me back.    romero byrd CMA

## 2018-06-06 ENCOUNTER — APPOINTMENT (OUTPATIENT)
Dept: PHYSICAL THERAPY | Facility: HOSPITAL | Age: 28
End: 2018-06-06

## 2018-06-06 ENCOUNTER — TELEPHONE (OUTPATIENT)
Dept: PRIMARY CARE CLINIC | Age: 28
End: 2018-06-06

## 2018-06-11 ENCOUNTER — TELEPHONE (OUTPATIENT)
Dept: PRIMARY CARE CLINIC | Age: 28
End: 2018-06-11

## 2018-07-26 ENCOUNTER — TELEPHONE (OUTPATIENT)
Dept: NEUROSURGERY | Facility: CLINIC | Age: 28
End: 2018-07-26

## 2018-07-26 ENCOUNTER — OFFICE VISIT (OUTPATIENT)
Dept: NEUROSURGERY | Facility: CLINIC | Age: 28
End: 2018-07-26

## 2018-07-26 VITALS
DIASTOLIC BLOOD PRESSURE: 75 MMHG | SYSTOLIC BLOOD PRESSURE: 118 MMHG | HEIGHT: 62 IN | WEIGHT: 180.2 LBS | BODY MASS INDEX: 33.16 KG/M2

## 2018-07-26 DIAGNOSIS — M54.2 NECK PAIN: ICD-10-CM

## 2018-07-26 DIAGNOSIS — F17.200 SMOKER: ICD-10-CM

## 2018-07-26 DIAGNOSIS — G89.29 CHRONIC NONINTRACTABLE HEADACHE, UNSPECIFIED HEADACHE TYPE: ICD-10-CM

## 2018-07-26 DIAGNOSIS — M41.115 JUVENILE IDIOPATHIC SCOLIOSIS OF THORACOLUMBAR REGION: Primary | ICD-10-CM

## 2018-07-26 DIAGNOSIS — R20.0 NUMBNESS AND TINGLING IN BOTH HANDS: ICD-10-CM

## 2018-07-26 DIAGNOSIS — M54.12 CERVICAL RADICULOPATHY: ICD-10-CM

## 2018-07-26 DIAGNOSIS — R51.9 CHRONIC NONINTRACTABLE HEADACHE, UNSPECIFIED HEADACHE TYPE: ICD-10-CM

## 2018-07-26 DIAGNOSIS — G93.2 BIH (BENIGN INTRACRANIAL HYPERTENSION): ICD-10-CM

## 2018-07-26 DIAGNOSIS — R20.2 NUMBNESS AND TINGLING IN BOTH HANDS: ICD-10-CM

## 2018-07-26 LAB
APPEARANCE CSF: CLEAR
COLOR CSF: COLORLESS
GLUCOSE CSF-MCNC: 61 MG/DL (ref 40–70)
NUC CELL # CSF MANUAL: 0 /MM3 (ref 0–8)
PROT CSF-MCNC: 43 MG/DL (ref 12–60)
RBC # CSF MANUAL: 0 /MM3 (ref 0–0)
SPECIMEN VOL CSF: 6 ML
TUBE # CSF: 2

## 2018-07-26 PROCEDURE — 61070 BRAIN CANAL SHUNT PROCEDURE: CPT | Performed by: NURSE PRACTITIONER

## 2018-07-26 PROCEDURE — 84157 ASSAY OF PROTEIN OTHER: CPT | Performed by: NURSE PRACTITIONER

## 2018-07-26 PROCEDURE — 86698 HISTOPLASMA ANTIBODY: CPT | Performed by: NURSE PRACTITIONER

## 2018-07-26 PROCEDURE — 86635 COCCIDIOIDES ANTIBODY: CPT | Performed by: NURSE PRACTITIONER

## 2018-07-26 PROCEDURE — 86606 ASPERGILLUS ANTIBODY: CPT | Performed by: NURSE PRACTITIONER

## 2018-07-26 PROCEDURE — 99214 OFFICE O/P EST MOD 30 MIN: CPT | Performed by: NURSE PRACTITIONER

## 2018-07-26 PROCEDURE — 89050 BODY FLUID CELL COUNT: CPT | Performed by: NURSE PRACTITIONER

## 2018-07-26 PROCEDURE — 86612 BLASTOMYCES ANTIBODY: CPT | Performed by: NURSE PRACTITIONER

## 2018-07-26 PROCEDURE — 82945 GLUCOSE OTHER FLUID: CPT | Performed by: NURSE PRACTITIONER

## 2018-07-26 PROCEDURE — 83605 ASSAY OF LACTIC ACID: CPT | Performed by: NURSE PRACTITIONER

## 2018-07-26 NOTE — PATIENT INSTRUCTIONS
Steps to Quit Smoking  Smoking tobacco can be bad for your health. It can also affect almost every organ in your body. Smoking puts you and people around you at risk for many serious long-lasting (chronic) diseases. Quitting smoking is hard, but it is one of the best things that you can do for your health. It is never too late to quit.  What are the benefits of quitting smoking?  When you quit smoking, you lower your risk for getting serious diseases and conditions. They can include:  · Lung cancer or lung disease.  · Heart disease.  · Stroke.  · Heart attack.  · Not being able to have children (infertility).  · Weak bones (osteoporosis) and broken bones (fractures).    If you have coughing, wheezing, and shortness of breath, those symptoms may get better when you quit. You may also get sick less often. If you are pregnant, quitting smoking can help to lower your chances of having a baby of low birth weight.  What can I do to help me quit smoking?  Talk with your doctor about what can help you quit smoking. Some things you can do (strategies) include:  · Quitting smoking totally, instead of slowly cutting back how much you smoke over a period of time.  · Going to in-person counseling. You are more likely to quit if you go to many counseling sessions.  · Using resources and support systems, such as:  ? Online chats with a counselor.  ? Phone quitlines.  ? Printed self-help materials.  ? Support groups or group counseling.  ? Text messaging programs.  ? Mobile phone apps or applications.  · Taking medicines. Some of these medicines may have nicotine in them. If you are pregnant or breastfeeding, do not take any medicines to quit smoking unless your doctor says it is okay. Talk with your doctor about counseling or other things that can help you.    Talk with your doctor about using more than one strategy at the same time, such as taking medicines while you are also going to in-person counseling. This can help make  quitting easier.  What things can I do to make it easier to quit?  Quitting smoking might feel very hard at first, but there is a lot that you can do to make it easier. Take these steps:  · Talk to your family and friends. Ask them to support and encourage you.  · Call phone quitlines, reach out to support groups, or work with a counselor.  · Ask people who smoke to not smoke around you.  · Avoid places that make you want (trigger) to smoke, such as:  ? Bars.  ? Parties.  ? Smoke-break areas at work.  · Spend time with people who do not smoke.  · Lower the stress in your life. Stress can make you want to smoke. Try these things to help your stress:  ? Getting regular exercise.  ? Deep-breathing exercises.  ? Yoga.  ? Meditating.  ? Doing a body scan. To do this, close your eyes, focus on one area of your body at a time from head to toe, and notice which parts of your body are tense. Try to relax the muscles in those areas.  · Download or buy apps on your mobile phone or tablet that can help you stick to your quit plan. There are many free apps, such as QuitGuide from the CDC (Centers for Disease Control and Prevention). You can find more support from smokefree.gov and other websites.    This information is not intended to replace advice given to you by your health care provider. Make sure you discuss any questions you have with your health care provider.  Document Released: 10/14/2010 Document Revised: 08/15/2017 Document Reviewed: 05/03/2016  [employer] Interactive Patient Education © 2018 [employer]  BMI for Adults  Body mass index (BMI) is a number that is calculated from a person's weight and height. In most adults, the number is used to find how much of an adult's weight is made up of fat. BMI is not as accurate as a direct measure of body fat.  How is BMI calculated?  BMI is calculated by dividing weight in kilograms by height in meters squared. It can also be calculated by dividing weight in pounds by height  in inches squared, then multiplying the resulting number by 703. Charts are available to help you find your BMI quickly and easily without doing this calculation.  How is BMI interpreted?  Health care professionals use BMI charts to identify whether an adult is underweight, at a normal weight, or overweight based on the following guidelines:  · Underweight: BMI less than 18.5.  · Normal weight: BMI between 18.5 and 24.9.  · Overweight: BMI between 25 and 29.9.  · Obese: BMI of 30 and above.    BMI is usually interpreted the same for males and females.  Weight includes both fat and muscle, so someone with a muscular build, such as an athlete, may have a BMI that is higher than 24.9. In cases like these, BMI may not accurately depict body fat. To determine if excess body fat is the cause of a BMI of 25 or higher, further assessments may need to be done by a health care provider.  Why is BMI a useful tool?  BMI is used to identify a possible weight problem that may be related to a medical problem or may increase the risk for medical problems. BMI can also be used to promote changes to reach a healthy weight.  This information is not intended to replace advice given to you by your health care provider. Make sure you discuss any questions you have with your health care provider.  Document Released: 08/29/2005 Document Revised: 04/27/2017 Document Reviewed: 05/15/2015  [employer] Interactive Patient Education © 2018 [employer]

## 2018-07-26 NOTE — PROGRESS NOTES
Procedure   Procedures  Kelsey Mcqueen  YOB: 1990  Name of procedure: Ventricular peritoneal shunt tap    The patient was brought back to the exam room where she was laid in a supine position and had her head turned to the left.  The shunt reservoir was identified.  The site was cleaned and prepped with a Betadine solution.  Her head was draped in a sterile fashion.  A 23-gauge butterfly needle was inserted into the shunt reservoir and there was spontaneous flow of clear cervical spinal fluid.  This was allowed to drain back into the manometer which did have an opening pressure of 11.  There was good restoration variation as well as whenever she was coughing.  I did withdraw all 5 mL's of cerebral spinal fluid.  Once this was done I did check closing pressure which was found to be at 11 as well.  I did check both proximal and distal runoff which had good flow and was brisk without any evidence of obstruction.  The butterfly needle was withdrawn and I did place us ample of spinal fluid into sterile containers and sent to the lab for further evaluation.  A 4 x 4 was use to obtain hemostasis.  There is 0 mL's of blood loss and no complications during the procedure.

## 2018-07-26 NOTE — TELEPHONE ENCOUNTER
Pt called and wanted to let you know that Tk will be faxing over the CT report and will be sending the CD in the mail.

## 2018-07-26 NOTE — PROGRESS NOTES
"    Chief complaint:   Chief Complaint   Patient presents with   • Vision changes and migraine headaches     Kelsey was referred back today by Dr. Garcia, her opthamologist with changes in her optic nerves, with edema and swelling and sontriction of visual field.  She states she has been suffering with migraines and knows something is going on with her pituatiary.         Subjective     HPI: This is a 27-year-old who we follow-up for benign intracranial hypertension fortunate a shunt placed in November 2015.  She is routinely followed by her ophthalmologist who was concerned about some changes in her vision.  She was sent to us for shunt evaluation.  Patient states that she has been having headaches and some vision changes but she says her headaches do feel different than her normal shunt headache that she gets occasionally.  She said that her headaches have been progressively getting worse and that she also has been having some worsening vision since her last ophthalmology check which was 2 months ago.  She says that her vision is not as good as it was 2 months ago but she says that the changes in her vision do seem to be intermittent.  She also continues to have issues with galactorrhea.  She is working with her primary care doctor and has been to see different specialist regarding this right here.  There was a concern about a pituitary tumor however on MRI that was done in year agoand CT scan this has been negative.  They wanted to see if there is any issues with the shunt before doing any further investigating at this time.    Review of Systems   Eyes: Positive for visual disturbance.   Musculoskeletal: Positive for neck pain.   Neurological: Positive for headaches.         Objective      Vital Signs  /75 (BP Location: Right arm, Patient Position: Sitting)   Ht 157.5 cm (62\")   Wt 81.7 kg (180 lb 3.2 oz)   BMI 32.96 kg/m²     Physical Exam   Constitutional: She is oriented to person, place, and time. " She appears well-developed and well-nourished.   HENT:   Head: Normocephalic.   Eyes: Pupils are equal, round, and reactive to light. EOM are normal.   Neck: Normal range of motion.   Pulmonary/Chest: Effort normal.   Musculoskeletal: Normal range of motion.   Neurological: She is alert and oriented to person, place, and time. She has normal strength and normal reflexes. No cranial nerve deficit or sensory deficit. Gait normal. GCS eye subscore is 4. GCS verbal subscore is 5. GCS motor subscore is 6.   Skin: Skin is warm.   Psychiatric: She has a normal mood and affect. Her speech is normal and behavior is normal. Thought content normal.       Results Review: CT scan of the brain with and without contrast shows good placement of the shunt catheter.  No evidence of any Pituitary tumor or abnormality.          Assessment/Plan: I am going to send a CSF specimen down for lab work for further evaluation review.  At this point is felt that the shunt is functioning adequately.  Please see my procedure note.  She can keep her regular scheduled appointment.  BMI shows she is overweight.  BMI chart was given the patient.  She is a nonsmoker.        Kelsey was seen today for vision changes and migraine headaches.    Diagnoses and all orders for this visit:    Juvenile idiopathic scoliosis of thoracolumbar region    Neck pain    Cervical radiculopathy    Chronic nonintractable headache, unspecified headache type    Numbness and tingling in both hands    BMI 32.0-32.9,adult    Smoker    BIH (benign intracranial hypertension)  -     Cell Count With Differential, CSF - Cerebrospinal Fluid, Lumbar Puncture  -     Glucose, CSF - Cerebrospinal Fluid, Lumbar Puncture  -     Protein, CSF - Cerebrospinal Fluid, Lumbar Puncture  -     Lactic Acid, CSF - Cerebrospinal Fluid, Lumbar Puncture  -     Fungal Antibodies, CSF (CF) - Cerebrospinal Fluid, Lumbar Puncture  -     Culture, CSF - Cerebrospinal Fluid, Lumbar Puncture        I  discussed the patients findings and my recommendations with patient  Ean Treviño, APRN  07/26/18  3:40 PM

## 2018-07-30 ENCOUNTER — TELEPHONE (OUTPATIENT)
Dept: NEUROSURGERY | Facility: CLINIC | Age: 28
End: 2018-07-30

## 2018-07-30 RX ORDER — CLONAZEPAM 0.5 MG/1
TABLET ORAL
COMMUNITY
End: 2019-01-29 | Stop reason: DRUGHIGH

## 2018-07-30 RX ORDER — GABAPENTIN 300 MG/1
CAPSULE ORAL
COMMUNITY
End: 2019-05-30

## 2018-07-30 NOTE — TELEPHONE ENCOUNTER
Gladys with [facility] called to let Ean know that some of the lab results were in and results are in chart for the shunt tap that was done on [facility] on 07/26/18, some of the tests had to be canceled because there was not enough sample to perform the labs on, if there is any questions about this you may return a call to Gladys in the lab  @ [phone]

## 2018-08-02 ENCOUNTER — OFFICE VISIT (OUTPATIENT)
Dept: NEUROLOGY | Age: 28
End: 2018-08-02
Payer: MEDICAID

## 2018-08-02 VITALS
WEIGHT: 176.2 LBS | DIASTOLIC BLOOD PRESSURE: 79 MMHG | SYSTOLIC BLOOD PRESSURE: 127 MMHG | HEART RATE: 104 BPM | HEIGHT: 62 IN | BODY MASS INDEX: 32.42 KG/M2

## 2018-08-02 DIAGNOSIS — G93.2 PSEUDOTUMOR CEREBRI: Primary | ICD-10-CM

## 2018-08-02 DIAGNOSIS — H53.10 SUBJECTIVE VISUAL DISTURBANCE OF BOTH EYES: ICD-10-CM

## 2018-08-02 DIAGNOSIS — G43.719 INTRACTABLE CHRONIC MIGRAINE WITHOUT AURA AND WITHOUT STATUS MIGRAINOSUS: ICD-10-CM

## 2018-08-02 DIAGNOSIS — R20.0 NUMBNESS: ICD-10-CM

## 2018-08-02 PROCEDURE — G8417 CALC BMI ABV UP PARAM F/U: HCPCS | Performed by: PSYCHIATRY & NEUROLOGY

## 2018-08-02 PROCEDURE — 4004F PT TOBACCO SCREEN RCVD TLK: CPT | Performed by: PSYCHIATRY & NEUROLOGY

## 2018-08-02 PROCEDURE — G8427 DOCREV CUR MEDS BY ELIG CLIN: HCPCS | Performed by: PSYCHIATRY & NEUROLOGY

## 2018-08-02 PROCEDURE — 99203 OFFICE O/P NEW LOW 30 MIN: CPT | Performed by: PSYCHIATRY & NEUROLOGY

## 2018-08-02 RX ORDER — HYDROCODONE BITARTRATE AND ACETAMINOPHEN 10; 325 MG/1; MG/1
1 TABLET ORAL EVERY 8 HOURS PRN
COMMUNITY
End: 2019-12-18 | Stop reason: ALTCHOICE

## 2018-08-02 RX ORDER — GABAPENTIN 100 MG/1
100 CAPSULE ORAL 2 TIMES DAILY
COMMUNITY
End: 2018-10-17 | Stop reason: ALTCHOICE

## 2018-08-02 RX ORDER — OXCARBAZEPINE 150 MG/1
300 TABLET, FILM COATED ORAL 2 TIMES DAILY
Qty: 120 TABLET | Refills: 2 | Status: SHIPPED | OUTPATIENT
Start: 2018-08-02 | End: 2018-10-17 | Stop reason: SINTOL

## 2018-08-02 RX ORDER — CLONAZEPAM 0.5 MG/1
1 TABLET ORAL 2 TIMES DAILY PRN
COMMUNITY
End: 2018-10-17 | Stop reason: ALTCHOICE

## 2018-08-02 RX ORDER — RIZATRIPTAN BENZOATE 10 MG/1
10 TABLET ORAL
Qty: 30 TABLET | Refills: 2 | Status: SHIPPED | OUTPATIENT
Start: 2018-08-02 | End: 2019-10-14

## 2018-08-02 NOTE — PROGRESS NOTES
[id number] [facility]  [address] [place] [address] Drive, [address]  [place], [place]  Phone [phone]  Fax [phone] [facility] [facility] PATIENT VISIT        Patient: Deysi Gambino  :  1990  Age:  33 y.o. MRN:  [id number]  Account #:  [de-identified]  Today:  18    Referring Provider: Dr. Kemar John Provider: Izella Osler, M.D.    [address] [place] [address]:  Chief Complaint   Patient presents with    New Patient     r arm numbness , thinks shunt is not working, vision is going       History Source: History obtained from the patient. PCP: MOUSTAPHA Lucero    HISTORY OF PRESENT ILLNESS:   Deysi Gambino is a 33y.o. year old woman with a history of fibromyalgia and pseudotumor cerebrii who was referred for visual loss. In  She was diagnosed with pseudotumor during work up for headache and papilledema. She had a right VPS placed by Dr. Carrol Novoa. She did have improvement in her headaches and vision. CSF at the time was negative including MS profile. For the past 2 months she has had impaired vision. She saw Dr. Nena Merino who found some visual field defects reportidly. She was referred to Dr. Carrol Novoa who tapped the shunt and flushed it. He found nothing wrong with the shunt itself. She says her PCP and Dr. Butler Angelucci suspect she has MS. She has additionally had worsened headaches. She has had galactorrhea, dysmenorrhea and has seen 2 endocrinologists that did not find any problems. She worries that she had a pituitary tumor but apparently prolactin levels were normal.  She has bifrontal headaches and pain behind her eyes. She has headaches 24/7. She takes hydrocodone every day for the headaches and fibromyalgia. She sees Dr. Rogers Odom. The headaches are associated with photophobia. She has found not other exacerbating and no alleviating features. She has intermittent numbness in the right arm. That often wakens her at night.   She did have an episode of numbness in both legs that lasted 30 nervous/anxious. PHYSICAL EXAMINATION:  Vitals: /79   Pulse 104   Ht 5' 2\" (1.575 m)   Wt 176 lb 3.2 oz (79.9 kg)   BMI 32.23 kg/m²   General appearance: alert, appears stated age and cooperative  Skin: Skin color, texture, turgor normal. No rashes or lesions  HEENT: Head: Normal, normocephalic, atraumatic. The right shunt bulb is easily compressible. Neck: no adenopathy, no carotid bruit, no JVD, supple, symmetrical, trachea midline and thyroid not enlarged, symmetric, no tenderness/mass/nodules  Lungs: clear to auscultation bilaterally  Heart: regular rate and rhythm, S1, S2 normal, no murmur, click, rub or gallop  Abdomen: soft, non-tender; bowel sounds normal; no masses,  no organomegaly  Extremities: extremities normal, atraumatic, no cyanosis or edema    NEUROLOGIC EXAMINATION:  Neurologic Exam     Mental Status   Oriented to person, place, and time. Speech: speech is normal   Level of consciousness: alert    Cranial Nerves     CN II   Visual fields full to confrontation. CN III, IV, VI   Pupils are equal, round, and reactive to light. Extraocular motions are normal.   Nystagmus: none     CN V   Facial sensation intact. CN VII   Facial expression full, symmetric. CN VIII   Hearing: intact    CN XI   Right sternocleidomastoid strength: normal  Left sternocleidomastoid strength: normal  Right trapezius strength: normal  Left trapezius strength: normal    CN XII   Tongue: not atrophic  Fasciculations: absent  Tongue deviation: none  Fundoscopic examination was normal.  Optic discs are flat with sharp borders.      Motor Exam   Muscle bulk: normal  Overall muscle tone: normal  Right arm pronator drift: absent  Left arm pronator drift: absent    Strength   Right neck flexion: 5/5  Left neck flexion: 5/5  Right neck extension: 5/5  Left neck extension: 5/5  Right deltoid: 5/5  Left deltoid: 5/5  Right biceps: 5/5  Left biceps: 5/5  Right triceps: 5/5  Left triceps: 5/5  Right wrist present within the right lateral ventricle. There is  appropriate decompression of the ventricles. There is no convincing  diffusion restriction. Artifact is suspected within the left cerebellum  on the diffusion-weighted sequences. No intracranial hemorrhage or mass is identified. No abnormal  intracranial enhancement is appreciated. There are no abnormal  extra-axial fluid collections. There is chronic mucosal thickening of the paranasal sinuses. The  retrobulbar fat of the right and left orbits is preserved. No proptosis  is appreciated. There are normal flow-voids in the distal internal carotid and basilar  arteries. Magnified images through the sella are performed. The pituitary gland is  normal in appearance with no enlargement or nodularity. There is  homogeneous enhancement. Other Result Information   Interface, Rad Results [place] [facility] - 08/03/2017  2:43 PM CDT  HISTORY: Acute conjunctivitis of the right side. Galactorrhea not  associated with childbirth. Headache. Shunt. MRI brain: Multiplanar imaging of brain is performed pre- and post-IV  contrast. There is prominent susceptibility artifact associated with the  right transparietal shunt and predominantly the shunt reservoir. The shunt tip is present within the right lateral ventricle. There is  appropriate decompression of the ventricles. There is no convincing  diffusion restriction. Artifact is suspected within the left cerebellum  on the diffusion-weighted sequences. No intracranial hemorrhage or mass is identified. No abnormal  intracranial enhancement is appreciated. There are no abnormal  extra-axial fluid collections. There is chronic mucosal thickening of the paranasal sinuses. The  retrobulbar fat of the right and left orbits is preserved. No proptosis  is appreciated. There are normal flow-voids in the distal internal carotid and basilar  arteries. Magnified images through the sella are performed.  The pituitary gland is  normal in appearance with no enlargement or nodularity. There is  homogeneous enhancement. IMPRESSION:  1. Susceptibility artifact associated with the right transparietal  shunt. Appropriate decompression of the ventricles. No convincing acute  intracranial process identified. 2. Limited assessment of the orbits is unremarkable. Retrobulbar fat is  preserved bilaterally. This report was finalized on 08/03/2017 15:39 by Dr. Efrain Vogel MD.     Result Impression   1. Straightening of the normal cervical lordosis with no abnormal  subluxation. No acute cervical vertebral pathology. No abnormal signal  within the cervical spinal cord. No significant cervical canal stenosis  or neural foramen narrowing identified. This report was finalized on 02/13/2018 15:46 by Dr. Efrain Vogel MD.   Result Narrative   HISTORY: Cervical reticula of the. MRI cervical spine: Sagittal and axial images of cervical spine obtained  without contrast.    COMPARISON: None    FINDINGS: There is straightening of the normal cervical lordosis with no  abnormal subluxation. There is no compression deformity or marrow edema. No suspicious focal bony mass is visualized. There is no abnormal signal within the cervical spinal cord. The  cervical cranial junction is unremarkable. There is no prominent disc  bulging or focal disc protrusion. There is no cervical spinal canal  stenosis or neural foramen narrowing. Labs - negative pregnancy test, normal B12, TSH. No prolactin level found at [facility] or [facility]. IMPRESSION:    ICD-10-CM ICD-9-CM    1. Pseudotumor cerebri G93.2 348.2    2. Subjective visual disturbance of both eyes H53.10 368.10    3. Intractable chronic migraine without aura and without status migrainosus G43.719 346.71    4. Numbness R20.0 782.0    Her pseudotumor is adequately treated. She does not have papilledema. Nothing on examination, history, previous MRIs, or CSF to suggest MS.   No evidence for pituitary

## 2018-08-03 ENCOUNTER — TELEPHONE (OUTPATIENT)
Dept: CARDIOLOGY | Age: 28
End: 2018-08-03

## 2018-08-03 LAB
B DERMAT AB TITR SER: NORMAL {TITER}
HISTOPLASMA CAPSULATUM YEAST PHASE AB [TITER] IN SERUM: NORMAL {TITER}
LACTIC ACID, CSF: 15 MG/DL (ref 10–22)
Lab: NORMAL
Lab: NORMAL

## 2018-08-09 ENCOUNTER — TELEPHONE (OUTPATIENT)
Dept: NEUROLOGY | Age: 28
End: 2018-08-09

## 2018-08-09 ENCOUNTER — HOSPITAL ENCOUNTER (OUTPATIENT)
Dept: MRI IMAGING | Age: 28
Discharge: HOME | End: 2018-08-09
Payer: MEDICAID

## 2018-08-09 DIAGNOSIS — G93.2 PSEUDOTUMOR CEREBRI: ICD-10-CM

## 2018-08-09 DIAGNOSIS — R20.0 NUMBNESS: ICD-10-CM

## 2018-08-09 DIAGNOSIS — G43.719 INTRACTABLE CHRONIC MIGRAINE WITHOUT AURA AND WITHOUT STATUS MIGRAINOSUS: ICD-10-CM

## 2018-08-09 DIAGNOSIS — H53.10 SUBJECTIVE VISUAL DISTURBANCE OF BOTH EYES: ICD-10-CM

## 2018-08-09 PROCEDURE — 6360000004 HC RX CONTRAST MEDICATION: Performed by: PSYCHIATRY & NEUROLOGY

## 2018-08-09 PROCEDURE — A9577 INJ MULTIHANCE: HCPCS | Performed by: PSYCHIATRY & NEUROLOGY

## 2018-08-09 PROCEDURE — 70553 MRI BRAIN STEM W/O & W/DYE: CPT

## 2018-08-09 RX ADMIN — GADOBENATE DIMEGLUMINE 16 ML: 529 INJECTION, SOLUTION INTRAVENOUS at 07:51

## 2018-08-09 ASSESSMENT — ENCOUNTER SYMPTOMS
PHOTOPHOBIA: 1
NAUSEA: 1
SHORTNESS OF BREATH: 0
BLOOD IN STOOL: 0
DIARRHEA: 0
EYE DISCHARGE: 0
SPUTUM PRODUCTION: 0
EYE PAIN: 0
COUGH: 1
HEMOPTYSIS: 0
EYE REDNESS: 0
VOMITING: 0
BLURRED VISION: 1
BACK PAIN: 1
ABDOMINAL PAIN: 0
DOUBLE VISION: 0
CONSTIPATION: 0

## 2018-08-10 ENCOUNTER — OFFICE VISIT (OUTPATIENT)
Dept: NEUROSURGERY | Facility: CLINIC | Age: 28
End: 2018-08-10

## 2018-08-10 VITALS
SYSTOLIC BLOOD PRESSURE: 118 MMHG | WEIGHT: 174 LBS | BODY MASS INDEX: 32.02 KG/M2 | HEIGHT: 62 IN | DIASTOLIC BLOOD PRESSURE: 70 MMHG

## 2018-08-10 DIAGNOSIS — G93.2 PSEUDOTUMOR CEREBRI: ICD-10-CM

## 2018-08-10 DIAGNOSIS — Z45.41 CEREBRAL VENTRICULAR SHUNT FITTING OR ADJUSTMENT: Primary | ICD-10-CM

## 2018-08-10 DIAGNOSIS — F17.200 SMOKER: ICD-10-CM

## 2018-08-10 PROCEDURE — 99213 OFFICE O/P EST LOW 20 MIN: CPT | Performed by: NURSE PRACTITIONER

## 2018-08-10 PROCEDURE — 62252 CSF SHUNT REPROGRAM: CPT | Performed by: NURSE PRACTITIONER

## 2018-08-10 NOTE — PROGRESS NOTES
"    Chief complaint:   Chief Complaint   Patient presents with   • Juvenile idiopathic scoliosis of thoracolumbar region        Subjective     HPI: this is a 27 year old female patient who had a shunt placed in November of 2015 for benign intracranial hypertension.  She had an MRI of her brain and his strength comes in today for shunt check to make sure the settings are the same.  He states that she continues to have headaches but she does not feel like these are related to her shunt.  She is also complaining of blurred vision.  She had an MRI done to check for pituitary tumor due to abnormal lab values and galactorrhea.  She is given a continue working with her primary care doctor regarding these issues as well.  Denies any nausea vomiting.  Denies any photophobia.  Denies any neck or arm pain.  The headaches are daily and she does notice a change from her vision from a few months ago and has been following up with ophthalmologist regarding this..    Review of Systems   Eyes: Positive for visual disturbance.   Musculoskeletal: Negative.    Neurological: Positive for headaches.         Objective      Vital Signs  /70   Ht 157.5 cm (62\")   Wt 78.9 kg (174 lb)   BMI 31.83 kg/m²     Physical Exam   Constitutional: She is oriented to person, place, and time. She appears well-developed and well-nourished.   HENT:   Head: Normocephalic.   Eyes: Pupils are equal, round, and reactive to light. EOM are normal.   Neck: Normal range of motion.   Pulmonary/Chest: Effort normal.   Musculoskeletal: Normal range of motion.   Neurological: She is alert and oriented to person, place, and time. She has normal strength and normal reflexes. No cranial nerve deficit or sensory deficit. Gait normal. GCS eye subscore is 4. GCS verbal subscore is 5. GCS motor subscore is 6.   Skin: Skin is warm.   Psychiatric: She has a normal mood and affect. Her speech is normal and behavior is normal. Thought content normal.       Results " Review: No new imaging          Assessment/Plan: I did check the patient's shunt it was set at 1.5 which is different from where we had inset previously at 1.0.  I did adjust her shunt back to 1.0.  She is getting continue working with her primary care doctor and see about going to an endocrinologist for these issues that she is having.  She already has an appointment set up in a few months to see Dr. Power.  She can keep this appointment at this time and call us if she has any further problems.  BMI shows that she is overweight.  BMI chart was given the patient.  She is a smoker.  Smoking cessation classes given the patient.         Kelsey was seen today for juvenile idiopathic scoliosis of thoracolumbar region.    Diagnoses and all orders for this visit:    Cerebral ventricular shunt fitting or adjustment    Pseudotumor cerebri    Smoker    BMI 30.0-30.9,adult        I discussed the patients findings and my recommendations with patient  Ena Treviño, APRN  08/10/18  9:51 AM

## 2018-08-10 NOTE — PATIENT INSTRUCTIONS
Health Risks of Smoking  Smoking cigarettes is very bad for your health. Tobacco smoke has over 200 known poisons in it. It contains the poisonous gases nitrogen oxide and carbon monoxide. There are over 60 chemicals in tobacco smoke that cause cancer.  Smoking is difficult to quit because a chemical in tobacco, called nicotine, causes addiction or dependence. When you smoke and inhale, nicotine is absorbed rapidly into the bloodstream through your lungs. Both inhaled and non-inhaled nicotine may be addictive.  What are the risks of cigarette smoke?  Cigarette smokers have an increased risk of many serious medical problems, including:  · Lung cancer.  · Lung disease, such as pneumonia, bronchitis, and emphysema.  · Chest pain (angina) and heart attack because the heart is not getting enough oxygen.  · Heart disease and peripheral blood vessel disease.  · High blood pressure (hypertension).  · Stroke.  · Oral cancer, including cancer of the lip, mouth, or voice box.  · Bladder cancer.  · Pancreatic cancer.  · Cervical cancer.  · Pregnancy complications, including premature birth.  · Stillbirths and smaller  babies, birth defects, and genetic damage to sperm.  · Early menopause.  · Lower estrogen level for women.  · Infertility.  · Facial wrinkles.  · Blindness.  · Increased risk of broken bones (fractures).  · Senile dementia.  · Stomach ulcers and internal bleeding.  · Delayed wound healing and increased risk of complications during surgery.  · Even smoking lightly shortens your life expectancy by several years.    Because of secondhand smoke exposure, children of smokers have an increased risk of the following:  · Sudden infant death syndrome (SIDS).  · Respiratory infections.  · Lung cancer.  · Heart disease.  · Ear infections.    What are the benefits of quitting?  There are many health benefits of quitting smoking. Here are some of them:  · Within days of quitting smoking, your risk of having a heart  attack decreases, your blood flow improves, and your lung capacity improves. Blood pressure, pulse rate, and breathing patterns start returning to normal soon after quitting.  · Within months, your lungs may clear up completely.  · Quitting for 10 years reduces your risk of developing lung cancer and heart disease to almost that of a nonsmoker.  · People who quit may see an improvement in their overall quality of life.    How do I quit smoking?  Smoking is an addiction with both physical and psychological effects, and longtime habits can be hard to change. Your health care provider can recommend:  · Programs and community resources, which may include group support, education, or talk therapy.  · Prescription medicines to help reduce cravings.  · Nicotine replacement products, such as patches, gum, and nasal sprays. Use these products only as directed. Do not replace cigarette smoking with electronic cigarettes, which are commonly called e-cigarettes. The safety of e-cigarettes is not known, and some may contain harmful chemicals.  · A combination of two or more of these methods.    Where to find more information:  · American Lung Association: www.lung.org  · American Cancer Society: www.cancer.org  Summary  · Smoking cigarettes is very bad for your health. Cigarette smokers have an increased risk of many serious medical problems, including several cancers, heart disease, and stroke.  · Smoking is an addiction with both physical and psychological effects, and longtime habits can be hard to change.  · By stopping right away, you can greatly reduce the risk of medical problems for you and your family.  · To help you quit smoking, your health care provider can recommend programs, community resources, prescription medicines, and nicotine replacement products such as patches, gum, and nasal sprays.  This information is not intended to replace advice given to you by your health care provider. Make sure you discuss any  questions you have with your health care provider.  Document Released: 01/25/2006 Document Revised: 12/22/2017 Document Reviewed: 12/22/2017  [employer] Interactive Patient Education © 2017 [employer]

## 2018-08-23 ENCOUNTER — DOCUMENTATION (OUTPATIENT)
Dept: PHYSICAL THERAPY | Facility: HOSPITAL | Age: 28
End: 2018-08-23

## 2018-08-23 DIAGNOSIS — M54.41 ACUTE BILATERAL LOW BACK PAIN WITH BILATERAL SCIATICA: Primary | ICD-10-CM

## 2018-08-23 DIAGNOSIS — M54.6 MIDLINE THORACIC BACK PAIN, UNSPECIFIED CHRONICITY: ICD-10-CM

## 2018-08-23 DIAGNOSIS — M54.2 NECK PAIN: ICD-10-CM

## 2018-08-23 DIAGNOSIS — M54.42 ACUTE BILATERAL LOW BACK PAIN WITH BILATERAL SCIATICA: Primary | ICD-10-CM

## 2018-08-23 DIAGNOSIS — M54.12 CERVICAL RADICULOPATHY: ICD-10-CM

## 2018-08-23 NOTE — THERAPY DISCHARGE NOTE
Outpatient Physical Therapy Discharge Summary         Patient Name: Kelsey Mcqueen  : 1990  MRN: [id number]    Today's Date: 2018    Visit Dx:    ICD-10-CM ICD-9-CM   1. Acute bilateral low back pain with bilateral sciatica M54.42 724.2    M54.41 724.3   2. Cervical radiculopathy M54.12 723.4   3. Midline thoracic back pain, unspecified chronicity M54.6 724.1   4. Neck pain M54.2 723.1             PT OP Goals     Row Name 18 1423          Long Term Goals    LTG Date to Achieve 18  -FAITH     LTG 1 Improve thoracic mobility to allow for improve posture  -FAITH     LTG 1 Progress Not Met  -FAITH     LTG 1 Progress Comments She is not in the clinic to formally assess.  While she was here she continue to have decreased mobility.  -FAITH     LTG 2 Able to hold neutral upright posture consistently  -FAITH     LTG 2 Progress Not Met  -FAITH     LTG 2 Progress Comments She is not in the clinic to formally assess.  While she was here she continued to rest in forward shoulder posture.  -FAITH     LTG 3 No back pain except occasional minor twinges no more than 1-2/10 relieved with stretching  -FAITH     LTG 3 Progress Not Met  -FAITH     LTG 3 Progress Comments She is not in the clinic to formally assess.  While she was here her pain continued to be high.  -FAITH     LTG 4 No LE radicular symptoms for a week  -FAITH     LTG 4 Progress Partially Met  -FAITH     LTG 4 Progress Comments She is not in the clinic to formally assess.   -FAITH     LTG 5 No UE radicular symptoms for a week  -FAITH     LTG 5 Progress Not Met  -FAITH     LTG 5 Progress Comments She is not in the clinic to formally assess, but she continued to have symptoms while she was coming to therapy.  -FAITH     LTG 6 Ind with HEP for flexibility and postural stability  -FAITH     LTG 6 Progress Not Met  -FAITH     LTG 6 Progress Comments She is not in the clinic to formally assess compliance.  -FAITH       User Key  (r) = Recorded By, (t) = Taken By, (c) = Cosigned By    Initials Name  Provider Type    FAITH Jackson Robles PTA Physical Therapy Assistant          OP PT Discharge Summary  Date of Discharge: 08/23/18  Reason for Discharge: Non-compliant, Lack of progress  Outcomes Achieved: Refer to plan of care for updates on goals achieved, Unable to make functional progress toward goals at this time  Discharge Destination: Home without follow-up  Discharge Instructions/Additional Comments: Patient has not been seen in our office since 5/18/18 due to no-showing her appointments.  While she was in the clinic she was not progressing as desired towards her goals due to continual poor posture and decreased thoracic and cervical mobility.  We were working on building a HEP, but due to poor compliance with attendance she may not be performing HEP on her home at this point.      Time Calculation:        Therapy Suggested Charges     Code   Minutes Charges    None                       Jackson Robles PTA  8/23/2018

## 2018-10-17 ENCOUNTER — OFFICE VISIT (OUTPATIENT)
Dept: SURGERY | Age: 28
End: 2018-10-17
Payer: MEDICAID

## 2018-10-17 VITALS — DIASTOLIC BLOOD PRESSURE: 78 MMHG | HEART RATE: 96 BPM | SYSTOLIC BLOOD PRESSURE: 120 MMHG

## 2018-10-17 DIAGNOSIS — N64.3 GALACTORRHEA: Primary | ICD-10-CM

## 2018-10-17 DIAGNOSIS — Z80.3 FAMILY HX-BREAST MALIGNANCY: ICD-10-CM

## 2018-10-17 PROCEDURE — G8417 CALC BMI ABV UP PARAM F/U: HCPCS | Performed by: SURGERY

## 2018-10-17 PROCEDURE — 99214 OFFICE O/P EST MOD 30 MIN: CPT | Performed by: SURGERY

## 2018-10-17 PROCEDURE — 4004F PT TOBACCO SCREEN RCVD TLK: CPT | Performed by: SURGERY

## 2018-10-17 PROCEDURE — G8427 DOCREV CUR MEDS BY ELIG CLIN: HCPCS | Performed by: SURGERY

## 2018-10-17 PROCEDURE — G8484 FLU IMMUNIZE NO ADMIN: HCPCS | Performed by: SURGERY

## 2018-10-17 RX ORDER — DROSPIRENONE AND ETHINYL ESTRADIOL 0.02-3(28)
1 KIT ORAL DAILY
COMMUNITY
End: 2019-10-14

## 2018-10-17 RX ORDER — PREGABALIN 50 MG/1
50 CAPSULE ORAL 2 TIMES DAILY
COMMUNITY
End: 2019-10-14

## 2018-11-29 ENCOUNTER — OFFICE VISIT (OUTPATIENT)
Dept: ENDOCRINOLOGY | Facility: CLINIC | Age: 28
End: 2018-11-29

## 2018-11-29 VITALS
HEART RATE: 104 BPM | DIASTOLIC BLOOD PRESSURE: 68 MMHG | OXYGEN SATURATION: 95 % | HEIGHT: 62 IN | BODY MASS INDEX: 33.82 KG/M2 | SYSTOLIC BLOOD PRESSURE: 106 MMHG | WEIGHT: 183.8 LBS

## 2018-11-29 DIAGNOSIS — Z72.0 TOBACCO ABUSE: ICD-10-CM

## 2018-11-29 DIAGNOSIS — E04.1 SOLITARY THYROID NODULE: ICD-10-CM

## 2018-11-29 DIAGNOSIS — E22.1 HYPERPROLACTINEMIA (HCC): Primary | ICD-10-CM

## 2018-11-29 PROCEDURE — 99245 OFF/OP CONSLTJ NEW/EST HI 55: CPT | Performed by: INTERNAL MEDICINE

## 2018-11-29 RX ORDER — DROSPIRENONE AND ETHINYL ESTRADIOL 0.02-3(28)
1 KIT ORAL DAILY
COMMUNITY
End: 2019-05-30

## 2018-12-09 PROBLEM — F41.8 DEPRESSION WITH ANXIETY: Status: ACTIVE | Noted: 2018-12-09

## 2018-12-09 NOTE — PROGRESS NOTES
Kelsey Mcqueen is a 28 y.o. female patient that     comes for direct consultation request from Jenna Andre for my opinion regarding     Chief Complaint   Patient presents with   • elevated prolactin level           29 yo female comes for consultation    Reason, galactorrhea and hyperprolactinemia    Important context, patient has h o pseudotumor cerebri and  shunt.     Symptoms, she has referred galactorrhea never amenorrhea and now on ocp   She has multiple other symptoms including fatigue, weakness, anxiety, fogginess, malaise, headaches    Severity, from an endocrine standpoint undefined - mild    Alleviating, another endocrinologist treated her with dostinex for galactorrhea and she referred no improvement    Aggravating, none    Quantity, prolactin elevation has been mild 20s     MRI 8-17 personally reviewed and no pituitary adenoma           Past Medical History:   Diagnosis Date   • Depression with anxiety 12/9/2018   • Intracranial hypertension    • Kidney stones    • Pseudotumor cerebri 9/14/2016   • Tobacco abuse 9/21/2016     Family History   Problem Relation Age of Onset   • Prolactinoma Neg Hx      Social History     Tobacco Use   • Smoking status: Current Every Day Smoker     Types: Cigarettes   • Smokeless tobacco: Never Used   • Tobacco comment: 5 CIGARETTS/DAY   Substance Use Topics   • Alcohol use: No   • Drug use: No         Current Outpatient Medications:   •  albuterol (PROVENTIL HFA;VENTOLIN HFA) 108 (90 Base) MCG/ACT inhaler, Inhale 2 puffs., Disp: , Rfl:   •  clonazePAM (KlonoPIN) 0.5 MG tablet, clonazepam 0.5 mg tablet, Disp: , Rfl:   •  drospirenone-ethinyl estradiol (MICHAEL) 3-0.02 MG per tablet, Take 1 tablet by mouth Daily., Disp: , Rfl:   •  escitalopram (LEXAPRO) 20 MG tablet, , Disp: , Rfl:   •  gabapentin (NEURONTIN) 300 MG capsule, gabapentin 300 mg capsule  one three times daily, Disp: , Rfl:   •  HYDROcodone-acetaminophen (NORCO)  MG per tablet, TAKE 1/2-1 TABLET BY  MOUTH 3 TIMES A DAY AS NEEDED FOR PAIN, Disp: , Rfl: 0  •  ibuprofen (ADVIL,MOTRIN) 800 MG tablet, TAKE 1 TABLET BY MOUTH 3 TIMES A DAY AS NEEDED FOR SEVERE HEADACHES, (DO NOT TAKE WITH OTHER NSAIDS), Disp: , Rfl: 0  •  linaclotide (LINZESS) 290 MCG capsule capsule, Take 290 mcg by mouth daily, AM, Disp: , Rfl:   •  loratadine-pseudoephedrine (CLARITIN-D 12-hour) 5-120 MG per 12 hr tablet, Take 1 tablet by mouth., Disp: , Rfl:   •  tiZANidine (ZANAFLEX) 4 MG tablet, TAKE 1 TABLET BY MOUTH EVERY 6 HOURS AS NEEDED FOR MUSCLE SPASM, Disp: , Rfl: 1  •  VENTOLIN  (90 Base) MCG/ACT inhaler, INHALE 1-2 PUFFS PO Q 6 H PRN, Disp: , Rfl: 2  •  fexofenadine-pseudoephedrine (ALLEGRA-D 24) 180-240 MG per 24 hr tablet, Take 1 tablet by mouth., Disp: , Rfl:   •  traZODone (DESYREL) 50 MG tablet, Take 50 mg by mouth., Disp: , Rfl:     Review of Systems    Review of Systems   Constitutional: Positive for fatigue and unexpected weight change. Negative for activity change, appetite change, chills, diaphoresis and fever.   HENT: Negative for congestion, dental problem, drooling, ear discharge, ear pain, facial swelling, mouth sores, postnasal drip, rhinorrhea, sinus pressure, sore throat, tinnitus, trouble swallowing and voice change.    Eyes: Positive for visual disturbance. Negative for photophobia, pain, discharge, redness and itching.   Respiratory: Negative for apnea, cough, choking, chest tightness, shortness of breath, wheezing and stridor.    Cardiovascular: Negative for chest pain, palpitations and leg swelling.   Gastrointestinal: Negative for abdominal distention, abdominal pain, constipation, diarrhea, nausea and vomiting.   Endocrine: Positive for cold intolerance and heat intolerance. Negative for polydipsia, polyphagia and polyuria.   Genitourinary: Negative for decreased urine volume, difficulty urinating, dysuria, flank pain, frequency, hematuria and urgency.   Musculoskeletal: Positive for arthralgias and  "myalgias. Negative for back pain, gait problem, joint swelling, neck pain and neck stiffness.   Skin: Negative for color change, pallor, rash and wound.   Allergic/Immunologic: Negative for immunocompromised state.   Neurological: Positive for dizziness, weakness, numbness and headaches. Negative for tremors, seizures, syncope, facial asymmetry, speech difficulty and light-headedness.   Hematological: Negative for adenopathy.   Psychiatric/Behavioral: Positive for confusion, decreased concentration and sleep disturbance. Negative for agitation, behavioral problems, dysphoric mood, hallucinations, self-injury and suicidal ideas. The patient is nervous/anxious. The patient is not hyperactive.         Objective:   /68   Pulse 104   Ht 157.5 cm (62\")   Wt 83.4 kg (183 lb 12.8 oz)   SpO2 95%   BMI 33.62 kg/m²     Physical Exam   Constitutional: She is oriented to person, place, and time. She appears well-developed.   HENT:   Head: Normocephalic.   Right Ear: External ear normal.   Left Ear: External ear normal.   Nose: Nose normal.   Eyes: Conjunctivae and EOM are normal. No scleral icterus.   Neck: Normal range of motion. Neck supple. No tracheal deviation present. No thyromegaly present.   Cardiovascular: Normal rate, regular rhythm, normal heart sounds and intact distal pulses. Exam reveals no gallop and no friction rub.   No murmur heard.  Pulmonary/Chest: Effort normal and breath sounds normal. No stridor. No respiratory distress. She has no wheezes. She has no rales. She exhibits no tenderness.   Abdominal: Soft. Bowel sounds are normal. She exhibits no distension and no mass. There is no tenderness. There is no rebound and no guarding.   Musculoskeletal: Normal range of motion. She exhibits no tenderness or deformity.   Lymphadenopathy:     She has no cervical adenopathy.   Neurological: She is alert and oriented to person, place, and time. She displays normal reflexes. She exhibits normal muscle tone. " Coordination normal.   Skin: No rash noted. No erythema. No pallor.   Psychiatric: She has a normal mood and affect. Her behavior is normal. Judgment and thought content normal.       Lab Review      Assessment/Plan       ICD-10-CM ICD-9-CM   1. Hyperprolactinemia (CMS/HCC) E22.1 253.1   2. Solitary thyroid nodule E04.1 241.0   3. Tobacco abuse Z72.0 305.1         I reviewed and summarized records from [facility] Andre from 2018 and I reviewed / ordered labs.   From review of records :    Patient has h o pseudotumor cerebri and  shunt placement    She referred galactorrhea and prolactin was documented in the 20s    Importantly she never had amenorrhea, she had an MRI from 2017 not revealing an adenoma and she has consulted with other endocrinologists who have actually started her on dostinex that she states never resolved galactorrhea.    --    I told her that even if prolactin was elevated it wouldn't account for all of her symptoms. The only symptoms that could be accounted for would be galactorrhea and amenorrhea.     She is on OCP  and that addresses the low estrogen status    Other endocrine consultants have given her dostinex in the past and she didn't derive any benefit.     I also explained that a prolactin in the 20s doesn't correlate with a prolactinoma and we have an MRI to prove that from 2017 that I personally reviewed.    I will repeat assessment as below and contact her .     If all normal then reassurance    If mild prolactin elevation we could consider dostinex if she is willing to try again but not because we are treating a prolactinoma but because we are treating idiopathic galactorrhea.   Since she has chronic HA , she has to weigh the benefit of treating galactorrhea vs potential HA.     She has a small 4 mm thyroid nodule that requires no followup unless there is palpable enlargement.       Orders Placed This Encounter   Procedures   • ACTH   • Cortisol - AM   • Insulin-like Growth Factor   •  T4, Free   • TSH   • Macroprolactin   • Prolactin       Please see my above opinion and suggestions.     I will see patient as needed    A copy of my note was sent to Jenna Andre  Number of Diagnoses or Management Options  Hyperprolactinemia (CMS/HCC): new, needed workup  Solitary thyroid nodule:   Tobacco abuse:      Amount and/or Complexity of Data Reviewed  Clinical lab tests: ordered and reviewed  Review and summarize past medical records: yes  Independent visualization of images, tracings, or specimens: yes    Risk of Complications, Morbidity, and/or Mortality  Presenting problems: moderate  Diagnostic procedures: minimal  Management options: moderate

## 2019-01-22 ENCOUNTER — TRANSCRIBE ORDERS (OUTPATIENT)
Dept: ADMINISTRATIVE | Facility: HOSPITAL | Age: 29
End: 2019-01-22

## 2019-01-22 ENCOUNTER — APPOINTMENT (OUTPATIENT)
Dept: LAB | Facility: HOSPITAL | Age: 29
End: 2019-01-22
Attending: OBSTETRICS & GYNECOLOGY

## 2019-01-22 DIAGNOSIS — E34.9 HORMONE IMBALANCE: ICD-10-CM

## 2019-01-22 DIAGNOSIS — R53.83 FATIGUE, UNSPECIFIED TYPE: Primary | ICD-10-CM

## 2019-01-22 DIAGNOSIS — N64.3 GALACTORRHEA: ICD-10-CM

## 2019-01-22 LAB
BASOPHILS # BLD AUTO: 0.02 10*3/MM3 (ref 0–0.2)
BASOPHILS NFR BLD AUTO: 0.3 % (ref 0–2)
DEPRECATED RDW RBC AUTO: 40.7 FL (ref 40–54)
EOSINOPHIL # BLD AUTO: 0.29 10*3/MM3 (ref 0–0.7)
EOSINOPHIL NFR BLD AUTO: 4.4 % (ref 0–4)
ERYTHROCYTE [DISTWIDTH] IN BLOOD BY AUTOMATED COUNT: 12 % (ref 12–15)
ESTRADIOL SERPL HS-MCNC: 1352.7 PG/ML
FSH SERPL-ACNC: <0.66 MIU/ML
HCT VFR BLD AUTO: 40.5 % (ref 37–47)
HGB BLD-MCNC: 13.7 G/DL (ref 12–16)
IMM GRANULOCYTES # BLD AUTO: 0.02 10*3/MM3 (ref 0–0.03)
IMM GRANULOCYTES NFR BLD AUTO: 0.3 % (ref 0–5)
LH SERPL-ACNC: 0.23 MIU/ML
LYMPHOCYTES # BLD AUTO: 1.78 10*3/MM3 (ref 0.72–4.86)
LYMPHOCYTES NFR BLD AUTO: 27.3 % (ref 15–45)
MCH RBC QN AUTO: 31.2 PG (ref 28–32)
MCHC RBC AUTO-ENTMCNC: 33.8 G/DL (ref 33–36)
MCV RBC AUTO: 92.3 FL (ref 82–98)
MONOCYTES # BLD AUTO: 0.33 10*3/MM3 (ref 0.19–1.3)
MONOCYTES NFR BLD AUTO: 5.1 % (ref 4–12)
NEUTROPHILS # BLD AUTO: 4.09 10*3/MM3 (ref 1.87–8.4)
NEUTROPHILS NFR BLD AUTO: 62.6 % (ref 39–78)
NRBC BLD AUTO-RTO: 0 /100 WBC (ref 0–0)
PLATELET # BLD AUTO: 268 10*3/MM3 (ref 130–400)
PMV BLD AUTO: 9.9 FL (ref 6–12)
RBC # BLD AUTO: 4.39 10*6/MM3 (ref 4.2–5.4)
WBC NRBC COR # BLD: 6.53 10*3/MM3 (ref 4.8–10.8)

## 2019-01-22 PROCEDURE — 83002 ASSAY OF GONADOTROPIN (LH): CPT | Performed by: OBSTETRICS & GYNECOLOGY

## 2019-01-22 PROCEDURE — 84146 ASSAY OF PROLACTIN: CPT | Performed by: OBSTETRICS & GYNECOLOGY

## 2019-01-22 PROCEDURE — 86696 HERPES SIMPLEX TYPE 2 TEST: CPT | Performed by: OBSTETRICS & GYNECOLOGY

## 2019-01-22 PROCEDURE — 82670 ASSAY OF TOTAL ESTRADIOL: CPT | Performed by: OBSTETRICS & GYNECOLOGY

## 2019-01-22 PROCEDURE — 86695 HERPES SIMPLEX TYPE 1 TEST: CPT | Performed by: OBSTETRICS & GYNECOLOGY

## 2019-01-22 PROCEDURE — 36415 COLL VENOUS BLD VENIPUNCTURE: CPT

## 2019-01-22 PROCEDURE — 85025 COMPLETE CBC W/AUTO DIFF WBC: CPT | Performed by: OBSTETRICS & GYNECOLOGY

## 2019-01-22 PROCEDURE — 83001 ASSAY OF GONADOTROPIN (FSH): CPT | Performed by: OBSTETRICS & GYNECOLOGY

## 2019-01-23 LAB
HSV1 IGG SER IA-ACNC: 36.3 INDEX (ref 0–0.9)
HSV2 IGG SER IA-ACNC: <0.91 INDEX (ref 0–0.9)
PROLACTIN SERPL-MCNC: 43.8 NG/ML (ref 4.8–23.3)

## 2019-01-24 LAB
HSV1 IGM TITR SER IF: NORMAL TITER
HSV2 IGM TITR SER IF: NORMAL TITER

## 2019-01-29 ENCOUNTER — OFFICE VISIT (OUTPATIENT)
Dept: NEUROSURGERY | Facility: CLINIC | Age: 29
End: 2019-01-29

## 2019-01-29 ENCOUNTER — TELEPHONE (OUTPATIENT)
Dept: NEUROSURGERY | Facility: CLINIC | Age: 29
End: 2019-01-29

## 2019-01-29 VITALS
BODY MASS INDEX: 33.34 KG/M2 | SYSTOLIC BLOOD PRESSURE: 98 MMHG | DIASTOLIC BLOOD PRESSURE: 68 MMHG | HEIGHT: 62 IN | WEIGHT: 181.2 LBS

## 2019-01-29 DIAGNOSIS — F17.200 SMOKER: ICD-10-CM

## 2019-01-29 DIAGNOSIS — G93.2 PSEUDOTUMOR CEREBRI: Primary | ICD-10-CM

## 2019-01-29 DIAGNOSIS — D35.2 PITUITARY MICROADENOMA (HCC): ICD-10-CM

## 2019-01-29 PROCEDURE — 99214 OFFICE O/P EST MOD 30 MIN: CPT | Performed by: NEUROLOGICAL SURGERY

## 2019-01-29 RX ORDER — DICLOFENAC SODIUM 75 MG/1
75 TABLET, DELAYED RELEASE ORAL 2 TIMES DAILY
Refills: 1 | COMMUNITY
Start: 2019-01-14 | End: 2019-08-13

## 2019-01-29 RX ORDER — ACYCLOVIR 800 MG/1
TABLET ORAL
Refills: 0 | COMMUNITY
Start: 2019-01-22 | End: 2019-05-30

## 2019-01-29 RX ORDER — SULFAMETHOXAZOLE AND TRIMETHOPRIM 800; 160 MG/1; MG/1
TABLET ORAL
Refills: 0 | COMMUNITY
Start: 2019-01-25 | End: 2019-02-05

## 2019-01-29 RX ORDER — CLONAZEPAM 1 MG/1
TABLET ORAL
COMMUNITY
End: 2023-03-23

## 2019-01-29 NOTE — PROGRESS NOTES
SUBJECTIVE:  Patient ID: Kelsey Mcqueen is a 28 y.o. female is here today for follow-up.    Chief Complaint:pseudotumor  Chief Complaint   Patient presents with   • Psuedotumor     patient had an eye exam yesterday by Dr sAa Garcia and it was noted that her visual field exam had changed as well as she had increased pressure in her head.  She is here today to discuss shunt tap.       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  She went to the ophthalmology office for her routine 3 month evaluation.  There is some discrepancy with her visual field test.  Dr. Garcia was very clear that there did not appear to be any significant changes in her optic nerve is exam.  The patient describes variety of blurred vision changes and trouble seeing at night.  She says her headaches of been stable and unchanged recently.  She still is complaining of galactorrhea she has seen a variety of endocrinologist both in [place] in [place].  She has had 2 MRIs of her brain in an effort to evaluate the pituitary.  She has had an extensive endocrine workup in the past.  Her most recent prolactin level in January 2019 apparently is elevated.    The following portions of the patient's history were reviewed and updated as appropriate: allergies, current medications, past family history, past medical history, past social history, past surgical history and problem list.    OBJECTIVE:    Review of Systems   Eyes: Positive for visual disturbance.   Neurological: Positive for headaches.   All other systems reviewed and are negative.         Physical Exam   Constitutional: She is oriented to person, place, and time. She appears well-developed and well-nourished.   HENT:   Head: Normocephalic and atraumatic.   Right Ear: Hearing normal.   Left Ear: Hearing normal.   Eyes: EOM are normal. Pupils are equal, round, and reactive to light.   Neck: Normal range of motion.   Neurological: She is alert and oriented to person, place, and time. She  has normal strength and normal reflexes. No cranial nerve deficit or sensory deficit. She displays a negative Romberg sign. GCS eye subscore is 4. GCS verbal subscore is 5. GCS motor subscore is 6. She displays no Babinski's sign on the right side. She displays no Babinski's sign on the left side.   Psychiatric: Her speech is normal. Judgment normal. Cognition and memory are normal.       Neurologic Exam     Mental Status   Oriented to person, place, and time.   Speech: speech is normal     Cranial Nerves     CN III, IV, VI   Pupils are equal, round, and reactive to light.  Extraocular motions are normal.     Motor Exam     Strength   Strength 5/5 throughout.       Independent Review of Radiographic Studies:       ASSESSMENT/PLAN:  1.  Pseudotumor.  Given the fact that Dr. Garcia is concerned about her peripheral vision we will tap her shunt and plan a nuclear medicine shunt study to assess patency and functioning of the shunt.    2.  Pituitary abnormalities.  2 prior MRIs of the brain do not report any abnormality of the pituitary gland.  Report her most recent prolactin level is 43 with 25 being the upper limit of normal.  She has had normal prolactin levels in the past.  I explained to her very patiently the treatment for a prolactinoma would be bromocriptine which is extremely successful in treating galactorrhea.  However this treatment would have to be directed by an endocrinologist.  I also explained to her that nothing on her previous 2 MRIs of the brain suggested that there is a pituitary tumor that would have been encroaching on her optic nerves.  I will repeat the MRI the pituitary and a CT scan of her brain to assess her complaints.      1. Pseudotumor cerebri    2. Pituitary microadenoma (CMS/HCC)    3. Smoker    4. BMI 33.0-33.9,adult            Return in about 2 weeks (around 2/12/2019) for test results w/DR HOLCOMB APPT ALREADY IN THE SYSTEM.      Joe Holcomb MD

## 2019-01-29 NOTE — TELEPHONE ENCOUNTER
Patient aware that the 2/5 appt is cx'd and we are seeing her 1/29/19 for new symptoms/complaints.    romero byrd CMA

## 2019-01-29 NOTE — PATIENT INSTRUCTIONS
PATIENT TO CONTINUE TO FOLLOW UP WITH HER PRIMARY CARE PROVIDER FOR YEARLY PHYSICAL EXAMS TO ENSURE COMPLETE HEALTH MAINTENANCE      BMI for Adults  Body mass index (BMI) is a number that is calculated from a person's weight and height. In most adults, the number is used to find how much of an adult's weight is made up of fat. BMI is not as accurate as a direct measure of body fat.  How is BMI calculated?  BMI is calculated by dividing weight in kilograms by height in meters squared. It can also be calculated by dividing weight in pounds by height in inches squared, then multiplying the resulting number by 703. Charts are available to help you find your BMI quickly and easily without doing this calculation.  How is BMI interpreted?  Health care professionals use BMI charts to identify whether an adult is underweight, at a normal weight, or overweight based on the following guidelines:  · Underweight: BMI less than 18.5.  · Normal weight: BMI between 18.5 and 24.9.  · Overweight: BMI between 25 and 29.9.  · Obese: BMI of 30 and above.    BMI is usually interpreted the same for males and females.  Weight includes both fat and muscle, so someone with a muscular build, such as an athlete, may have a BMI that is higher than 24.9. In cases like these, BMI may not accurately depict body fat. To determine if excess body fat is the cause of a BMI of 25 or higher, further assessments may need to be done by a health care provider.  Why is BMI a useful tool?  BMI is used to identify a possible weight problem that may be related to a medical problem or may increase the risk for medical problems. BMI can also be used to promote changes to reach a healthy weight.  This information is not intended to replace advice given to you by your health care provider. Make sure you discuss any questions you have with your health care provider.  Document Released: 08/29/2005 Document Revised: 04/27/2017 Document Reviewed: 05/15/2015  [place]  Interactive Patient Education © 2018 [employer]        Steps to Quit Smoking  Smoking tobacco can be harmful to your health and can affect almost every organ in your body. Smoking puts you, and those around you, at risk for developing many serious chronic diseases. Quitting smoking is difficult, but it is one of the best things that you can do for your health. It is never too late to quit.  What are the benefits of quitting smoking?  When you quit smoking, you lower your risk of developing serious diseases and conditions, such as:  · Lung cancer or lung disease, such as COPD.  · Heart disease.  · Stroke.  · Heart attack.  · Infertility.  · Osteoporosis and bone fractures.    Additionally, symptoms such as coughing, wheezing, and shortness of breath may get better when you quit. You may also find that you get sick less often because your body is stronger at fighting off colds and infections. If you are pregnant, quitting smoking can help to reduce your chances of having a baby of low birth weight.  How do I get ready to quit?  When you decide to quit smoking, create a plan to make sure that you are successful. Before you quit:  · Pick a date to quit. Set a date within the next two weeks to give you time to prepare.  · Write down the reasons why you are quitting. Keep this list in places where you will see it often, such as on your bathroom mirror or in your car or wallet.  · Identify the people, places, things, and activities that make you want to smoke (triggers) and avoid them. Make sure to take these actions:  ? Throw away all cigarettes at home, at work, and in your car.  ? Throw away smoking accessories, such as ashtrays and lighters.  ? Clean your car and make sure to empty the ashtray.  ? Clean your home, including curtains and carpets.  · Tell your family, friends, and coworkers that you are quitting. Support from your loved ones can make quitting easier.  · Talk with your health care provider about your  options for quitting smoking.  · Find out what treatment options are covered by your health insurance.    What strategies can I use to quit smoking?  Talk with your healthcare provider about different strategies to quit smoking. Some strategies include:  · Quitting smoking altogether instead of gradually lessening how much you smoke over a period of time. Research shows that quitting “cold turkey” is more successful than gradually quitting.  · Attending in-person counseling to help you build problem-solving skills. You are more likely to have success in quitting if you attend several counseling sessions. Even short sessions of 10 minutes can be effective.  · Finding resources and support systems that can help you to quit smoking and remain smoke-free after you quit. These resources are most helpful when you use them often. They can include:  ? Online chats with a counselor.  ? Telephone quitlines.  ? Printed self-help materials.  ? Support groups or group counseling.  ? Text messaging programs.  ? Mobile phone applications.  · Taking medicines to help you quit smoking. (If you are pregnant or breastfeeding, talk with your health care provider first.) Some medicines contain nicotine and some do not. Both types of medicines help with cravings, but the medicines that include nicotine help to relieve withdrawal symptoms. Your health care provider may recommend:  ? Nicotine patches, gum, or lozenges.  ? Nicotine inhalers or sprays.  ? Non-nicotine medicine that is taken by mouth.    Talk with your health care provider about combining strategies, such as taking medicines while you are also receiving in-person counseling. Using these two strategies together makes you more likely to succeed in quitting than if you used either strategy on its own.  If you are pregnant or breastfeeding, talk with your health care provider about finding counseling or other support strategies to quit smoking. Do not take medicine to help you  quit smoking unless told to do so by your health care provider.  What things can I do to make it easier to quit?  Quitting smoking might feel overwhelming at first, but there is a lot that you can do to make it easier. Take these important actions:  · Reach out to your family and friends and ask that they support and encourage you during this time. Call telephone quitlines, reach out to support groups, or work with a counselor for support.  · Ask people who smoke to avoid smoking around you.  · Avoid places that trigger you to smoke, such as bars, parties, or smoke-break areas at work.  · Spend time around people who do not smoke.  · Lessen stress in your life, because stress can be a smoking trigger for some people. To lessen stress, try:  ? Exercising regularly.  ? Deep-breathing exercises.  ? Yoga.  ? Meditating.  ? Performing a body scan. This involves closing your eyes, scanning your body from head to toe, and noticing which parts of your body are particularly tense. Purposefully relax the muscles in those areas.  · Download or purchase mobile phone or tablet apps (applications) that can help you stick to your quit plan by providing reminders, tips, and encouragement. There are many free apps, such as QuitGuide from the CDC (Centers for Disease Control and Prevention). You can find other support for quitting smoking (smoking cessation) through smokefree.gov and other websites.    How will I feel when I quit smoking?  Within the first 24 hours of quitting smoking, you may start to feel some withdrawal symptoms. These symptoms are usually most noticeable 2-3 days after quitting, but they usually do not last beyond 2-3 weeks. Changes or symptoms that you might experience include:  · Mood swings.  · Restlessness, anxiety, or irritation.  · Difficulty concentrating.  · Dizziness.  · Strong cravings for sugary foods in addition to nicotine.  · Mild weight gain.  · Constipation.  · Nausea.  · Coughing or a sore  throat.  · Changes in how your medicines work in your body.  · A depressed mood.  · Difficulty sleeping (insomnia).    After the first 2-3 weeks of quitting, you may start to notice more positive results, such as:  · Improved sense of smell and taste.  · Decreased coughing and sore throat.  · Slower heart rate.  · Lower blood pressure.  · Clearer skin.  · The ability to breathe more easily.  · Fewer sick days.    Quitting smoking is very challenging for most people. Do not get discouraged if you are not successful the first time. Some people need to make many attempts to quit before they achieve long-term success. Do your best to stick to your quit plan, and talk with your health care provider if you have any questions or concerns.  This information is not intended to replace advice given to you by your health care provider. Make sure you discuss any questions you have with your health care provider.  Document Released: 12/12/2002 Document Revised: 08/15/2017 Document Reviewed: 05/03/2016  Elsevier Interactive Patient Education © 2018 [employer].

## 2019-02-01 ENCOUNTER — HOSPITAL ENCOUNTER (OUTPATIENT)
Dept: NUCLEAR MEDICINE | Facility: HOSPITAL | Age: 29
Discharge: HOME | End: 2019-02-01
Attending: NEUROLOGICAL SURGERY

## 2019-02-01 DIAGNOSIS — G93.2 PSEUDOTUMOR CEREBRI: ICD-10-CM

## 2019-02-01 LAB
APPEARANCE CSF: CLEAR
COLOR CSF: COLORLESS
COLOR SPUN CSF: COLORLESS
GLUCOSE CSF-MCNC: 60 MG/DL (ref 40–70)
METHOD: ABNORMAL
NUC CELL # CSF MANUAL: 4 /MM3 (ref 0–8)
PROT CSF-MCNC: 48 MG/DL (ref 12–60)
RBC # CSF MANUAL: 6 /MM3 (ref 0–0)
SPECIMEN VOL CSF: 5 ML
TUBE # CSF: 1

## 2019-02-01 PROCEDURE — 87070 CULTURE OTHR SPECIMN AEROBIC: CPT | Performed by: NURSE PRACTITIONER

## 2019-02-01 PROCEDURE — 78645 CSF SHUNT EVALUATION: CPT | Performed by: NURSE PRACTITIONER

## 2019-02-01 PROCEDURE — 84157 ASSAY OF PROTEIN OTHER: CPT | Performed by: NURSE PRACTITIONER

## 2019-02-01 PROCEDURE — A9540 TC99M MAA: HCPCS | Performed by: NEUROLOGICAL SURGERY

## 2019-02-01 PROCEDURE — 62272 THER SPI PNXR DRG CSF: CPT | Performed by: NURSE PRACTITIONER

## 2019-02-01 PROCEDURE — 87015 SPECIMEN INFECT AGNT CONCNTJ: CPT | Performed by: NURSE PRACTITIONER

## 2019-02-01 PROCEDURE — 87205 SMEAR GRAM STAIN: CPT | Performed by: NURSE PRACTITIONER

## 2019-02-01 PROCEDURE — 83605 ASSAY OF LACTIC ACID: CPT | Performed by: NURSE PRACTITIONER

## 2019-02-01 PROCEDURE — 78645 CSF SHUNT EVALUATION: CPT

## 2019-02-01 PROCEDURE — 89050 BODY FLUID CELL COUNT: CPT | Performed by: NURSE PRACTITIONER

## 2019-02-01 PROCEDURE — 82945 GLUCOSE OTHER FLUID: CPT | Performed by: NURSE PRACTITIONER

## 2019-02-01 PROCEDURE — 0 TECHNETIUM ALBUMIN AGGREGATED: Performed by: NEUROLOGICAL SURGERY

## 2019-02-01 RX ADMIN — Medication 1 DOSE: at 11:30

## 2019-02-01 NOTE — PROCEDURES
The patient was brought into the nuclear med room where she was laid supine.  The shunt reservoir was identified in the right temporal region.  The patient was draped in a sterile fashion and the skin was cleansed using a Betadine solution.  A 25-gauge butterfly needle was inserted into the shunt reservoir.  There was not brisk return of CSF. However, I had the patient cough which did produce clear CSF in the tubing, giving an opening pressure of 3. Gentle aspiration was applied and 5mL of CSf was withdrawn.  The patient did have good variation with coughing and respirations.    Proximal and distal flow was checked and was found to be appropriate.  Closing pressure was 0. I then did inject the radioisotope followed by half an mL of CSF fluid.  The distal tubing was occluded while injecting the radioisotope.  Once the isotope was injected I did withdraw the butterfly needle.  There is 0 mL's of blood loss.  The patient tolerated procedure.  She then proceeded with the rest of the nuclear med shunt study.  Needles were removed from the tray and placed in the sharps container. Csf specimens was sent to lab          Ean Treviño NP

## 2019-02-02 ENCOUNTER — HOSPITAL ENCOUNTER (EMERGENCY)
Facility: HOSPITAL | Age: 29
Discharge: HOME | End: 2019-02-02
Attending: EMERGENCY MEDICINE | Admitting: EMERGENCY MEDICINE

## 2019-02-02 ENCOUNTER — NURSE TRIAGE (OUTPATIENT)
Dept: CALL CENTER | Facility: HOSPITAL | Age: 29
End: 2019-02-02

## 2019-02-02 VITALS
SYSTOLIC BLOOD PRESSURE: 136 MMHG | RESPIRATION RATE: 16 BRPM | DIASTOLIC BLOOD PRESSURE: 83 MMHG | OXYGEN SATURATION: 97 % | WEIGHT: 183 LBS | HEIGHT: 62 IN | BODY MASS INDEX: 33.68 KG/M2 | HEART RATE: 83 BPM | TEMPERATURE: 97.6 F

## 2019-02-02 DIAGNOSIS — L50.9 URTICARIA: ICD-10-CM

## 2019-02-02 DIAGNOSIS — R21 RASH AND NONSPECIFIC SKIN ERUPTION: Primary | ICD-10-CM

## 2019-02-02 LAB — LACTIC ACID, CSF: 16 MG/DL (ref 10–22)

## 2019-02-02 PROCEDURE — 99283 EMERGENCY DEPT VISIT LOW MDM: CPT

## 2019-02-02 PROCEDURE — 96372 THER/PROPH/DIAG INJ SC/IM: CPT

## 2019-02-02 PROCEDURE — 25010000002 DIPHENHYDRAMINE PER 50 MG: Performed by: EMERGENCY MEDICINE

## 2019-02-02 RX ORDER — DIPHENHYDRAMINE HYDROCHLORIDE 50 MG/ML
50 INJECTION INTRAMUSCULAR; INTRAVENOUS EVERY 6 HOURS PRN
Status: DISCONTINUED | OUTPATIENT
Start: 2019-02-02 | End: 2019-02-03 | Stop reason: HOSPADM

## 2019-02-02 RX ORDER — FAMOTIDINE 20 MG/1
20 TABLET, FILM COATED ORAL ONCE
Status: COMPLETED | OUTPATIENT
Start: 2019-02-02 | End: 2019-02-02

## 2019-02-02 RX ADMIN — FAMOTIDINE 20 MG: 20 TABLET, FILM COATED ORAL at 22:31

## 2019-02-02 RX ADMIN — DIPHENHYDRAMINE HYDROCHLORIDE 50 MG: 50 INJECTION, SOLUTION INTRAMUSCULAR; INTRAVENOUS at 22:31

## 2019-02-03 NOTE — ED PROVIDER NOTES
Subjective   This is a 28-year-old female who presents to the emergency department for evaluation of a pruritic skin rash on her lower extremities.  Onset was earlier today.  No open wounds.  No blistered lesions.  Minimal relief with oral over-the-counter Benadryl.  Patient does have a history of pseudotumor cerebri and does have a  shunt.  She did have a both diagnostic and therapeutic shunt tap yesterday.  CSF was taken off but they also did a shunt series with dye injection to assess for patency.  Patient indicates that she has had shunt injections before and believes that they used the same dye before.  For the last approximately 2 weeks she has had lesions in her right axilla.  She states it was misdiagnosed as shingles and she was started on acyclovir.  This made her skin lesions worse.  She was then told that it was likely a staph infection.  She got a shot of Rocephin and was started on Bactrim.  This has since been healing.  No new medications otherwise.  No other new exposure.  She describes a feeling of tightness in the throat, shortness of breath, and bdominal pain today as well.  No fever or shaking chills.  She is unsure if she is wheezing.  No cough.  No chest pain or heart racing.  No nausea or vomiting.  No stool or urine changes.  Review of systems otherwise negative.  She has no additional complaints.            Review of Systems   All other systems reviewed and are negative.      Past Medical History:   Diagnosis Date   • Depression with anxiety 12/9/2018   • Intracranial hypertension    • Kidney stones    • Pseudotumor cerebri 9/14/2016   • Tobacco abuse 9/21/2016       Allergies   Allergen Reactions   • Morphine And Related Shortness Of Breath   • Other Other (See Comments)     Other reaction(s): Other (See Comments)  Dissolvable sutures. She developed an abscess from the last ones. Ended up in ER.   Dissolvable sutures. She developed an abscess from the last ones. Ended up in ER.    •  Morphine Itching   • Demerol [Meperidine] Rash       Past Surgical History:   Procedure Laterality Date   • CYSTOSCOPY ELECTROHYDRAULIC LITHOTRIPSY     • OTHER SURGICAL HISTORY      Intracranial shunt   •  SHUNT INSERTION  11/2015       Family History   Problem Relation Age of Onset   • Prolactinoma Neg Hx        Social History     Socioeconomic History   • Marital status: Single     Spouse name: Not on file   • Number of children: 1   • Years of education: Not on file   • Highest education level: Not on file   Tobacco Use   • Smoking status: Current Every Day Smoker     Types: Cigarettes   • Smokeless tobacco: Never Used   • Tobacco comment: 5 CIGARETTS/DAY   Substance and Sexual Activity   • Alcohol use: No   • Drug use: No   • Sexual activity: Defer           Objective   Physical Exam   Constitutional: She appears well-developed and well-nourished.   HENT:   Mouth/Throat: Oropharynx is clear and moist. No oropharyngeal exudate.   Eyes: EOM are normal. Pupils are equal, round, and reactive to light.   Neck: Normal range of motion. Neck supple. No JVD present. No tracheal deviation present.   Cardiovascular: Normal rate, regular rhythm and normal heart sounds.   Pulmonary/Chest: Effort normal and breath sounds normal. No respiratory distress. She has no wheezes. She exhibits no tenderness.   Abdominal: Soft. Bowel sounds are normal. She exhibits no distension. There is no tenderness. There is no guarding.   Musculoskeletal: She exhibits no edema or deformity.   Neurological: She is alert.   Skin: Skin is warm and dry. Capillary refill takes less than 2 seconds.   On the distal inner thighs, medial knees, and lower legs are well demarcated areas of blanching erythema consistent with urticaria.  No vesicles, bullae, or petechiae.  No evidence for folliculitis.  No palmar or mucosal lesions.   Psychiatric: She has a normal mood and affect. Her behavior is normal.   Nursing note and vitals  reviewed.      Procedures           ED Course      Patient is refusing any diagnostic testing whatsoever  We discussed etiologies of shortness of breath as well as abdominal pain  She is declining any labs or imaging  She is refusing steroids  She does not want oral Benadryl or IV Benadryl  She is willing to accept IM Benadryl and a dose of oral Pepcid            MDM  Patient is refusing any diagnostic testing  Exam consistent with urticaria  She is refusing oral or IM steroids given her history of pseudotumor cerebri  Patient is upset that I am not able to tell her the exact indistinct etiology of what may have caused her urticaria  I tried to reassure her that this does not appear to be infectious and there is no evidence for anaphylaxis or angioedema  Patient does not know what if any interventions she is comfortable with  I did discuss writing her a prescription for topical steroids but she is now upset that her preferred pharmacy is closed  She is willing to accept IM Benadryl and a dose of oral Pepcid  Outpatient follow-up with dermatology encouraged      Final diagnoses:   Rash and nonspecific skin eruption   Urticaria            Raymond Munoz, DO  02/03/19 0006

## 2019-02-03 NOTE — DISCHARGE INSTRUCTIONS
Please read and follow all directions.  Tylenol or ibuprofen for discomfort as needed.  Benadryl for itching.  Use steroid cream for skin rash.  Take all home medications as previously prescribed.  Please contact your primary care provider and a dermatologist in 2-3 days to arrange for outpatient follow-up.  Return to the emergency department sooner if symptoms worsen or for any additional concerns.

## 2019-02-03 NOTE — TELEPHONE ENCOUNTER
"Caller reporting hives all over and during assessment states she is feeling like her tongue is having some swelling. She denies having taken any benadryl or other OTC treatment. Caller denies respiratory distress at this time. She is advised per guideline.      Reason for Disposition  • Swollen tongue    Additional Information  • Negative: [1] Life-threatening reaction (anaphylaxis) in the past to similar substance (e.g., food, insect bite/sting, chemical, etc.) AND [2] < 2 hours since exposure  • Negative: Difficulty breathing or wheezing now  • Negative: [1] Swollen tongue AND [2] rapid onset  • Negative: [1] Hoarseness or cough now AND [2] rapid onset  • Negative: Shock suspected (e.g., cold/pale/clammy skin, too weak to stand, low BP, rapid pulse)  • Negative: Difficult to awaken or acting confused (e.g., disoriented, slurred speech)  • Negative: Sounds like a life-threatening emergency to the triager  • Negative: [1] Bee, wasp, or yellow jacket sting AND [2] within last 24 hours  • Negative: Blood-colored, dark red or purple rash  • Negative: [1] Drug rash suspected AND [2] started taking new medicine within last 2 weeks(Exception: antihistamine, eye drops, ear drops, decongestant or other OTC cough/cold medicines)  • Negative: Doesn't match the SYMPTOMS of hives    Answer Assessment - Initial Assessment Questions  1. APPEARANCE: \"What does the rash look like?\"       States hives  2. LOCATION: \"Where is the rash located?\"        Started on leg's   3. NUMBER: \"How many hives are there?\"      \"numerous and all over my body\"  4. SIZE: \"How big are the hives?\" (inches, cm, compare to coins) \"Do they all look the same or is there lots of variation in shape and size?\"       Some bigger then others  5. ONSET: \"When did the hives begin?\" (Hours or days ago)       This morning  6. ITCHING: \"Does it itch?\" If so, ask: \"How bad is the itch?\"     - MILD: doesn't interfere with normal activities    - MODERATE-SEVERE: " "interferes with work, school, sleep, or other activities       Severe   7. RECURRENT PROBLEM: \"Have you had hives before?\" If so, ask: \"When was the last time?\" and \"What happened that time?\"       Denies   8. TRIGGERS: \"Were you exposed to any new food, plant, cosmetic product or animal just before the hives began?\"      Unsure   9. OTHER SYMPTOMS: \"Do you have any other symptoms?\" (e.g., fever, tongue swelling, difficulty breathing, abdominal pain)      Denies   10. PREGNANCY: \"Is there any chance you are pregnant?\" \"When was your last menstrual period?\"        Denies    Protocols used: HIVES-ADULT-      "

## 2019-02-04 ENCOUNTER — HOSPITAL ENCOUNTER (OUTPATIENT)
Dept: CT IMAGING | Facility: HOSPITAL | Age: 29
Discharge: HOME | End: 2019-02-04
Attending: NEUROLOGICAL SURGERY | Admitting: NEUROLOGICAL SURGERY

## 2019-02-04 ENCOUNTER — HOSPITAL ENCOUNTER (OUTPATIENT)
Dept: MRI IMAGING | Facility: HOSPITAL | Age: 29
Discharge: HOME | End: 2019-02-04
Attending: NEUROLOGICAL SURGERY

## 2019-02-04 DIAGNOSIS — D35.2 PITUITARY MICROADENOMA (HCC): ICD-10-CM

## 2019-02-04 DIAGNOSIS — G93.2 PSEUDOTUMOR CEREBRI: ICD-10-CM

## 2019-02-04 LAB — CREAT BLDA-MCNC: 0.8 MG/DL (ref 0.6–1.3)

## 2019-02-04 PROCEDURE — 70470 CT HEAD/BRAIN W/O & W/DYE: CPT

## 2019-02-04 PROCEDURE — 0 GADOBENATE DIMEGLUMINE 529 MG/ML SOLUTION: Performed by: NEUROLOGICAL SURGERY

## 2019-02-04 PROCEDURE — 25010000002 IOPAMIDOL 61 % SOLUTION: Performed by: NEUROLOGICAL SURGERY

## 2019-02-04 PROCEDURE — A9577 INJ MULTIHANCE: HCPCS | Performed by: NEUROLOGICAL SURGERY

## 2019-02-04 PROCEDURE — 70553 MRI BRAIN STEM W/O & W/DYE: CPT

## 2019-02-04 PROCEDURE — 82565 ASSAY OF CREATININE: CPT

## 2019-02-04 RX ADMIN — GADOBENATE DIMEGLUMINE 17 ML: 529 INJECTION, SOLUTION INTRAVENOUS at 07:04

## 2019-02-04 RX ADMIN — IOPAMIDOL 100 ML: 612 INJECTION, SOLUTION INTRAVENOUS at 07:25

## 2019-02-05 ENCOUNTER — OFFICE VISIT (OUTPATIENT)
Dept: NEUROSURGERY | Facility: CLINIC | Age: 29
End: 2019-02-05

## 2019-02-05 VITALS
HEIGHT: 62 IN | SYSTOLIC BLOOD PRESSURE: 128 MMHG | DIASTOLIC BLOOD PRESSURE: 76 MMHG | WEIGHT: 180 LBS | BODY MASS INDEX: 33.13 KG/M2

## 2019-02-05 DIAGNOSIS — G93.2 PSEUDOTUMOR CEREBRI: Primary | ICD-10-CM

## 2019-02-05 DIAGNOSIS — F17.200 SMOKER: ICD-10-CM

## 2019-02-05 PROCEDURE — 99214 OFFICE O/P EST MOD 30 MIN: CPT | Performed by: NEUROLOGICAL SURGERY

## 2019-02-05 RX ORDER — FLUCONAZOLE 150 MG/1
TABLET ORAL
COMMUNITY
End: 2019-05-30

## 2019-02-05 NOTE — PROGRESS NOTES
SUBJECTIVE:  Patient ID: Kelsey Mcqueen is a 28 y.o. female is here today for follow-up.    Chief Complaint: Pseudotumor  Chief Complaint   Patient presents with   • Pseudotumor cerebri     patient is here for discussion of her test results - patient had nuclear medicine shunt tap on 2/1/19 and MRI pituitary with CT head on 2/4/19 @ [facility].       HPI  28-year-old female that we follow for pseudotumor cerebri as well as galactorrhea and elevated prolactin level.  Was also seen here for back pain in the past.  We tapped her shunt and did a nuclear medicine study along with a CT scan and MRI to workup her latest issue of galactorrhea and some vision changes per her ophthalmologist.  She is here to discuss the results.    The following portions of the patient's history were reviewed and updated as appropriate: allergies, current medications, past family history, past medical history, past social history, past surgical history and problem list.    OBJECTIVE:    Review of Systems   Eyes: Positive for visual disturbance.   Neurological: Positive for headaches.   All other systems reviewed and are negative.         Physical Exam   Constitutional: She is oriented to person, place, and time. She appears well-developed and well-nourished.   HENT:   Head: Normocephalic and atraumatic.   Right Ear: Hearing normal.   Left Ear: Hearing normal.   Eyes: EOM are normal. Pupils are equal, round, and reactive to light.   Neck: Normal range of motion.   Neurological: She is alert and oriented to person, place, and time. She has normal strength and normal reflexes. No cranial nerve deficit or sensory deficit. She displays a negative Romberg sign. GCS eye subscore is 4. GCS verbal subscore is 5. GCS motor subscore is 6. She displays no Babinski's sign on the right side. She displays no Babinski's sign on the left side.   Psychiatric: Her speech is normal. Judgment normal. Cognition and memory are normal.       Neurologic Exam     Mental  Status   Oriented to person, place, and time.   Speech: speech is normal     Cranial Nerves     CN III, IV, VI   Pupils are equal, round, and reactive to light.  Extraocular motions are normal.     Motor Exam     Strength   Strength 5/5 throughout.       Independent Review of Radiographic Studies:   Nuclear medicine shunt study showed a patent ventriculoperitoneal shunt system CT scan of the brain essentially was unremarkable.  MRI of the brain with and without contrast with attention to the pituitary also was unremarkable    ASSESSMENT/PLAN:  The patient had an opening pressure on shunt tap of 3 with good proximal distal flow.  The nuclear medicine study showed a patent shunting system.  I have no reason to think at this point that the shunt has malfunctioned.  MRI of her pituitary gland was unremarkable per the radiologist's assessment.  I do not have a radiographic explanation for her slightly elevated prolactin level and her galactorrhea.  Nor do I have an explanation for her vision changes right now.  She is to follow-up with Dr. Mott her gynecologist.  I did offer the patient and her mother to send him to a higher level care for a second opinion.  I will  communicate with Dr. Garcia her ophthalmologist R findings.  I will see her in follow-up in 1 year.      1. Pseudotumor cerebri    2. BMI 32.0-32.9,adult    3. Smoker            Return in about 1 year (around 2/5/2020) for follow up w/DR HOLCOMB.      Joe Holcomb MD

## 2019-02-08 NOTE — ED NOTES
"ED Call Back Questions    1. How are you doing since leaving the Emergency Department?  im good    2. Do you have any questions about your discharge instructions? No     3. Have you filled your new prescriptions yet? Yes   a. Do you have any questions about those medications? No     4. Were you able to make a follow-up appointment with the physician? Yes     5. Do you have a primary care physician? Yes   a. If No, would you like for me to set you up with one? N/A  i. If Yes, “I will have our ED  give you a call right back at this number to work with you on the best time for an appointment.”    6. We are always looking to get better at what we do. Do you have any suggestions for what we can do to be even better? N/A  a. If Yes, \"Thank you for sharing your concerns. I apologize. I will follow up with our manager and patient . Would you like someone to call you back?\" N/A    7. Is there anything else I can do for you? N/A visit Lex Zamora  02/08/19 [id number]    "

## 2019-02-09 ENCOUNTER — HOSPITAL ENCOUNTER (EMERGENCY)
Facility: HOSPITAL | Age: 29
Discharge: HOME | End: 2019-02-10
Attending: EMERGENCY MEDICINE | Admitting: EMERGENCY MEDICINE

## 2019-02-09 DIAGNOSIS — N30.01 ACUTE CYSTITIS WITH HEMATURIA: Primary | ICD-10-CM

## 2019-02-09 LAB
ALBUMIN SERPL-MCNC: 3.6 G/DL (ref 3.5–5)
ALBUMIN/GLOB SERPL: 1.4 G/DL (ref 1.1–2.5)
ALP SERPL-CCNC: 67 U/L (ref 24–120)
ALT SERPL W P-5'-P-CCNC: 21 U/L (ref 0–54)
ANION GAP SERPL CALCULATED.3IONS-SCNC: 10 MMOL/L (ref 4–13)
AST SERPL-CCNC: 24 U/L (ref 7–45)
BACTERIA UR QL AUTO: ABNORMAL /HPF
BASOPHILS # BLD AUTO: 0.04 10*3/MM3 (ref 0–0.2)
BASOPHILS NFR BLD AUTO: 0.5 % (ref 0–2)
BILIRUB SERPL-MCNC: 0.2 MG/DL (ref 0.1–1)
BILIRUB UR QL STRIP: NEGATIVE
BUN BLD-MCNC: 16 MG/DL (ref 5–21)
BUN/CREAT SERPL: 22.2 (ref 7–25)
CALCIUM SPEC-SCNC: 8.3 MG/DL (ref 8.4–10.4)
CHLORIDE SERPL-SCNC: 105 MMOL/L (ref 98–110)
CLARITY UR: ABNORMAL
CO2 SERPL-SCNC: 24 MMOL/L (ref 24–31)
COLOR UR: YELLOW
CREAT BLD-MCNC: 0.72 MG/DL (ref 0.5–1.4)
DEPRECATED RDW RBC AUTO: 38.4 FL (ref 40–54)
EOSINOPHIL # BLD AUTO: 0.45 10*3/MM3 (ref 0–0.7)
EOSINOPHIL NFR BLD AUTO: 5.7 % (ref 0–4)
ERYTHROCYTE [DISTWIDTH] IN BLOOD BY AUTOMATED COUNT: 11.7 % (ref 12–15)
GFR SERPL CREATININE-BSD FRML MDRD: 97 ML/MIN/1.73
GLOBULIN UR ELPH-MCNC: 2.6 GM/DL
GLUCOSE BLD-MCNC: 123 MG/DL (ref 70–100)
GLUCOSE UR STRIP-MCNC: NEGATIVE MG/DL
HCG SERPL QL: NEGATIVE
HCT VFR BLD AUTO: 34.7 % (ref 37–47)
HGB BLD-MCNC: 12.1 G/DL (ref 12–16)
HGB UR QL STRIP.AUTO: ABNORMAL
HYALINE CASTS UR QL AUTO: ABNORMAL /LPF
IMM GRANULOCYTES # BLD AUTO: 0.03 10*3/MM3 (ref 0–0.03)
IMM GRANULOCYTES NFR BLD AUTO: 0.4 % (ref 0–5)
KETONES UR QL STRIP: NEGATIVE
LEUKOCYTE ESTERASE UR QL STRIP.AUTO: ABNORMAL
LYMPHOCYTES # BLD AUTO: 1.99 10*3/MM3 (ref 0.72–4.86)
LYMPHOCYTES NFR BLD AUTO: 25.1 % (ref 15–45)
MCH RBC QN AUTO: 31.3 PG (ref 28–32)
MCHC RBC AUTO-ENTMCNC: 34.9 G/DL (ref 33–36)
MCV RBC AUTO: 89.9 FL (ref 82–98)
MONOCYTES # BLD AUTO: 0.38 10*3/MM3 (ref 0.19–1.3)
MONOCYTES NFR BLD AUTO: 4.8 % (ref 4–12)
NEUTROPHILS # BLD AUTO: 5.03 10*3/MM3 (ref 1.87–8.4)
NEUTROPHILS NFR BLD AUTO: 63.5 % (ref 39–78)
NITRITE UR QL STRIP: NEGATIVE
NRBC BLD AUTO-RTO: 0 /100 WBC (ref 0–0)
PH UR STRIP.AUTO: <=5 [PH] (ref 5–8)
PLATELET # BLD AUTO: 224 10*3/MM3 (ref 130–400)
PMV BLD AUTO: 10.1 FL (ref 6–12)
POTASSIUM BLD-SCNC: 3.7 MMOL/L (ref 3.5–5.3)
PROT SERPL-MCNC: 6.2 G/DL (ref 6.3–8.7)
PROT UR QL STRIP: NEGATIVE
RBC # BLD AUTO: 3.86 10*6/MM3 (ref 4.2–5.4)
RBC # UR: ABNORMAL /HPF
REF LAB TEST METHOD: ABNORMAL
SODIUM BLD-SCNC: 139 MMOL/L (ref 135–145)
SP GR UR STRIP: 1.02 (ref 1–1.03)
SQUAMOUS #/AREA URNS HPF: ABNORMAL /HPF
UROBILINOGEN UR QL STRIP: ABNORMAL
WBC NRBC COR # BLD: 7.92 10*3/MM3 (ref 4.8–10.8)
WBC UR QL AUTO: ABNORMAL /HPF

## 2019-02-09 PROCEDURE — 96375 TX/PRO/DX INJ NEW DRUG ADDON: CPT

## 2019-02-09 PROCEDURE — 25010000002 ONDANSETRON PER 1 MG: Performed by: EMERGENCY MEDICINE

## 2019-02-09 PROCEDURE — 87086 URINE CULTURE/COLONY COUNT: CPT | Performed by: EMERGENCY MEDICINE

## 2019-02-09 PROCEDURE — 84703 CHORIONIC GONADOTROPIN ASSAY: CPT | Performed by: EMERGENCY MEDICINE

## 2019-02-09 PROCEDURE — 99284 EMERGENCY DEPT VISIT MOD MDM: CPT

## 2019-02-09 PROCEDURE — 80053 COMPREHEN METABOLIC PANEL: CPT | Performed by: EMERGENCY MEDICINE

## 2019-02-09 PROCEDURE — 85025 COMPLETE CBC W/AUTO DIFF WBC: CPT | Performed by: EMERGENCY MEDICINE

## 2019-02-09 PROCEDURE — 81001 URINALYSIS AUTO W/SCOPE: CPT | Performed by: EMERGENCY MEDICINE

## 2019-02-09 PROCEDURE — 96374 THER/PROPH/DIAG INJ IV PUSH: CPT

## 2019-02-09 PROCEDURE — 25010000002 HYDROMORPHONE PER 4 MG: Performed by: EMERGENCY MEDICINE

## 2019-02-09 RX ORDER — HYDROMORPHONE HYDROCHLORIDE 1 MG/ML
0.5 INJECTION, SOLUTION INTRAMUSCULAR; INTRAVENOUS; SUBCUTANEOUS ONCE
Status: COMPLETED | OUTPATIENT
Start: 2019-02-09 | End: 2019-02-09

## 2019-02-09 RX ORDER — ONDANSETRON 2 MG/ML
4 INJECTION INTRAMUSCULAR; INTRAVENOUS ONCE
Status: COMPLETED | OUTPATIENT
Start: 2019-02-09 | End: 2019-02-09

## 2019-02-09 RX ADMIN — HYDROMORPHONE HYDROCHLORIDE 0.5 MG: 1 INJECTION, SOLUTION INTRAMUSCULAR; INTRAVENOUS; SUBCUTANEOUS at 23:16

## 2019-02-09 RX ADMIN — ONDANSETRON HYDROCHLORIDE 4 MG: 2 INJECTION INTRAMUSCULAR; INTRAVENOUS at 23:15

## 2019-02-10 VITALS
DIASTOLIC BLOOD PRESSURE: 67 MMHG | HEART RATE: 72 BPM | BODY MASS INDEX: 34.96 KG/M2 | TEMPERATURE: 98.1 F | RESPIRATION RATE: 16 BRPM | SYSTOLIC BLOOD PRESSURE: 105 MMHG | OXYGEN SATURATION: 96 % | HEIGHT: 62 IN | WEIGHT: 190 LBS

## 2019-02-10 RX ORDER — CEFDINIR 300 MG/1
300 CAPSULE ORAL 2 TIMES DAILY
Qty: 10 CAPSULE | Refills: 0 | Status: SHIPPED | OUTPATIENT
Start: 2019-02-10 | End: 2019-02-15

## 2019-02-10 NOTE — ED NOTES
[facility] contacted for imaging results     Kizzy Ford RN  02/09/19 0717

## 2019-02-10 NOTE — ED PROVIDER NOTES
Subjective   History of Present Illness    28-year-old female presenting with left flank pain.    Patient was onset of symptoms earlier today, constant, no triggers or alleviating factors. Similar to previous nephrolithiasis pain. Patient has not been able to control the pain at home. Patient presented to her local emergency department earlier today. Per report, a CAT scan there, did not show any kidney stone. A pelvic ultrasound did show some ovarian cysts and some free fluid in the pelvis. Patient was discharged, but reportedly patient was told to come to this emergency department for further evaluation.    Patient at this time. Does report nausea but no vomiting, continues to have left flank and pelvic pain.    Patient denies any associated fevers, diarrhea, chest pain, trouble breathing.    Review of Systems   Constitutional: Negative for chills and fever.   HENT: Negative for sinus pain.    Eyes: Negative for pain.   Respiratory: Negative for shortness of breath.    Cardiovascular: Negative for chest pain.   Gastrointestinal: Positive for nausea. Negative for abdominal pain.   Genitourinary: Positive for flank pain and pelvic pain.   Musculoskeletal: Negative for back pain.   Skin: Negative for wound.   Neurological: Negative for syncope and weakness.   Psychiatric/Behavioral: The patient is not hyperactive.        Past Medical History:   Diagnosis Date   • Depression with anxiety 12/9/2018   • Intracranial hypertension    • Kidney stones    • Lactation disorder    • Pseudotumor cerebri 9/14/2016   • Tobacco abuse 9/21/2016       Allergies   Allergen Reactions   • Morphine And Related Shortness Of Breath   • Other Other (See Comments)     Other reaction(s): Other (See Comments)  Dissolvable sutures. She developed an abscess from the last ones. Ended up in ER.   Dissolvable sutures. She developed an abscess from the last ones. Ended up in ER.    • Morphine Itching   • Demerol [Meperidine] Rash       Past  Surgical History:   Procedure Laterality Date   • CYSTOSCOPY ELECTROHYDRAULIC LITHOTRIPSY     • KIDNEY STONE SURGERY     • OTHER SURGICAL HISTORY      Intracranial shunt   •  SHUNT INSERTION  11/2015       Family History   Problem Relation Age of Onset   • Prolactinoma Neg Hx        Social History     Socioeconomic History   • Marital status: Single     Spouse name: Not on file   • Number of children: 1   • Years of education: Not on file   • Highest education level: Not on file   Tobacco Use   • Smoking status: Current Every Day Smoker     Packs/day: 0.50     Types: Cigarettes   • Smokeless tobacco: Never Used   • Tobacco comment: 5 CIGARETTS/DAY   Substance and Sexual Activity   • Alcohol use: No   • Drug use: No   • Sexual activity: Defer           Objective   Physical Exam   Constitutional: She is oriented to person, place, and time. She appears well-developed and well-nourished.   HENT:   Head: Normocephalic and atraumatic.   Mouth/Throat: Oropharynx is clear and moist.   Eyes: Conjunctivae are normal.   Neck: Normal range of motion.   Cardiovascular: Normal rate, regular rhythm and intact distal pulses.   Pulmonary/Chest: Effort normal and breath sounds normal. No respiratory distress.   Abdominal: Soft. Bowel sounds are normal. She exhibits no distension. There is tenderness.   Bilateral lower abdominal tenderness.  Left CVA tenderness.  No rebound or guarding   Musculoskeletal: She exhibits no edema.   Neurological: She is alert and oriented to person, place, and time.   Skin: Skin is warm and dry. Capillary refill takes less than 2 seconds.   Psychiatric: She has a normal mood and affect.   Nursing note and vitals reviewed.      Procedures           ED Course  ED Course as of Feb 10 0634   Sun Feb 10, 2019   0030 Upon review of imaging at the outside hospital today. Patient had a CT scan of the abdomen and pelvis without any obstructing kidney stones. A pelvic ultrasound was also done that did not show  any ovarian cyst. Patient will be treated for a UTI. Discharged with urology follow-up this week.  [NR]      ED Course User Index  [NR] Patrick Mario MD                  MDM  Number of Diagnoses or Management Options  Acute cystitis with hematuria:   Diagnosis management comments: 28-year-old female presenting with left flank pain. Pelvic pain. Workup at outside emergency department, reportedly with a negative CT of the abdomen and pelvis and ovarian cysts on a transvaginal ultrasound. We will attempt to get the records from the outside hospital. We will repeat basic blood work and urinalysis here. Patient is overall well-appearing with normal vital signs.       Amount and/or Complexity of Data Reviewed  Clinical lab tests: reviewed          Final diagnoses:   Acute cystitis with hematuria            Patrick Mario MD  02/10/19 [id number]

## 2019-02-11 LAB
BACTERIA SPEC AEROBE CULT: NO GROWTH
BACTERIA SPEC AEROBE CULT: NORMAL
GRAM STN SPEC: NORMAL

## 2019-02-18 NOTE — ED NOTES
"ED Call Back Questions    1. How are you doing since leaving the Emergency Department?  Feel better    2. Do you have any questions about your discharge instructions? No     3. Have you filled your new prescriptions yet? Yes   a. Do you have any questions about those medications? N/A    4. Were you able to make a follow-up appointment with the physician? Yes     5. Do you have a primary care physician? Yes   a. If No, would you like for me to set you up with one? N/A  i. If Yes, “I will have our ED  give you a call right back at this number to work with you on the best time for an appointment.”    6. We are always looking to get better at what we do. Do you have any suggestions for what we can do to be even better? N/A  a. If Yes, \"Thank you for sharing your concerns. I apologize. I will follow up with our manager and patient . Would you like someone to call you back?\" N/A    7. Is there anything else I can do for you? N/A good visit        Lex Lundy  02/18/19 [id number]    "

## 2019-05-02 DIAGNOSIS — F41.9 ANXIETY AND DEPRESSION: ICD-10-CM

## 2019-05-02 DIAGNOSIS — F32.A ANXIETY AND DEPRESSION: ICD-10-CM

## 2019-05-02 RX ORDER — ESCITALOPRAM OXALATE 20 MG/1
20 TABLET ORAL DAILY
Qty: 30 TABLET | Refills: 11 | OUTPATIENT
Start: 2019-05-02

## 2019-05-03 DIAGNOSIS — F41.9 ANXIETY AND DEPRESSION: ICD-10-CM

## 2019-05-03 DIAGNOSIS — F32.A ANXIETY AND DEPRESSION: ICD-10-CM

## 2019-05-03 RX ORDER — ESCITALOPRAM OXALATE 20 MG/1
TABLET ORAL
Qty: 30 TABLET | Refills: 4 | OUTPATIENT
Start: 2019-05-03

## 2019-05-07 ENCOUNTER — OUTSIDE FACILITY SERVICE (OUTPATIENT)
Dept: CARDIOLOGY | Facility: CLINIC | Age: 29
End: 2019-05-07

## 2019-05-07 PROCEDURE — 93306 TTE W/DOPPLER COMPLETE: CPT | Performed by: INTERNAL MEDICINE

## 2019-05-30 ENCOUNTER — OFFICE VISIT (OUTPATIENT)
Dept: CARDIOLOGY | Facility: CLINIC | Age: 29
End: 2019-05-30

## 2019-05-30 VITALS
SYSTOLIC BLOOD PRESSURE: 106 MMHG | WEIGHT: 188 LBS | BODY MASS INDEX: 34.6 KG/M2 | HEART RATE: 74 BPM | HEIGHT: 62 IN | DIASTOLIC BLOOD PRESSURE: 71 MMHG

## 2019-05-30 DIAGNOSIS — Z72.0 TOBACCO ABUSE: ICD-10-CM

## 2019-05-30 DIAGNOSIS — I82.622 ACUTE DEEP VEIN THROMBOSIS (DVT) OF BRACHIAL VEIN OF LEFT UPPER EXTREMITY (HCC): Primary | ICD-10-CM

## 2019-05-30 DIAGNOSIS — R93.1 ABNORMAL ECHOCARDIOGRAM: ICD-10-CM

## 2019-05-30 PROCEDURE — 99204 OFFICE O/P NEW MOD 45 MIN: CPT | Performed by: INTERNAL MEDICINE

## 2019-05-30 PROCEDURE — 93000 ELECTROCARDIOGRAM COMPLETE: CPT | Performed by: INTERNAL MEDICINE

## 2019-05-30 NOTE — PROGRESS NOTES
[facility] - CARDIOLOGY  New Patient Initial Outpatient Evaulation    Primary Care Physician: No Mcqueen APRN    Subjective     Chief Complaint: ?PFO, clot in the left arm    History of Present Illness  29yo with left upper extremity deep venous thrombosis diagnosed in February; she has been on Xarelto intermittently since.  States she was hospitalized for kidney stone at the time.  She had an IV placed in the left antecubital fossa (aggravating factor). She was discharged on Saturday, with left arm pain that worsened with associated heat and swelling such that she sought care for it three days later in her PCP's office. Ultrasound thereafter showed a clot, so she was started on xarelto.  She states that she was taking that for a few weeks, then a repeat ultrasound reportedly showed no clot so she was taken off of it.  Follow-up ultrasound thereafter apparently then showed clot again so she has been back on Xarelto.  From her description, I do not believe she has had a hypercoagulable work-up.  Patient's primary provider did order an echocardiogram, and apparently the sonographer called a preliminary report to the ordering provider's office suggesting a positive bubble study, so the patient was referred to me for further evaluation and management.    Today, she reports no specific complaints.  She is still taking Xarelto at this time.  Her left arm is now back to normal and has no swelling, redness, or soreness.  She has never had a stroke or mini stroke.    Review of Systems   Constitution: Positive for weakness and malaise/fatigue.   HENT: Negative.  Negative for nosebleeds.    Eyes: Negative.    Cardiovascular: Negative.  Negative for chest pain, claudication, dyspnea on exertion, irregular heartbeat, leg swelling, near-syncope, orthopnea, palpitations, paroxysmal nocturnal dyspnea and syncope.   Respiratory: Positive for shortness of breath. Negative for cough and wheezing.    Endocrine: Negative.     Hematologic/Lymphatic: Negative.  Negative for bleeding problem. Does not bruise/bleed easily.   Skin: Negative.    Musculoskeletal: Negative.    Gastrointestinal: Negative.  Negative for dysphagia, hematemesis, hematochezia and melena.   Genitourinary: Negative.  Negative for hematuria and non-menstrual bleeding.   Psychiatric/Behavioral: Negative.    Allergic/Immunologic: Negative.     Otherwise complete ROS reviewed and negative except as mentioned in the HPI.      Past Medical History:   Past Medical History:   Diagnosis Date   • Depression with anxiety 12/9/2018   • Intracranial hypertension    • Kidney stones    • Lactation disorder    • Pseudotumor cerebri 9/14/2016   • Tobacco abuse 9/21/2016       Past Surgical History:  Past Surgical History:   Procedure Laterality Date   • CYSTOSCOPY ELECTROHYDRAULIC LITHOTRIPSY     • KIDNEY STONE SURGERY     • OTHER SURGICAL HISTORY      Intracranial shunt   •  SHUNT INSERTION  11/2015       Family History: family history includes Hypertension in her mother; No Known Problems in her father.    Social History:  reports that she has been smoking cigarettes.  She has been smoking about 0.50 packs per day. She has never used smokeless tobacco. She reports that she does not drink alcohol or use drugs.    Medications:  Prior to Admission medications    Medication Sig Start Date End Date Taking? Authorizing Provider   acyclovir (ZOVIRAX) 800 MG tablet 1 (ONE) TABLET FOUR TIMES DAILY 1/22/19   Provider, MD Jessica   albuterol (PROVENTIL HFA;VENTOLIN HFA) 108 (90 Base) MCG/ACT inhaler Inhale 2 puffs. 11/28/16   Provider, MD Jessica   betamethasone valerate (VALISONE) 0.1 % ointment Apply  topically to the appropriate area as directed 2 (Two) Times a Day. 2/2/19   Raymond Munoz,    clonazePAM (KlonoPIN) 1 MG tablet clonazepam 1 mg tablet   Take 1 tablet twice a day by oral route as needed for 30 days.    Provider, MD Jessica   diclofenac (VOLTAREN) 75 MG EC  tablet Take 75 mg by mouth 2 (Two) Times a Day. 1/14/19   Jessica Haley MD   drospirenone-ethinyl estradiol (MICHAEL) 3-0.02 MG per tablet Take 1 tablet by mouth Daily.    Jessica Haley MD   escitalopram (LEXAPRO) 20 MG tablet  4/30/18   Jessica Haley MD   fexofenadine-pseudoephedrine (ALLEGRA-D 24) 180-240 MG per 24 hr tablet Take 1 tablet by mouth.    Jessica Haley MD   fluconazole (DIFLUCAN) 150 MG tablet fluconazole 150 mg tablet    Jessica Haley MD   gabapentin (NEURONTIN) 300 MG capsule gabapentin 300 mg capsule   one three times daily    Jessica Haley MD   HYDROcodone-acetaminophen (NORCO)  MG per tablet TAKE 1/2-1 TABLET BY MOUTH 3 TIMES A DAY AS NEEDED FOR PAIN 11/27/17   Jessica Haley MD   ibuprofen (ADVIL,MOTRIN) 800 MG tablet TAKE 1 TABLET BY MOUTH 3 TIMES A DAY AS NEEDED FOR SEVERE HEADACHES, (DO NOT TAKE WITH OTHER NSAIDS) 11/20/17   Jessica Hlaey MD   linaclotide (LINZESS) 290 MCG capsule capsule Take 290 mcg by mouth daily, AM    Jessica Haley MD   loratadine-pseudoephedrine (CLARITIN-D 12-hour) 5-120 MG per 12 hr tablet Take 1 tablet by mouth. 10/29/15   Jessica Haley MD   silver sulfadiazine (SILVADENE, SSD) 1 % cream silver sulfadiazine 1 % topical cream    Jessica Haley MD   tiZANidine (ZANAFLEX) 4 MG tablet TAKE 1 TABLET BY MOUTH EVERY 6 HOURS AS NEEDED FOR MUSCLE SPASM 12/31/17   Jessica Haley MD   traZODone (DESYREL) 50 MG tablet Take 50 mg by mouth. 6/14/17   Jessica Haley MD   VENTOLIN  (90 Base) MCG/ACT inhaler INHALE 1-2 PUFFS PO Q 6 H PRN 10/10/17   Jessica Haley MD     Allergies:  Allergies   Allergen Reactions   • Morphine And Related Shortness Of Breath   • Other Other (See Comments)     Other reaction(s): Other (See Comments)  Dissolvable sutures. She developed an abscess from the last ones. Ended up in ER.   Dissolvable sutures. She developed an abscess from the  "last ones. Ended up in ER.    • Morphine Itching   • Demerol [Meperidine] Rash       Objective     Vital Signs: /71 (BP Location: Right arm, Patient Position: Sitting)   Pulse 74   Ht 157.5 cm (62\")   Wt 85.3 kg (188 lb)   BMI 34.39 kg/m²     Physical Exam   Constitutional: No distress.   HENT:   Mouth/Throat: Oropharynx is clear. Pharynx is normal.   Neck: Normal range of motion and thyroid normal. Neck supple. No JVD present. No thyromegaly present.   Cardiovascular: Normal rate, regular rhythm, S1 normal, S2 normal, normal heart sounds, intact distal pulses and normal pulses.  No extrasystoles are present. PMI is not displaced.   No murmur heard.  Pulmonary/Chest: Effort normal and breath sounds normal.   Abdominal: Soft. Bowel sounds are normal. She exhibits no distension. There is no splenomegaly or hepatomegaly. There is no tenderness.   Musculoskeletal: She exhibits no edema or tenderness.   Neurological: She is alert and oriented to person, place, and time.   Skin: Skin is warm and dry.       Results Reviewed:      ECG 12 Lead  Date/Time: 5/30/2019 11:15 AM  Performed by: Claude Zamora MD  Authorized by: Claude Zamora MD   Comparison: not compared with previous ECG   Previous ECG: no previous ECG available  Rhythm: sinus rhythm  QRS axis: right    Clinical impression: non-specific ECG              No results found for: CHOL, TRIG, HDL, VLDL, LDLHDL  Lab Results   Component Value Date    HGBA1C 5.1 03/10/2015     Old records reviewed: I reviewed the echocardiographic report from study performed 5/9/2019 at [facility].  Of note, there was 2 different reports included in the referral packet.  One was an original study, which made no comment whatsoever on the bubble study performed.  Otherwise, there are no structural abnormalities noted.  The second report had been amended, and included a statement saying that there was no evidence of a right to left shunt and that the bubble study " was negative.      Assessment / Plan        Problem List Items Addressed This Visit  (all new to me)       Cardiovascular and Mediastinum    Acute deep vein thrombosis (DVT) of brachial vein of left upper extremity (CMS/HCC) - Primary    Abnormal echocardiogram       Other    Tobacco abuse        Recommendations and plans: Official report of the echocardiogram indicates a negative bubble study with no evidence of right to left shunt.  I did personally call and speak with the interpreting physician, Dr. Nii Swanson.  He reviewed the images again and discussed them with me.  He said there were a few late bubbles showing up on the initial injection in the left side, but with provocative maneuvers designed to increase right to left shunting like Valsalva and cough, there was no evidence of bubbles entering the left atrium.  Therefore, this is a negative bubble study.  Even if it had been positive, the patient has no history of stroke and therefore closure of PFO would not be recommended.    The index event prompting the aforementioned work-up seems to have been a left upper extremity DVT, for which the patient is still on Xarelto.  However, the patient reports this occurred immediately after a hospitalization during which she had an IV placed in the left antecubital fossa.  Therefore, I suspect in retrospect this was most likely superficial thrombophlebitis perhaps with an associated underlying thrombotic component.  Regardless, management guidelines for DVTs that are provoked in the upper extremity suggest anticoagulation for 3 months or less.  However, I informed the patient this was not my area of expertise and I would suggest that Mrs. No Mcqueen consider a hematology consultation to help decide when the appropriate time would be to discontinue anticoagulation.  If the index event was not felt to have been provoked, though certainly by history it sounds like it was a provoked event, then perhaps a hypercoagulable  work-up would be in order.    No further cardiology follow-up or testing is required at this point time.  Thank you for the consultation, and please let me know if I can be of any further assistance.      Claude Zamora MD   05/31/19   11:12 AM

## 2019-05-31 PROBLEM — I82.622 ACUTE DEEP VEIN THROMBOSIS (DVT) OF BRACHIAL VEIN OF LEFT UPPER EXTREMITY (HCC): Status: ACTIVE | Noted: 2019-05-31

## 2019-05-31 PROBLEM — R93.1 ABNORMAL ECHOCARDIOGRAM: Status: ACTIVE | Noted: 2019-05-31

## 2019-08-13 ENCOUNTER — OFFICE VISIT (OUTPATIENT)
Dept: NEUROSURGERY | Facility: CLINIC | Age: 29
End: 2019-08-13

## 2019-08-13 VITALS
SYSTOLIC BLOOD PRESSURE: 118 MMHG | BODY MASS INDEX: 35.22 KG/M2 | HEIGHT: 62 IN | WEIGHT: 191.4 LBS | DIASTOLIC BLOOD PRESSURE: 70 MMHG

## 2019-08-13 DIAGNOSIS — Z87.891 FORMER SMOKER: ICD-10-CM

## 2019-08-13 DIAGNOSIS — G93.2 PSEUDOTUMOR CEREBRI: Primary | ICD-10-CM

## 2019-08-13 PROBLEM — F17.200 SMOKER: Status: RESOLVED | Noted: 2017-02-21 | Resolved: 2019-08-13

## 2019-08-13 PROCEDURE — 99213 OFFICE O/P EST LOW 20 MIN: CPT | Performed by: NURSE PRACTITIONER

## 2019-08-13 RX ORDER — VENLAFAXINE HYDROCHLORIDE 75 MG/1
CAPSULE, EXTENDED RELEASE ORAL
COMMUNITY

## 2019-08-13 NOTE — PROGRESS NOTES
"    Chief complaint:   Chief Complaint   Patient presents with   • Pseudotumor     Kelsey has been referred back by Dr. Garcia after her recent visit for her vision check up and they notice a visual change.         Subjective     HPI: This is a 28-year-old female patient who had a shunt placed back in November 2015 for benign intracranial hypertension.  She comes in today for further evaluation.  She states that she had a recent evaluation by her ophthalmologist and it was found that she had papilledema as well as decreased visual fields.  She says that she has noticed a decrease in her visual fields as well.  She says that she has not had as much trouble with her headaches and they are not as bad as they used to be.  She is having less flareups with her headaches and less migraines at this time.  She is not taking any pain medication for her headaches.  She states that she has changed her birth control and has an implantable birth control device in her arm and states that she is no longer having periods which she does feel is helping overall with her headaches.    Review of Systems   Eyes: Positive for visual disturbance.   Musculoskeletal: Negative.    Neurological: Positive for headaches.         Objective      Vital Signs  /70 (BP Location: Right arm, Patient Position: Sitting)   Ht 157.5 cm (62\")   Wt 86.8 kg (191 lb 6.4 oz)   BMI 35.01 kg/m²     Physical Exam   Constitutional: She is oriented to person, place, and time. She appears well-developed and well-nourished.   HENT:   Head: Normocephalic.   Eyes: EOM are normal. Pupils are equal, round, and reactive to light.   Neck: Normal range of motion.   Pulmonary/Chest: Effort normal.   Musculoskeletal: Normal range of motion.   Neurological: She is alert and oriented to person, place, and time. She has normal strength and normal reflexes. No cranial nerve deficit or sensory deficit. Gait normal. GCS eye subscore is 4. GCS verbal subscore is 5. GCS motor " subscore is 6.   Skin: Skin is warm.   Psychiatric: She has a normal mood and affect. Her speech is normal and behavior is normal. Thought content normal.       Results Review: Per ophthalmology report the patient does have papilledema as well as decreased visual fields.        Assessment/Plan: Because the patient is having a change in her visual fields I am going to go ahead and order a nuclear med shunt evaluation to make sure that the shunt is working appropriately without any blockages.  If there is no blockages we may need to send the patient to a tertiary care facility for consideration of optic nerve fenestration    BMI shows the patient is Obese. BMI chart was given to the patient. Patient is a non-smoker    Kelsey was seen today for pseudotumor.    Diagnoses and all orders for this visit:    Pseudotumor cerebri  -     NM csf shunt evaluation; Future    BMI 35.0-35.9,adult    Former smoker        I discussed the patients findings and my recommendations with patient  Ean Treviño, GREYSON  08/14/19  7:06 AM

## 2019-08-13 NOTE — PATIENT INSTRUCTIONS

## 2019-08-21 ENCOUNTER — HOSPITAL ENCOUNTER (OUTPATIENT)
Dept: NUCLEAR MEDICINE | Facility: HOSPITAL | Age: 29
Discharge: HOME | End: 2019-08-21

## 2019-08-21 DIAGNOSIS — G93.2 PSEUDOTUMOR CEREBRI: ICD-10-CM

## 2019-08-21 PROCEDURE — 61070 BRAIN CANAL SHUNT PROCEDURE: CPT | Performed by: NURSE PRACTITIONER

## 2019-08-21 PROCEDURE — A9539 TC99M PENTETATE: HCPCS | Performed by: NURSE PRACTITIONER

## 2019-08-21 PROCEDURE — 78645 CSF SHUNT EVALUATION: CPT

## 2019-08-21 PROCEDURE — 0 TECHNETIUM TC 99M PENTETATE KIT: Performed by: NURSE PRACTITIONER

## 2019-08-21 RX ADMIN — KIT FOR THE PREPARATION OF TECHNETIUM TC 99M PENTETATE 1 DOSE: 20 INJECTION, POWDER, LYOPHILIZED, FOR SOLUTION INTRAVENOUS; RESPIRATORY (INHALATION) at 10:42

## 2019-08-21 NOTE — PROCEDURES
Ms. Mcqueen was brought into the nuclear med room where she was laid supine.  The shunt reservoir was identified over the right temporal region.  The patient's skin was   cleansed using a Betadine solution x 3 and draped in sterile fashion.  A 25-gauge butterfly needle was inserted into the shunt reservoir.  There was not brisk return of CSF.  Gentle aspiration to a 5 mL syringe was applied and flow of CSF was noted in the tubing.  I was not able to aspirate enough CSF fluid to send for labs.  Proximal flow was checked and did appear intact however distal flow was checked and no flow was noted.   While the distal tubing was occluded, I then injected the radioisotope followed by CSF .  Once the isotope was injected I withdrew the butterfly needle.  There was 0 mL's of blood loss.  The patient tolerated procedure well.  She then proceeded with the rest of the nuclear med shunt study.  All needles were removed from the tray and placed in the sharps container.     Ean Treviño, APRN; 8/21/2019; 10:55 AM    Procedure [id number]: CSF shunt evaluation

## 2019-08-29 ENCOUNTER — TELEPHONE (OUTPATIENT)
Dept: NEUROSURGERY | Facility: CLINIC | Age: 29
End: 2019-08-29

## 2019-08-29 NOTE — TELEPHONE ENCOUNTER
Patient called and given the results of the nuclear med shunt study that was normal.  She is to follow-up with her ophthalmologist

## 2019-08-29 NOTE — TELEPHONE ENCOUNTER
Called to give patient results of her shunt study however there was no answer and unable to leave message

## 2019-10-13 ENCOUNTER — HOSPITAL ENCOUNTER (EMERGENCY)
Age: 29
Discharge: HOME | End: 2019-10-14
Payer: MEDICAID

## 2019-10-13 DIAGNOSIS — N39.0 URINARY TRACT INFECTION WITH HEMATURIA, SITE UNSPECIFIED: ICD-10-CM

## 2019-10-13 DIAGNOSIS — R31.9 URINARY TRACT INFECTION WITH HEMATURIA, SITE UNSPECIFIED: ICD-10-CM

## 2019-10-13 DIAGNOSIS — R10.9 ACUTE LEFT FLANK PAIN: Primary | ICD-10-CM

## 2019-10-13 PROCEDURE — 99284 EMERGENCY DEPT VISIT MOD MDM: CPT

## 2019-10-14 ENCOUNTER — APPOINTMENT (OUTPATIENT)
Dept: CT IMAGING | Age: 29
End: 2019-10-14
Payer: MEDICAID

## 2019-10-14 VITALS
RESPIRATION RATE: 16 BRPM | BODY MASS INDEX: 33.13 KG/M2 | HEIGHT: 62 IN | HEART RATE: 80 BPM | WEIGHT: 180 LBS | TEMPERATURE: 97.9 F | DIASTOLIC BLOOD PRESSURE: 86 MMHG | SYSTOLIC BLOOD PRESSURE: 119 MMHG | OXYGEN SATURATION: 95 %

## 2019-10-14 LAB
ALBUMIN SERPL-MCNC: 4 G/DL (ref 3.5–5.2)
ALP BLD-CCNC: 88 U/L (ref 35–104)
ALT SERPL-CCNC: 15 U/L (ref 5–33)
ANION GAP SERPL CALCULATED.3IONS-SCNC: 11 MMOL/L (ref 7–19)
AST SERPL-CCNC: 13 U/L (ref 5–32)
BACTERIA: NEGATIVE /HPF
BASOPHILS ABSOLUTE: 0 K/UL (ref 0–0.2)
BASOPHILS RELATIVE PERCENT: 0.5 % (ref 0–1)
BILIRUB SERPL-MCNC: <0.2 MG/DL (ref 0.2–1.2)
BILIRUBIN URINE: ABNORMAL
BLOOD, URINE: NEGATIVE
BUN BLDV-MCNC: 14 MG/DL (ref 6–20)
CALCIUM SERPL-MCNC: 9.6 MG/DL (ref 8.6–10)
CHLORIDE BLD-SCNC: 102 MMOL/L (ref 98–111)
CLARITY: ABNORMAL
CO2: 27 MMOL/L (ref 22–29)
COLOR: ABNORMAL
CREAT SERPL-MCNC: 0.8 MG/DL (ref 0.5–0.9)
EOSINOPHILS ABSOLUTE: 0.3 K/UL (ref 0–0.6)
EOSINOPHILS RELATIVE PERCENT: 3.2 % (ref 0–5)
EPITHELIAL CELLS, UA: 5 /HPF (ref 0–5)
GFR NON-AFRICAN AMERICAN: >60
GLUCOSE BLD-MCNC: 116 MG/DL (ref 74–109)
GLUCOSE URINE: NEGATIVE MG/DL
HCG QUALITATIVE: NEGATIVE
HCT VFR BLD CALC: 39.2 % (ref 37–47)
HEMOGLOBIN: 13 G/DL (ref 12–16)
HYALINE CASTS: 8 /HPF (ref 0–8)
IMMATURE GRANULOCYTES #: 0 K/UL
KETONES, URINE: ABNORMAL MG/DL
LEUKOCYTE ESTERASE, URINE: ABNORMAL
LYMPHOCYTES ABSOLUTE: 2.8 K/UL (ref 1.1–4.5)
LYMPHOCYTES RELATIVE PERCENT: 32.9 % (ref 20–40)
MCH RBC QN AUTO: 31.6 PG (ref 27–31)
MCHC RBC AUTO-ENTMCNC: 33.2 G/DL (ref 33–37)
MCV RBC AUTO: 95.4 FL (ref 81–99)
MONOCYTES ABSOLUTE: 0.5 K/UL (ref 0–0.9)
MONOCYTES RELATIVE PERCENT: 6 % (ref 0–10)
NEUTROPHILS ABSOLUTE: 4.9 K/UL (ref 1.5–7.5)
NEUTROPHILS RELATIVE PERCENT: 57.2 % (ref 50–65)
NITRITE, URINE: POSITIVE
PDW BLD-RTO: 11.7 % (ref 11.5–14.5)
PH UA: 6 (ref 5–8)
PLATELET # BLD: 250 K/UL (ref 130–400)
PMV BLD AUTO: 10.3 FL (ref 9.4–12.3)
POTASSIUM SERPL-SCNC: 4.1 MMOL/L (ref 3.5–5)
PROTEIN UA: NEGATIVE MG/DL
RBC # BLD: 4.11 M/UL (ref 4.2–5.4)
RBC UA: 9 /HPF (ref 0–4)
SODIUM BLD-SCNC: 140 MMOL/L (ref 136–145)
SPECIFIC GRAVITY UA: 1.02 (ref 1–1.03)
TOTAL PROTEIN: 7 G/DL (ref 6.6–8.7)
URINE REFLEX TO CULTURE: YES
UROBILINOGEN, URINE: 2 E.U./DL
WBC # BLD: 8.6 K/UL (ref 4.8–10.8)
WBC UA: 22 /HPF (ref 0–5)

## 2019-10-14 PROCEDURE — 74150 CT ABDOMEN W/O CONTRAST: CPT

## 2019-10-14 PROCEDURE — 36415 COLL VENOUS BLD VENIPUNCTURE: CPT

## 2019-10-14 PROCEDURE — 96374 THER/PROPH/DIAG INJ IV PUSH: CPT

## 2019-10-14 PROCEDURE — 6360000002 HC RX W HCPCS

## 2019-10-14 PROCEDURE — 84703 CHORIONIC GONADOTROPIN ASSAY: CPT

## 2019-10-14 PROCEDURE — 96376 TX/PRO/DX INJ SAME DRUG ADON: CPT

## 2019-10-14 PROCEDURE — 80053 COMPREHEN METABOLIC PANEL: CPT

## 2019-10-14 PROCEDURE — 87086 URINE CULTURE/COLONY COUNT: CPT

## 2019-10-14 PROCEDURE — 2580000003 HC RX 258: Performed by: NURSE PRACTITIONER

## 2019-10-14 PROCEDURE — 96375 TX/PRO/DX INJ NEW DRUG ADDON: CPT

## 2019-10-14 PROCEDURE — 6360000002 HC RX W HCPCS: Performed by: NURSE PRACTITIONER

## 2019-10-14 PROCEDURE — 85025 COMPLETE CBC W/AUTO DIFF WBC: CPT

## 2019-10-14 PROCEDURE — 81001 URINALYSIS AUTO W/SCOPE: CPT

## 2019-10-14 RX ORDER — VENLAFAXINE 75 MG/1
75 TABLET ORAL ONCE
Status: ON HOLD | COMMUNITY
End: 2022-05-05

## 2019-10-14 RX ORDER — VENLAFAXINE 75 MG/1
150 TABLET ORAL ONCE
Status: ON HOLD | COMMUNITY
End: 2022-05-05

## 2019-10-14 RX ORDER — 0.9 % SODIUM CHLORIDE 0.9 %
1000 INTRAVENOUS SOLUTION INTRAVENOUS ONCE
Status: COMPLETED | OUTPATIENT
Start: 2019-10-14 | End: 2019-10-14

## 2019-10-14 RX ORDER — KETOROLAC TROMETHAMINE 30 MG/ML
30 INJECTION, SOLUTION INTRAMUSCULAR; INTRAVENOUS ONCE
Status: COMPLETED | OUTPATIENT
Start: 2019-10-14 | End: 2019-10-14

## 2019-10-14 RX ORDER — PROMETHAZINE HYDROCHLORIDE 25 MG/ML
25 INJECTION, SOLUTION INTRAMUSCULAR; INTRAVENOUS ONCE
Status: COMPLETED | OUTPATIENT
Start: 2019-10-14 | End: 2019-10-14

## 2019-10-14 RX ORDER — CLONAZEPAM 1 MG/1
1 TABLET ORAL 2 TIMES DAILY PRN
Status: ON HOLD | COMMUNITY
End: 2022-05-05

## 2019-10-14 RX ORDER — PROMETHAZINE HYDROCHLORIDE 25 MG/ML
INJECTION, SOLUTION INTRAMUSCULAR; INTRAVENOUS
Status: COMPLETED
Start: 2019-10-14 | End: 2019-10-14

## 2019-10-14 RX ORDER — ONDANSETRON 2 MG/ML
4 INJECTION INTRAMUSCULAR; INTRAVENOUS ONCE
Status: COMPLETED | OUTPATIENT
Start: 2019-10-14 | End: 2019-10-14

## 2019-10-14 RX ORDER — CEPHALEXIN 500 MG/1
500 CAPSULE ORAL 3 TIMES DAILY
Qty: 21 CAPSULE | Refills: 0 | Status: SHIPPED | OUTPATIENT
Start: 2019-10-14 | End: 2019-10-21

## 2019-10-14 RX ADMIN — ONDANSETRON 4 MG: 2 INJECTION INTRAMUSCULAR; INTRAVENOUS at 00:33

## 2019-10-14 RX ADMIN — HYDROMORPHONE HYDROCHLORIDE 1 MG: 1 INJECTION, SOLUTION INTRAMUSCULAR; INTRAVENOUS; SUBCUTANEOUS at 00:46

## 2019-10-14 RX ADMIN — KETOROLAC TROMETHAMINE 30 MG: 30 INJECTION, SOLUTION INTRAMUSCULAR at 01:41

## 2019-10-14 RX ADMIN — HYDROMORPHONE HYDROCHLORIDE 1 MG: 1 INJECTION, SOLUTION INTRAMUSCULAR; INTRAVENOUS; SUBCUTANEOUS at 02:03

## 2019-10-14 RX ADMIN — HYDROMORPHONE HYDROCHLORIDE 1 MG: 1 INJECTION, SOLUTION INTRAMUSCULAR; INTRAVENOUS; SUBCUTANEOUS at 00:33

## 2019-10-14 RX ADMIN — SODIUM CHLORIDE 1000 ML: 9 INJECTION, SOLUTION INTRAVENOUS at 00:38

## 2019-10-14 RX ADMIN — PROMETHAZINE HYDROCHLORIDE 25 MG: 25 INJECTION INTRAMUSCULAR; INTRAVENOUS at 02:22

## 2019-10-14 RX ADMIN — PROMETHAZINE HYDROCHLORIDE 25 MG: 25 INJECTION, SOLUTION INTRAMUSCULAR; INTRAVENOUS at 02:22

## 2019-10-14 ASSESSMENT — PAIN SCALES - GENERAL
PAINLEVEL_OUTOF10: 10
PAINLEVEL_OUTOF10: 5

## 2019-10-14 ASSESSMENT — ENCOUNTER SYMPTOMS
BACK PAIN: 1
ABDOMINAL PAIN: 1

## 2019-10-14 ASSESSMENT — PAIN DESCRIPTION - LOCATION
LOCATION: ABDOMEN;BACK
LOCATION: ABDOMEN;BACK

## 2019-10-14 ASSESSMENT — PAIN DESCRIPTION - DESCRIPTORS
DESCRIPTORS: STABBING;SPASM
DESCRIPTORS: SPASM;STABBING

## 2019-10-14 ASSESSMENT — PAIN DESCRIPTION - FREQUENCY: FREQUENCY: CONTINUOUS

## 2019-10-16 ENCOUNTER — NURSE TRIAGE (OUTPATIENT)
Dept: CALL CENTER | Facility: HOSPITAL | Age: 29
End: 2019-10-16

## 2019-10-16 ENCOUNTER — TELEPHONE (OUTPATIENT)
Dept: NEUROSURGERY | Facility: CLINIC | Age: 29
End: 2019-10-16

## 2019-10-16 LAB — URINE CULTURE, ROUTINE: NORMAL

## 2019-10-16 NOTE — TELEPHONE ENCOUNTER
Kelsey has called the office this afternoon with concerns over some sever abdomen pain she has been having all week, she states she went to the ER at [facility] on Sunday with what she thought was a kidney stone but they didn't find one on the CT scan of her kidney and asked that she follow with her family Doctor, she tried calling her but she is out until next week and she really thinks this is related to the tubing of her shunt because they found fluid in her abdomen, she has been having blurry vision and headaches daily and her PCP is very concerned about the shunt.  I have called [facility] to have the CT scan uploaded for review.    She wants a call back to discuss what is going on with her and her abdomen pain, the fluid in her abdomen and the blurry vision and headaches.

## 2019-10-17 NOTE — TELEPHONE ENCOUNTER
"Caller c/o abdominal pain and states severe on and off. She states she has a shunt and is also having blurred vision. She is asking to speak with Neurosurgeon on call at this time. She was advised per guideline to be seen in ER.     Reason for Disposition  • Patient sounds very sick or weak to the triager    Additional Information  • Negative: Shock suspected (e.g., cold/pale/clammy skin, too weak to stand, low BP, rapid pulse)  • Negative: Difficult to awaken or acting confused (e.g., disoriented, slurred speech)  • Negative: Passed out (i.e., lost consciousness, collapsed and was not responding)  • Negative: Sounds like a life-threatening emergency to the triager  • Negative: Chest pain  • Negative: Pain is mainly in upper abdomen  (if needed ask: \"is it mainly above the belly button?\")  • Negative: Followed an abdomen (stomach) injury  • Negative: [1] Abdominal pain AND [2] pregnant < 20 weeks  • Negative: [1] Abdominal pain AND [2] pregnant > 20 weeks  • Negative: [1] Abdominal pain AND [2] postpartum < 1 month since delivery  • Negative: [1] SEVERE pain (e.g., excruciating) AND [2] present > 1 hour  • Negative: [1] SEVERE pain AND [2] age > 60  • Negative: [1] Vomiting AND [2] contains red blood or black (\"coffee ground\") material  (Exception: few red streaks in vomit that only happened once)  • Negative: Blood in bowel movements   (Exception: blood on surface of BM with constipation)  • Negative: Black or tarry bowel movements  (Exception: chronic-unchanged  black-grey bowel movements AND is taking iron pills or Pepto-bismol)    Answer Assessment - Initial Assessment Questions  1. LOCATION: \"Where does it hurt?\"       Pelvis area   2. RADIATION: \"Does the pain shoot anywhere else?\" (e.g., chest, back)       Up to middle and sides   3. ONSET: \"When did the pain begin?\" (e.g., minutes, hours or days ago)       10/12/2019  4. SUDDEN: \"Gradual or sudden onset?\"      Gradual   5. PATTERN \"Does the pain come and go, " "or is it constant?\"     - If constant: \"Is it getting better, staying the same, or worsening?\"       (Note: Constant means the pain never goes away completely; most serious pain is constant and it progresses)      - If intermittent: \"How long does it last?\" \"Do you have pain now?\"      (Note: Intermittent means the pain goes away completely between bouts)      Comes and goes   6. SEVERITY: \"How bad is the pain?\"  (e.g., Scale 1-10; mild, moderate, or severe)    - MILD (1-3): doesn't interfere with normal activities, abdomen soft and not tender to touch     - MODERATE (4-7): interferes with normal activities or awakens from sleep, tender to touch     - SEVERE (8-10): excruciating pain, doubled over, unable to do any normal activities       10  7. RECURRENT SYMPTOM: \"Have you ever had this type of abdominal pain before?\" If so, ask: \"When was the last time?\" and \"What happened that time?\"       Never this bad   8. CAUSE: \"What do you think is causing the abdominal pain?\"        Denies   9. RELIEVING/AGGRAVATING FACTORS: \"What makes it better or worse?\" (e.g., movement, antacids, bowel movement)      Denies   10. OTHER SYMPTOMS: \"Has there been any vomiting, diarrhea, constipation, or urine problems?\"        ? Blood in urine   11. PREGNANCY: \"Is there any chance you are pregnant?\" \"When was your last menstrual period?\"        N/a    Protocols used: ABDOMINAL PAIN - FEMALE-ADULT-      "

## 2019-10-17 NOTE — TELEPHONE ENCOUNTER
Per Dr Power: patient contacted him after the office closed to discuss her vaginal bleeding and abdominal pain.  Dr Power reviewed the patient's imaging.  Nothing to do from a neurosurgical standpoint.      romero byrd CMA

## 2019-12-18 ENCOUNTER — HOSPITAL ENCOUNTER (EMERGENCY)
Age: 29
Discharge: HOME | End: 2019-12-19
Attending: FAMILY MEDICINE
Payer: MEDICAID

## 2019-12-18 ENCOUNTER — APPOINTMENT (OUTPATIENT)
Dept: GENERAL RADIOLOGY | Age: 29
End: 2019-12-18
Payer: MEDICAID

## 2019-12-18 DIAGNOSIS — N30.00 ACUTE CYSTITIS WITHOUT HEMATURIA: ICD-10-CM

## 2019-12-18 DIAGNOSIS — R10.9 FLANK PAIN: Primary | ICD-10-CM

## 2019-12-18 DIAGNOSIS — Z96.0 URETERAL STENT RETAINED: ICD-10-CM

## 2019-12-18 LAB
ALBUMIN SERPL-MCNC: 4.4 G/DL (ref 3.5–5.2)
ALP BLD-CCNC: 123 U/L (ref 35–104)
ALT SERPL-CCNC: 13 U/L (ref 5–33)
ANION GAP SERPL CALCULATED.3IONS-SCNC: 13 MMOL/L (ref 7–19)
AST SERPL-CCNC: 17 U/L (ref 5–32)
BACTERIA: NEGATIVE /HPF
BASOPHILS ABSOLUTE: 0 K/UL (ref 0–0.2)
BASOPHILS RELATIVE PERCENT: 0.4 % (ref 0–1)
BILIRUB SERPL-MCNC: <0.2 MG/DL (ref 0.2–1.2)
BILIRUBIN URINE: ABNORMAL
BLOOD, URINE: ABNORMAL
BUN BLDV-MCNC: 13 MG/DL (ref 6–20)
CALCIUM SERPL-MCNC: 9.8 MG/DL (ref 8.6–10)
CHLORIDE BLD-SCNC: 99 MMOL/L (ref 98–111)
CLARITY: ABNORMAL
CO2: 28 MMOL/L (ref 22–29)
COLOR: ABNORMAL
CREAT SERPL-MCNC: 0.7 MG/DL (ref 0.5–0.9)
EOSINOPHILS ABSOLUTE: 0.5 K/UL (ref 0–0.6)
EOSINOPHILS RELATIVE PERCENT: 4.8 % (ref 0–5)
EPITHELIAL CELLS, UA: 1 /HPF (ref 0–5)
GFR NON-AFRICAN AMERICAN: >60
GLUCOSE BLD-MCNC: 86 MG/DL (ref 74–109)
GLUCOSE URINE: NEGATIVE MG/DL
HCT VFR BLD CALC: 40.3 % (ref 37–47)
HEMOGLOBIN: 13.2 G/DL (ref 12–16)
HYALINE CASTS: 6 /HPF (ref 0–8)
IMMATURE GRANULOCYTES #: 0 K/UL
KETONES, URINE: ABNORMAL MG/DL
LEUKOCYTE ESTERASE, URINE: ABNORMAL
LIPASE: 39 U/L (ref 13–60)
LYMPHOCYTES ABSOLUTE: 2.4 K/UL (ref 1.1–4.5)
LYMPHOCYTES RELATIVE PERCENT: 25.1 % (ref 20–40)
MCH RBC QN AUTO: 31.6 PG (ref 27–31)
MCHC RBC AUTO-ENTMCNC: 32.8 G/DL (ref 33–37)
MCV RBC AUTO: 96.4 FL (ref 81–99)
MONOCYTES ABSOLUTE: 0.5 K/UL (ref 0–0.9)
MONOCYTES RELATIVE PERCENT: 5.5 % (ref 0–10)
NEUTROPHILS ABSOLUTE: 6 K/UL (ref 1.5–7.5)
NEUTROPHILS RELATIVE PERCENT: 63.9 % (ref 50–65)
NITRITE, URINE: POSITIVE
PDW BLD-RTO: 12.1 % (ref 11.5–14.5)
PH UA: 6.5 (ref 5–8)
PLATELET # BLD: 283 K/UL (ref 130–400)
PMV BLD AUTO: 10 FL (ref 9.4–12.3)
POTASSIUM REFLEX MAGNESIUM: 4.1 MMOL/L (ref 3.5–5)
PROTEIN UA: 100 MG/DL
RBC # BLD: 4.18 M/UL (ref 4.2–5.4)
RBC UA: 317 /HPF (ref 0–4)
SODIUM BLD-SCNC: 140 MMOL/L (ref 136–145)
SPECIFIC GRAVITY UA: 1.02 (ref 1–1.03)
TOTAL PROTEIN: 7 G/DL (ref 6.6–8.7)
UROBILINOGEN, URINE: 1 E.U./DL
WBC # BLD: 9.5 K/UL (ref 4.8–10.8)
WBC UA: 9 /HPF (ref 0–5)

## 2019-12-18 PROCEDURE — 74018 RADEX ABDOMEN 1 VIEW: CPT

## 2019-12-18 PROCEDURE — 96375 TX/PRO/DX INJ NEW DRUG ADDON: CPT

## 2019-12-18 PROCEDURE — 99284 EMERGENCY DEPT VISIT MOD MDM: CPT

## 2019-12-18 PROCEDURE — 83690 ASSAY OF LIPASE: CPT

## 2019-12-18 PROCEDURE — 6360000002 HC RX W HCPCS: Performed by: FAMILY MEDICINE

## 2019-12-18 PROCEDURE — 81001 URINALYSIS AUTO W/SCOPE: CPT

## 2019-12-18 PROCEDURE — 2580000003 HC RX 258: Performed by: FAMILY MEDICINE

## 2019-12-18 PROCEDURE — 80053 COMPREHEN METABOLIC PANEL: CPT

## 2019-12-18 PROCEDURE — 85025 COMPLETE CBC W/AUTO DIFF WBC: CPT

## 2019-12-18 PROCEDURE — 96365 THER/PROPH/DIAG IV INF INIT: CPT

## 2019-12-18 PROCEDURE — 36415 COLL VENOUS BLD VENIPUNCTURE: CPT

## 2019-12-18 RX ORDER — HYDROCODONE BITARTRATE AND ACETAMINOPHEN 5; 325 MG/1; MG/1
1 TABLET ORAL ONCE
Status: DISCONTINUED | OUTPATIENT
Start: 2019-12-18 | End: 2019-12-18

## 2019-12-18 RX ORDER — PROMETHAZINE HYDROCHLORIDE 25 MG/ML
12.5 INJECTION, SOLUTION INTRAMUSCULAR; INTRAVENOUS ONCE
Status: COMPLETED | OUTPATIENT
Start: 2019-12-18 | End: 2019-12-18

## 2019-12-18 RX ORDER — SODIUM CHLORIDE 9 MG/ML
1000 INJECTION, SOLUTION INTRAVENOUS CONTINUOUS
Status: DISCONTINUED | OUTPATIENT
Start: 2019-12-18 | End: 2019-12-19 | Stop reason: HOSPADM

## 2019-12-18 RX ORDER — ONDANSETRON 2 MG/ML
4 INJECTION INTRAMUSCULAR; INTRAVENOUS ONCE
Status: COMPLETED | OUTPATIENT
Start: 2019-12-18 | End: 2019-12-18

## 2019-12-18 RX ORDER — HYDROCODONE BITARTRATE AND ACETAMINOPHEN 5; 325 MG/1; MG/1
2 TABLET ORAL ONCE
Status: DISCONTINUED | OUTPATIENT
Start: 2019-12-18 | End: 2019-12-18

## 2019-12-18 RX ADMIN — SODIUM CHLORIDE 1000 ML: 9 INJECTION, SOLUTION INTRAVENOUS at 22:28

## 2019-12-18 RX ADMIN — CEFTRIAXONE 1 G: 1 INJECTION, POWDER, FOR SOLUTION INTRAMUSCULAR; INTRAVENOUS at 23:41

## 2019-12-18 RX ADMIN — ONDANSETRON 4 MG: 2 INJECTION INTRAMUSCULAR; INTRAVENOUS at 22:28

## 2019-12-18 RX ADMIN — PROMETHAZINE HYDROCHLORIDE 12.5 MG: 25 INJECTION INTRAMUSCULAR; INTRAVENOUS at 23:52

## 2019-12-18 RX ADMIN — HYDROMORPHONE HYDROCHLORIDE 0.5 MG: 1 INJECTION, SOLUTION INTRAMUSCULAR; INTRAVENOUS; SUBCUTANEOUS at 22:37

## 2019-12-18 ASSESSMENT — PAIN SCALES - GENERAL
PAINLEVEL_OUTOF10: 7
PAINLEVEL_OUTOF10: 8

## 2019-12-18 ASSESSMENT — ENCOUNTER SYMPTOMS: NAUSEA: 1

## 2019-12-18 ASSESSMENT — PAIN DESCRIPTION - ORIENTATION: ORIENTATION: LEFT

## 2019-12-18 ASSESSMENT — PAIN DESCRIPTION - LOCATION: LOCATION: FLANK

## 2019-12-18 ASSESSMENT — PAIN DESCRIPTION - DESCRIPTORS: DESCRIPTORS: ACHING

## 2019-12-19 VITALS
RESPIRATION RATE: 18 BRPM | SYSTOLIC BLOOD PRESSURE: 110 MMHG | OXYGEN SATURATION: 96 % | HEIGHT: 64 IN | WEIGHT: 212 LBS | DIASTOLIC BLOOD PRESSURE: 68 MMHG | TEMPERATURE: 98.5 F | BODY MASS INDEX: 36.19 KG/M2 | HEART RATE: 80 BPM

## 2020-03-15 ENCOUNTER — HOSPITAL ENCOUNTER (EMERGENCY)
Facility: HOSPITAL | Age: 30
Discharge: HOME | End: 2020-03-16
Admitting: EMERGENCY MEDICINE

## 2020-03-15 ENCOUNTER — APPOINTMENT (OUTPATIENT)
Dept: CT IMAGING | Facility: HOSPITAL | Age: 30
End: 2020-03-15

## 2020-03-15 DIAGNOSIS — N28.1 PARAPELVIC RENAL CYST: Primary | ICD-10-CM

## 2020-03-15 DIAGNOSIS — N30.00 ACUTE CYSTITIS WITHOUT HEMATURIA: ICD-10-CM

## 2020-03-15 PROCEDURE — 99283 EMERGENCY DEPT VISIT LOW MDM: CPT

## 2020-03-15 PROCEDURE — 81001 URINALYSIS AUTO W/SCOPE: CPT | Performed by: NURSE PRACTITIONER

## 2020-03-15 PROCEDURE — 96374 THER/PROPH/DIAG INJ IV PUSH: CPT

## 2020-03-15 PROCEDURE — 25010000003 HYDROMORPHONE 1 MG/ML SOLUTION: Performed by: NURSE PRACTITIONER

## 2020-03-15 PROCEDURE — 85025 COMPLETE CBC W/AUTO DIFF WBC: CPT | Performed by: NURSE PRACTITIONER

## 2020-03-15 PROCEDURE — 80053 COMPREHEN METABOLIC PANEL: CPT | Performed by: NURSE PRACTITIONER

## 2020-03-15 PROCEDURE — 96375 TX/PRO/DX INJ NEW DRUG ADDON: CPT

## 2020-03-15 PROCEDURE — 87086 URINE CULTURE/COLONY COUNT: CPT | Performed by: NURSE PRACTITIONER

## 2020-03-15 PROCEDURE — 25010000002 ONDANSETRON PER 1 MG: Performed by: NURSE PRACTITIONER

## 2020-03-15 RX ORDER — SODIUM CHLORIDE 0.9 % (FLUSH) 0.9 %
10 SYRINGE (ML) INJECTION AS NEEDED
Status: DISCONTINUED | OUTPATIENT
Start: 2020-03-15 | End: 2020-03-16 | Stop reason: HOSPADM

## 2020-03-15 RX ORDER — ONDANSETRON 2 MG/ML
4 INJECTION INTRAMUSCULAR; INTRAVENOUS ONCE
Status: COMPLETED | OUTPATIENT
Start: 2020-03-15 | End: 2020-03-15

## 2020-03-15 RX ADMIN — SODIUM CHLORIDE 1000 ML: 9 INJECTION, SOLUTION INTRAVENOUS at 23:54

## 2020-03-15 RX ADMIN — HYDROMORPHONE HYDROCHLORIDE 1 MG: 1 INJECTION, SOLUTION INTRAMUSCULAR; INTRAVENOUS; SUBCUTANEOUS at 23:54

## 2020-03-15 RX ADMIN — ONDANSETRON HYDROCHLORIDE 4 MG: 2 SOLUTION INTRAMUSCULAR; INTRAVENOUS at 23:56

## 2020-03-16 ENCOUNTER — TELEPHONE (OUTPATIENT)
Dept: NEUROSURGERY | Facility: CLINIC | Age: 30
End: 2020-03-16

## 2020-03-16 ENCOUNTER — APPOINTMENT (OUTPATIENT)
Dept: CT IMAGING | Facility: HOSPITAL | Age: 30
End: 2020-03-16

## 2020-03-16 VITALS
SYSTOLIC BLOOD PRESSURE: 116 MMHG | BODY MASS INDEX: 36.19 KG/M2 | OXYGEN SATURATION: 97 % | WEIGHT: 212 LBS | HEART RATE: 61 BPM | RESPIRATION RATE: 16 BRPM | DIASTOLIC BLOOD PRESSURE: 70 MMHG | HEIGHT: 64 IN | TEMPERATURE: 97.8 F

## 2020-03-16 LAB
ALBUMIN SERPL-MCNC: 4.9 G/DL (ref 3.5–5.2)
ALBUMIN/GLOB SERPL: 1.6 G/DL
ALP SERPL-CCNC: 143 U/L (ref 39–117)
ALT SERPL W P-5'-P-CCNC: 26 U/L (ref 1–33)
ANION GAP SERPL CALCULATED.3IONS-SCNC: 15 MMOL/L (ref 5–15)
AST SERPL-CCNC: 13 U/L (ref 1–32)
BACTERIA UR QL AUTO: ABNORMAL /HPF
BASOPHILS # BLD AUTO: 0.02 10*3/MM3 (ref 0–0.2)
BASOPHILS NFR BLD AUTO: 0.2 % (ref 0–1.5)
BILIRUB SERPL-MCNC: <0.2 MG/DL (ref 0.2–1.2)
BILIRUB UR QL STRIP: NEGATIVE
BUN BLD-MCNC: 14 MG/DL (ref 6–20)
BUN/CREAT SERPL: 26.4 (ref 7–25)
CALCIUM SPEC-SCNC: 9.8 MG/DL (ref 8.6–10.5)
CHLORIDE SERPL-SCNC: 106 MMOL/L (ref 98–107)
CLARITY UR: CLEAR
CO2 SERPL-SCNC: 21 MMOL/L (ref 22–29)
COLOR UR: YELLOW
CREAT BLD-MCNC: 0.53 MG/DL (ref 0.57–1)
DEPRECATED RDW RBC AUTO: 42.3 FL (ref 37–54)
EOSINOPHIL # BLD AUTO: 0 10*3/MM3 (ref 0–0.4)
EOSINOPHIL NFR BLD AUTO: 0 % (ref 0.3–6.2)
ERYTHROCYTE [DISTWIDTH] IN BLOOD BY AUTOMATED COUNT: 13 % (ref 12.3–15.4)
GFR SERPL CREATININE-BSD FRML MDRD: 137 ML/MIN/1.73
GLOBULIN UR ELPH-MCNC: 3.1 GM/DL
GLUCOSE BLD-MCNC: 123 MG/DL (ref 65–99)
GLUCOSE UR STRIP-MCNC: NEGATIVE MG/DL
HCT VFR BLD AUTO: 40.9 % (ref 34–46.6)
HGB BLD-MCNC: 14 G/DL (ref 12–15.9)
HGB UR QL STRIP.AUTO: NEGATIVE
HOLD SPECIMEN: NORMAL
HYALINE CASTS UR QL AUTO: ABNORMAL /LPF
IMM GRANULOCYTES # BLD AUTO: 0.03 10*3/MM3 (ref 0–0.05)
IMM GRANULOCYTES NFR BLD AUTO: 0.4 % (ref 0–0.5)
KETONES UR QL STRIP: NEGATIVE
LEUKOCYTE ESTERASE UR QL STRIP.AUTO: ABNORMAL
LYMPHOCYTES # BLD AUTO: 0.81 10*3/MM3 (ref 0.7–3.1)
LYMPHOCYTES NFR BLD AUTO: 9.8 % (ref 19.6–45.3)
MCH RBC QN AUTO: 30.4 PG (ref 26.6–33)
MCHC RBC AUTO-ENTMCNC: 34.2 G/DL (ref 31.5–35.7)
MCV RBC AUTO: 88.7 FL (ref 79–97)
MONOCYTES # BLD AUTO: 0.1 10*3/MM3 (ref 0.1–0.9)
MONOCYTES NFR BLD AUTO: 1.2 % (ref 5–12)
NEUTROPHILS # BLD AUTO: 7.28 10*3/MM3 (ref 1.7–7)
NEUTROPHILS NFR BLD AUTO: 88.4 % (ref 42.7–76)
NITRITE UR QL STRIP: NEGATIVE
NRBC BLD AUTO-RTO: 0 /100 WBC (ref 0–0.2)
PH UR STRIP.AUTO: 7 [PH] (ref 5–8)
PLATELET # BLD AUTO: 355 10*3/MM3 (ref 140–450)
PMV BLD AUTO: 9.9 FL (ref 6–12)
POTASSIUM BLD-SCNC: 3.9 MMOL/L (ref 3.5–5.2)
PROT SERPL-MCNC: 8 G/DL (ref 6–8.5)
PROT UR QL STRIP: NEGATIVE
RBC # BLD AUTO: 4.61 10*6/MM3 (ref 3.77–5.28)
RBC # UR: ABNORMAL /HPF
REF LAB TEST METHOD: ABNORMAL
SODIUM BLD-SCNC: 142 MMOL/L (ref 136–145)
SP GR UR STRIP: 1.01 (ref 1–1.03)
SQUAMOUS #/AREA URNS HPF: ABNORMAL /HPF
UROBILINOGEN UR QL STRIP: ABNORMAL
WBC NRBC COR # BLD: 8.24 10*3/MM3 (ref 3.4–10.8)
WBC UR QL AUTO: ABNORMAL /HPF
WHOLE BLOOD HOLD SPECIMEN: NORMAL
WHOLE BLOOD HOLD SPECIMEN: NORMAL

## 2020-03-16 PROCEDURE — 74177 CT ABD & PELVIS W/CONTRAST: CPT

## 2020-03-16 PROCEDURE — 63710000001 PROMETHAZINE PER 25 MG: Performed by: NURSE PRACTITIONER

## 2020-03-16 PROCEDURE — 96376 TX/PRO/DX INJ SAME DRUG ADON: CPT

## 2020-03-16 PROCEDURE — 96375 TX/PRO/DX INJ NEW DRUG ADDON: CPT

## 2020-03-16 PROCEDURE — 25010000002 IOPAMIDOL 61 % SOLUTION: Performed by: NURSE PRACTITIONER

## 2020-03-16 PROCEDURE — 25010000002 KETOROLAC TROMETHAMINE PER 15 MG: Performed by: NURSE PRACTITIONER

## 2020-03-16 PROCEDURE — 25010000003 HYDROMORPHONE 1 MG/ML SOLUTION: Performed by: NURSE PRACTITIONER

## 2020-03-16 RX ORDER — KETOROLAC TROMETHAMINE 15 MG/ML
15 INJECTION, SOLUTION INTRAMUSCULAR; INTRAVENOUS ONCE
Status: COMPLETED | OUTPATIENT
Start: 2020-03-16 | End: 2020-03-16

## 2020-03-16 RX ORDER — NITROFURANTOIN 25; 75 MG/1; MG/1
100 CAPSULE ORAL 2 TIMES DAILY
Qty: 14 CAPSULE | Refills: 0 | Status: SHIPPED | OUTPATIENT
Start: 2020-03-16 | End: 2020-03-23

## 2020-03-16 RX ORDER — PROMETHAZINE HYDROCHLORIDE 25 MG/1
12.5 TABLET ORAL ONCE
Status: COMPLETED | OUTPATIENT
Start: 2020-03-16 | End: 2020-03-16

## 2020-03-16 RX ORDER — KETOROLAC TROMETHAMINE 10 MG/1
10 TABLET, FILM COATED ORAL EVERY 6 HOURS PRN
Qty: 8 TABLET | Refills: 0 | Status: SHIPPED | OUTPATIENT
Start: 2020-03-16 | End: 2020-03-18

## 2020-03-16 RX ADMIN — IOPAMIDOL 100 ML: 612 INJECTION, SOLUTION INTRAVENOUS at 01:28

## 2020-03-16 RX ADMIN — PROMETHAZINE HYDROCHLORIDE 12.5 MG: 25 TABLET ORAL at 02:51

## 2020-03-16 RX ADMIN — HYDROMORPHONE HYDROCHLORIDE 1 MG: 1 INJECTION, SOLUTION INTRAMUSCULAR; INTRAVENOUS; SUBCUTANEOUS at 01:18

## 2020-03-16 RX ADMIN — KETOROLAC TROMETHAMINE 15 MG: 15 INJECTION, SOLUTION INTRAMUSCULAR; INTRAVENOUS at 00:53

## 2020-03-16 NOTE — ED PROVIDER NOTES
Subjective     History provided by:  Patient   used: No    Flank Pain   Pain location:  L flank and LLQ  Pain quality: aching and cramping    Pain radiates to:  Does not radiate  Pain severity:  Moderate  Onset quality:  Sudden  Duration:  1 day  Timing:  Constant  Progression:  Unchanged  Chronicity:  New  Context: not alcohol use, not awakening from sleep, not diet changes, not eating, not laxative use, not medication withdrawal, not previous surgeries, not recent illness, not recent sexual activity, not recent travel, not retching, not sick contacts, not suspicious food intake and not trauma    Relieved by:  Nothing  Worsened by:  Nothing  Ineffective treatments:  None tried  Associated symptoms: nausea    Associated symptoms: no anorexia, no belching, no chest pain, no chills, no constipation, no cough, no diarrhea, no dysuria, no fatigue, no fever, no flatus, no hematemesis, no hematochezia, no hematuria, no melena, no shortness of breath, no sore throat, no vaginal bleeding, no vaginal discharge and no vomiting    Risk factors: no alcohol abuse, no aspirin use, not elderly, has not had multiple surgeries, no NSAID use, not obese, not pregnant and no recent hospitalization        Review of Systems   Constitutional: Negative for chills, fatigue and fever.   HENT: Negative for sore throat.    Respiratory: Negative for cough and shortness of breath.    Cardiovascular: Negative for chest pain.   Gastrointestinal: Positive for nausea. Negative for anorexia, constipation, diarrhea, flatus, hematemesis, hematochezia, melena and vomiting.   Genitourinary: Positive for flank pain. Negative for dysuria, hematuria, vaginal bleeding and vaginal discharge.   All other systems reviewed and are negative.      Past Medical History:   Diagnosis Date   • Depression with anxiety 12/9/2018   • Intracranial hypertension    • Kidney stones    • Lactation disorder    • Pseudotumor cerebri 9/14/2016   • Tobacco  abuse 9/21/2016       Allergies   Allergen Reactions   • Morphine And Related Shortness Of Breath   • Other Other (See Comments)     Other reaction(s): Other (See Comments)  Dissolvable sutures. She developed an abscess from the last ones. Ended up in ER.   Dissolvable sutures. She developed an abscess from the last ones. Ended up in ER.    • Morphine Itching   • Demerol [Meperidine] Rash       Past Surgical History:   Procedure Laterality Date   • CYSTOSCOPY ELECTROHYDRAULIC LITHOTRIPSY     • KIDNEY STONE SURGERY     • OTHER SURGICAL HISTORY      Intracranial shunt   •  SHUNT INSERTION  11/2015       Family History   Problem Relation Age of Onset   • Hypertension Mother    • No Known Problems Father    • Prolactinoma Neg Hx        Social History     Socioeconomic History   • Marital status: Single     Spouse name: Not on file   • Number of children: 1   • Years of education: Not on file   • Highest education level: Not on file   Tobacco Use   • Smoking status: Former Smoker     Types: Cigarettes   • Smokeless tobacco: Never Used   • Tobacco comment: She has recently quit.    Substance and Sexual Activity   • Alcohol use: No   • Drug use: No   • Sexual activity: Defer           Objective   Physical Exam   Constitutional: She is oriented to person, place, and time. She appears well-developed and well-nourished.   HENT:   Head: Normocephalic and atraumatic.   Eyes: Conjunctivae are normal.   Cardiovascular: Normal rate, regular rhythm and normal heart sounds.   Pulmonary/Chest: Effort normal and breath sounds normal.   Abdominal: Soft. Bowel sounds are normal. There is tenderness in the left lower quadrant. There is CVA tenderness.   Neurological: She is alert and oriented to person, place, and time.   Skin: Skin is warm and dry. Capillary refill takes less than 2 seconds.   Psychiatric: She has a normal mood and affect.   Nursing note and vitals reviewed.      Procedures           ED Course  ED Course as of Mar 23  "0909   Mon Mar 16, 2020   0259 The patient's labs are reviewed.  During the patient's stay she had called the nurse to the bathroom to look at her urine in the toilet.  The nurse noted that this was an orange color urine.  The patient reported that her urine was clear earlier.  On the patient's lab work her urinalysis showed no blood.  There was a moderate mount of leukocytes with 0-2 RBCs, 13-20 WBCs and no bacteria.  The patient CMP showed a normal renal function.  CBC showed a normal white blood cell count.  CT scan of the abdomen and pelvis was read by stat rad radiology.  This showed\" dependent atelectasis, gallbladder surgically absent, liver spleen pancreas and adrenal glands are unremarkable.  Suspect left parapelvic cyst with nonobstructing calculi.  No hydronephrosis.  Uterus is surgically absent.   shunt catheter with tip in the pelvis.  Within small amount of free fluid.  Appendix is unremarkable.  Bowel is unremarkable.  No acute osseous abnormality \".  I reviewed a previous CAT scan that was done at [facility] in December 2019.  At that time she also had a small amount of free fluid in her pelvis.    [LF]   0303 I discussed the results with the patient.  The patient reports that she is nauseated.  She states that the Zofran did not help her and the Zofran normally does not help her nausea.  She tells me that the only thing that normally helps her nausea is Phenergan.  The patient has had Dilaudid and Toradol while here.  She reported to the nurse that the Toradol did not help her pain.  The patient is requesting Phenergan before she is discharged.  I advised her that she is already had several doses of sedative medications and I do not feel comfortable giving her a large dose of Phenergan prior to her discharge.  We will give her 12.5 Phenergan tablet.  She has had no vomiting since arrival to the ER.    [LF]   0304 It is noted in the chart that the patient has Norco on her home medication list.  The " patient tells me that she has at home but that she does not take it.  She states that it does not help her.  She states that this was given to her for her  shunt.  I asked the patient if she was regularly prescribed this medicine and she tells me yes.  She states that she was going to pain management but is no longer going, I asked her when the last time she was seen at pain management was and she reported that it was this month.  I advised her that if she received a prescription for narcotics I cannot prescribe her further narcotic pain medication and she would either need to follow-up pain management, her primary care provider, or urologist for further discussion of this.    [LF]   0305 When I reviewed the patient's chart as noted that she was actually seen in a primary care practice in [facility] yesterday and diagnosed with a UTI and given a prescription for Keflex.  I will go ahead and prescribe her Macrobid and she can stop the Keflex at this time.  Patient reported that she believes that she has Phenergan at home.  I recommended that she follow-up with her urologist tomorrow as well as her primary care provider.  The patient has not complained of a headache or fever this evening.  I do not believe her abdominal pain is related to her  shunt.    [LF]   0306 Patient be discharged home at this time stable condition.  While the nurse was discharging the patient she reported that the Toradol actually helped her pain more than the Dilaudid and is requesting a prescription for Toradol.  I will write her for a prescription for Toradol.  It is noted in her chart that the patient is on Xarelto however she tells me she has not been on Xarelto for some time.  She states that she developed a blood clot in her arm after an IV and was only on Xarelto for 2 months.  She will be discharged home at this time stable condition.    [LF]      ED Course User Index  [LF] Kathie Johnson, APRN                                  CT Abdomen Pelvis With Contrast   Final Result   1. No acute intra-abdominal finding.   2. Probable  shunt catheter, tip at the right low pelvis. Trace free   fluid in the pelvis. No loculated or focal drainable fluid collection.   3. Cholecystectomy clips.       Preliminary interpretation performed by Michael and I agree with the   preliminary report.   This report was finalized on 03/16/2020 07:07 by Dr Marianela Noriega MD.        Labs Reviewed   COMPREHENSIVE METABOLIC PANEL - Abnormal; Notable for the following components:       Result Value    Glucose 123 (*)     Creatinine 0.53 (*)     CO2 21.0 (*)     Alkaline Phosphatase 143 (*)     Total Bilirubin <0.2 (*)     BUN/Creatinine Ratio 26.4 (*)     All other components within normal limits    Narrative:     GFR Normal >60  Chronic Kidney Disease <60  Kidney Failure <15     URINALYSIS W/ CULTURE IF INDICATED - Abnormal; Notable for the following components:    Leuk Esterase, UA Moderate (2+) (*)     All other components within normal limits   CBC WITH AUTO DIFFERENTIAL - Abnormal; Notable for the following components:    Neutrophil % 88.4 (*)     Lymphocyte % 9.8 (*)     Monocyte % 1.2 (*)     Eosinophil % 0.0 (*)     Neutrophils, Absolute 7.28 (*)     All other components within normal limits   URINALYSIS, MICROSCOPIC ONLY - Abnormal; Notable for the following components:    RBC, UA 0-2 (*)     WBC, UA 13-20 (*)     All other components within normal limits   URINE CULTURE - Normal   RAINBOW DRAW    Narrative:     The following orders were created for panel order [facility] Draw.  Procedure                               Abnormality         Status                     ---------                               -----------         ------                     Light Blue Top[[id number]]                                   Final result               Green Top (Gel)[[id number]]                                  Final result               Lavender Top[[id number]]                                      Final result               Red Top[[id number]]                                          Final result                 Please view results for these tests on the individual orders.   RAINBOW URINE   LIGHT BLUE TOP   GREEN TOP   LAVENDER TOP   RED TOP   CBC AND DIFFERENTIAL    Narrative:     The following orders were created for panel order CBC & Differential.  Procedure                               Abnormality         Status                     ---------                               -----------         ------                     CBC Auto Differential[[id number]]        Abnormal            Final result                 Please view results for these tests on the individual orders.               MDM  Number of Diagnoses or Management Options  Acute cystitis without hematuria: new and requires workup  Parapelvic renal cyst: new and requires workup     Amount and/or Complexity of Data Reviewed  Clinical lab tests: ordered and reviewed  Tests in the radiology section of CPT®: ordered and reviewed  Discuss the patient with other providers: yes        Final diagnoses:   Parapelvic renal cyst   Acute cystitis without hematuria            Kathie Johnson, APRN  03/23/20 0909

## 2020-03-16 NOTE — TELEPHONE ENCOUNTER
Tried contacting the patient about her appt with Dr Power on Thursday but she didn't answer and she doesn't have voicemail.    I contacted the patient's mother and she is aware that we are going to cancel the appointment on Thursday for now.       romero byrd CMA

## 2020-03-16 NOTE — TELEPHONE ENCOUNTER
Tried to contact the patient to confirm her appt with Dr Power on Thursday; however, she did not answer and she did not have a voicemail set up.    romero byrd CMA

## 2020-03-17 LAB — BACTERIA SPEC AEROBE CULT: NO GROWTH

## 2020-06-25 ENCOUNTER — TELEPHONE (OUTPATIENT)
Dept: NEUROSURGERY | Facility: CLINIC | Age: 30
End: 2020-06-25

## 2020-09-09 ENCOUNTER — NURSE TRIAGE (OUTPATIENT)
Dept: CALL CENTER | Facility: HOSPITAL | Age: 30
End: 2020-09-09

## 2020-09-09 NOTE — TELEPHONE ENCOUNTER
"Sick since Thursday Fever 104-105  Yesterday started vomiting. Has eaten nothing since Saturday. Fever now 104. Has been talking out of her head. Drinking very little. Thinks she dehydrated. Sometimes dizzy.  Diarrhea since Thursday.  Every inch of my body hurts.  Has a  shunt on the right side of my head. Also has kidney problems. Very little urinary output, mostly diarrhea.    Reason for Disposition  • Patient sounds very sick or weak to the triager  (Exception: mild weakness and hasn't taken fever medicine)    Additional Information  • Negative: Shock suspected (e.g., cold/pale/clammy skin, too weak to stand, low BP, rapid pulse)  • Negative: Difficult to awaken or acting confused (e.g., disoriented, slurred speech)  • Negative: [1] Difficulty breathing AND [2] bluish lips, tongue or face  • Negative: New onset rash with multiple purple (or blood-colored) spots or dots  • Negative: Sounds like a life-threatening emergency to the triager  • Negative: Fever in a cancer patient who is currently (or recently) receiving chemotherapy or radiation therapy, or cancer patient who has metastatic or end-stage cancer and is receiving palliative care  • Negative: Pregnant  • Negative: Postpartum (from 0 to 6 weeks after delivery)  • Negative: Fever onset within 24 hours of receiving vaccine  • Negative: [1] Fever AND [2] within 14 days of COVID-19 Exposure  • Negative: Other symptom is present, see that guideline  (e.g., symptoms of cough, runny nose, sore throat, earache, abdominal pain, diarrhea, vomiting)  • Negative: [1] Headache AND [2] stiff neck (can't touch chin to chest)  • Negative: Difficulty breathing  • Negative: IV drug abuse  • Negative: [1] Drinking very little AND [2] dehydration suspected (e.g., no urine > 12 hours, very dry mouth, very lightheaded)    Answer Assessment - Initial Assessment Questions  1. TEMPERATURE: \"What is the most recent temperature?\"  \"How was it measured?\"     104.3  2. ONSET: \"When " "did the fever start?\"      Thursday  3. SYMPTOMS: \"Do you have any other symptoms besides the fever?\"  (e.g., colds, headache, sore throat, earache, cough, rash, diarrhea, vomiting, abdominal pain)    vomiting diarrhea sore throat headache  4. CAUSE: If there are no symptoms, ask: \"What do you think is causing the fever?\"       No idea  5. CONTACTS: \"Does anyone else in the family have an infection?\"     no  6. TREATMENT: \"What have you done so far to treat this fever?\" (e.g., medications)    AleveTylenol  7. IMMUNOCOMPROMISE: \"Do you have of the following: diabetes, HIV positive, splenectomy, cancer chemotherapy, chronic steroid treatment, transplant patient, etc.\"     Has  shunt since 2015  8. PREGNANCY: \"Is there any chance you are pregnant?\" \"When was your last menstrual period?\"  Mo hysterecomy  9. TRAVEL: \"Have you traveled out of the country in the last month?\" (e.g., travel history, exposure      no    Protocols used: FEVER-ADULT-AH      "

## 2020-10-01 ENCOUNTER — OFFICE VISIT (OUTPATIENT)
Dept: NEUROSURGERY | Facility: CLINIC | Age: 30
End: 2020-10-01

## 2020-10-01 VITALS
DIASTOLIC BLOOD PRESSURE: 68 MMHG | SYSTOLIC BLOOD PRESSURE: 120 MMHG | WEIGHT: 216.6 LBS | HEIGHT: 64 IN | BODY MASS INDEX: 36.98 KG/M2

## 2020-10-01 DIAGNOSIS — G89.29 CHRONIC NONINTRACTABLE HEADACHE, UNSPECIFIED HEADACHE TYPE: ICD-10-CM

## 2020-10-01 DIAGNOSIS — Z87.891 FORMER SMOKER: ICD-10-CM

## 2020-10-01 DIAGNOSIS — G93.2 PSEUDOTUMOR CEREBRI: Primary | ICD-10-CM

## 2020-10-01 DIAGNOSIS — R51.9 CHRONIC NONINTRACTABLE HEADACHE, UNSPECIFIED HEADACHE TYPE: ICD-10-CM

## 2020-10-01 PROCEDURE — 99213 OFFICE O/P EST LOW 20 MIN: CPT | Performed by: NEUROLOGICAL SURGERY

## 2020-10-01 RX ORDER — APIXABAN 5 MG/1
5 TABLET, FILM COATED ORAL 2 TIMES DAILY
COMMUNITY
Start: 2020-09-18 | End: 2023-03-23

## 2020-10-01 RX ORDER — FOLIC ACID 1 MG/1
1000 TABLET ORAL DAILY
COMMUNITY
Start: 2020-09-15

## 2020-10-01 RX ORDER — ONDANSETRON 4 MG/1
TABLET, FILM COATED ORAL
COMMUNITY

## 2020-10-01 RX ORDER — PREDNISONE 20 MG/1
40 TABLET ORAL DAILY
COMMUNITY
Start: 2020-09-15

## 2020-10-01 RX ORDER — HYDROCODONE BITARTRATE AND ACETAMINOPHEN 7.5; 325 MG/1; MG/1
1 TABLET ORAL EVERY 6 HOURS PRN
COMMUNITY
Start: 2020-07-01 | End: 2020-10-01

## 2020-10-01 RX ORDER — PROMETHAZINE HYDROCHLORIDE 25 MG/1
25 TABLET ORAL EVERY 6 HOURS PRN
COMMUNITY
Start: 2020-09-15

## 2020-10-01 RX ORDER — OXYCODONE AND ACETAMINOPHEN 7.5; 325 MG/1; MG/1
1 TABLET ORAL EVERY 6 HOURS PRN
COMMUNITY
Start: 2020-08-30

## 2020-10-01 RX ORDER — BUTALBITAL, ACETAMINOPHEN AND CAFFEINE 50; 325; 40 MG/1; MG/1; MG/1
1 TABLET ORAL EVERY 8 HOURS PRN
COMMUNITY
Start: 2020-08-26 | End: 2020-10-01

## 2020-10-01 RX ORDER — POTASSIUM CHLORIDE 20 MEQ/1
20 TABLET, EXTENDED RELEASE ORAL 2 TIMES DAILY
COMMUNITY
Start: 2020-09-15 | End: 2023-03-23

## 2020-10-01 RX ORDER — TRAMADOL HYDROCHLORIDE 50 MG/1
TABLET ORAL
COMMUNITY

## 2020-10-01 RX ORDER — OXCARBAZEPINE 300 MG/1
300 TABLET, FILM COATED ORAL 2 TIMES DAILY
COMMUNITY
Start: 2020-08-17

## 2020-10-01 RX ORDER — ADALIMUMAB 40MG/0.4ML
KIT SUBCUTANEOUS
COMMUNITY

## 2020-10-01 RX ORDER — DICYCLOMINE HCL 20 MG
20 TABLET ORAL 2 TIMES DAILY
COMMUNITY
Start: 2020-09-18

## 2020-10-01 RX ORDER — PANTOPRAZOLE SODIUM 40 MG/1
TABLET, DELAYED RELEASE ORAL
COMMUNITY
Start: 2020-09-15

## 2020-10-01 NOTE — PROGRESS NOTES
SUBJECTIVE:  Patient ID: Kelsey Mcqueen is a 29 y.o. female is here today for follow-up.    Chief Complaint: Benign intracranial hypertension  Chief Complaint   Patient presents with   • PSUEDOTUMOR CEREBRI     patient is here for a follow up; no imaging today.         HPI  This is a 29-year-old female patient who had a shunt placed back in November 2015 for benign intracranial hypertension.  She is here in routine follow-up.  She was recently diagnosed with Crohn's disease.  She is on a tapering course of steroids and scheduled to start immune suppressant injection therapy.  She does not really complain of any headaches.  She has no new vision changes.  She was diagnosed with a stigmatism by Dr. Garcia recently.  But the last time she saw him earlier this year he noted that she did not have papilledema.    The following portions of the patient's history were reviewed and updated as appropriate: allergies, current medications, past family history, past medical history, past social history, past surgical history and problem list.    OBJECTIVE:    Review of Systems   Gastrointestinal: Positive for diarrhea, nausea and vomiting.   All other systems reviewed and are negative.         Physical Exam  Eyes:      Extraocular Movements: EOM normal.      Pupils: Pupils are equal, round, and reactive to light.   Neurological:      Mental Status: She is oriented to person, place, and time.      Coordination: Finger-Nose-Finger Test normal.      Gait: Gait is intact.      Deep Tendon Reflexes: Strength normal.   Psychiatric:         Speech: Speech normal.         Neurologic Exam     Mental Status   Oriented to person, place, and time.   Attention: normal.   Speech: speech is normal   Level of consciousness: alert  Knowledge: good.     Cranial Nerves     CN II   Visual fields full to confrontation.     CN III, IV, VI   Pupils are equal, round, and reactive to light.  Extraocular motions are normal.     CN V   Facial sensation  intact.     CN VII   Facial expression full, symmetric.     CN VIII   CN VIII normal.     CN IX, X   CN IX normal.   CN X normal.     CN XI   CN XI normal.     CN XII   CN XII normal.   No papilledema on funduscopic exam.     Motor Exam   Muscle bulk: normal  Overall muscle tone: normal  Right arm pronator drift: absent  Left arm pronator drift: absent    Strength   Strength 5/5 throughout.     Sensory Exam   Light touch normal.   Pinprick normal.     Gait, Coordination, and Reflexes     Gait  Gait: normal    Coordination   Finger to nose coordination: normal    Tremor   Resting tremor: absent  Intention tremor: absent  Action tremor: absent    Reflexes   Reflexes 2+ except as noted.       Independent Review of Radiographic Studies:       Nuclear medicine shunt study done August 2019 shows a patent ventriculoperitoneal shunt system    ASSESSMENT/PLAN:  The patient seems to be doing well regarding her diagnosis of benign intracranial hypertension.  The shunt appears to be working.  She did have it tapped and a nuclear medicine study done in August 2019.  She does not have papilledema today.  We will see her in follow-up in a year.      No diagnosis found.        No follow-ups on file.      Joe Power MD

## 2020-10-01 NOTE — PATIENT INSTRUCTIONS
"PATIENT TO CONTINUE TO FOLLOW UP WITH HER PRIMARY CARE PROVIDER FOR YEARLY PHYSICAL EXAMS TO ENSURE COMPLETE HEALTH MAINTENANCE        BMI for Adults    Body mass index (BMI) is a number that is calculated from a person's weight and height. BMI may help to estimate how much of a person's weight is composed of fat. BMI can help identify those who may be at higher risk for certain medical problems.  How is BMI used with adults?  BMI is used as a screening tool to identify possible weight problems. It is used to check whether a person is obese, overweight, healthy weight, or underweight.  How is BMI calculated?  BMI measures your weight and compares it to your height. This can be done either in English ([place] or metric measurements. Note that charts are available to help you find your BMI quickly and easily without having to do these calculations yourself.  To calculate your BMI in English ([place] measurements, your health care provider will:  1. Measure your weight in pounds (lb).  2. Multiply the number of pounds by 703.  ? For example, for a person who weighs 180 lb, multiply that number by 703, which equals 126,540.  3. Measure your height in inches (in). Then multiply that number by itself to get a measurement called \"inches squared.\"  ? For example, for a person who is 70 in tall, the \"inches squared\" measurement is 70 in x 70 in, which equals 4900 inches squared.  4. Divide the total from Step 2 (number of lb x 703) by the total from Step 3 (inches squared): 126,540 ÷ 4900 = 25.8. This is your BMI.  To calculate your BMI in metric measurements, your health care provider will:  1. Measure your weight in kilograms (kg).  2. Measure your height in meters (m). Then multiply that number by itself to get a measurement called \"meters squared.\"  ? For example, for a person who is 1.75 m tall, the \"meters squared\" measurement is 1.75 m x 1.75 m, which is equal to 3.1 meters squared.  3. Divide the number of kilograms " (your weight) by the meters squared number. In this example: 70 ÷ 3.1 = 22.6. This is your BMI.  How is BMI interpreted?  To interpret your results, your health care provider will use BMI charts to identify whether you are underweight, normal weight, overweight, or obese. The following guidelines will be used:  · Underweight: BMI less than 18.5.  · Normal weight: BMI between 18.5 and 24.9.  · Overweight: BMI between 25 and 29.9.  · Obese: BMI of 30 and above.  Please note:  · Weight includes both fat and muscle, so someone with a muscular build, such as an athlete, may have a BMI that is higher than 24.9. In cases like these, BMI is not an accurate measure of body fat.  · To determine if excess body fat is the cause of a BMI of 25 or higher, further assessments may need to be done by a health care provider.  · BMI is usually interpreted in the same way for men and women.  Why is BMI a useful tool?  BMI is useful in two ways:  · Identifying a weight problem that may be related to a medical condition, or that may increase the risk for medical problems.  · Promoting lifestyle and diet changes in order to reach a healthy weight.  Summary  · Body mass index (BMI) is a number that is calculated from a person's weight and height.  · BMI may help to estimate how much of a person's weight is composed of fat. BMI can help identify those who may be at higher risk for certain medical problems.  · BMI can be measured using English measurements or metric measurements.  · To interpret your results, your health care provider will use BMI charts to identify whether you are underweight, normal weight, overweight, or obese.  This information is not intended to replace advice given to you by your health care provider. Make sure you discuss any questions you have with your health care provider.  Document Released: 08/29/2005 Document Revised: 11/30/2018 Document Reviewed: 10/31/2018  Elsevier Patient Education © 2020 [employer]

## 2021-05-21 ENCOUNTER — APPOINTMENT (OUTPATIENT)
Dept: GENERAL RADIOLOGY | Facility: HOSPITAL | Age: 31
End: 2021-05-21

## 2021-05-21 ENCOUNTER — HOSPITAL ENCOUNTER (EMERGENCY)
Facility: HOSPITAL | Age: 31
Discharge: HOME | End: 2021-05-21
Admitting: EMERGENCY MEDICINE

## 2021-05-21 VITALS
OXYGEN SATURATION: 100 % | TEMPERATURE: 98.6 F | WEIGHT: 218 LBS | HEART RATE: 91 BPM | RESPIRATION RATE: 16 BRPM | SYSTOLIC BLOOD PRESSURE: 129 MMHG | BODY MASS INDEX: 40.12 KG/M2 | DIASTOLIC BLOOD PRESSURE: 89 MMHG | HEIGHT: 62 IN
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
## 2021-05-21 PROCEDURE — 90472 IMMUNIZATION ADMIN EACH ADD: CPT

## 2021-05-21 PROCEDURE — 25010000002 RABIES IMMUNE GLOBULIN PER 150 INT'L UNITS: Performed by: NURSE PRACTITIONER

## 2021-05-21 PROCEDURE — 90675 RABIES VACCINE IM: CPT | Performed by: NURSE PRACTITIONER

## 2021-05-21 PROCEDURE — 90471 IMMUNIZATION ADMIN: CPT | Performed by: NURSE PRACTITIONER

## 2021-05-21 PROCEDURE — 90715 TDAP VACCINE 7 YRS/> IM: CPT | Performed by: NURSE PRACTITIONER

## 2021-05-21 PROCEDURE — 25010000002 TDAP 5-2.5-18.5 LF-MCG/0.5 SUSPENSION: Performed by: NURSE PRACTITIONER

## 2021-05-21 PROCEDURE — 73610 X-RAY EXAM OF ANKLE: CPT

## 2021-05-21 PROCEDURE — 25010000002 RABIES VACCINE PER 1 ML: Performed by: NURSE PRACTITIONER

## 2021-05-21 PROCEDURE — 96372 THER/PROPH/DIAG INJ SC/IM: CPT

## 2021-05-21 PROCEDURE — 90375 RABIES IG IM/SC: CPT | Performed by: NURSE PRACTITIONER

## 2021-05-21 PROCEDURE — 99283 EMERGENCY DEPT VISIT LOW MDM: CPT

## 2021-05-21 RX ORDER — HYDROCODONE BITARTRATE AND ACETAMINOPHEN 7.5; 325 MG/1; MG/1
1 TABLET ORAL ONCE
Status: COMPLETED | OUTPATIENT
Start: 2021-05-21 | End: 2021-05-21

## 2021-05-21 RX ORDER — AMOXICILLIN AND CLAVULANATE POTASSIUM 875; 125 MG/1; MG/1
1 TABLET, FILM COATED ORAL 2 TIMES DAILY
Qty: 20 TABLET | Refills: 0 | Status: SHIPPED | OUTPATIENT
Start: 2021-05-21 | End: 2021-05-31

## 2021-05-21 RX ADMIN — RABIES VACCINE 2.5 UNITS: KIT at 17:27

## 2021-05-21 RX ADMIN — TETANUS TOXOID, REDUCED DIPHTHERIA TOXOID AND ACELLULAR PERTUSSIS VACCINE, ADSORBED 0.5 ML: 5; 2.5; 8; 8; 2.5 SUSPENSION INTRAMUSCULAR at 17:26

## 2021-05-21 RX ADMIN — RABIES IMMUNE GLOBULIN (HUMAN) 2100 UNITS: 300 INJECTION, SOLUTION INFILTRATION; INTRAMUSCULAR at 17:40

## 2021-05-21 RX ADMIN — HYDROCODONE BITARTRATE AND ACETAMINOPHEN 1 TABLET: 7.5; 325 TABLET ORAL at 17:26

## 2021-05-23 ENCOUNTER — HOSPITAL ENCOUNTER (EMERGENCY)
Facility: HOSPITAL | Age: 31
Discharge: HOME | End: 2021-05-23
Admitting: EMERGENCY MEDICINE

## 2021-05-23 VITALS
SYSTOLIC BLOOD PRESSURE: 112 MMHG | BODY MASS INDEX: 40.48 KG/M2 | HEIGHT: 62 IN | OXYGEN SATURATION: 95 % | RESPIRATION RATE: 16 BRPM | TEMPERATURE: 98.5 F | HEART RATE: 101 BPM | WEIGHT: 220 LBS | DIASTOLIC BLOOD PRESSURE: 69 MMHG

## 2021-05-23 DIAGNOSIS — Z23 ENCOUNTER FOR REPEAT ADMINISTRATION OF RABIES VACCINATION: Primary | ICD-10-CM

## 2021-05-23 PROCEDURE — 99202 OFFICE O/P NEW SF 15 MIN: CPT

## 2021-05-23 PROCEDURE — 99211 OFF/OP EST MAY X REQ PHY/QHP: CPT

## 2021-05-27 ENCOUNTER — HOSPITAL ENCOUNTER (EMERGENCY)
Facility: HOSPITAL | Age: 31
Discharge: HOME | End: 2021-05-27
Admitting: EMERGENCY MEDICINE

## 2021-05-27 VITALS
WEIGHT: 218 LBS | TEMPERATURE: 98.6 F | RESPIRATION RATE: 16 BRPM | OXYGEN SATURATION: 98 % | HEART RATE: 102 BPM | HEIGHT: 62 IN | SYSTOLIC BLOOD PRESSURE: 119 MMHG | BODY MASS INDEX: 40.12 KG/M2 | DIASTOLIC BLOOD PRESSURE: 84 MMHG

## 2021-05-27 DIAGNOSIS — Z29.14 ENCOUNTER FOR PROPHYLACTIC ADMINISTRATION OF RABIES IMMUNE GLOBULIN: Primary | ICD-10-CM
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
## 2021-05-27 PROCEDURE — 90675 RABIES VACCINE IM: CPT | Performed by: NURSE PRACTITIONER

## 2021-05-27 PROCEDURE — 99202 OFFICE O/P NEW SF 15 MIN: CPT

## 2021-05-27 PROCEDURE — 25010000002 RABIES VACCINE PER 1 ML: Performed by: NURSE PRACTITIONER

## 2021-05-27 PROCEDURE — 90471 IMMUNIZATION ADMIN: CPT

## 2021-05-27 RX ORDER — DOXYCYCLINE 100 MG/1
100 CAPSULE ORAL 2 TIMES DAILY
Qty: 14 CAPSULE | Refills: 0 | Status: SHIPPED | OUTPATIENT
Start: 2021-05-27 | End: 2021-06-03

## 2021-05-27 RX ORDER — GINSENG 100 MG
CAPSULE ORAL 2 TIMES DAILY
Qty: 14 G | Refills: 0 | Status: SHIPPED | OUTPATIENT
Start: 2021-05-27 | End: 2023-03-23

## 2021-05-27 RX ADMIN — RABIES VACCINE 2.5 UNITS: KIT at 17:09

## 2021-06-01 ENCOUNTER — NURSE TRIAGE (OUTPATIENT)
Dept: CALL CENTER | Facility: HOSPITAL | Age: 31
End: 2021-06-01

## 2021-06-01 NOTE — TELEPHONE ENCOUNTER
"Numbness of the leg with some redness, will be retuning back to the ED    Reason for Disposition  • Looks infected (red area, red streak, or pus)    Additional Information  • Negative: [1] Major bleeding (e.g., actively dripping or spurting) AND [2] can't be stopped  • Negative: Sounds like a life-threatening emergency to the triager  • Negative: Snake bite  • Negative: Bite, wound, or sting from fish  • Negative: [1] Any break in skin from BITE (e.g., cut, puncture or scratch) AND[2] WILD animal at risk for RABIES (e.g., bat, raccoon, bland, skunk, coyote, other carnivores)  • Negative: [1] Any break in skin from BITE (e.g., cut, puncture or scratch) AND[2] PET animial (e.g., dog, cat, or ferret) at risk for RABIES (e.g., sick, stray, unprovoked bite, developing country)  • Negative: [1] Any break in skin from BITE (e.g., cut, puncture or scratch) AND[2] monkey  • Negative: [1] EXPOSURE of non-intact skin (e.g., exposed person has dermatitis, abrasion, wound) AND[2] with animal BODY FLUID (e.g., saliva such as licking, blood, brain) AND[3] animal at high-risk for RABIES (e.g., bat, raccoon, bland, skunk, coyote, other carnivores)  • Negative: [1] Cut (length > 1/8 inch or 3 mm) or skin tear AND [2] any animal  • Negative: [1] Bleeding AND [2] won't stop after 10 minutes of direct pressure (using correct technique)  • Negative: Description of bite sounds severe to the triager  • Negative: [1] Puncture wound (hole through the skin) AND [2] from a cat bite (or deep claw puncture wound)  • Negative: [1] Puncture wound or small cut AND [2] on face  • Negative: [1] Puncture wound or small cut AND [2] on hands or genitals  • Negative: [1] Puncture wound or small cut AND [2] weak immune system (e.g., HIV positive, cancer chemo, splenectomy, organ transplant, chronic steroids)    Answer Assessment - Initial Assessment Questions  1. ANIMAL: \"What type of animal caused the bite?\" \"Is the injury from a bite or a claw?\" If the " "animal is a dog or a cat, ask: \"Was it a pet or a stray?\" \"Was it acting ill or behaving strangely?\"     Bitten a pit bull  2. LOCATION: \"Where is the bite located?\"     Left leg  3. SIZE: \"How big is the bite?\" \"What does it look like?\"       na  4. ONSET: \"When did the bite happen?\" (Minutes or hours ago)     na  5. CIRCUMSTANCES: \"Tell me how this happened.\"    na  6. TETANUS: \"When was the last tetanus booster?\"      UTD  7. PREGNANCY: \"Is there any chance you are pregnant?\" \"When was your last menstrual period?\"   na    Protocols used: ANIMAL BITE-ADULT-AH      "

## 2021-08-30 ENCOUNTER — OFFICE VISIT (OUTPATIENT)
Dept: NEUROSURGERY | Facility: CLINIC | Age: 31
End: 2021-08-30

## 2021-08-30 VITALS
BODY MASS INDEX: 38.83 KG/M2 | WEIGHT: 211 LBS | HEIGHT: 62 IN | DIASTOLIC BLOOD PRESSURE: 98 MMHG | SYSTOLIC BLOOD PRESSURE: 140 MMHG

## 2021-08-30 DIAGNOSIS — G89.29 CHRONIC NONINTRACTABLE HEADACHE, UNSPECIFIED HEADACHE TYPE: ICD-10-CM

## 2021-08-30 DIAGNOSIS — G93.2 PSEUDOTUMOR CEREBRI: Primary | ICD-10-CM

## 2021-08-30 DIAGNOSIS — E66.09 CLASS 2 OBESITY DUE TO EXCESS CALORIES WITH BODY MASS INDEX (BMI) OF 38.0 TO 38.9 IN ADULT, UNSPECIFIED WHETHER SERIOUS COMORBIDITY PRESENT: ICD-10-CM

## 2021-08-30 DIAGNOSIS — R51.9 CHRONIC NONINTRACTABLE HEADACHE, UNSPECIFIED HEADACHE TYPE: ICD-10-CM

## 2021-08-30 DIAGNOSIS — Z87.891 FORMER SMOKER: ICD-10-CM

## 2021-08-30 PROBLEM — E66.812 CLASS 2 OBESITY DUE TO EXCESS CALORIES WITH BODY MASS INDEX (BMI) OF 38.0 TO 38.9 IN ADULT: Status: ACTIVE | Noted: 2020-10-01

## 2021-08-30 PROCEDURE — 99213 OFFICE O/P EST LOW 20 MIN: CPT | Performed by: NURSE PRACTITIONER

## 2021-08-30 NOTE — PROGRESS NOTES
"    Chief complaint:   Chief Complaint   Patient presents with   • Headache     Kelsey is returning today with an chronic headache, blurred vision and concern that something is going on with her shunt.         Subjective     HPI: This is a 30-year-old female patient who we see for  shunt that was placed for benign intracranial hypertension.  The shunt was placed in November 2015.  The patient has had difficulty with headaches and vision changes.  She comes in today and states that 2 weeks ago she did get sick with an upper respiratory virus but did not have Covid.  She said that she was getting over that but over the last 2 days on August 28 and 29 she had worsening headache and vision changes.  This did prompt a call to our office and the patient was scheduled to come and see us.  She said that today she has noticed improvement in her headaches as well as her vision.  Denies any nausea or vomiting.    Review of Systems   Eyes: Positive for visual disturbance.   Musculoskeletal: Negative.    Neurological: Positive for headaches.   Psychiatric/Behavioral: Negative.          Objective      Vital Signs  /98   Ht 157.5 cm (62\")   Wt 95.7 kg (211 lb)   LMP 01/21/2019 (Approximate)   BMI 38.59 kg/m²     Physical Exam  Constitutional:       Appearance: She is well-developed.   HENT:      Head: Normocephalic.   Eyes:      Extraocular Movements: Extraocular movements intact and EOM normal.      Pupils: Pupils are equal, round, and reactive to light.      Comments: Funduscopic exam normal   Pulmonary:      Effort: Pulmonary effort is normal.   Musculoskeletal:         General: Normal range of motion.      Cervical back: Normal range of motion.   Skin:     General: Skin is warm.   Neurological:      Mental Status: She is alert and oriented to person, place, and time.      GCS: GCS eye subscore is 4. GCS verbal subscore is 5. GCS motor subscore is 6.      Cranial Nerves: No cranial nerve deficit.      Sensory: No " sensory deficit.      Gait: Gait is intact. Gait normal.      Deep Tendon Reflexes: Strength normal and reflexes are normal and symmetric.   Psychiatric:         Speech: Speech normal.         Behavior: Behavior normal.         Thought Content: Thought content normal.     Shunt reservoir noted to be sluggish with refilling    Neurologic Exam     Mental Status   Oriented to person, place, and time.   Attention: normal. Concentration: normal.   Speech: speech is normal   Level of consciousness: alert  Normal comprehension.     Cranial Nerves     CN II   Visual fields full to confrontation.     CN III, IV, VI   Pupils are equal, round, and reactive to light.  Extraocular motions are normal.   Right pupil: Shape: regular. Reactivity: brisk. Accommodation: intact.   Left pupil: Shape: regular. Reactivity: brisk. Accommodation: intact.     CN V   Facial sensation intact.     CN VII   Facial expression full, symmetric.     CN VIII   CN VIII normal.     CN IX, X   CN IX normal.   CN X normal.     CN XI   CN XI normal.     CN XII   CN XII normal.     Motor Exam   Muscle bulk: normal    Strength   Strength 5/5 throughout.     Sensory Exam   Light touch normal.     Gait, Coordination, and Reflexes     Gait  Gait: normal    Reflexes   Reflexes 2+ except as noted.       Imaging review: No new imaging        Assessment/Plan:   The patient has had improvement today in her symptoms.  Her shunt was checked and found to be at 1.0.  I will have the patient come back in 1 week to make sure that she still doing good however she was told that if she had worsening symptoms or did not feel like her symptoms with headache and blurred vision was improving over the next couple days to give us a call and we will order a nuclear med shunt study to further evaluate the patient.  Also did encourage her to get in touch with her ophthalmologist for further evaluation.  She acknowledged understanding.  Her questions and concerns    Patient is a  nonsmoker  The patient's Body mass index is 38.59 kg/m².. BMI is above normal parameters. Recommendations include: educational material and nutrition counseling    Diagnoses and all orders for this visit:    1. Pseudotumor cerebri (Primary)    2. Chronic nonintractable headache, unspecified headache type    3. Former smoker        I discussed the patients findings and my recommendations with patient  Ean Treviño, APRN  08/30/21  15:35 CDT

## 2021-09-08 ENCOUNTER — OFFICE VISIT (OUTPATIENT)
Dept: NEUROSURGERY | Facility: CLINIC | Age: 31
End: 2021-09-08

## 2021-09-08 VITALS
BODY MASS INDEX: 39.31 KG/M2 | WEIGHT: 213.6 LBS | HEIGHT: 62 IN | SYSTOLIC BLOOD PRESSURE: 112 MMHG | DIASTOLIC BLOOD PRESSURE: 82 MMHG

## 2021-09-08 DIAGNOSIS — G93.2 PSEUDOTUMOR CEREBRI: Primary | ICD-10-CM

## 2021-09-08 DIAGNOSIS — E66.09 CLASS 2 OBESITY DUE TO EXCESS CALORIES WITH BODY MASS INDEX (BMI) OF 39.0 TO 39.9 IN ADULT, UNSPECIFIED WHETHER SERIOUS COMORBIDITY PRESENT: ICD-10-CM

## 2021-09-08 DIAGNOSIS — Z87.891 FORMER SMOKER: ICD-10-CM

## 2021-09-08 PROCEDURE — 99213 OFFICE O/P EST LOW 20 MIN: CPT | Performed by: NURSE PRACTITIONER

## 2021-09-08 NOTE — PATIENT INSTRUCTIONS
"BMI for Adults  What is BMI?  Body mass index (BMI) is a number that is calculated from a person's weight and height. BMI can help estimate how much of a person's weight is composed of fat. BMI does not measure body fat directly. Rather, it is an alternative to procedures that directly measure body fat, which can be difficult and expensive.  BMI can help identify people who may be at higher risk for certain medical problems.  What are BMI measurements used for?  BMI is used as a screening tool to identify possible weight problems. It helps determine whether a person is obese, overweight, a healthy weight, or underweight.  BMI is useful for:  · Identifying a weight problem that may be related to a medical condition or may increase the risk for medical problems.  · Promoting changes, such as changes in diet and exercise, to help reach a healthy weight. BMI screening can be repeated to see if these changes are working.  How is BMI calculated?  BMI involves measuring your weight in relation to your height. Both height and weight are measured, and the BMI is calculated from those numbers. This can be done either in English (U[place] or metric measurements. Note that charts and online BMI calculators are available to help you find your BMI quickly and easily without having to do these calculations yourself.  To calculate your BMI in English ([place] measurements:    1. Measure your weight in pounds (lb).  2. Multiply the number of pounds by 703.  ? For example, for a person who weighs 180 lb, multiply that number by 703, which equals 126,540.  3. Measure your height in inches. Then multiply that number by itself to get a measurement called \"inches squared.\"  ? For example, for a person who is 70 inches tall, the \"inches squared\" measurement is 70 inches x 70 inches, which equals 4,900 inches squared.  4. Divide the total from step 2 (number of lb x 703) by the total from step 3 (inches squared): 126,540 ÷ 4,900 = 25.8. This is " "your BMI.    To calculate your BMI in metric measurements:  1. Measure your weight in kilograms (kg).  2. Measure your height in meters (m). Then multiply that number by itself to get a measurement called \"meters squared.\"  ? For example, for a person who is 1.75 m tall, the \"meters squared\" measurement is 1.75 m x 1.75 m, which is equal to 3.1 meters squared.  3. Divide the number of kilograms (your weight) by the meters squared number. In this example: 70 ÷ 3.1 = 22.6. This is your BMI.  What do the results mean?  BMI charts are used to identify whether you are underweight, normal weight, overweight, or obese. The following guidelines will be used:  · Underweight: BMI less than 18.5.  · Normal weight: BMI between 18.5 and 24.9.  · Overweight: BMI between 25 and 29.9.  · Obese: BMI of 30 or above.  Keep these notes in mind:  · Weight includes both fat and muscle, so someone with a muscular build, such as an athlete, may have a BMI that is higher than 24.9. In cases like these, BMI is not an accurate measure of body fat.  · To determine if excess body fat is the cause of a BMI of 25 or higher, further assessments may need to be done by a health care provider.  · BMI is usually interpreted in the same way for men and women.  Where to find more information  For more information about BMI, including tools to quickly calculate your BMI, go to these websites:  · Centers for Disease Control and Prevention: www.cdc.gov  · American Heart Association: www.heart.org  · National Heart, Lung, and Blood [place]: www.nhlbi.nih.gov  Summary  · Body mass index (BMI) is a number that is calculated from a person's weight and height.  · BMI may help estimate how much of a person's weight is composed of fat. BMI can help identify those who may be at higher risk for certain medical problems.  · BMI can be measured using English measurements or metric measurements.  · BMI charts are used to identify whether you are underweight, normal " "weight, overweight, or obese.  This information is not intended to replace advice given to you by your health care provider. Make sure you discuss any questions you have with your health care provider.  Document Revised: 09/09/2020 Document Reviewed: 07/17/2020  [employer] Patient Education © 2021 [employer]      https://www.nhlbi.nih.gov/files/docs/public/heart/dash_brief.pdf\">   DASH Eating Plan  DASH stands for Dietary Approaches to Stop Hypertension. The DASH eating plan is a healthy eating plan that has been shown to:  · Reduce high blood pressure (hypertension).  · Reduce your risk for type 2 diabetes, heart disease, and stroke.  · Help with weight loss.  What are tips for following this plan?  Reading food labels  · Check food labels for the amount of salt (sodium) per serving. Choose foods with less than 5 percent of the Daily Value of sodium. Generally, foods with less than 300 milligrams (mg) of sodium per serving fit into this eating plan.  · To find whole grains, look for the word \"whole\" as the first word in the ingredient list.  Shopping  · Buy products labeled as \"low-sodium\" or \"no salt added.\"  · Buy fresh foods. Avoid canned foods and pre-made or frozen meals.  Cooking  · Avoid adding salt when cooking. Use salt-free seasonings or herbs instead of table salt or sea salt. Check with your health care provider or pharmacist before using salt substitutes.  · Do not galan foods. Cook foods using healthy methods such as baking, boiling, grilling, roasting, and broiling instead.  · Cook with heart-healthy oils, such as olive, canola, avocado, soybean, or sunflower oil.  Meal planning    · Eat a balanced diet that includes:  ? 4 or more servings of fruits and 4 or more servings of vegetables each day. Try to fill one-half of your plate with fruits and vegetables.  ? 6-8 servings of whole grains each day.  ? Less than 6 oz (170 g) of lean meat, poultry, or fish each day. A 3-oz (85-g) serving of meat is " about the same size as a deck of cards. One egg equals 1 oz (28 g).  ? 2-3 servings of low-fat dairy each day. One serving is 1 cup (237 mL).  ? 1 serving of nuts, seeds, or beans 5 times each week.  ? 2-3 servings of heart-healthy fats. Healthy fats called omega-3 fatty acids are found in foods such as walnuts, flaxseeds, fortified milks, and eggs. These fats are also found in cold-water fish, such as sardines, salmon, and mackerel.  · Limit how much you eat of:  ? Canned or prepackaged foods.  ? Food that is high in trans fat, such as some fried foods.  ? Food that is high in saturated fat, such as fatty meat.  ? Desserts and other sweets, sugary drinks, and other foods with added sugar.  ? Full-fat dairy products.  · Do not salt foods before eating.  · Do not eat more than 4 egg yolks a week.  · Try to eat at least 2 vegetarian meals a week.  · Eat more home-cooked food and less restaurant, buffet, and fast food.    Lifestyle  · When eating at a restaurant, ask that your food be prepared with less salt or no salt, if possible.  · If you drink alcohol:  ? Limit how much you use to:  § 0-1 drink a day for women who are not pregnant.  § 0-2 drinks a day for men.  ? Be aware of how much alcohol is in your drink. In the [place] one drink equals one 12 oz bottle of beer (355 mL), one 5 oz glass of wine (148 mL), or one 1½ oz glass of hard liquor (44 mL).  General information  · Avoid eating more than 2,300 mg of salt a day. If you have hypertension, you may need to reduce your sodium intake to 1,500 mg a day.  · Work with your health care provider to maintain a healthy body weight or to lose weight. Ask what an ideal weight is for you.  · Get at least 30 minutes of exercise that causes your heart to beat faster (aerobic exercise) most days of the week. Activities may include walking, swimming, or biking.  · Work with your health care provider or dietitian to adjust your eating plan to your individual calorie needs.  What  foods should I eat?  Fruits  All fresh, dried, or frozen fruit. Canned fruit in natural juice (without added sugar).  Vegetables  Fresh or frozen vegetables (raw, steamed, roasted, or grilled). Low-sodium or reduced-sodium tomato and vegetable juice. Low-sodium or reduced-sodium tomato sauce and tomato paste. Low-sodium or reduced-sodium canned vegetables.  Grains  Whole-grain or whole-wheat bread. Whole-grain or whole-wheat pasta. Brown rice. Oatmeal. Quinoa. Bulgur. Whole-grain and low-sodium cereals. Kristi bread. Low-fat, low-sodium crackers. Whole-wheat flour tortillas.  Meats and other proteins  Skinless chicken or turkey. Ground chicken or turkey. Pork with fat trimmed off. Fish and seafood. Egg whites. Dried beans, peas, or lentils. Unsalted nuts, nut butters, and seeds. Unsalted canned beans. Lean cuts of beef with fat trimmed off. Low-sodium, lean precooked or cured meat, such as sausages or meat loaves.  Dairy  Low-fat (1%) or fat-free (skim) milk. Reduced-fat, low-fat, or fat-free cheeses. Nonfat, low-sodium ricotta or cottage cheese. Low-fat or nonfat yogurt. Low-fat, low-sodium cheese.  Fats and oils  Soft margarine without trans fats. Vegetable oil. Reduced-fat, low-fat, or light mayonnaise and salad dressings (reduced-sodium). Canola, safflower, olive, avocado, soybean, and sunflower oils. Avocado.  Seasonings and condiments  Herbs. Spices. Seasoning mixes without salt.  Other foods  Unsalted popcorn and pretzels. Fat-free sweets.  The items listed above may not be a complete list of foods and beverages you can eat. Contact a dietitian for more information.  What foods should I avoid?  Fruits  Canned fruit in a light or heavy syrup. Fried fruit. Fruit in cream or butter sauce.  Vegetables  Creamed or fried vegetables. Vegetables in a cheese sauce. Regular canned vegetables (not low-sodium or reduced-sodium). Regular canned tomato sauce and paste (not low-sodium or reduced-sodium). Regular tomato and  vegetable juice (not low-sodium or reduced-sodium). Pickles. Olives.  Grains  Baked goods made with fat, such as croissants, muffins, or some breads. Dry pasta or rice meal packs.  Meats and other proteins  Fatty cuts of meat. Ribs. Fried meat. Gregg. Bologna, salami, and other precooked or cured meats, such as sausages or meat loaves. Fat from the back of a pig (fatback). Bratwurst. Salted nuts and seeds. Canned beans with added salt. Canned or smoked fish. Whole eggs or egg yolks. Chicken or turkey with skin.  Dairy  Whole or 2% milk, cream, and half-and-half. Whole or full-fat cream cheese. Whole-fat or sweetened yogurt. Full-fat cheese. Nondairy creamers. Whipped toppings. Processed cheese and cheese spreads.  Fats and oils  Butter. Stick margarine. Lard. Shortening. Ghee. Gregg fat. Tropical oils, such as coconut, palm kernel, or palm oil.  Seasonings and condiments  Onion salt, garlic salt, seasoned salt, table salt, and sea salt. Worcestershire sauce. Tartar sauce. Barbecue sauce. Teriyaki sauce. Soy sauce, including reduced-sodium. Steak sauce. Canned and packaged gravies. Fish sauce. Oyster sauce. Cocktail sauce. Store-bought horseradish. Ketchup. Mustard. Meat flavorings and tenderizers. Bouillon cubes. Hot sauces. Pre-made or packaged marinades. Pre-made or packaged taco seasonings. Relishes. Regular salad dressings.  Other foods  Salted popcorn and pretzels.  The items listed above may not be a complete list of foods and beverages you should avoid. Contact a dietitian for more information.  Where to find more information  · National Heart, Lung, and Blood [place]: www.nhlbi.nih.gov  · American Heart Association: www.heart.org  · Academy of Nutrition and Dietetics: www.eatright.org  · National Kidney Foundation: www.kidney.org  Summary  · The DASH eating plan is a healthy eating plan that has been shown to reduce high blood pressure (hypertension). It may also reduce your risk for type 2 diabetes, heart  disease, and stroke.  · When on the DASH eating plan, aim to eat more fresh fruits and vegetables, whole grains, lean proteins, low-fat dairy, and heart-healthy fats.  · With the DASH eating plan, you should limit salt (sodium) intake to 2,300 mg a day. If you have hypertension, you may need to reduce your sodium intake to 1,500 mg a day.  · Work with your health care provider or dietitian to adjust your eating plan to your individual calorie needs.  This information is not intended to replace advice given to you by your health care provider. Make sure you discuss any questions you have with your health care provider.  Document Revised: 11/20/2020 Document Reviewed: 11/20/2020  [employer]vier Patient Education © 2021 [employer]

## 2021-09-08 NOTE — PROGRESS NOTES
"    Chief complaint:   Chief Complaint   Patient presents with   • Headache     Kelsey returns today for follow up, she states  her headache is better today.         Subjective     HPI: This is a 30-year-old female patient who we see for  shunt that was placed for benign intracranial hypertension.  The shunt was placed in November 2015.    We did see her on August 30, 2021 for increasing headaches and vision changes.  She said that it had been getting better on the day that we saw her and we wanted to see how she was improving.  She comes in today for follow-up. She says that her headaches have resolved at this point and do feel like she is back to her normal headaches. She not complaining of any vision changes and feels like her vision has improved from the last time that she was here. She said that she is set up to go see her ophthalmologist next week for another eye exam to make sure that everything is still stable but overall she does like she is doing better compared to how she was from a week and a half ago    Review of Systems   Eyes: Positive for visual disturbance.   Musculoskeletal: Negative.    Neurological: Positive for headaches.   Psychiatric/Behavioral: Negative.          Objective      Vital Signs  /82   Ht 157.5 cm (62\")   Wt 96.9 kg (213 lb 9.6 oz)   LMP 01/21/2019 (Approximate)   BMI 39.07 kg/m²     Physical Exam  Constitutional:       Appearance: She is well-developed.   HENT:      Head: Normocephalic.   Eyes:      Extraocular Movements: Extraocular movements intact.      Pupils: Pupils are equal, round, and reactive to light.      Comments: Funduscopic exam normal   Pulmonary:      Effort: Pulmonary effort is normal.   Musculoskeletal:         General: Normal range of motion.      Cervical back: Normal range of motion.   Skin:     General: Skin is warm.   Neurological:      Mental Status: She is alert and oriented to person, place, and time.      GCS: GCS eye subscore is 4. GCS " verbal subscore is 5. GCS motor subscore is 6.      Cranial Nerves: No cranial nerve deficit.      Sensory: No sensory deficit.      Gait: Gait is intact. Gait normal.      Deep Tendon Reflexes: Reflexes are normal and symmetric.   Psychiatric:         Speech: Speech normal.         Behavior: Behavior normal.         Thought Content: Thought content normal.         Neurologic Exam     Mental Status   Oriented to person, place, and time.   Speech: speech is normal     Cranial Nerves     CN III, IV, VI   Pupils are equal, round, and reactive to light.    Gait, Coordination, and Reflexes     Gait  Gait: normal      Imaging review: No new imaging        Assessment/Plan: The patient has made improvements in the last week and a half. At this point I do not recommend a shunt tap. She is getting continue with her eye exam and if there is any concerns for papilledema we can consider doing a shunt tap or she has any changes in her symptoms. I did check her shunt and it is found to be at 1.0. She was told to call us if any further problems or concerns. We will follow-up again with her in 1 year    Patient is a nonsmoker  The patient's Body mass index is 39.07 kg/m².. BMI is above normal parameters. Recommendations include: educational material and nutrition counseling    Diagnoses and all orders for this visit:    1. Pseudotumor cerebri (Primary)    2. Class 2 obesity due to excess calories with body mass index (BMI) of 39.0 to 39.9 in adult, unspecified whether serious comorbidity present    3. Former smoker        I discussed the patients findings and my recommendations with patient  Ean Treviño, GREYSON  09/08/21  15:44 CDT

## 2022-03-20 ENCOUNTER — NURSE TRIAGE (OUTPATIENT)
Dept: CALL CENTER | Facility: HOSPITAL | Age: 32
End: 2022-03-20

## 2022-03-20 NOTE — TELEPHONE ENCOUNTER
Reason for Disposition  • [1] INCREASING pain in incision AND [2] > 2 days (48 hours) since surgery    Additional Information  • Negative: [1] Major abdominal surgical incision AND [2] wound gaping open AND [3] visible internal organs  • Negative: Sounds like a life-threatening emergency to the triager  • Negative: Patient has a Negative Pressure Wound Therapy device  • Negative: Patient is followed by a wound clinic or wound specialist for this wound  • Negative: [1] Bleeding from incision AND [2] won't stop after 10 minutes of direct pressure  • Negative: [1] Widespread rash AND [2] bright red, sunburn-like  • Negative: Severe pain in the incision  • Negative: [1] Incision gaping open AND [2] < 48 hours since wound re-opened  • Negative: [1] Incision gaping open AND [2] length of opening > 2 inches (5 cm)  • Negative: Patient sounds very sick or weak to the triager  • Negative: Sounds like a serious complication to the triager  • Negative: Fever > 100.4 F (38.0 C)  • Negative: [1] Incision looks infected (spreading redness, pain) AND [2] fever > 99.5 F (37.5 C)  • Negative: [1] Incision looks infected (spreading redness, pain) AND [2] large red area (> 2 in. or 5 cm)  • Negative: [1] Incision looks infected (spreading redness, pain) AND [2] face wound  • Negative: [1] Red streak runs from the incision AND [2] longer than 1 inch (2.5 cm)  • Negative: [1] Pus or bad-smelling fluid draining from incision AND [2] no fever  • Negative: [1] Post-op pain AND [2] not controlled with pain medications  • Negative: Dressing soaked with blood or body fluid (e.g., drainage)  • Negative: [1] Raised bruise and [2] size > 2 inches (5 cm) and expanding  • Negative: [1] Caller has URGENT question AND [2] triager unable to answer question  • Negative: [1] Small red area or streak AND [2] no fever  • Negative: [1] Clear or blood-tinged fluid draining from wound AND [2] no fever  • Negative: [1] Incision gaping open AND [2] > 48  "hours since wound re-opened AND [3] length of opening > 1/2 inch (12 mm)  • Negative: [1] Incision on face gaping open after skin glue AND [2] > 48 hours since wound re-opened AND [3] length of opening > 1/4 inch (6 mm)  • Negative: Suture or staple removal is overdue  • Negative: [1] Suture or staple came out early AND [2] caller wants wound checked  • Negative: [1] Caller has NON-URGENT question AND [2] triager unable to answer question  • Negative: Pimple where a stitch comes through the skin  • Negative: Suture (or staple) came out early  • Negative: Mild bruising near incision site  • Negative: Suture removal date, questions about  • Negative: Skin tape (e.g., Steri-strips) removal, questions about  • Negative: Sutured or stapled surgical incision, questions about  • Negative: Surgical incision after sutures or staples removed, questions about  • Negative: Numbness at incision site, questions about    Answer Assessment - Initial Assessment Questions  1. SYMPTOM: \"What's the main symptom you're concerned about?\" (e.g., redness, pain, drainage)      pain  2. ONSET: \"When did pain  start?\"      today  3. SURGERY: \"What surgery was performed?\"      Port placement  4. DATE of SURGERY: \"When was surgery performed?\"       Thursday  5. INCISION SITE: \"Where is the incision located?\"       Left chest  6. REDNESS: \"Is there any redness at the incision site?\" If yes, ask: \"How wide across is the redness?\" (Inches, centimeters)       denies  7. PAIN: \"Is there any pain?\" If Yes, ask: \"How bad is it?\"  (Scale 1-10; or mild, moderate, severe)      Increased discomfort radiating to back; states she has been sleeping on that side and on her stomach; states she has been lifting with that arm  8. BLEEDING: \"Is there any bleeding?\" If Yes, ask: \"How much?\" and \"Where?\"      denies  9. DRAINAGE: \"Is there any drainage from the incision site?\" If yes, ask: \"What color and how much?\" (e.g., red, cloudy, pus; drops, teaspoon)      " "denies  10. FEVER: \"Do you have a fever?\" If Yes, ask: \"What is your temperature, how was it measured, and when did it start?\"        States no fever, but, she has had some chills  11. OTHER SYMPTOMS: \"Do you have any other symptoms?\" (e.g., shaking chills, weakness, rash elsewhere on body)        denies    Protocols used: POST-OP INCISION SYMPTOMS AND QUESTIONS-ADULT-      "

## 2022-04-20 ENCOUNTER — TELEPHONE (OUTPATIENT)
Dept: VASCULAR SURGERY | Age: 32
End: 2022-04-20

## 2022-04-20 ENCOUNTER — OFFICE VISIT (OUTPATIENT)
Dept: SURGERY | Age: 32
End: 2022-04-20
Payer: MEDICAID

## 2022-04-20 VITALS
HEIGHT: 62 IN | TEMPERATURE: 97.4 F | BODY MASS INDEX: 37.36 KG/M2 | SYSTOLIC BLOOD PRESSURE: 122 MMHG | HEART RATE: 75 BPM | DIASTOLIC BLOOD PRESSURE: 76 MMHG | WEIGHT: 203 LBS

## 2022-04-20 DIAGNOSIS — T82.9XXA CENTRAL LINE COMPLICATION, INITIAL ENCOUNTER: Primary | ICD-10-CM

## 2022-04-20 PROCEDURE — G8419 CALC BMI OUT NRM PARAM NOF/U: HCPCS | Performed by: PHYSICIAN ASSISTANT

## 2022-04-20 PROCEDURE — 99203 OFFICE O/P NEW LOW 30 MIN: CPT | Performed by: PHYSICIAN ASSISTANT

## 2022-04-20 PROCEDURE — G8427 DOCREV CUR MEDS BY ELIG CLIN: HCPCS | Performed by: PHYSICIAN ASSISTANT

## 2022-04-20 PROCEDURE — 4004F PT TOBACCO SCREEN RCVD TLK: CPT | Performed by: PHYSICIAN ASSISTANT

## 2022-04-20 RX ORDER — OMEPRAZOLE 10 MG/1
40 CAPSULE, DELAYED RELEASE ORAL DAILY
COMMUNITY

## 2022-04-20 RX ORDER — PROPRANOLOL HYDROCHLORIDE 10 MG/1
80 TABLET ORAL DAILY
COMMUNITY

## 2022-04-20 RX ORDER — DIAZEPAM 5 MG/1
5 TABLET ORAL EVERY 6 HOURS PRN
Status: ON HOLD | COMMUNITY
End: 2022-05-05

## 2022-04-20 NOTE — TELEPHONE ENCOUNTER
I received a call from Maggie Weinberg with [facility] requesting a port study on the pt. The port was placed by a physician at [facility] that is no longer available. The pt recently had a mediport check with contrast on 4/13/22. The impression read as follows: \"There is a fibrin sheath covering the distal catheter tip. Contrast is noted to opacity of the SVC normally. There is also kinking of the port catheter at the junction of the catheter and the reservoir. \"  Lucila Costa has asked his nurse to obtain a copy of the films so they can be uploaded for Dr. Lauren Romero review. I will schedule the pt next week for a port study and contact the pt with the details. Lucila Costa voiced understanding and is aware of the POC.

## 2022-04-20 NOTE — TELEPHONE ENCOUNTER
I sw the pt to let her know I have her scheduled for her port study with Dr. Guanakito Laughlin on Tue 4/26/22. The pt will need to arrive at [facility] reg at 11:00 am. She will not need a  to take her home, nor will she have any special prep instructions. Once we have the surgeons recommendation we will contact General Surgery with the report for further scheduling. The pt voiced understanding and is aware of this procedure.

## 2022-04-20 NOTE — PROGRESS NOTES
Subjective  Alma Delia Odell is a 60-year-old female with poor venous access with a history of Crohn's and other medical problems. She had a port placed by her surgeon in [facility] who is no longer there. She had a port study that is shown some fibrin sheath and some kinking of the port tubing in the reservoir. She comes today for evaluation. She has had no neck swelling. She has some intermittent pain within the left chest wall. Objective  Patient Active Problem List    Diagnosis Date Noted    Family hx-breast malignancy 04/25/2018    Pyelonephritis     Acute left flank pain 04/07/2018    Left nephrolithiasis 04/07/2018    Acute cystitis with hematuria 04/07/2018    MAURI (generalized anxiety disorder) 12/19/2017    Galactorrhea 09/13/2017    Methamphetamine abuse ([facility] [address] 06/12/2017    Mood disorder ([facility] [address] 06/12/2017    Suicidal ideation     Atypical nevus of abdominal wall 12/27/2016    Atypical nevus of female breast 12/27/2016       Current Outpatient Medications   Medication Sig Dispense Refill    vitamin D-3 (CHOLECALCIFEROL) 125 MCG (5000 UT) TABS Take 2,000 Units by mouth daily      Cholecalciferol 50 MCG (2000 UT) CAPS cholecalciferol (vitamin D3) 50 mcg (2,000 unit) capsule      clonazePAM (KLONOPIN) 1 MG tablet Take 1 mg by mouth 2 times daily as needed.  (Patient not taking: Reported on 4/20/2022)      venlafaxine (EFFEXOR) 75 MG tablet Take 150 mg by mouth once Indications: morning (Patient not taking: Reported on 4/20/2022)      venlafaxine (EFFEXOR) 75 MG tablet Take 75 mg by mouth once Indications: nightly (Patient not taking: Reported on 4/20/2022)      GABAPENTIN PO Take by mouth (Patient not taking: Reported on 4/20/2022)      linaclotide (LINZESS) 290 MCG CAPS capsule Take 1 capsule by mouth every morning (before breakfast) (Patient not taking: Reported on 4/20/2022) 30 capsule 5    tiZANidine (ZANAFLEX) 4 MG tablet Take 1 tablet by mouth every 6 hours as needed (muscle spasm) (Patient not taking: Reported on 4/20/2022) 60 tablet 1    PROAIR  (90 BASE) MCG/ACT inhaler INHALE 2 PUFFS Q 4 H PRN (Patient not taking: Reported on 4/20/2022)  0     No current facility-administered medications for this visit. Allergies: Morphine and related and Other    Past Medical History:   Diagnosis Date    Back pain     from kidney stones    Chronic kidney disease     Vurshite stones    Depression     Headache     IIH (idiopathic intracranial hypertension)     Kidney stone     Neck pain     Ovarian cyst        Past Surgical History:   Procedure Laterality Date    ABDOMEN SURGERY Left 12/29/2016    EXCSION SKIN LESION BREAST AND ABDOMEN WALL performed by Bony Solomon MD at [address] [place] [address]      prior to hysterectomy    KIDNEY STONE SURGERY  04/03/2018    Kidney surgery [place] by Dr. Horacio Whittaker Stones removed and stent placed    KIDNEY SURGERY      Vurshite stones    PORT SURGERY  03/13/2022    [id number] [facility]  2015     Shunt/Dr Emiliano wOens TONSILLECTOMY      VENTRICULOPERITONEAL SHUNT         Family History   Problem Relation Age of Onset    Breast Cancer Mother         Stage 4       Social History     Tobacco Use    Smoking status: Current Some Day Smoker     Packs/day: 0.50     Years: 4.00     Pack years: 2.00     Types: E-Cigarettes, Cigarettes    Smokeless tobacco: Never Used   Substance Use Topics    Alcohol use: Yes     Comment: rarely        Review of systems  Reviewed and positive for the above all other systems noted to be negative    Exam  Blood pressure 122/76, pulse 75, temperature 97.4 °F (36.3 °C), temperature source Temporal, height 5' 2\" (1.575 m), weight 203 lb (92.1 kg), not currently breastfeeding. Examination to the left chest wall the incision is healing well. There is a suture knot that is removed. She has no neck swelling.   There is no evidence of infection. Assessment  Port malfunction    Plan  I will send the patient to vascular surgery for a evaluation in the special procedure room. We will asked him to proceed with any procedure that are indicated.

## 2022-04-21 ENCOUNTER — PREP FOR PROCEDURE (OUTPATIENT)
Dept: VASCULAR SURGERY | Age: 32
End: 2022-04-21

## 2022-04-21 RX ORDER — SODIUM CHLORIDE 0.9 % (FLUSH) 0.9 %
5-40 SYRINGE (ML) INJECTION EVERY 12 HOURS SCHEDULED
Status: CANCELLED | OUTPATIENT
Start: 2022-04-21

## 2022-04-21 RX ORDER — SODIUM CHLORIDE 9 MG/ML
INJECTION, SOLUTION INTRAVENOUS PRN
Status: CANCELLED | OUTPATIENT
Start: 2022-04-21

## 2022-04-21 RX ORDER — SODIUM CHLORIDE 0.9 % (FLUSH) 0.9 %
5-40 SYRINGE (ML) INJECTION PRN
Status: CANCELLED | OUTPATIENT
Start: 2022-04-21

## 2022-04-21 NOTE — H&P
Patient Care Team:  Vanita Campbell as PCP - Bibi Vicente MD (Neurology)        Renan Sexton 28 y.o. female with a mal functioning mediport    Patient Active Problem List   Diagnosis    Atypical nevus of abdominal wall    Atypical nevus of female breast    Suicidal ideation    [facility]amphetamine abuse ([facility] [address]    Mood disorder ([facility] [address]    Galactorrhea    MAURI (generalized anxiety disorder)    Acute left flank pain    Left nephrolithiasis    Acute cystitis with hematuria    Pyelonephritis    Family hx-breast malignancy      See attached meds  Allergies:  Morphine and related and Other  Past Medical History:   Diagnosis Date    Back pain     from kidney stones    Chronic kidney disease     Vurshite stones    Depression     Headache     IIH (idiopathic intracranial hypertension)     Kidney stone     Neck pain     Ovarian cyst       Past Surgical History:   Procedure Laterality Date    ABDOMEN SURGERY Left 12/29/2016    EXCSION SKIN LESION BREAST AND ABDOMEN WALL performed by Esa Boggs MD at [address] [place] [address]      prior to hysterectomy    KIDNEY STONE SURGERY  04/03/2018    [facility] [place] by Dr. Silvano Sanford Stones removed and stent placed    KIDNEY SURGERY      Vurshite stones    PORT SURGERY  03/13/2022    [address] [facility]  2015     Shunt/Dr Cesar Arthur TONSILLECTOMY      VENTRICULOPERITONEAL SHUNT        Family History   Problem Relation Age of Onset    Breast Cancer Mother         Stage 4      Social History     Tobacco Use    Smoking status: Current Some Day Smoker     Packs/day: 0.50     Years: 4.00     Pack years: 2.00     Types: E-Cigarettes, Cigarettes    Smokeless tobacco: Never Used   Substance Use Topics    Alcohol use: Yes     Comment: rarely       HEENT __normocepahlic; sclera clear  Neck   Supple  Lungs -cta  Heart  rr  GI - Abdomen soft, non-tender  Extremities without cyanosis  Skin without significant lesions   Diagnosis malfunctioning mediport with fibrin sheath    Permit for port study and possible stripping    Risks have been reviewed with the patient including but not limited to heart attack, death, CVA, bleeding, nerve injury, infection,  pain, and need for further surgery.       _______________________________________________________________________  Signature     Date    Time

## 2022-04-21 NOTE — H&P (VIEW-ONLY)
Patient Care Team:  Jena Adams as PCP - Ary Farrell MD (Neurology)        Marissa Paris 28 y.o. female with a mal functioning mediport    Patient Active Problem List   Diagnosis    Atypical nevus of abdominal wall    Atypical nevus of female breast    Suicidal ideation    [facility] abuse ([facility] [address]    Mood disorder ([facility] [address]    Galactorrhea    MAURI (generalized anxiety disorder)    Acute left flank pain    Left nephrolithiasis    Acute cystitis with hematuria    Pyelonephritis    Family hx-breast malignancy      See attached meds  Allergies:  Morphine and related and Other  Past Medical History:   Diagnosis Date    Back pain     from kidney stones    Chronic kidney disease     Vurshite stones    Depression     Headache     IIH (idiopathic intracranial hypertension)     Kidney stone     Neck pain     Ovarian cyst       Past Surgical History:   Procedure Laterality Date    ABDOMEN SURGERY Left 12/29/2016    EXCSION SKIN LESION BREAST AND ABDOMEN WALL performed by Michel Savage MD at [address] [place] [address]      prior to hysterectomy    KIDNEY STONE SURGERY  04/03/2018    [facility] [place] by Dr. Nahum Martinez Stones removed and stent placed    KIDNEY SURGERY      Vurshite stones    PORT SURGERY  03/13/2022    [address] [facility]  2015     Shunt/Dr Walter Fairburn TONSILLECTOMY      VENTRICULOPERITONEAL SHUNT        Family History   Problem Relation Age of Onset    Breast Cancer Mother         Stage 4      Social History     Tobacco Use    Smoking status: Current Some Day Smoker     Packs/day: 0.50     Years: 4.00     Pack years: 2.00     Types: E-Cigarettes, Cigarettes    Smokeless tobacco: Never Used   Substance Use Topics    Alcohol use: Yes     Comment: rarely       HEENT __normocepahlic; sclera clear  Neck   Supple  Lungs -cta  Heart  rr  GI - Abdomen soft, non-tender  Extremities without cyanosis  Skin without significant lesions   Diagnosis malfunctioning mediport with fibrin sheath    Permit for port study and possible stripping    Risks have been reviewed with the patient including but not limited to heart attack, death, CVA, bleeding, nerve injury, infection,  pain, and need for further surgery.       _______________________________________________________________________  Signature     Date    Time

## 2022-04-26 ENCOUNTER — HOSPITAL ENCOUNTER (OUTPATIENT)
Dept: INTERVENTIONAL RADIOLOGY/VASCULAR | Age: 32
Discharge: HOME | End: 2022-04-26
Payer: MEDICAID

## 2022-04-26 DIAGNOSIS — K50.919 CROHN'S DISEASE WITH COMPLICATION, UNSPECIFIED GASTROINTESTINAL TRACT LOCATION (HCC): ICD-10-CM

## 2022-04-26 PROCEDURE — 6360000004 HC RX CONTRAST MEDICATION: Performed by: SURGERY

## 2022-04-26 PROCEDURE — 6360000002 HC RX W HCPCS: Performed by: SURGERY

## 2022-04-26 PROCEDURE — 2709999900 IR CONTRAST INJECTION  EXISTING CV ACCESS DEVICE EVALUATION W FLUORO

## 2022-04-26 PROCEDURE — 36598 INJ W/FLUOR EVAL CV DEVICE: CPT | Performed by: SURGERY

## 2022-04-26 PROCEDURE — 36598 INJ W/FLUOR EVAL CV DEVICE: CPT

## 2022-04-26 RX ORDER — IODIXANOL 320 MG/ML
INJECTION, SOLUTION INTRAVASCULAR
Status: COMPLETED | OUTPATIENT
Start: 2022-04-26 | End: 2022-04-26

## 2022-04-26 RX ORDER — HEPARIN SODIUM (PORCINE) LOCK FLUSH IV SOLN 100 UNIT/ML 100 UNIT/ML
SOLUTION INTRAVENOUS
Status: COMPLETED | OUTPATIENT
Start: 2022-04-26 | End: 2022-04-26

## 2022-04-26 RX ADMIN — SODIUM CHLORIDE, PRESERVATIVE FREE 300 UNITS: 5 INJECTION INTRAVENOUS at 11:44

## 2022-04-26 RX ADMIN — IODIXANOL 5 ML: 320 INJECTION, SOLUTION INTRAVASCULAR at 11:47

## 2022-04-26 NOTE — INTERVAL H&P NOTE
Update History & Physical    The patient's History and Physical of April 21, 2022 was reviewed with the patient and I examined the patient. There was no change. The surgical site was confirmed by the patient and me. Plan: The risks, benefits, expected outcome, and alternative to the recommended procedure have been discussed with the patient. Patient understands and wants to proceed with the procedure.      Electronically signed by Sherin Ruano DO on 4/26/2022 at 11:29 AM

## 2022-04-26 NOTE — OP NOTE
Patient Name: Malu Milian   YOB: 1990   MRN: [id number]     Procedure Date: 4/26/2022     Pre Op Diagnosis: port malfunction    Post Op Diagnosis: same    Procedure: port study    Anesthesia: none    Estimated Blood Loss: none    Complications: None    Implants: none    Procedure Details: Patient was placed in supine position on the angiogram table. Imaging was performed showing the tubing with cath at the port site of the subclavian left port. With injection of the contrast after the accident port with fibrin sheath at the end. Otherwise good opacification of the SVC. Plan for left subclavian port removal and placement of the left IJ port. Findings: Kinking of the tubing at the port site. Fibrin sheath at the tip of the port. Device tubing is intact.     [address] [facility] DO Svetlana  4/26/2022 11:42 AM

## 2022-04-27 ENCOUNTER — TELEPHONE (OUTPATIENT)
Dept: SURGERY | Age: 32
End: 2022-04-27

## 2022-04-27 ENCOUNTER — TELEPHONE (OUTPATIENT)
Dept: VASCULAR SURGERY | Age: 32
End: 2022-04-27

## 2022-04-27 DIAGNOSIS — Z01.818 PRE-OP TESTING: Primary | ICD-10-CM

## 2022-04-27 NOTE — TELEPHONE ENCOUNTER
The pt called to let me know she was advised by [id number] [facility] to continue to stay under the care of Vascular for her port Exchange. The pt and I then dicussed the details of her upcoming surgery with Dr. Nancy Scott. The pt will need to arrive at the [facility] Patient registration on [address] [place] [address] Rd 5/5/2022 at 0600, our surgery dept will call the day prior to confirm this time. Nothing to eat or drink after the night prior. No special instructions with the pt's current medications. She will need to have a  upon hospital d/c. She will need to arrive on Fri 4/29/2022 at 0900 for pre-op testing. She will not have to fast prior to this appt. A RX of Gilford Spitz will be sent to the pt's pharmacy. Please apply to nares BID x 5 days starting on 5/3/2022. The pt voiced understanding and is aware of these instructions.

## 2022-04-27 NOTE — TELEPHONE ENCOUNTER
I sw the pt to ask if she was able to get a hold of Gen surg in regards to scheduling her port exchange? The pt states their office tried to return her call today but she was in an appt at the time of the call. She has called and left another  with their office. We discussed cancelling surgery with Dr. Juliet Bañuelos on Mon. I asked her to call to give me an update one she hears from [facility]. The pt voiced understanding and is aware. Surgery and pre-op testing has been cancelled.

## 2022-04-27 NOTE — TELEPHONE ENCOUNTER
My recommendation is to proceed with surgery with Dr. Jeanne Shaffer. Discussed with patient and she will to proceed with surgery with vascular surgery. This has been electronically signed by Marek Chacon.  Terry Stveenson PA-C.

## 2022-04-27 NOTE — TELEPHONE ENCOUNTER
4/27/2022  Patient called today and ask to speak with Kimber Her. She asked that we call her back ASAP. She did not state a reason for the call. Please call back at [phone].    4/27/2022 Vanessa with Vascular office called and stated patient was referred to them for a [facility]. Vanessa stated it does need to be exchanged that there is a kink in the line. She also expressed that the patient is a new patient of Kimber Her but would like to be seen by Thomas Barrientos. Patient has a history with Thomas Barrientos her mother was seen by him. Albina's extention is [phone]. Please reach out to patient.

## 2022-04-27 NOTE — TELEPHONE ENCOUNTER
Attempted to call. No answer. This has been electronically signed by Bebe Kiser.  Kemar Jarrell PA-C.

## 2022-04-29 ENCOUNTER — HOSPITAL ENCOUNTER (OUTPATIENT)
Dept: PREADMISSION TESTING | Age: 32
Discharge: HOME | End: 2022-05-03
Payer: MEDICAID

## 2022-04-29 ENCOUNTER — HOSPITAL ENCOUNTER (OUTPATIENT)
Dept: GENERAL RADIOLOGY | Age: 32
Discharge: HOME | End: 2022-04-29
Payer: MEDICAID

## 2022-04-29 VITALS — WEIGHT: 195 LBS | BODY MASS INDEX: 35.67 KG/M2

## 2022-04-29 DIAGNOSIS — Z01.818 PRE-OP TESTING: ICD-10-CM

## 2022-04-29 LAB
ANION GAP SERPL CALCULATED.3IONS-SCNC: 15 MMOL/L (ref 7–19)
APTT: 25.8 SEC (ref 26–36.2)
BUN BLDV-MCNC: 9 MG/DL (ref 6–20)
CALCIUM SERPL-MCNC: 9.3 MG/DL (ref 8.6–10)
CHLORIDE BLD-SCNC: 104 MMOL/L (ref 98–111)
CO2: 25 MMOL/L (ref 22–29)
CREAT SERPL-MCNC: 0.8 MG/DL (ref 0.5–0.9)
EKG P AXIS: 29 DEGREES
EKG P-R INTERVAL: 148 MS
EKG Q-T INTERVAL: 406 MS
EKG QRS DURATION: 90 MS
EKG QTC CALCULATION (BAZETT): 416 MS
EKG T AXIS: 40 DEGREES
GFR AFRICAN AMERICAN: >59
GFR NON-AFRICAN AMERICAN: >60
GLUCOSE BLD-MCNC: 96 MG/DL (ref 74–109)
HCT VFR BLD CALC: 45.7 % (ref 37–47)
HEMOGLOBIN: 14.4 G/DL (ref 12–16)
INR BLD: 1.01 (ref 0.88–1.18)
MCH RBC QN AUTO: 31.3 PG (ref 27–31)
MCHC RBC AUTO-ENTMCNC: 31.5 G/DL (ref 33–37)
MCV RBC AUTO: 99.3 FL (ref 81–99)
MRSA SCREEN RT-PCR: NOT DETECTED
PDW BLD-RTO: 13 % (ref 11.5–14.5)
PLATELET # BLD: 343 K/UL (ref 130–400)
PMV BLD AUTO: 10.4 FL (ref 9.4–12.3)
POTASSIUM SERPL-SCNC: 3.9 MMOL/L (ref 3.5–5)
PROTHROMBIN TIME: 13.2 SEC (ref 12–14.6)
RBC # BLD: 4.6 M/UL (ref 4.2–5.4)
SODIUM BLD-SCNC: 144 MMOL/L (ref 136–145)
WBC # BLD: 8.7 K/UL (ref 4.8–10.8)

## 2022-04-29 PROCEDURE — 93005 ELECTROCARDIOGRAM TRACING: CPT

## 2022-04-29 PROCEDURE — 85730 THROMBOPLASTIN TIME PARTIAL: CPT

## 2022-04-29 PROCEDURE — 80048 BASIC METABOLIC PNL TOTAL CA: CPT

## 2022-04-29 PROCEDURE — 85027 COMPLETE CBC AUTOMATED: CPT

## 2022-04-29 PROCEDURE — 71046 X-RAY EXAM CHEST 2 VIEWS: CPT

## 2022-04-29 PROCEDURE — 87641 MR-STAPH DNA AMP PROBE: CPT

## 2022-04-29 PROCEDURE — 93010 ELECTROCARDIOGRAM REPORT: CPT | Performed by: INTERNAL MEDICINE

## 2022-04-29 PROCEDURE — 85610 PROTHROMBIN TIME: CPT

## 2022-05-03 ENCOUNTER — PREP FOR PROCEDURE (OUTPATIENT)
Dept: VASCULAR SURGERY | Age: 32
End: 2022-05-03

## 2022-05-03 RX ORDER — SODIUM CHLORIDE 0.9 % (FLUSH) 0.9 %
5-40 SYRINGE (ML) INJECTION PRN
Status: CANCELLED | OUTPATIENT
Start: 2022-05-03

## 2022-05-03 RX ORDER — ASPIRIN 81 MG/1
81 TABLET ORAL ONCE
Status: CANCELLED | OUTPATIENT
Start: 2022-05-03 | End: 2022-05-03

## 2022-05-03 RX ORDER — SODIUM CHLORIDE 0.9 % (FLUSH) 0.9 %
5-40 SYRINGE (ML) INJECTION EVERY 12 HOURS SCHEDULED
Status: CANCELLED | OUTPATIENT
Start: 2022-05-03

## 2022-05-03 RX ORDER — SODIUM CHLORIDE 9 MG/ML
INJECTION, SOLUTION INTRAVENOUS PRN
Status: CANCELLED | OUTPATIENT
Start: 2022-05-03

## 2022-05-03 NOTE — H&P
Patient Care Team:  MOUSTAPHA Fry - CNP as PCP - General (Nurse Practitioner)  Jcarlos Kolb MD (Neurology)        Santosh Dobbins 28 y.o. female with a non functioning mediport. She is in need of removal and placement of new one    Patient Active Problem List   Diagnosis    Atypical nevus of abdominal wall    Atypical nevus of female breast    Suicidal ideation    Methamphetamine abuse ([address]    Mood disorder ([address]    Galactorrhea    MAURI (generalized anxiety disorder)    Acute left flank pain    Left nephrolithiasis    Acute cystitis with hematuria    Pyelonephritis    Family hx-breast malignancy    Crohn's disease with complication ([address]      See attached meds  Allergies:  Morphine and related and Other  Past Medical History:   Diagnosis Date    Back pain     from kidney stones    Chronic kidney disease     Vurshite stones    Depression     Headache     IIH (idiopathic intracranial hypertension)     Kidney stone     Neck pain     Ovarian cyst       Past Surgical History:   Procedure Laterality Date    ABDOMEN SURGERY Left 12/29/2016    EXCSION SKIN LESION BREAST AND ABDOMEN WALL performed by Mario Montoya MD at [address] [place] [address]      prior to hysterectomy    KIDNEY STONE SURGERY  04/03/2018    Kidney surgery At [place], [place] by Dr. Galen Osman Stones removed and stent placed    KIDNEY SURGERY      Vurshite stones    PORT SURGERY  03/13/2022    [address] [facility],[address]  2015     Shunt/Dr May Mcduffie TONSILLECTOMY      VASCULAR SURGERY  04/26/2022    VI. Port study    VENTRICULOPERITONEAL SHUNT        Family History   Problem Relation Age of Onset    Breast Cancer Mother         Stage 4      Social History     Tobacco Use    Smoking status: Current Some Day Smoker     Packs/day: 0.50     Years: 4.00     Pack years: 2.00     Types: E-Cigarettes, Cigarettes    Smokeless tobacco: Never Used   Substance Use Topics    Alcohol use: Yes     Comment: rarely       HEENT __normocepahlic; sclera clear  Neck   Supple  Lungs -cta  Heart  rr  GI - Abdomen soft, non-tender  Extremities without cyanosis  Skin without significant lesions   Diagnosis malfunctioning port    Permit for port removal and placement    Risks have been reviewed with the patient including but not limited to heart attack, death, CVA, bleeding, nerve injury, infection,  pain, and need for further surgery.       _______________________________________________________________________  Signature     Date    Time

## 2022-05-03 NOTE — H&P (VIEW-ONLY)
Patient Care Team:  MOUSTAPHA Bowen - CNP as PCP - General (Nurse Practitioner)  Brayan Rowell MD (Neurology)        Janusz Redman 28 y.o. female with a non functioning mediport. She is in need of removal and placement of new one    Patient Active Problem List   Diagnosis    Atypical nevus of abdominal wall    Atypical nevus of female breast    Suicidal ideation    Methamphetamine abuse ([address]    Mood disorder ([address]    Galactorrhea    MAURI (generalized anxiety disorder)    Acute left flank pain    Left nephrolithiasis    Acute cystitis with hematuria    Pyelonephritis    Family hx-breast malignancy    Crohn's disease with complication ([address]      See attached meds  Allergies:  Morphine and related and Other  Past Medical History:   Diagnosis Date    Back pain     from kidney stones    Chronic kidney disease     Vurshite stones    Depression     Headache     IIH (idiopathic intracranial hypertension)     Kidney stone     Neck pain     Ovarian cyst       Past Surgical History:   Procedure Laterality Date    ABDOMEN SURGERY Left 12/29/2016    EXCSION SKIN LESION BREAST AND ABDOMEN WALL performed by Jennifer Herr MD at [address] [place] [address]      prior to hysterectomy    KIDNEY STONE SURGERY  04/03/2018    Kidney surgery At [place], [place] by Dr. Naomi Tinsley Stones removed and stent placed    KIDNEY SURGERY      Vurshite stones    PORT SURGERY  03/13/2022    [address] [facility],[address]  2015     Shunt/Dr Kirstie Barrera TONSILLECTOMY      VASCULAR SURGERY  04/26/2022    VI. Port study    VENTRICULOPERITONEAL SHUNT        Family History   Problem Relation Age of Onset    Breast Cancer Mother         Stage 4      Social History     Tobacco Use    Smoking status: Current Some Day Smoker     Packs/day: 0.50     Years: 4.00     Pack years: 2.00     Types: E-Cigarettes, Cigarettes    Smokeless tobacco: Never Used   Substance Use Topics    Alcohol use: Yes     Comment: rarely       HEENT __normocepahlic; sclera clear  Neck   Supple  Lungs -cta  Heart  rr  GI - Abdomen soft, non-tender  Extremities without cyanosis  Skin without significant lesions   Diagnosis malfunctioning port    Permit for port removal and placement    Risks have been reviewed with the patient including but not limited to heart attack, death, CVA, bleeding, nerve injury, infection,  pain, and need for further surgery.       _______________________________________________________________________  Signature     Date    Time

## 2022-05-05 ENCOUNTER — ANESTHESIA EVENT (OUTPATIENT)
Dept: OPERATING ROOM | Age: 32
End: 2022-05-05
Payer: MEDICAID

## 2022-05-05 ENCOUNTER — ANESTHESIA (OUTPATIENT)
Dept: OPERATING ROOM | Age: 32
End: 2022-05-05
Payer: MEDICAID

## 2022-05-05 ENCOUNTER — HOSPITAL ENCOUNTER (OUTPATIENT)
Age: 32
Setting detail: OUTPATIENT SURGERY
Discharge: HOME | End: 2022-05-05
Attending: SURGERY | Admitting: SURGERY
Payer: MEDICAID

## 2022-05-05 ENCOUNTER — APPOINTMENT (OUTPATIENT)
Dept: INTERVENTIONAL RADIOLOGY/VASCULAR | Age: 32
End: 2022-05-05
Attending: SURGERY
Payer: MEDICAID

## 2022-05-05 VITALS
RESPIRATION RATE: 16 BRPM | SYSTOLIC BLOOD PRESSURE: 117 MMHG | HEART RATE: 67 BPM | WEIGHT: 198 LBS | OXYGEN SATURATION: 98 % | DIASTOLIC BLOOD PRESSURE: 63 MMHG | BODY MASS INDEX: 36.44 KG/M2 | HEIGHT: 62 IN | TEMPERATURE: 97 F

## 2022-05-05 VITALS
SYSTOLIC BLOOD PRESSURE: 102 MMHG | OXYGEN SATURATION: 99 % | RESPIRATION RATE: 18 BRPM | TEMPERATURE: 98.5 F | DIASTOLIC BLOOD PRESSURE: 54 MMHG

## 2022-05-05 PROBLEM — Z95.828 PORT-A-CATH IN PLACE: Status: ACTIVE | Noted: 2022-05-05

## 2022-05-05 LAB — HCG(URINE) PREGNANCY TEST: NEGATIVE

## 2022-05-05 PROCEDURE — C1788 PORT, INDWELLING, IMP: HCPCS | Performed by: SURGERY

## 2022-05-05 PROCEDURE — 6360000002 HC RX W HCPCS: Performed by: ANESTHESIOLOGY

## 2022-05-05 PROCEDURE — 2580000003 HC RX 258: Performed by: SURGERY

## 2022-05-05 PROCEDURE — 6360000002 HC RX W HCPCS: Performed by: REGISTERED NURSE

## 2022-05-05 PROCEDURE — 2500000003 HC RX 250 WO HCPCS: Performed by: SURGERY

## 2022-05-05 PROCEDURE — 36590 REMOVAL TUNNELED CV CATH: CPT | Performed by: SURGERY

## 2022-05-05 PROCEDURE — 2500000003 HC RX 250 WO HCPCS: Performed by: REGISTERED NURSE

## 2022-05-05 PROCEDURE — 76937 US GUIDE VASCULAR ACCESS: CPT

## 2022-05-05 PROCEDURE — 7100000011 HC PHASE II RECOVERY - ADDTL 15 MIN: Performed by: SURGERY

## 2022-05-05 PROCEDURE — 6360000002 HC RX W HCPCS: Performed by: SURGERY

## 2022-05-05 PROCEDURE — 84703 CHORIONIC GONADOTROPIN ASSAY: CPT

## 2022-05-05 PROCEDURE — 3700000001 HC ADD 15 MINUTES (ANESTHESIA): Performed by: SURGERY

## 2022-05-05 PROCEDURE — 77001 FLUOROGUIDE FOR VEIN DEVICE: CPT | Performed by: SURGERY

## 2022-05-05 PROCEDURE — 3700000000 HC ANESTHESIA ATTENDED CARE: Performed by: SURGERY

## 2022-05-05 PROCEDURE — 36561 INSERT TUNNELED CV CATH: CPT | Performed by: SURGERY

## 2022-05-05 PROCEDURE — A4217 STERILE WATER/SALINE, 500 ML: HCPCS | Performed by: SURGERY

## 2022-05-05 PROCEDURE — 7100000000 HC PACU RECOVERY - FIRST 15 MIN: Performed by: SURGERY

## 2022-05-05 PROCEDURE — 3600000003 HC SURGERY LEVEL 3 BASE: Performed by: SURGERY

## 2022-05-05 PROCEDURE — 3600000013 HC SURGERY LEVEL 3 ADDTL 15MIN: Performed by: SURGERY

## 2022-05-05 PROCEDURE — 2580000003 HC RX 258: Performed by: ANESTHESIOLOGY

## 2022-05-05 PROCEDURE — 77001 FLUOROGUIDE FOR VEIN DEVICE: CPT

## 2022-05-05 PROCEDURE — 7100000001 HC PACU RECOVERY - ADDTL 15 MIN: Performed by: SURGERY

## 2022-05-05 PROCEDURE — C1769 GUIDE WIRE: HCPCS | Performed by: SURGERY

## 2022-05-05 PROCEDURE — 6360000002 HC RX W HCPCS: Performed by: NURSE PRACTITIONER

## 2022-05-05 PROCEDURE — 6370000000 HC RX 637 (ALT 250 FOR IP): Performed by: NURSE PRACTITIONER

## 2022-05-05 PROCEDURE — 7100000010 HC PHASE II RECOVERY - FIRST 15 MIN: Performed by: SURGERY

## 2022-05-05 PROCEDURE — 2709999900 HC NON-CHARGEABLE SUPPLY: Performed by: SURGERY

## 2022-05-05 DEVICE — PORT INFUS PLAS SGL LUMN W/ 9.6FR SIL CATH AIRGUARD VLV: Type: IMPLANTABLE DEVICE | Site: SUBCLAVIAN | Status: FUNCTIONAL

## 2022-05-05 RX ORDER — SODIUM CHLORIDE 0.9 % (FLUSH) 0.9 %
5-40 SYRINGE (ML) INJECTION PRN
Status: DISCONTINUED | OUTPATIENT
Start: 2022-05-05 | End: 2022-05-05 | Stop reason: HOSPADM

## 2022-05-05 RX ORDER — DEXMEDETOMIDINE HYDROCHLORIDE 100 UG/ML
INJECTION, SOLUTION INTRAVENOUS PRN
Status: DISCONTINUED | OUTPATIENT
Start: 2022-05-05 | End: 2022-05-05 | Stop reason: SDUPTHER

## 2022-05-05 RX ORDER — SODIUM CHLORIDE 0.9 % (FLUSH) 0.9 %
5-40 SYRINGE (ML) INJECTION EVERY 12 HOURS SCHEDULED
Status: DISCONTINUED | OUTPATIENT
Start: 2022-05-05 | End: 2022-05-05 | Stop reason: SDUPTHER

## 2022-05-05 RX ORDER — SODIUM CHLORIDE 0.9 % (FLUSH) 0.9 %
5-40 SYRINGE (ML) INJECTION EVERY 12 HOURS SCHEDULED
Status: CANCELLED | OUTPATIENT
Start: 2022-05-05

## 2022-05-05 RX ORDER — METOCLOPRAMIDE HYDROCHLORIDE 5 MG/ML
10 INJECTION INTRAMUSCULAR; INTRAVENOUS
Status: DISCONTINUED | OUTPATIENT
Start: 2022-05-05 | End: 2022-05-05 | Stop reason: HOSPADM

## 2022-05-05 RX ORDER — DIPHENHYDRAMINE HYDROCHLORIDE 50 MG/ML
12.5 INJECTION INTRAMUSCULAR; INTRAVENOUS
Status: DISCONTINUED | OUTPATIENT
Start: 2022-05-05 | End: 2022-05-05 | Stop reason: HOSPADM

## 2022-05-05 RX ORDER — FENTANYL CITRATE 50 UG/ML
50 INJECTION, SOLUTION INTRAMUSCULAR; INTRAVENOUS EVERY 5 MIN PRN
Status: DISCONTINUED | OUTPATIENT
Start: 2022-05-05 | End: 2022-05-05 | Stop reason: HOSPADM

## 2022-05-05 RX ORDER — SODIUM CHLORIDE 9 MG/ML
INJECTION, SOLUTION INTRAVENOUS PRN
Status: DISCONTINUED | OUTPATIENT
Start: 2022-05-05 | End: 2022-05-05 | Stop reason: HOSPADM

## 2022-05-05 RX ORDER — ASPIRIN 81 MG/1
81 TABLET ORAL ONCE
Status: COMPLETED | OUTPATIENT
Start: 2022-05-05 | End: 2022-05-05

## 2022-05-05 RX ORDER — SODIUM CHLORIDE 0.9 % (FLUSH) 0.9 %
5-40 SYRINGE (ML) INJECTION EVERY 12 HOURS SCHEDULED
Status: DISCONTINUED | OUTPATIENT
Start: 2022-05-05 | End: 2022-05-05 | Stop reason: HOSPADM

## 2022-05-05 RX ORDER — LIDOCAINE HYDROCHLORIDE 10 MG/ML
INJECTION, SOLUTION EPIDURAL; INFILTRATION; INTRACAUDAL; PERINEURAL PRN
Status: DISCONTINUED | OUTPATIENT
Start: 2022-05-05 | End: 2022-05-05 | Stop reason: SDUPTHER

## 2022-05-05 RX ORDER — FAMOTIDINE 20 MG/1
20 TABLET, FILM COATED ORAL ONCE
Status: DISCONTINUED | OUTPATIENT
Start: 2022-05-05 | End: 2022-05-05 | Stop reason: HOSPADM

## 2022-05-05 RX ORDER — HYDROMORPHONE HYDROCHLORIDE 1 MG/ML
0.5 INJECTION, SOLUTION INTRAMUSCULAR; INTRAVENOUS; SUBCUTANEOUS EVERY 5 MIN PRN
Status: DISCONTINUED | OUTPATIENT
Start: 2022-05-05 | End: 2022-05-05 | Stop reason: HOSPADM

## 2022-05-05 RX ORDER — LIDOCAINE HYDROCHLORIDE 10 MG/ML
1 INJECTION, SOLUTION EPIDURAL; INFILTRATION; INTRACAUDAL; PERINEURAL
Status: DISCONTINUED | OUTPATIENT
Start: 2022-05-05 | End: 2022-05-05 | Stop reason: HOSPADM

## 2022-05-05 RX ORDER — SODIUM CHLORIDE 9 MG/ML
INJECTION, SOLUTION INTRAVENOUS PRN
Status: CANCELLED | OUTPATIENT
Start: 2022-05-05

## 2022-05-05 RX ORDER — ONDANSETRON 2 MG/ML
INJECTION INTRAMUSCULAR; INTRAVENOUS PRN
Status: DISCONTINUED | OUTPATIENT
Start: 2022-05-05 | End: 2022-05-05 | Stop reason: SDUPTHER

## 2022-05-05 RX ORDER — HYDROMORPHONE HYDROCHLORIDE 1 MG/ML
0.25 INJECTION, SOLUTION INTRAMUSCULAR; INTRAVENOUS; SUBCUTANEOUS EVERY 5 MIN PRN
Status: DISCONTINUED | OUTPATIENT
Start: 2022-05-05 | End: 2022-05-05 | Stop reason: HOSPADM

## 2022-05-05 RX ORDER — LIDOCAINE HYDROCHLORIDE 10 MG/ML
INJECTION, SOLUTION EPIDURAL; INFILTRATION; INTRACAUDAL; PERINEURAL PRN
Status: DISCONTINUED | OUTPATIENT
Start: 2022-05-05 | End: 2022-05-05 | Stop reason: ALTCHOICE

## 2022-05-05 RX ORDER — SODIUM CHLORIDE 0.9 % (FLUSH) 0.9 %
5-40 SYRINGE (ML) INJECTION PRN
Status: CANCELLED | OUTPATIENT
Start: 2022-05-05

## 2022-05-05 RX ORDER — DEXAMETHASONE SODIUM PHOSPHATE 10 MG/ML
INJECTION, SOLUTION INTRAMUSCULAR; INTRAVENOUS PRN
Status: DISCONTINUED | OUTPATIENT
Start: 2022-05-05 | End: 2022-05-05 | Stop reason: SDUPTHER

## 2022-05-05 RX ORDER — FENTANYL CITRATE 50 UG/ML
25 INJECTION, SOLUTION INTRAMUSCULAR; INTRAVENOUS EVERY 5 MIN PRN
Status: DISCONTINUED | OUTPATIENT
Start: 2022-05-05 | End: 2022-05-05 | Stop reason: HOSPADM

## 2022-05-05 RX ORDER — ALPRAZOLAM 1 MG/1
1 TABLET ORAL DAILY
COMMUNITY

## 2022-05-05 RX ORDER — SODIUM CHLORIDE, SODIUM LACTATE, POTASSIUM CHLORIDE, CALCIUM CHLORIDE 600; 310; 30; 20 MG/100ML; MG/100ML; MG/100ML; MG/100ML
INJECTION, SOLUTION INTRAVENOUS CONTINUOUS
Status: DISCONTINUED | OUTPATIENT
Start: 2022-05-05 | End: 2022-05-05 | Stop reason: HOSPADM

## 2022-05-05 RX ORDER — FENTANYL CITRATE 50 UG/ML
INJECTION, SOLUTION INTRAMUSCULAR; INTRAVENOUS PRN
Status: DISCONTINUED | OUTPATIENT
Start: 2022-05-05 | End: 2022-05-05 | Stop reason: SDUPTHER

## 2022-05-05 RX ORDER — PROPOFOL 10 MG/ML
INJECTION, EMULSION INTRAVENOUS PRN
Status: DISCONTINUED | OUTPATIENT
Start: 2022-05-05 | End: 2022-05-05 | Stop reason: SDUPTHER

## 2022-05-05 RX ORDER — SCOLOPAMINE TRANSDERMAL SYSTEM 1 MG/1
1 PATCH, EXTENDED RELEASE TRANSDERMAL
Status: DISCONTINUED | OUTPATIENT
Start: 2022-05-05 | End: 2022-05-05 | Stop reason: HOSPADM

## 2022-05-05 RX ORDER — MIDAZOLAM HYDROCHLORIDE 1 MG/ML
2 INJECTION INTRAMUSCULAR; INTRAVENOUS
Status: COMPLETED | OUTPATIENT
Start: 2022-05-05 | End: 2022-05-05

## 2022-05-05 RX ORDER — ENOXAPARIN SODIUM 100 MG/ML
40 INJECTION SUBCUTANEOUS DAILY
Status: CANCELLED | OUTPATIENT
Start: 2022-05-06

## 2022-05-05 RX ADMIN — HYDROMORPHONE HYDROCHLORIDE 0.5 MG: 1 INJECTION, SOLUTION INTRAMUSCULAR; INTRAVENOUS; SUBCUTANEOUS at 09:38

## 2022-05-05 RX ADMIN — PHENYLEPHRINE HYDROCHLORIDE 100 MCG: 10 INJECTION INTRAVENOUS at 08:28

## 2022-05-05 RX ADMIN — FENTANYL CITRATE 100 MCG: 50 INJECTION, SOLUTION INTRAMUSCULAR; INTRAVENOUS at 07:40

## 2022-05-05 RX ADMIN — DEXAMETHASONE SODIUM PHOSPHATE 8 MG: 10 INJECTION, SOLUTION INTRAMUSCULAR; INTRAVENOUS at 07:48

## 2022-05-05 RX ADMIN — PHENYLEPHRINE HYDROCHLORIDE 100 MCG: 10 INJECTION INTRAVENOUS at 08:15

## 2022-05-05 RX ADMIN — SODIUM CHLORIDE, SODIUM LACTATE, POTASSIUM CHLORIDE, AND CALCIUM CHLORIDE: 600; 310; 30; 20 INJECTION, SOLUTION INTRAVENOUS at 06:40

## 2022-05-05 RX ADMIN — ONDANSETRON 4 MG: 2 INJECTION INTRAMUSCULAR; INTRAVENOUS at 07:48

## 2022-05-05 RX ADMIN — DEXMEDETOMIDINE HYDROCHLORIDE 20 MCG: 100 INJECTION, SOLUTION, CONCENTRATE INTRAVENOUS at 08:32

## 2022-05-05 RX ADMIN — FENTANYL CITRATE 25 MCG: 50 INJECTION, SOLUTION INTRAMUSCULAR; INTRAVENOUS at 08:43

## 2022-05-05 RX ADMIN — FENTANYL CITRATE 50 MCG: 50 INJECTION, SOLUTION INTRAMUSCULAR; INTRAVENOUS at 07:38

## 2022-05-05 RX ADMIN — MIDAZOLAM 2 MG: 1 INJECTION, SOLUTION INTRAMUSCULAR; INTRAVENOUS at 06:51

## 2022-05-05 RX ADMIN — Medication 2000 MG: at 07:48

## 2022-05-05 RX ADMIN — FENTANYL CITRATE 50 MCG: 50 INJECTION, SOLUTION INTRAMUSCULAR; INTRAVENOUS at 07:43

## 2022-05-05 RX ADMIN — FENTANYL CITRATE 50 MCG: 50 INJECTION, SOLUTION INTRAMUSCULAR; INTRAVENOUS at 08:03

## 2022-05-05 RX ADMIN — FENTANYL CITRATE 50 MCG: 50 INJECTION, SOLUTION INTRAMUSCULAR; INTRAVENOUS at 09:46

## 2022-05-05 RX ADMIN — ASPIRIN 81 MG: 81 TABLET, COATED ORAL at 06:51

## 2022-05-05 RX ADMIN — LIDOCAINE HYDROCHLORIDE 50 MG: 10 INJECTION, SOLUTION EPIDURAL; INFILTRATION; INTRACAUDAL; PERINEURAL at 07:40

## 2022-05-05 RX ADMIN — HYDROMORPHONE HYDROCHLORIDE 0.5 MG: 1 INJECTION, SOLUTION INTRAMUSCULAR; INTRAVENOUS; SUBCUTANEOUS at 09:29

## 2022-05-05 RX ADMIN — PROPOFOL 200 MG: 10 INJECTION, EMULSION INTRAVENOUS at 07:40

## 2022-05-05 RX ADMIN — SODIUM CHLORIDE, SODIUM LACTATE, POTASSIUM CHLORIDE, AND CALCIUM CHLORIDE: 600; 310; 30; 20 INJECTION, SOLUTION INTRAVENOUS at 08:28

## 2022-05-05 ASSESSMENT — PAIN DESCRIPTION - ORIENTATION
ORIENTATION: LEFT;UPPER
ORIENTATION: UPPER;LEFT

## 2022-05-05 ASSESSMENT — PAIN SCALES - GENERAL
PAINLEVEL_OUTOF10: 9
PAINLEVEL_OUTOF10: 9
PAINLEVEL_OUTOF10: 5
PAINLEVEL_OUTOF10: 10
PAINLEVEL_OUTOF10: 8

## 2022-05-05 ASSESSMENT — PAIN DESCRIPTION - LOCATION
LOCATION: CHEST

## 2022-05-05 ASSESSMENT — LIFESTYLE VARIABLES: SMOKING_STATUS: 0

## 2022-05-05 NOTE — ANESTHESIA POSTPROCEDURE EVALUATION
Department of Anesthesiology  Postprocedure Note    Patient: Nena Rueda  MRN: [id number]  YOB: 1990  Date of evaluation: 5/5/2022  Time:  8:57 AM     Procedure Summary     Date: 05/05/22 Room / Location: [address]    Anesthesia Start: 5144 Anesthesia Stop: 6201    Procedure: LEFT INTERNAL JUGLAR VEIN [address] [facility] (Left ) Diagnosis: (I87.8)    Surgeons: Kaye Calero DO Responsible Provider: MOUSTAPHA Solorzano CRNA    Anesthesia Type: MAC ASA Status: 2          Anesthesia Type: MAC    Luis Angel Phase I: Luis Angel Score: 4    Luis Angel Phase II:      Last vitals: Reviewed and per EMR flowsheets.        Anesthesia Post Evaluation    Patient location during evaluation: PACU  Patient participation: complete - patient participated  Level of consciousness: sleepy but conscious and obtunded/minimal responses  Pain score: 0  Airway patency: patent  Nausea & Vomiting: no nausea and no vomiting  Complications: no  Cardiovascular status: hemodynamically stable  Respiratory status: acceptable and face mask  Hydration status: euvolemic

## 2022-05-05 NOTE — ANESTHESIA PRE PROCEDURE
Department of Anesthesiology  Preprocedure Note       Name:  Noa Godfrey   Age:  28 y.o.  :  1990                                          MRN:  [id number]         Date:  2022      Surgeon: Jesus Lopez):  Pavel Rivera DO    Procedure: Procedure(s):  LEFT INTERNAL JUGLAR VEIN MEDIPORT EXCHANGE    Medications prior to admission:   Prior to Admission medications    Medication Sig Start Date End Date Taking? Authorizing Provider   ALPRAZolam Raissa Kunz) 1 MG tablet Take 1 mg by mouth daily.  Daily x 7 days   Yes Historical Provider, MD   vitamin D-3 (CHOLECALCIFEROL) 125 MCG (5000 UT) TABS Take 2,000 Units by mouth daily    Historical Provider, MD   Cholecalciferol 50 MCG (2000 UT) CAPS cholecalciferol (vitamin D3) 50 mcg (2,000 unit) capsule  Patient not taking: Reported on 2022    Historical Provider, MD   milnacipran HCl (SAVELLA) 100 MG TABS Take 50 mg by mouth 3 times daily     Historical Provider, MD   propranolol (INDERAL) 10 MG tablet Take 80 mg by mouth daily     Historical Provider, MD   Potassium 75 MG TABS Take 1 tablet by mouth daily     Historical Provider, MD   omeprazole (PRILOSEC) 10 MG delayed release capsule Take 40 mg by mouth daily     Historical Provider, MD   sodium chloride 0.9 % SOLN 250 mL with vedolizumab 300 MG SOLR 300 mg Infuse 300 mg intravenously once    Historical Provider, MD   linaclotide (LINZESS) 290 MCG CAPS capsule Take 1 capsule by mouth every morning (before breakfast) 18   ASHISH Sanches DO   tiZANidine (ZANAFLEX) 4 MG tablet Take 1 tablet by mouth every 6 hours as needed (muscle spasm) 12/15/17   MOUSTAPHA Jon   PROAIR  (83 BASE) MCG/ACT inhaler INHALE 2 PUFFS Q 4 H PRN  Patient not taking: Reported on 16   Historical Provider, MD       Current medications:    Current Facility-Administered Medications   Medication Dose Route Frequency Provider Last Rate Last Admin    lactated ringers infusion   IntraVENous Continuous Billy Valdes  mL/hr at 05/05/22 0642 NoRateChange at 05/05/22 0642    0.9 % sodium chloride infusion   IntraVENous PRN MOUSTAPHA Perez        ceFAZolin (ANCEF) 2000 mg in 0.9% sodium chloride 50 mL IVPB  2,000 mg IntraVENous On Call to [facility], APRN        sodium chloride flush 0.9 % injection 5-40 mL  5-40 mL IntraVENous 2 times per day MOUSTAPHA Perez        sodium chloride flush 0.9 % injection 5-40 mL  5-40 mL IntraVENous PRN MOUSTAPHA Perez        aspirin EC tablet 81 mg  81 mg Oral Once MOUSTAPHA Perez           Allergies: Allergies   Allergen Reactions    Morphine And Related Shortness Of Breath    Other Other (See Comments)     Dissolvable sutures. She developed an abscess from the last ones. Ended up in ER.         Problem List:    Patient Active Problem List   Diagnosis Code    Atypical nevus of abdominal wall D22.5    Atypical nevus of female breast D22.5    Suicidal ideation R45.851    Methamphetamine abuse ([address] F15.10    Mood disorder ([address] F39    Galactorrhea N64.3    MAURI (generalized anxiety disorder) F41.1    Acute left flank pain R10.9    Left nephrolithiasis N20.0    Acute cystitis with hematuria N30.01    Pyelonephritis N12    Family hx-breast malignancy Z80.3    Crohn's disease with complication ([address] I20.804       Past Medical History:        Diagnosis Date    Back pain     from kidney stones    Chronic kidney disease     Vurshite stones    Depression     Headache     IIH (idiopathic intracranial hypertension)     Kidney stone     Neck pain     Ovarian cyst        Past Surgical History:        Procedure Laterality Date    ABDOMEN SURGERY Left 12/29/2016    EXCSION SKIN LESION BREAST AND ABDOMEN WALL performed by Gary Barney MD at [address] [place] [address]      prior to hysterectomy    HYSTERECTOMY      KIDNEY STONE SURGERY  04/03/2018    Kidney surgery At [place], [place] by Dr. Glenny Park removed and stent placed    KIDNEY SURGERY      Vurshite stones    PORT SURGERY  03/13/2022    [address] [place],[address]  2015     Shunt/Dr Yasir Carroll TONSILLECTOMY      VASCULAR SURGERY  04/26/2022    VI. Port study    VENTRICULOPERITONEAL SHUNT         Social History:    Social History     Tobacco Use    Smoking status: Current Some Day Smoker     Packs/day: 0.50     Years: 4.00     Pack years: 2.00     Types: E-Cigarettes, Cigarettes    Smokeless tobacco: Never Used    Tobacco comment: stopped 2 months ago   Substance Use Topics    Alcohol use: Yes     Comment: stopped                                Ready to quit: Not Answered  Counseling given: Not Answered  Comment: stopped 2 months ago      Vital Signs (Current):   Vitals:    05/05/22 0633   BP: 113/66   Pulse: 77   Resp: 16   Temp: 97.8 °F (36.6 °C)   TempSrc: Temporal   SpO2: 98%   Weight: 198 lb (89.8 kg)   Height: 5' 2\" (1.575 m)                                              BP Readings from Last 3 Encounters:   05/05/22 113/66   04/20/22 122/76   12/19/19 110/68       NPO Status: Time of last liquid consumption: 2100                        Time of last solid consumption: 2100                        Date of last liquid consumption: 05/04/22                        Date of last solid food consumption: 05/04/22    BMI:   Wt Readings from Last 3 Encounters:   05/05/22 198 lb (89.8 kg)   04/29/22 195 lb (88.5 kg)   04/20/22 203 lb (92.1 kg)     Body mass index is 36.21 kg/m².     CBC:   Lab Results   Component Value Date    WBC 8.7 04/29/2022    RBC 4.60 04/29/2022    HGB 14.4 04/29/2022    HCT 45.7 04/29/2022    MCV 99.3 04/29/2022    RDW 13.0 04/29/2022     04/29/2022       CMP:   Lab Results   Component Value Date     04/29/2022    K 3.9 04/29/2022    K 4.1 12/18/2019     04/29/2022    CO2 25 04/29/2022    BUN 9 04/29/2022    CREATININE 0.8 04/29/2022    GFRAA >59 04/29/2022    LABGLOM >60 04/29/2022    GLUCOSE 96 04/29/2022    PROT 7.0 12/18/2019    CALCIUM 9.3 04/29/2022    BILITOT <0.2 12/18/2019    ALKPHOS 123 12/18/2019    AST 17 12/18/2019    ALT 13 12/18/2019       POC Tests: No results for input(s): POCGLU, POCNA, POCK, POCCL, POCBUN, POCHEMO, POCHCT in the last 72 hours. Coags:   Lab Results   Component Value Date    PROTIME 13.2 04/29/2022    INR 1.01 04/29/2022    APTT 25.8 04/29/2022       HCG (If Applicable):   Lab Results   Component Value Date    PREGTESTUR Negative 05/05/2022        ABGs: No results found for: PHART, PO2ART, VDF0ZDW, CNA9NLS, BEART, T3JRDEQG     Type & Screen (If Applicable):  No results found for: LABABO, LABRH    Drug/Infectious Status (If Applicable):  No results found for: HIV, HEPCAB    COVID-19 Screening (If Applicable): No results found for: COVID19        Anesthesia Evaluation  Patient summary reviewed and Nursing notes reviewed no history of anesthetic complications:   Airway: Mallampati: II  TM distance: >3 FB   Neck ROM: full  Mouth opening: > = 3 FB Dental:          Pulmonary:       (-) not a current smoker                           Cardiovascular:          ECG reviewed               Beta Blocker:  Dose within 24 Hrs         Neuro/Psych:   (+) seizures:, depression/anxiety    (-) CVA           GI/Hepatic/Renal:   (+) GERD:, renal disease: kidney stones,          ROS comment: crohn's. Endo/Other:        (-) diabetes mellitus               Abdominal:             Vascular: negative vascular ROS. Other Findings:             Anesthesia Plan      MAC     ASA 2           MIPS: Postoperative opioids intended and Prophylactic antiemetics administered. Anesthetic plan and risks discussed with patient.                       Dudley Martinez MD   5/5/2022

## 2022-05-05 NOTE — INTERVAL H&P NOTE
Update History & Physical    The patient's History and Physical of May 3, 2022 was reviewed with the patient and I examined the patient. There was no change. The surgical site was confirmed by the patient and me. Plan: The risks, benefits, expected outcome, and alternative to the recommended procedure have been discussed with the patient. Patient understands and wants to proceed with the procedure.      Electronically signed by Brooklynn Pastor DO on 5/5/2022 at 7:30 AM

## 2022-05-05 NOTE — PROGRESS NOTES
Patient with repeated requests for a pain medication \"stronger than dilaudid. I've built tolerance to it. \" Patient story on what is hurting is continually changing. Pt currently laying in bed completely naked and making her mother and nurse dress her.

## 2022-05-05 NOTE — OP NOTE
Operative Note      Patient: Patricia Luz  YOB: 1990  MRN: [id number]    Date of Procedure: 5/5/2022    Pre-Op Diagnosis: I87.8    Post-Op Diagnosis: Same       Procedure:  Removal of left subclavian port. Placement of left internal jugular port. Surgeon(s):  DO Loreto    Assistant:   * No surgical staff found *    Anesthesia: General    Estimated Blood Loss (mL): less than 50     Complications: None    Specimens:   * No specimens in log *    Implants:  Implant Name Type Inv. Item Serial No.  Lot No. LRB No. Used Action   PORT INFUS PLAS SGL LUMN W/ 9.6FR JOEL CATH AIRGUARD VLV - [id number]  PORT INFUS PLAS SGL LUMN W/ 9.6FR JOEL CATH AIRGUARD VLV  [employer]- [id number] Left 1 Implanted         Drains: * No LDAs found *    Findings: Removed port tubing intact. No signs of infection. Left internal jugular port with tip in the SVC atrial junction. Flushes and blood return well. Detailed Description of Procedure:   Patient was brought to the operating room and placed supine position. Preoperative antibiotics were given. Her lower neck and upper chest were prepped and draped in sterile fashion. Timeout was performed. Incision was made in previous scar at the port site on the left upper chest using 15 blade. It was carried down with electrocautery and the port was dissected using Metzenbaum scissors and electrocautery. Stitches around port were cut and the port was externalized in the GB was pulled out. Manual pressure was held at the left subclavian site. The kidney appeared to be intact. At the previous port site capsule was removed and hemostasis was achieved. Rifampin soaked sponge was placed at the port site. Left internal jugular vein was accessed under continuous ultrasound guidance using sterile micropuncture technique. The J wire was placed under fluoroscopic guidance and positioned in the IVC.   Port was tunneled from the previous port site into the internal jugular access site. It was then measured and cut. Micropuncture sheath was exchanged to the peel-away sheath, wire and the introducer were removed and the port tubing was floated inside the peel-away sheath and was peeled away. The port tubing was then connected to the port and locked. It was then checked to have good transition points and tip at the SVC atrial junction with fluoroscopy. Port was first and had good blood return and flushed well. It was then secured to the subcutaneous tissue using 3-0 Prolene. It was then checked for good transition point and tip unchanged. It was then flushed through the skin. The incision closed using 3-0 Vicryl and 4-0 Monocryl. The access site closed using 4-0 Monocryl. Dermabond was placed. Patient tolerated procedure well and was transferred back in stable condition.     Electronically signed by Kyleigh Poole DO on 5/5/2022 at 9:03 AM

## 2022-05-12 ENCOUNTER — TELEPHONE (OUTPATIENT)
Dept: VASCULAR SURGERY | Age: 32
End: 2022-05-12

## 2022-05-12 NOTE — TELEPHONE ENCOUNTER
[facility] requests that office return their call. The best time to reach her is Anytime. Patient called to cancel appointment for today at 0 with Marco Antonio Tena due to her and her child having stomach bug, she is wanting to know how soon she can be seen with her being post op ,she states she does not have covid but also has not been tested. Thank you.

## 2022-05-16 ENCOUNTER — OFFICE VISIT (OUTPATIENT)
Dept: VASCULAR SURGERY | Age: 32
End: 2022-05-16

## 2022-05-16 VITALS
TEMPERATURE: 98.4 F | OXYGEN SATURATION: 98 % | SYSTOLIC BLOOD PRESSURE: 137 MMHG | HEART RATE: 96 BPM | DIASTOLIC BLOOD PRESSURE: 80 MMHG

## 2022-05-16 DIAGNOSIS — I87.8 POOR VENOUS ACCESS: Primary | ICD-10-CM

## 2022-05-16 PROCEDURE — 99024 POSTOP FOLLOW-UP VISIT: CPT | Performed by: PHYSICIAN ASSISTANT

## 2022-05-16 NOTE — PROGRESS NOTES
Patient Care Team:  MOUSTAPHA Trujillo - CNP as PCP - General (Nurse Practitioner)  Alexandra Lafleur MD (Neurology)    Ms. Jama Martinez is a 41-year-old female who underwent removal of a left subclavian Mediport and placement of a left IJ Mediport by Dr. Vassie Cowden on 5/5/2022. She reports postop tenderness at the Op-Site. No reports of fever/chills. On today's exam the incision is healing well there was a spitting stitch at the medial end of the incision. This was removed without difficulty. There is no drainage or redness noted. No signs of infection. Today we have recommended she wash incision daily with soap and water and cover with dry gauze if it drains. I explained her to avoid any lotions or salves or topical ointments. We will follow up with her PRN  or sooner if She develops fever/chills or wound deterioration. This should bring you up to date on [facility]  As always we want to thank you for allowing us to participate in the care of your patients.     Sincerely,    Juice Hardy PA-C

## 2023-03-23 ENCOUNTER — OFFICE VISIT (OUTPATIENT)
Dept: NEUROSURGERY | Facility: CLINIC | Age: 33
End: 2023-03-23
Payer: COMMERCIAL

## 2023-03-23 VITALS — HEIGHT: 62 IN | WEIGHT: 223 LBS | BODY MASS INDEX: 41.04 KG/M2

## 2023-03-23 DIAGNOSIS — G93.2 PSEUDOTUMOR CEREBRI: Primary | ICD-10-CM

## 2023-03-23 DIAGNOSIS — Z87.891 FORMER SMOKER: ICD-10-CM

## 2023-03-23 DIAGNOSIS — E66.01 CLASS 3 SEVERE OBESITY DUE TO EXCESS CALORIES WITHOUT SERIOUS COMORBIDITY WITH BODY MASS INDEX (BMI) OF 40.0 TO 44.9 IN ADULT: ICD-10-CM

## 2023-03-23 PROBLEM — E66.813 CLASS 3 SEVERE OBESITY DUE TO EXCESS CALORIES WITHOUT SERIOUS COMORBIDITY WITH BODY MASS INDEX (BMI) OF 40.0 TO 44.9 IN ADULT: Status: ACTIVE | Noted: 2020-10-01

## 2023-03-23 PROCEDURE — 99213 OFFICE O/P EST LOW 20 MIN: CPT | Performed by: NURSE PRACTITIONER

## 2023-03-23 PROCEDURE — 1159F MED LIST DOCD IN RCRD: CPT | Performed by: NURSE PRACTITIONER

## 2023-03-23 PROCEDURE — 1160F RVW MEDS BY RX/DR IN RCRD: CPT | Performed by: NURSE PRACTITIONER

## 2023-03-23 RX ORDER — ACETAMINOPHEN 500 MG
TABLET ORAL EVERY 6 HOURS PRN
COMMUNITY

## 2023-03-23 RX ORDER — ARIPIPRAZOLE 2 MG/1
TABLET ORAL
COMMUNITY
Start: 2023-03-09

## 2023-03-23 RX ORDER — PROPRANOLOL HYDROCHLORIDE 80 MG/1
TABLET ORAL
COMMUNITY
Start: 2023-02-13

## 2023-03-23 RX ORDER — OMEPRAZOLE 40 MG/1
CAPSULE, DELAYED RELEASE ORAL
COMMUNITY

## 2023-03-23 RX ORDER — DM/PE/ACETAMINOPHEN/CHLORPHENR 10-5-325-2
TABLET, SEQUENTIAL ORAL
COMMUNITY
Start: 2023-02-13

## 2023-03-23 RX ORDER — IPRATROPIUM BROMIDE 21 UG/1
SPRAY, METERED NASAL
COMMUNITY
Start: 2023-01-25

## 2023-03-23 RX ORDER — ALPRAZOLAM 1 MG/1
TABLET ORAL EVERY 24 HOURS
COMMUNITY

## 2023-03-23 NOTE — PROGRESS NOTES
"    Chief complaint:   Chief Complaint   Patient presents with   • Pseudotumor cerebri      Pt hre for 1yr f/u        Subjective     HPI: This is a 32-year-old female patient who underwent a  shunt placement in November 2015 for benign intracranial hypertension.  She was seen in 2021 for increasing headaches and vision changes.  At her last office appointment she felt like she was doing well and felt her vision changes had improved.  She was continuing to follow with her ophthalmologist.  She said her last appointment with her ophthalmologist was 6 months ago and says that everything did look really well.  She is not complain of any headaches.  She does like her vision is stable at this time.  Overall she feels like she is doing well    Review of Systems   Eyes: Negative for visual disturbance.   Musculoskeletal: Negative.    Neurological: Negative for headaches.         Objective      Vital Signs  Ht 157.5 cm (62\")   Wt 101 kg (223 lb)   LMP 01/21/2019 (Approximate)   BMI 40.79 kg/m²     Physical Exam  Constitutional:       Appearance: She is well-developed.   HENT:      Head: Normocephalic.   Eyes:      Extraocular Movements: EOM normal.      Pupils: Pupils are equal, round, and reactive to light.   Pulmonary:      Effort: Pulmonary effort is normal.   Musculoskeletal:         General: Normal range of motion.      Cervical back: Normal range of motion.   Skin:     General: Skin is warm.   Neurological:      Mental Status: She is alert and oriented to person, place, and time.      GCS: GCS eye subscore is 4. GCS verbal subscore is 5. GCS motor subscore is 6.      Cranial Nerves: No cranial nerve deficit.      Sensory: No sensory deficit.      Motor: Motor strength is normal.      Gait: Gait is intact. Gait normal.      Deep Tendon Reflexes: Reflexes are normal and symmetric.   Psychiatric:         Speech: Speech normal.         Behavior: Behavior normal.         Thought Content: Thought content normal. "         Neurologic Exam     Mental Status   Oriented to person, place, and time.   Attention: normal. Concentration: normal.   Speech: speech is normal   Level of consciousness: alert  Normal comprehension.     Cranial Nerves     CN II   Visual fields full to confrontation.     CN III, IV, VI   Pupils are equal, round, and reactive to light.  Extraocular motions are normal.     CN V   Facial sensation intact.     CN VII   Facial expression full, symmetric.     CN VIII   CN VIII normal.     CN IX, X   CN IX normal.   CN X normal.     CN XI   CN XI normal.     CN XII   CN XII normal.     Motor Exam   Muscle bulk: normal    Strength   Strength 5/5 throughout.     Sensory Exam   Light touch normal.     Gait, Coordination, and Reflexes     Gait  Gait: normal    Reflexes   Reflexes 2+ except as noted.       Imaging review: No new imaging        Assessment/Plan: Patient is doing well from the shunt placement.  Her symptoms do seem to be under good control.  Her shunt is set at 0.5.  At this time we will see her on a yearly basis.  We will have her follow-up Dr. Power the next appointment.  She was told to call us if she any further problems or concerns    Patient is a nonsmoker  The patient's Body mass index is 40.79 kg/m².. BMI is above normal parameters. Recommendations include: educational material and nutrition counseling    Diagnoses and all orders for this visit:    1. Pseudotumor cerebri (Primary)    2. Former smoker    3. Class 3 severe obesity due to excess calories without serious comorbidity with body mass index (BMI) of 40.0 to 44.9 in adult (HCC)        I discussed the patients findings and my recommendations with patient  Ean Treviño, APRN  03/23/23  15:58 CDT

## 2023-03-23 NOTE — PATIENT INSTRUCTIONS
"DASH Eating Plan  DASH stands for Dietary Approaches to Stop Hypertension. The DASH eating plan is a healthy eating plan that has been shown to:  Reduce high blood pressure (hypertension).  Reduce your risk for type 2 diabetes, heart disease, and stroke.  Help with weight loss.  What are tips for following this plan?  Reading food labels  Check food labels for the amount of salt (sodium) per serving. Choose foods with less than 5 percent of the Daily Value of sodium. Generally, foods with less than 300 milligrams (mg) of sodium per serving fit into this eating plan.  To find whole grains, look for the word \"whole\" as the first word in the ingredient list.  Shopping  Buy products labeled as \"low-sodium\" or \"no salt added.\"  Buy fresh foods. Avoid canned foods and pre-made or frozen meals.  Cooking  Avoid adding salt when cooking. Use salt-free seasonings or herbs instead of table salt or sea salt. Check with your health care provider or pharmacist before using salt substitutes.  Do not galan foods. Cook foods using healthy methods such as baking, boiling, grilling, roasting, and broiling instead.  Cook with heart-healthy oils, such as olive, canola, avocado, soybean, or sunflower oil.  Meal planning    Eat a balanced diet that includes:  4 or more servings of fruits and 4 or more servings of vegetables each day. Try to fill one-half of your plate with fruits and vegetables.  6-8 servings of whole grains each day.  Less than 6 oz (170 g) of lean meat, poultry, or fish each day. A 3-oz (85-g) serving of meat is about the same size as a deck of cards. One egg equals 1 oz (28 g).  2-3 servings of low-fat dairy each day. One serving is 1 cup (237 mL).  1 serving of nuts, seeds, or beans 5 times each week.  2-3 servings of heart-healthy fats. Healthy fats called omega-3 fatty acids are found in foods such as walnuts, flaxseeds, fortified milks, and eggs. These fats are also found in cold-water fish, such as sardines, salmon, " and mackerel.  Limit how much you eat of:  Canned or prepackaged foods.  Food that is high in trans fat, such as some fried foods.  Food that is high in saturated fat, such as fatty meat.  Desserts and other sweets, sugary drinks, and other foods with added sugar.  Full-fat dairy products.  Do not salt foods before eating.  Do not eat more than 4 egg yolks a week.  Try to eat at least 2 vegetarian meals a week.  Eat more home-cooked food and less restaurant, buffet, and fast food.  Lifestyle  When eating at a restaurant, ask that your food be prepared with less salt or no salt, if possible.  If you drink alcohol:  Limit how much you use to:  0-1 drink a day for women who are not pregnant.  0-2 drinks a day for men.  Be aware of how much alcohol is in your drink. In the [place] one drink equals one 12 oz bottle of beer (355 mL), one 5 oz glass of wine (148 mL), or one 1½ oz glass of hard liquor (44 mL).  General information  Avoid eating more than 2,300 mg of salt a day. If you have hypertension, you may need to reduce your sodium intake to 1,500 mg a day.  Work with your health care provider to maintain a healthy body weight or to lose weight. Ask what an ideal weight is for you.  Get at least 30 minutes of exercise that causes your heart to beat faster (aerobic exercise) most days of the week. Activities may include walking, swimming, or biking.  Work with your health care provider or dietitian to adjust your eating plan to your individual calorie needs.  What foods should I eat?  Fruits  All fresh, dried, or frozen fruit. Canned fruit in natural juice (without added sugar).  Vegetables  Fresh or frozen vegetables (raw, steamed, roasted, or grilled). Low-sodium or reduced-sodium tomato and vegetable juice. Low-sodium or reduced-sodium tomato sauce and tomato paste. Low-sodium or reduced-sodium canned vegetables.  Grains  Whole-grain or whole-wheat bread. Whole-grain or whole-wheat pasta. Brown rice. Oatmeal. Quinoa.  Bulgur. Whole-grain and low-sodium cereals. Kristi bread. Low-fat, low-sodium crackers. Whole-wheat flour tortillas.  Meats and other proteins  Skinless chicken or turkey. Ground chicken or turkey. Pork with fat trimmed off. Fish and seafood. Egg whites. Dried beans, peas, or lentils. Unsalted nuts, nut butters, and seeds. Unsalted canned beans. Lean cuts of beef with fat trimmed off. Low-sodium, lean precooked or cured meat, such as sausages or meat loaves.  Dairy  Low-fat (1%) or fat-free (skim) milk. Reduced-fat, low-fat, or fat-free cheeses. Nonfat, low-sodium ricotta or cottage cheese. Low-fat or nonfat yogurt. Low-fat, low-sodium cheese.  Fats and oils  Soft margarine without trans fats. Vegetable oil. Reduced-fat, low-fat, or light mayonnaise and salad dressings (reduced-sodium). Canola, safflower, olive, avocado, soybean, and sunflower oils. Avocado.  Seasonings and condiments  Herbs. Spices. Seasoning mixes without salt.  Other foods  Unsalted popcorn and pretzels. Fat-free sweets.  The items listed above may not be a complete list of foods and beverages you can eat. Contact a dietitian for more information.  What foods should I avoid?  Fruits  Canned fruit in a light or heavy syrup. Fried fruit. Fruit in cream or butter sauce.  Vegetables  Creamed or fried vegetables. Vegetables in a cheese sauce. Regular canned vegetables (not low-sodium or reduced-sodium). Regular canned tomato sauce and paste (not low-sodium or reduced-sodium). Regular tomato and vegetable juice (not low-sodium or reduced-sodium). Pickles. Olives.  Grains  Baked goods made with fat, such as croissants, muffins, or some breads. Dry pasta or rice meal packs.  Meats and other proteins  Fatty cuts of meat. Ribs. Fried meat. Gregg. Bologna, salami, and other precooked or cured meats, such as sausages or meat loaves. Fat from the back of a pig (fatback). Bratwurst. Salted nuts and seeds. Canned beans with added salt. Canned or smoked fish.  Whole eggs or egg yolks. Chicken or turkey with skin.  Dairy  Whole or 2% milk, cream, and half-and-half. Whole or full-fat cream cheese. Whole-fat or sweetened yogurt. Full-fat cheese. Nondairy creamers. Whipped toppings. Processed cheese and cheese spreads.  Fats and oils  Butter. Stick margarine. Lard. Shortening. Ghee. Gregg fat. Tropical oils, such as coconut, palm kernel, or palm oil.  Seasonings and condiments  Onion salt, garlic salt, seasoned salt, table salt, and sea salt. Worcestershire sauce. Tartar sauce. Barbecue sauce. Teriyaki sauce. Soy sauce, including reduced-sodium. Steak sauce. Canned and packaged gravies. Fish sauce. Oyster sauce. Cocktail sauce. Store-bought horseradish. Ketchup. Mustard. Meat flavorings and tenderizers. Bouillon cubes. Hot sauces. Pre-made or packaged marinades. Pre-made or packaged taco seasonings. Relishes. Regular salad dressings.  Other foods  Salted popcorn and pretzels.  The items listed above may not be a complete list of foods and beverages you should avoid. Contact a dietitian for more information.  Where to find more information  National Heart, Lung, and Blood [place]: www.nhlbi.nih.gov  American Heart Association: www.heart.org  Academy of Nutrition and Dietetics: www.eatright.org  National Kidney Foundation: www.kidney.org  Summary  The DASH eating plan is a healthy eating plan that has been shown to reduce high blood pressure (hypertension). It may also reduce your risk for type 2 diabetes, heart disease, and stroke.  When on the DASH eating plan, aim to eat more fresh fruits and vegetables, whole grains, lean proteins, low-fat dairy, and heart-healthy fats.  With the DASH eating plan, you should limit salt (sodium) intake to 2,300 mg a day. If you have hypertension, you may need to reduce your sodium intake to 1,500 mg a day.  Work with your health care provider or dietitian to adjust your eating plan to your individual calorie needs.  This information is not  "intended to replace advice given to you by your health care provider. Make sure you discuss any questions you have with your health care provider.  Document Revised: 11/20/2020 Document Reviewed: 11/20/2020  Elsevier Patient Education © 2022 [employer]  BMI for Adults  What is BMI?  Body mass index (BMI) is a number that is calculated from a person's weight and height. BMI can help estimate how much of a person's weight is composed of fat. BMI does not measure body fat directly. Rather, it is an alternative to procedures that directly measure body fat, which can be difficult and expensive.  BMI can help identify people who may be at higher risk for certain medical problems.  What are BMI measurements used for?  BMI is used as a screening tool to identify possible weight problems. It helps determine whether a person is obese, overweight, a healthy weight, or underweight.  BMI is useful for:  Identifying a weight problem that may be related to a medical condition or may increase the risk for medical problems.  Promoting changes, such as changes in diet and exercise, to help reach a healthy weight. BMI screening can be repeated to see if these changes are working.  How is BMI calculated?  BMI involves measuring your weight in relation to your height. Both height and weight are measured, and the BMI is calculated from those numbers. This can be done either in English (U[place] or metric measurements. Note that charts and online BMI calculators are available to help you find your BMI quickly and easily without having to do these calculations yourself.  To calculate your BMI in English ([place] measurements:    Measure your weight in pounds (lb).  Multiply the number of pounds by 703.  For example, for a person who weighs 180 lb, multiply that number by 703, which equals 126,540.  Measure your height in inches. Then multiply that number by itself to get a measurement called \"inches squared.\"  For example, for a person who " "is 70 inches tall, the \"inches squared\" measurement is 70 inches x 70 inches, which equals 4,900 inches squared.  Divide the total from step 2 (number of lb x 703) by the total from step 3 (inches squared): 126,540 ÷ 4,900 = 25.8. This is your BMI.  To calculate your BMI in metric measurements:  Measure your weight in kilograms (kg).  Measure your height in meters (m). Then multiply that number by itself to get a measurement called \"meters squared.\"  For example, for a person who is 1.75 m tall, the \"meters squared\" measurement is 1.75 m x 1.75 m, which is equal to 3.1 meters squared.  Divide the number of kilograms (your weight) by the meters squared number. In this example: 70 ÷ 3.1 = 22.6. This is your BMI.  What do the results mean?  BMI charts are used to identify whether you are underweight, normal weight, overweight, or obese. The following guidelines will be used:  Underweight: BMI less than 18.5.  Normal weight: BMI between 18.5 and 24.9.  Overweight: BMI between 25 and 29.9.  Obese: BMI of 30 or above.  Keep these notes in mind:  Weight includes both fat and muscle, so someone with a muscular build, such as an athlete, may have a BMI that is higher than 24.9. In cases like these, BMI is not an accurate measure of body fat.  To determine if excess body fat is the cause of a BMI of 25 or higher, further assessments may need to be done by a health care provider.  BMI is usually interpreted in the same way for men and women.  Where to find more information  For more information about BMI, including tools to quickly calculate your BMI, go to these websites:  Centers for Disease Control and Prevention: www.cdc.gov  American Heart Association: www.heart.org  National Heart, Lung, and Blood [place]: www.nhlbi.nih.gov  Summary  Body mass index (BMI) is a number that is calculated from a person's weight and height.  BMI may help estimate how much of a person's weight is composed of fat. BMI can help identify those " who may be at higher risk for certain medical problems.  BMI can be measured using English measurements or metric measurements.  BMI charts are used to identify whether you are underweight, normal weight, overweight, or obese.  This information is not intended to replace advice given to you by your health care provider. Make sure you discuss any questions you have with your health care provider.  Document Revised: 09/09/2020 Document Reviewed: 07/17/2020  Elsevier Patient Education © 2022 Elsevier Inc.

## 2023-05-12 ENCOUNTER — TRANSCRIBE ORDERS (OUTPATIENT)
Dept: ADMINISTRATIVE | Facility: HOSPITAL | Age: 33
End: 2023-05-12
Payer: COMMERCIAL

## 2023-05-12 DIAGNOSIS — N20.0 NEPHROLITHIASIS, URIC ACID: Primary | ICD-10-CM

## 2023-05-31 ENCOUNTER — OFFICE VISIT (OUTPATIENT)
Dept: FAMILY MEDICINE CLINIC | Facility: CLINIC | Age: 33
End: 2023-05-31

## 2023-05-31 VITALS
OXYGEN SATURATION: 96 % | DIASTOLIC BLOOD PRESSURE: 84 MMHG | SYSTOLIC BLOOD PRESSURE: 129 MMHG | HEIGHT: 62 IN | HEART RATE: 77 BPM | WEIGHT: 220 LBS | BODY MASS INDEX: 40.48 KG/M2

## 2023-05-31 DIAGNOSIS — F90.0 ATTENTION DEFICIT HYPERACTIVITY DISORDER (ADHD), PREDOMINANTLY INATTENTIVE TYPE: Primary | ICD-10-CM

## 2023-05-31 DIAGNOSIS — G47.00 INSOMNIA DISORDER RELATED TO KNOWN ORGANIC FACTOR: ICD-10-CM

## 2023-05-31 DIAGNOSIS — F31.0 BIPOLAR AFFECTIVE DISORDER, CURRENT EPISODE HYPOMANIC: ICD-10-CM

## 2023-05-31 PROBLEM — F41.9 ANXIETY: Status: ACTIVE | Noted: 2018-09-13

## 2023-05-31 PROBLEM — K50.90 CROHN'S DISEASE: Status: ACTIVE | Noted: 2021-04-22

## 2023-05-31 PROCEDURE — 99204 OFFICE O/P NEW MOD 45 MIN: CPT | Performed by: PEDIATRICS

## 2023-05-31 RX ORDER — LAMOTRIGINE 25 MG/1
TABLET ORAL
Qty: 42 TABLET | Refills: 0 | Status: SHIPPED | OUTPATIENT
Start: 2023-05-31 | End: 2023-06-28

## 2023-05-31 RX ORDER — QUETIAPINE FUMARATE 50 MG/1
50 TABLET, FILM COATED ORAL NIGHTLY
Qty: 30 TABLET | Refills: 2 | Status: SHIPPED | OUTPATIENT
Start: 2023-05-31

## 2023-05-31 NOTE — PROGRESS NOTES
"Chief Complaint  ADHD (Initial )    Subjective    History of Present Illness      Patient presents to [facility] for   History of Present Illness  The patient endorses symptoms of ADHD including, but not limited to:       Yes: careless mistakes or not paying attention to directions or people of authority    Yes: trouble keeping attention on tasks and during hobbies or leisure activities    Yes: does not listen when spoken to directly    Yes: does not follow instructions and fails to finish homework chores daily tasks or duties at work    Yes: trouble organizing activities    Yes: avoids dislikes or doesn't want to do things that require mental effort for a long period of time    Yes: loses things needed for tasks    Yes: easily distracted    Yes: forgetful in daily activities    Yes: often fidgets with hands or feet or squirms in seat    Yes: often is restless    No: often gets up from seat and moves around when remaining in seat is expected    No: often has trouble enjoying leisure activities quietly    Yes: is often on the go or often acts is if driven by a motor    Yes: often talks excessively    Yes: often blurts out answers before questions have been finished    No: often has trouble waiting one's turn    Yes: often interrupts or intrudes on others      The patient has had symptoms of ADHD for 20 years.  The patient/guardian rates their ADHD at a 6/10 on a 0-10 scale, with 10 being the worst.     Review of Systems    I have reviewed and agree with the HPI information as above.  Curtis Pitts MD     Objective   Vital Signs:   /84   Pulse 77   Ht 157.5 cm (62\")   Wt 99.8 kg (220 lb)   SpO2 96%   BMI 40.24 kg/m²     Class 3 Severe Obesity (BMI >=40). Obesity-related health conditions include the following: none. Obesity is unchanged. BMI is is above average; BMI management plan is completed. We discussed portion control and increasing exercise.      Physical Exam "     KARINA-7:      PHQ-2 Depression Screening  Little interest or pleasure in doing things? 0-->not at all   Feeling down, depressed, or hopeless? 0-->not at all   PHQ-2 Total Score 0     PHQ-9 Depression Screening  Little interest or pleasure in doing things? 0-->not at all   Feeling down, depressed, or hopeless? 0-->not at all   Trouble falling or staying asleep, or sleeping too much?     Feeling tired or having little energy?     Poor appetite or overeating?     Feeling bad about yourself - or that you are a failure or have let yourself or your family down?     Trouble concentrating on things, such as reading the newspaper or watching television?     Moving or speaking so slowly that other people could have noticed? Or the opposite - being so fidgety or restless that you have been moving around a lot more than usual?     Thoughts that you would be better off dead, or of hurting yourself in some way?     PHQ-9 Total Score 0   If you checked off any problems, how difficult have these problems made it for you to do your work, take care of things at home, or get along with other people?        Result Review  Data Reviewed:                   Assessment and Plan      Diagnoses and all orders for this visit:    1. Attention deficit hyperactivity disorder (ADHD), predominantly inattentive type (Primary)  Assessment & Plan:  Psychological condition is newly identified.  Medication changes per orders.  Psychological condition  will be reassessed in 4 weeks    Will first get mood stable very low dose.      2. Insomnia disorder related to known organic factor  Assessment & Plan:  Mind racing insomnia   seroquel 50    Orders:  -     QUEtiapine (SEROquel) 50 MG tablet; Take 1 tablet by mouth Every Night.  Dispense: 30 tablet; Refill: 2    3. Bipolar affective disorder, current episode hypomanic  Assessment & Plan:  Psychological condition is newly identified.  Medication changes per orders.  Psychological condition  will be  reassessed in 4 weeks.    Orders:  -     GeneSight - Swab,; Future  -     lamoTRIgine (LaMICtal) 25 MG tablet; Take 1 tablet by mouth Every Night for 14 days, THEN 2 tablets Every Night for 14 days.  Dispense: 42 tablet; Refill: 0            Follow Up   No follow-ups on file.  Patient was given instructions and counseling regarding her condition or for health maintenance advice. Please see specific information pulled into the AVS if appropriate.

## 2023-05-31 NOTE — ASSESSMENT & PLAN NOTE
Psychological condition is newly identified.  Medication changes per orders.  Psychological condition  will be reassessed in 4 weeks    Will first get mood stable very low dose.

## 2023-06-02 ENCOUNTER — TELEPHONE (OUTPATIENT)
Dept: FAMILY MEDICINE CLINIC | Facility: CLINIC | Age: 33
End: 2023-06-02

## 2023-06-02 NOTE — TELEPHONE ENCOUNTER
Received fax request to submit pa for pt's seroquel. Submitted via covermymeds. Received approval for 6/2/23-6/1/24. Sent pt [employer] message informing.

## 2023-06-20 ENCOUNTER — APPOINTMENT (OUTPATIENT)
Dept: CT IMAGING | Age: 33
End: 2023-06-20
Payer: MEDICAID

## 2023-06-20 ENCOUNTER — HOSPITAL ENCOUNTER (EMERGENCY)
Age: 33
Discharge: HOME | End: 2023-06-21
Attending: EMERGENCY MEDICINE
Payer: MEDICAID

## 2023-06-20 VITALS
BODY MASS INDEX: 40.3 KG/M2 | SYSTOLIC BLOOD PRESSURE: 126 MMHG | WEIGHT: 219 LBS | HEIGHT: 62 IN | DIASTOLIC BLOOD PRESSURE: 81 MMHG | TEMPERATURE: 99.2 F | OXYGEN SATURATION: 100 % | RESPIRATION RATE: 20 BRPM | HEART RATE: 71 BPM

## 2023-06-20 DIAGNOSIS — R06.02 SHORTNESS OF BREATH: Primary | ICD-10-CM

## 2023-06-20 LAB
ALBUMIN SERPL-MCNC: 4.2 G/DL (ref 3.5–5.2)
ALP SERPL-CCNC: 114 U/L (ref 35–104)
ALT SERPL-CCNC: 27 U/L (ref 5–33)
ANION GAP SERPL CALCULATED.3IONS-SCNC: 11 MMOL/L (ref 7–19)
AST SERPL-CCNC: 25 U/L (ref 5–32)
BASOPHILS # BLD: 0 K/UL (ref 0–0.2)
BASOPHILS NFR BLD: 0.3 % (ref 0–1)
BILIRUB SERPL-MCNC: 0.3 MG/DL (ref 0.2–1.2)
BUN SERPL-MCNC: 6 MG/DL (ref 6–20)
CALCIUM SERPL-MCNC: 9.6 MG/DL (ref 8.6–10)
CHLORIDE SERPL-SCNC: 104 MMOL/L (ref 98–111)
CO2 SERPL-SCNC: 24 MMOL/L (ref 22–29)
CREAT SERPL-MCNC: 0.8 MG/DL (ref 0.5–0.9)
EOSINOPHIL # BLD: 0.2 K/UL (ref 0–0.6)
EOSINOPHIL NFR BLD: 2.1 % (ref 0–5)
ERYTHROCYTE [DISTWIDTH] IN BLOOD BY AUTOMATED COUNT: 12.1 % (ref 11.5–14.5)
GLUCOSE SERPL-MCNC: 78 MG/DL (ref 74–109)
HCT VFR BLD AUTO: 37.8 % (ref 37–47)
HGB BLD-MCNC: 13.1 G/DL (ref 12–16)
IMM GRANULOCYTES # BLD: 0 K/UL
LYMPHOCYTES # BLD: 2.7 K/UL (ref 1.1–4.5)
LYMPHOCYTES NFR BLD: 26.6 % (ref 20–40)
MCH RBC QN AUTO: 32 PG (ref 27–31)
MCHC RBC AUTO-ENTMCNC: 34.7 G/DL (ref 33–37)
MCV RBC AUTO: 92.2 FL (ref 81–99)
MONOCYTES # BLD: 0.4 K/UL (ref 0–0.9)
MONOCYTES NFR BLD: 4.3 % (ref 0–10)
NEUTROPHILS # BLD: 6.8 K/UL (ref 1.5–7.5)
NEUTS SEG NFR BLD: 66.4 % (ref 50–65)
PLATELET # BLD AUTO: 261 K/UL (ref 130–400)
PMV BLD AUTO: 9.8 FL (ref 9.4–12.3)
POTASSIUM SERPL-SCNC: 3.8 MMOL/L (ref 3.5–5)
PROT SERPL-MCNC: 7.4 G/DL (ref 6.6–8.7)
RBC # BLD AUTO: 4.1 M/UL (ref 4.2–5.4)
SODIUM SERPL-SCNC: 139 MMOL/L (ref 136–145)
WBC # BLD AUTO: 10.2 K/UL (ref 4.8–10.8)

## 2023-06-20 PROCEDURE — 6360000004 HC RX CONTRAST MEDICATION: Performed by: EMERGENCY MEDICINE

## 2023-06-20 PROCEDURE — 99285 EMERGENCY DEPT VISIT HI MDM: CPT

## 2023-06-20 PROCEDURE — 96374 THER/PROPH/DIAG INJ IV PUSH: CPT

## 2023-06-20 PROCEDURE — 6370000000 HC RX 637 (ALT 250 FOR IP): Performed by: EMERGENCY MEDICINE

## 2023-06-20 PROCEDURE — 71260 CT THORAX DX C+: CPT

## 2023-06-20 PROCEDURE — 93005 ELECTROCARDIOGRAM TRACING: CPT | Performed by: EMERGENCY MEDICINE

## 2023-06-20 PROCEDURE — 36415 COLL VENOUS BLD VENIPUNCTURE: CPT

## 2023-06-20 PROCEDURE — 85025 COMPLETE CBC W/AUTO DIFF WBC: CPT

## 2023-06-20 PROCEDURE — 80053 COMPREHEN METABOLIC PANEL: CPT

## 2023-06-20 PROCEDURE — 6360000002 HC RX W HCPCS: Performed by: EMERGENCY MEDICINE

## 2023-06-20 RX ORDER — QUETIAPINE FUMARATE 50 MG/1
50 TABLET, EXTENDED RELEASE ORAL NIGHTLY
COMMUNITY

## 2023-06-20 RX ORDER — ACETAMINOPHEN 500 MG
1000 TABLET ORAL ONCE
Status: COMPLETED | OUTPATIENT
Start: 2023-06-20 | End: 2023-06-20

## 2023-06-20 RX ORDER — ONDANSETRON 2 MG/ML
4 INJECTION INTRAMUSCULAR; INTRAVENOUS ONCE
Status: COMPLETED | OUTPATIENT
Start: 2023-06-20 | End: 2023-06-20

## 2023-06-20 RX ORDER — LAMOTRIGINE 25 MG/1
50 TABLET ORAL DAILY
COMMUNITY

## 2023-06-20 RX ORDER — LOPERAMIDE HYDROCHLORIDE 2 MG/1
2 CAPSULE ORAL 3 TIMES DAILY PRN
COMMUNITY

## 2023-06-20 RX ADMIN — IOPAMIDOL 90 ML: 755 INJECTION, SOLUTION INTRAVENOUS at 21:55

## 2023-06-20 RX ADMIN — ONDANSETRON 4 MG: 2 INJECTION INTRAMUSCULAR; INTRAVENOUS at 22:21

## 2023-06-20 RX ADMIN — ACETAMINOPHEN 1000 MG: 500 TABLET, FILM COATED ORAL at 22:22

## 2023-06-20 ASSESSMENT — PAIN DESCRIPTION - LOCATION: LOCATION: CHEST

## 2023-06-20 ASSESSMENT — PAIN DESCRIPTION - ORIENTATION: ORIENTATION: LEFT

## 2023-06-20 ASSESSMENT — LIFESTYLE VARIABLES: HOW OFTEN DO YOU HAVE A DRINK CONTAINING ALCOHOL: MONTHLY OR LESS

## 2023-06-20 ASSESSMENT — PAIN - FUNCTIONAL ASSESSMENT: PAIN_FUNCTIONAL_ASSESSMENT: 0-10

## 2023-06-20 ASSESSMENT — PAIN SCALES - GENERAL
PAINLEVEL_OUTOF10: 9
PAINLEVEL_OUTOF10: 7

## 2023-06-21 ENCOUNTER — TELEPHONE (OUTPATIENT)
Dept: VASCULAR SURGERY | Age: 33
End: 2023-06-21

## 2023-06-21 ENCOUNTER — OFFICE VISIT (OUTPATIENT)
Dept: VASCULAR SURGERY | Age: 33
End: 2023-06-21
Payer: MEDICAID

## 2023-06-21 VITALS
HEART RATE: 78 BPM | DIASTOLIC BLOOD PRESSURE: 80 MMHG | WEIGHT: 219 LBS | OXYGEN SATURATION: 99 % | BODY MASS INDEX: 40.3 KG/M2 | HEIGHT: 62 IN | SYSTOLIC BLOOD PRESSURE: 100 MMHG | TEMPERATURE: 98.1 F

## 2023-06-21 DIAGNOSIS — I87.8 POOR VENOUS ACCESS: Primary | ICD-10-CM

## 2023-06-21 LAB
EKG P AXIS: 25 DEGREES
EKG P-R INTERVAL: 202 MS
EKG Q-T INTERVAL: 386 MS
EKG QRS DURATION: 96 MS
EKG QTC CALCULATION (BAZETT): 400 MS
EKG T AXIS: 34 DEGREES

## 2023-06-21 PROCEDURE — 93010 ELECTROCARDIOGRAM REPORT: CPT | Performed by: INTERNAL MEDICINE

## 2023-06-21 PROCEDURE — G8427 DOCREV CUR MEDS BY ELIG CLIN: HCPCS | Performed by: PHYSICIAN ASSISTANT

## 2023-06-21 PROCEDURE — G8419 CALC BMI OUT NRM PARAM NOF/U: HCPCS | Performed by: PHYSICIAN ASSISTANT

## 2023-06-21 PROCEDURE — 99214 OFFICE O/P EST MOD 30 MIN: CPT | Performed by: PHYSICIAN ASSISTANT

## 2023-06-21 PROCEDURE — 4004F PT TOBACCO SCREEN RCVD TLK: CPT | Performed by: PHYSICIAN ASSISTANT

## 2023-06-21 NOTE — ED NOTES
Patient requesting pain and nausea medications. Dr. Kishan Duffy notified.      Seferino Mendez RN  06/20/23 [id number]

## 2023-06-21 NOTE — PROGRESS NOTES
tomorrow  Proceed with Port Study        Please note that parts of the chart were generated using Dragon dictation software. Although every effort was made to ensure the accuracy of this automated transcription, some errors in transcription may have occurred.

## 2023-06-21 NOTE — ED PROVIDER NOTES
[facility]  eMERGENCY dEPARTMENT eNCOUnter      Pt Name: Michoacano Montes  MRN: [id number]  Padmagfjaimie 1990  Date of evaluation: 6/20/2023  Provider: Shashank Vallejo MD    CHIEF COMPLAINT       Chief Complaint   Patient presents with    Shortness of Breath    Vascular Access Problem     Pt has left sided port, hx of blood clots, pain in port area that shoots into neck         HISTORY OF PRESENT ILLNESS   (Location/Symptom, Timing/Onset,Context/Setting, Quality, Duration, Modifying Factors, Severity)  Note limiting factors. Michoacano Montes is a 33 y.o. female who presents to the emergency department ***     HPI    NursingNotes were reviewed. REVIEW OF SYSTEMS    (2-9 systems for level 4, 10 or more for level 5)     Review of Systems         PAST MEDICALHISTORY     Past Medical History:   Diagnosis Date    Anxiety     Back pain     from kidney stones    Chronic kidney disease     Vurshite stones    Crohn's disease ([address]     entyvio infusions every 6 weeks    Depression     Headache     IIH (idiopathic intracranial hypertension)     Kidney stone     Neck pain     Ovarian cyst          SURGICAL HISTORY       Past Surgical History:   Procedure Laterality Date    ABDOMEN SURGERY Left 12/29/2016    EXCSION SKIN LESION BREAST AND ABDOMEN WALL performed by Lisset Cordoba MD at [facility]      prior to hysterectomy    HYSTERECTOMY (CERVIX STATUS UNKNOWN)      KIDNEY STONE SURGERY  04/03/2018    Kidney surgery At [place], [place] by Dr. Liang Bruno Stones removed and stent placed    KIDNEY SURGERY      Vurshite stones    PORT SURGERY  03/13/2022    [address] Left 5/5/2022    LEFT INTERNAL JUGLAR VEIN MEDIPORT EXCHANGE performed by Misha Cadet DO at [address]  2015     Shunt/Dr Chan 90      VASCULAR SURGERY  04/26/2022    VI. Port study    VENTRICULOPERITONEAL SHUNT           CURRENT MEDICATIONS

## 2023-06-22 ENCOUNTER — TELEPHONE (OUTPATIENT)
Dept: VASCULAR SURGERY | Age: 33
End: 2023-06-22

## 2023-06-22 DIAGNOSIS — I87.8 POOR VENOUS ACCESS: Primary | ICD-10-CM

## 2023-06-22 NOTE — TELEPHONE ENCOUNTER
Spoke with the patient to let her know we have her scheduled for a port study on Tuesday, 06/27/2023. I asked her to be at the [facility] @ 9:00 AM.  I let her know there will be no prep for this procedure and she should be completed in a short time. The patient acknowledged.

## 2023-06-27 ENCOUNTER — HOSPITAL ENCOUNTER (OUTPATIENT)
Dept: INTERVENTIONAL RADIOLOGY/VASCULAR | Age: 33
Discharge: HOME | End: 2023-06-27
Payer: MEDICAID

## 2023-06-27 ENCOUNTER — PREP FOR PROCEDURE (OUTPATIENT)
Dept: VASCULAR SURGERY | Age: 33
End: 2023-06-27

## 2023-06-27 VITALS — RESPIRATION RATE: 18 BRPM | OXYGEN SATURATION: 98 % | HEART RATE: 71 BPM

## 2023-06-27 DIAGNOSIS — I87.2 VENOUS (PERIPHERAL) INSUFFICIENCY: ICD-10-CM

## 2023-06-27 PROCEDURE — 2709999900 IR CONTRAST INJECTION  EXISTING CV ACCESS DEVICE EVALUATION W FLUORO

## 2023-06-27 PROCEDURE — 6360000002 HC RX W HCPCS: Performed by: SURGERY

## 2023-06-27 PROCEDURE — 36598 INJ W/FLUOR EVAL CV DEVICE: CPT | Performed by: SURGERY

## 2023-06-27 PROCEDURE — 36598 INJ W/FLUOR EVAL CV DEVICE: CPT

## 2023-06-27 RX ORDER — SODIUM CHLORIDE 0.9 % (FLUSH) 0.9 %
5-40 SYRINGE (ML) INJECTION EVERY 12 HOURS SCHEDULED
Status: CANCELLED | OUTPATIENT
Start: 2023-06-27

## 2023-06-27 RX ORDER — SODIUM CHLORIDE 9 MG/ML
INJECTION, SOLUTION INTRAVENOUS PRN
Status: DISCONTINUED | OUTPATIENT
Start: 2023-06-27 | End: 2023-06-29 | Stop reason: HOSPADM

## 2023-06-27 RX ORDER — SODIUM CHLORIDE 9 MG/ML
INJECTION, SOLUTION INTRAVENOUS PRN
Status: CANCELLED | OUTPATIENT
Start: 2023-06-27

## 2023-06-27 RX ORDER — HEPARIN SODIUM (PORCINE) LOCK FLUSH IV SOLN 100 UNIT/ML 100 UNIT/ML
SOLUTION INTRAVENOUS PRN
Status: COMPLETED | OUTPATIENT
Start: 2023-06-27 | End: 2023-06-27

## 2023-06-27 RX ORDER — SODIUM CHLORIDE 0.9 % (FLUSH) 0.9 %
5-40 SYRINGE (ML) INJECTION PRN
Status: DISCONTINUED | OUTPATIENT
Start: 2023-06-27 | End: 2023-06-29 | Stop reason: HOSPADM

## 2023-06-27 RX ORDER — SODIUM CHLORIDE 0.9 % (FLUSH) 0.9 %
5-40 SYRINGE (ML) INJECTION EVERY 12 HOURS SCHEDULED
Status: DISCONTINUED | OUTPATIENT
Start: 2023-06-27 | End: 2023-06-29 | Stop reason: HOSPADM

## 2023-06-27 RX ORDER — SODIUM CHLORIDE 0.9 % (FLUSH) 0.9 %
5-40 SYRINGE (ML) INJECTION PRN
Status: CANCELLED | OUTPATIENT
Start: 2023-06-27

## 2023-06-27 RX ADMIN — SODIUM CHLORIDE, PRESERVATIVE FREE 300 UNITS: 5 INJECTION INTRAVENOUS at 10:25

## 2023-06-27 NOTE — H&P
Patient Care Team:  MOUSTAPHA Ardon - CNP as PCP - General (Nurse Practitioner)  Dong Washington MD (Neurology)      History and Physical:    Jeff Gtz is a 33 y.o. female who has Crohn's disease with poor venous access. She get IV infusions every 6 weeks. She is 2 weeks past due for infusion. She reports that the last time the port was used it was not working properly and wouldn't draw blood. She presented to the ER yesterday due to SOB and pain over port site. She denies any fever. She has noticed some swelling around the port and mild swelling in her LUE. She reports that when she takes a deep breath she has pain around the port. Jeff Gtz is a 33 y.o. female with the following history reviewed and recorded in [facility]:  Patient Active Problem List    Diagnosis Date Noted    Port-A-Cath in place 05/05/2022     Priority: Medium    Crohn's disease with complication [facility])      Priority: Medium    Family hx-breast malignancy 04/25/2018    Pyelonephritis     Acute left flank pain 04/07/2018    Left nephrolithiasis 04/07/2018     S/P Recent Lithrotripsy        Acute cystitis with hematuria 04/07/2018    MAURI (generalized anxiety disorder) 12/19/2017    Galactorrhea 09/13/2017    Methamphetamine abuse ([address]) 06/12/2017    Mood disorder ([address]) 06/12/2017    Suicidal ideation     Atypical nevus of abdominal wall 12/27/2016    Atypical nevus of female breast 12/27/2016     Current Outpatient Medications   Medication Sig Dispense Refill    loperamide (IMODIUM) 2 MG capsule Take 1 capsule by mouth 3 times daily as needed for Diarrhea      QUEtiapine (SEROQUEL XR) 50 MG extended release tablet Take 1 tablet by mouth nightly      lamoTRIgine (LAMICTAL) 25 MG tablet Take 2 tablets by mouth daily      ALPRAZolam (XANAX) 1 MG tablet Take 1 tablet by mouth daily.  Daily x 7 days      vitamin D-3 (CHOLECALCIFEROL) 125 MCG (5000 UT) TABS Take 2,000 Units by mouth daily      milnacipran HCl (SAVELLA) 100 MG TABS Take

## 2023-06-30 ENCOUNTER — TELEPHONE (OUTPATIENT)
Dept: VASCULAR SURGERY | Age: 33
End: 2023-06-30

## 2023-06-30 ASSESSMENT — ENCOUNTER SYMPTOMS
ABDOMINAL PAIN: 0
SHORTNESS OF BREATH: 1

## 2023-07-21 ENCOUNTER — TELEPHONE (OUTPATIENT)
Dept: VASCULAR SURGERY | Age: 33
End: 2023-07-21

## 2023-07-21 NOTE — TELEPHONE ENCOUNTER
Pt. Is needing to be advised of arrival time for surgery Tuesday and any pre op instructions. Please advise pt.

## 2023-07-21 NOTE — TELEPHONE ENCOUNTER
Called and spoke to the patient. She confirmed her time and that there were no medications that she needed to stop taking. I let her know that none of her prescribed medication should be stopped. The patient acknowledged.

## 2023-07-24 RX ORDER — SODIUM CHLORIDE 0.9 % (FLUSH) 0.9 %
5-40 SYRINGE (ML) INJECTION EVERY 12 HOURS SCHEDULED
Status: CANCELLED | OUTPATIENT
Start: 2023-07-24

## 2023-07-24 RX ORDER — SODIUM CHLORIDE 9 MG/ML
INJECTION, SOLUTION INTRAVENOUS CONTINUOUS
Status: CANCELLED | OUTPATIENT
Start: 2023-07-24

## 2023-07-24 RX ORDER — SODIUM CHLORIDE 9 MG/ML
INJECTION, SOLUTION INTRAVENOUS PRN
Status: CANCELLED | OUTPATIENT
Start: 2023-07-24

## 2023-07-24 RX ORDER — SODIUM CHLORIDE 0.9 % (FLUSH) 0.9 %
5-40 SYRINGE (ML) INJECTION PRN
Status: CANCELLED | OUTPATIENT
Start: 2023-07-24

## 2023-07-25 ENCOUNTER — HOSPITAL ENCOUNTER (OUTPATIENT)
Dept: INTERVENTIONAL RADIOLOGY/VASCULAR | Age: 33
Discharge: HOME | End: 2023-07-25
Payer: MEDICAID

## 2023-07-25 VITALS
OXYGEN SATURATION: 97 % | SYSTOLIC BLOOD PRESSURE: 110 MMHG | RESPIRATION RATE: 13 BRPM | WEIGHT: 210 LBS | HEIGHT: 62 IN | DIASTOLIC BLOOD PRESSURE: 57 MMHG | BODY MASS INDEX: 38.64 KG/M2 | HEART RATE: 84 BPM | TEMPERATURE: 97.2 F

## 2023-07-25 DIAGNOSIS — I87.8 POOR VENOUS ACCESS: ICD-10-CM

## 2023-07-25 PROCEDURE — C1769 GUIDE WIRE: HCPCS

## 2023-07-25 PROCEDURE — 6360000002 HC RX W HCPCS: Performed by: SURGERY

## 2023-07-25 PROCEDURE — 36010 PLACE CATHETER IN VEIN: CPT

## 2023-07-25 PROCEDURE — 75825 VEIN X-RAY TRUNK: CPT

## 2023-07-25 PROCEDURE — 99152 MOD SED SAME PHYS/QHP 5/>YRS: CPT

## 2023-07-25 PROCEDURE — 6360000004 HC RX CONTRAST MEDICATION: Performed by: SURGERY

## 2023-07-25 PROCEDURE — 2580000003 HC RX 258: Performed by: SURGERY

## 2023-07-25 PROCEDURE — 2500000003 HC RX 250 WO HCPCS: Performed by: SURGERY

## 2023-07-25 PROCEDURE — 36598 INJ W/FLUOR EVAL CV DEVICE: CPT

## 2023-07-25 PROCEDURE — 6360000002 HC RX W HCPCS: Performed by: PHYSICIAN ASSISTANT

## 2023-07-25 PROCEDURE — 99153 MOD SED SAME PHYS/QHP EA: CPT

## 2023-07-25 PROCEDURE — 2580000003 HC RX 258: Performed by: PHYSICIAN ASSISTANT

## 2023-07-25 RX ORDER — SODIUM CHLORIDE 0.9 % (FLUSH) 0.9 %
5-40 SYRINGE (ML) INJECTION EVERY 12 HOURS SCHEDULED
Status: DISCONTINUED | OUTPATIENT
Start: 2023-07-25 | End: 2023-07-27 | Stop reason: HOSPADM

## 2023-07-25 RX ORDER — IODIXANOL 320 MG/ML
INJECTION, SOLUTION INTRAVASCULAR PRN
Status: COMPLETED | OUTPATIENT
Start: 2023-07-25 | End: 2023-07-25

## 2023-07-25 RX ORDER — LIDOCAINE HYDROCHLORIDE 20 MG/ML
INJECTION, SOLUTION EPIDURAL; INFILTRATION; INTRACAUDAL; PERINEURAL PRN
Status: COMPLETED | OUTPATIENT
Start: 2023-07-25 | End: 2023-07-25

## 2023-07-25 RX ORDER — MIDAZOLAM HYDROCHLORIDE 1 MG/ML
INJECTION INTRAMUSCULAR; INTRAVENOUS PRN
Status: COMPLETED | OUTPATIENT
Start: 2023-07-25 | End: 2023-07-25

## 2023-07-25 RX ORDER — HEPARIN SODIUM 5000 [USP'U]/ML
INJECTION, SOLUTION INTRAVENOUS; SUBCUTANEOUS PRN
Status: COMPLETED | OUTPATIENT
Start: 2023-07-25 | End: 2023-07-25

## 2023-07-25 RX ORDER — SODIUM CHLORIDE 9 MG/ML
INJECTION, SOLUTION INTRAVENOUS PRN
Status: DISCONTINUED | OUTPATIENT
Start: 2023-07-25 | End: 2023-07-25 | Stop reason: SDUPTHER

## 2023-07-25 RX ORDER — SODIUM CHLORIDE 9 MG/ML
INJECTION, SOLUTION INTRAVENOUS CONTINUOUS
Status: DISCONTINUED | OUTPATIENT
Start: 2023-07-25 | End: 2023-07-27 | Stop reason: HOSPADM

## 2023-07-25 RX ORDER — SODIUM CHLORIDE 9 MG/ML
INJECTION, SOLUTION INTRAVENOUS CONTINUOUS
Status: DISCONTINUED | OUTPATIENT
Start: 2023-07-25 | End: 2023-07-25 | Stop reason: SDUPTHER

## 2023-07-25 RX ORDER — SODIUM CHLORIDE 0.9 % (FLUSH) 0.9 %
5-40 SYRINGE (ML) INJECTION PRN
Status: DISCONTINUED | OUTPATIENT
Start: 2023-07-25 | End: 2023-07-27 | Stop reason: HOSPADM

## 2023-07-25 RX ORDER — HEPARIN 100 UNIT/ML
300 SYRINGE INTRAVENOUS PRN
Status: DISCONTINUED | OUTPATIENT
Start: 2023-07-25 | End: 2023-07-27 | Stop reason: HOSPADM

## 2023-07-25 RX ORDER — SODIUM CHLORIDE 9 MG/ML
INJECTION, SOLUTION INTRAVENOUS PRN
Status: DISCONTINUED | OUTPATIENT
Start: 2023-07-25 | End: 2023-07-27 | Stop reason: HOSPADM

## 2023-07-25 RX ORDER — FENTANYL CITRATE 50 UG/ML
INJECTION, SOLUTION INTRAMUSCULAR; INTRAVENOUS PRN
Status: COMPLETED | OUTPATIENT
Start: 2023-07-25 | End: 2023-07-25

## 2023-07-25 RX ADMIN — CEFAZOLIN 2000 MG: 2 INJECTION, POWDER, FOR SOLUTION INTRAMUSCULAR; INTRAVENOUS at 13:51

## 2023-07-25 RX ADMIN — FENTANYL CITRATE 50 MCG: 50 INJECTION, SOLUTION INTRAMUSCULAR; INTRAVENOUS at 14:20

## 2023-07-25 RX ADMIN — HEPARIN 300 UNITS: 100 SYRINGE at 16:49

## 2023-07-25 RX ADMIN — FENTANYL CITRATE 50 MCG: 50 INJECTION, SOLUTION INTRAMUSCULAR; INTRAVENOUS at 14:24

## 2023-07-25 RX ADMIN — FENTANYL CITRATE 50 MCG: 50 INJECTION, SOLUTION INTRAMUSCULAR; INTRAVENOUS at 14:41

## 2023-07-25 RX ADMIN — MIDAZOLAM 1 MG: 1 INJECTION INTRAMUSCULAR; INTRAVENOUS at 14:13

## 2023-07-25 RX ADMIN — FENTANYL CITRATE 50 MCG: 50 INJECTION, SOLUTION INTRAMUSCULAR; INTRAVENOUS at 14:13

## 2023-07-25 RX ADMIN — SODIUM CHLORIDE, PRESERVATIVE FREE 20 ML: 5 INJECTION INTRAVENOUS at 16:48

## 2023-07-25 RX ADMIN — IODIXANOL 30 ML: 320 INJECTION, SOLUTION INTRAVASCULAR at 14:49

## 2023-07-25 RX ADMIN — MIDAZOLAM 1 MG: 1 INJECTION INTRAMUSCULAR; INTRAVENOUS at 14:23

## 2023-07-25 RX ADMIN — HEPARIN SODIUM 5000 UNITS: 5000 INJECTION INTRAVENOUS; SUBCUTANEOUS at 14:27

## 2023-07-25 RX ADMIN — FENTANYL CITRATE 50 MCG: 50 INJECTION, SOLUTION INTRAMUSCULAR; INTRAVENOUS at 14:26

## 2023-07-25 RX ADMIN — MIDAZOLAM 1 MG: 1 INJECTION INTRAMUSCULAR; INTRAVENOUS at 14:26

## 2023-07-25 RX ADMIN — LIDOCAINE HYDROCHLORIDE 5 ML: 20 INJECTION, SOLUTION EPIDURAL; INFILTRATION; INTRACAUDAL; PERINEURAL at 14:27

## 2023-07-25 RX ADMIN — FENTANYL CITRATE 50 MCG: 50 INJECTION, SOLUTION INTRAMUSCULAR; INTRAVENOUS at 14:22

## 2023-07-25 RX ADMIN — MIDAZOLAM 1 MG: 1 INJECTION INTRAMUSCULAR; INTRAVENOUS at 14:41

## 2023-07-25 RX ADMIN — MIDAZOLAM 1 MG: 1 INJECTION INTRAMUSCULAR; INTRAVENOUS at 14:24

## 2023-07-25 RX ADMIN — MIDAZOLAM 1 MG: 1 INJECTION INTRAMUSCULAR; INTRAVENOUS at 14:20

## 2023-07-25 RX ADMIN — SODIUM CHLORIDE: 9 INJECTION, SOLUTION INTRAVENOUS at 13:00

## 2023-07-25 NOTE — INTERVAL H&P NOTE
Update History & Physical    The patient's History and Physical of June 27, 2023 was reviewed with the patient and I examined the patient. There was no change. The surgical site was confirmed by the patient and me. Plan: The risks, benefits, expected outcome, and alternative to the recommended procedure have been discussed with the patient. Patient understands and wants to proceed with the procedure.      ASA III, Mallampati 3    Electronically signed by Valerie Lagos DO on 7/25/2023 at 2:07 PM

## 2023-07-25 NOTE — OP NOTE
Patient Name: Adam Castrejon   YOB: 1990   MRN: [id number]     Procedure Date: 7/25/2023     Pre Op Diagnosis: port malfunction    Post Op Diagnosis: same    Procedure: port study , IVC venogram    Anesthesia: Local with Moderate Sedation    Estimated Blood Loss: Less than 50 ml    Complications: None    Implants: none    Procedure Details: Patient was brought to the angiogram suite and placed in supine position. Her bilateral groins were prepped and draped in sterile fashion. Timeout performed. Right common femoral vein was accessed under continuous ultrasound guidance using sterile micropuncture technique. Micropuncture sheath was placed. Glide advantage wire was placed and floated into the right innominate vein. The vein was then dilated using 10 Macedonian sheath and then 12 x 40 angled sheath was placed. We tried to place snare and maneuver the port using the snare and the catheter and sheath and could not place a snare around the catheter and the port. After multiple manipulations with the pigtail and then the snare around the port, was stopped. We performed IVC venogram through the sheath and showed no thrombus. And performed an injection through the port and that showed no residual thrombus. There was good flow through the port and good blood return. Right groin sheath was removed and manual pressure held until hemostasis. Findings: Good flow to the port. Good flow through the IVC.     Disposition:aroused from sedation, and taken to the recovery room in a stable condition    Jaspreet Wilson DO  7/25/2023 2:51 PM

## 2023-08-11 ENCOUNTER — TELEPHONE (OUTPATIENT)
Dept: FAMILY MEDICINE CLINIC | Facility: CLINIC | Age: 33
End: 2023-08-11
Payer: COMMERCIAL

## 2023-08-11 DIAGNOSIS — Z79.899 MEDICATION MANAGEMENT: Primary | ICD-10-CM

## 2023-08-11 NOTE — TELEPHONE ENCOUNTER
Kelsey called to follow up from her July 6 visit. See the phone encounter from that data. She wants to know if she can stop by for the UDS or if an office visit is necessary.

## 2023-08-17 ENCOUNTER — TELEPHONE (OUTPATIENT)
Dept: FAMILY MEDICINE CLINIC | Facility: CLINIC | Age: 33
End: 2023-08-17

## 2023-08-17 ENCOUNTER — TELEPHONE (OUTPATIENT)
Dept: FAMILY MEDICINE CLINIC | Facility: CLINIC | Age: 33
End: 2023-08-17
Payer: COMMERCIAL

## 2023-08-17 NOTE — TELEPHONE ENCOUNTER
Caller: Kelsey Mcqueen    Relationship: Self    Best call back number: [phone]    What is the best time to reach you: ANYTIME    Who are you requesting to speak with (clinical staff, provider,  specific staff member): CLINICAL    What was the call regarding: PATIENT WAS REQUESTING A REFILL FOR SLEEP MEDICATION, CANNOT RECALL WHAT THE NAME FO THE MEDICATION WAS.

## 2023-08-18 ENCOUNTER — LAB (OUTPATIENT)
Dept: LAB | Facility: HOSPITAL | Age: 33
End: 2023-08-18
Payer: COMMERCIAL

## 2023-08-18 DIAGNOSIS — Z79.899 MEDICATION MANAGEMENT: ICD-10-CM

## 2023-08-18 LAB
AMPHET+METHAMPHET UR QL: NEGATIVE
AMPHETAMINES UR QL: NEGATIVE
BARBITURATES UR QL SCN: NEGATIVE
BENZODIAZ UR QL SCN: NEGATIVE
BUPRENORPHINE SERPL-MCNC: NEGATIVE NG/ML
CANNABINOIDS SERPL QL: NEGATIVE
COCAINE UR QL: NEGATIVE
METHADONE UR QL SCN: NEGATIVE
OPIATES UR QL: NEGATIVE
OXYCODONE UR QL SCN: NEGATIVE
PCP UR QL SCN: NEGATIVE
PROPOXYPH UR QL: NEGATIVE
TRICYCLICS UR QL SCN: NEGATIVE

## 2023-08-18 PROCEDURE — 80307 DRUG TEST PRSMV CHEM ANLYZR: CPT

## 2023-08-18 NOTE — PROGRESS NOTES
UDS is clean    This was Natlies patient before she left. The note does not say which ADHD medication they were starting. Will you ask the patient what they discussed and I can route to Hong

## 2023-08-19 LAB — FENTANYL UR-MCNC: NEGATIVE NG/ML

## 2023-08-21 ENCOUNTER — TELEPHONE (OUTPATIENT)
Dept: FAMILY MEDICINE CLINIC | Facility: CLINIC | Age: 33
End: 2023-08-21
Payer: COMMERCIAL

## 2023-08-21 DIAGNOSIS — F90.9 ATTENTION DEFICIT HYPERACTIVITY DISORDER (ADHD), UNSPECIFIED ADHD TYPE: Primary | ICD-10-CM

## 2023-08-21 NOTE — TELEPHONE ENCOUNTER
----- Message from GREYSON Núñez sent at 8/18/2023  4:56 PM CDT -----  UDS is clean    This was Natlies patient before she left. The note does not say which ADHD medication they were starting. Will you ask the patient what they discussed and I can route to Hong

## 2023-08-21 NOTE — TELEPHONE ENCOUNTER
Spoke with patient and she is notified that Lauren Kaylyn wanted to put her on Vyvanse 30MG. Sending over to Hong for approval.

## 2023-08-21 NOTE — TELEPHONE ENCOUNTER
Attempted to contact pt with no answer. No voicemail box set up at this time unable to leave message.

## 2023-08-22 ENCOUNTER — TELEPHONE (OUTPATIENT)
Dept: FAMILY MEDICINE CLINIC | Facility: CLINIC | Age: 33
End: 2023-08-22
Payer: COMMERCIAL

## 2023-09-05 DIAGNOSIS — F31.0 BIPOLAR AFFECTIVE DISORDER, CURRENT EPISODE HYPOMANIC: ICD-10-CM

## 2023-09-05 RX ORDER — LAMOTRIGINE 25 MG/1
TABLET ORAL
Qty: 60 TABLET | Refills: 1 | OUTPATIENT
Start: 2023-09-05

## 2023-09-13 ENCOUNTER — OFFICE VISIT (OUTPATIENT)
Dept: FAMILY MEDICINE CLINIC | Facility: CLINIC | Age: 33
End: 2023-09-13
Payer: COMMERCIAL

## 2023-09-13 VITALS
SYSTOLIC BLOOD PRESSURE: 134 MMHG | HEIGHT: 62 IN | TEMPERATURE: 97.5 F | WEIGHT: 217 LBS | RESPIRATION RATE: 20 BRPM | BODY MASS INDEX: 39.93 KG/M2 | DIASTOLIC BLOOD PRESSURE: 86 MMHG

## 2023-09-13 DIAGNOSIS — F90.0 ATTENTION DEFICIT HYPERACTIVITY DISORDER (ADHD), PREDOMINANTLY INATTENTIVE TYPE: Primary | ICD-10-CM

## 2023-09-13 DIAGNOSIS — F31.0 BIPOLAR AFFECTIVE DISORDER, CURRENT EPISODE HYPOMANIC: ICD-10-CM

## 2023-09-13 DIAGNOSIS — G47.00 INSOMNIA DISORDER RELATED TO KNOWN ORGANIC FACTOR: ICD-10-CM

## 2023-09-13 PROCEDURE — 1160F RVW MEDS BY RX/DR IN RCRD: CPT

## 2023-09-13 PROCEDURE — 99214 OFFICE O/P EST MOD 30 MIN: CPT

## 2023-09-13 PROCEDURE — 1159F MED LIST DOCD IN RCRD: CPT

## 2023-09-13 RX ORDER — QUETIAPINE FUMARATE 50 MG/1
50 TABLET, FILM COATED ORAL NIGHTLY
Qty: 30 TABLET | Refills: 1 | Status: SHIPPED | OUTPATIENT
Start: 2023-09-13

## 2023-09-13 RX ORDER — LAMOTRIGINE 100 MG/1
100 TABLET ORAL NIGHTLY
Qty: 30 TABLET | Refills: 0 | Status: SHIPPED | OUTPATIENT
Start: 2023-09-13

## 2023-09-13 NOTE — PATIENT INSTRUCTIONS
Discharge Instructions - ADHD Follow Up    - You will need to return to the office as instructed for follow up, or sooner if you develop symptoms.  - Medication should be taken first thing in the morning.  - It is your responsibility to safe guard your medication. Any medication that is lost or stolen may not be renewed until your next scheduled appointment.  - If you develop any symptoms such as headache, weight loss, loss of appetite, chest pain, palpitations, or change in behavior, please contact our office immediately or go to your local emergency rooms for any emergent needs.  - Your next appointment will be a walk in visit. Dr Pitts is here Monday-Thursdays, and you will need to be signed in by 3 pm in order to guarantee your prescription is filled that day. You may be seen on Fridays or Saturdays for medication follow up as well, but your prescription will not be ready for  until the following Monday. Discharge Instructions - Mood Disorder Follow Up     - You will need to return to the office as instructed for follow up, or sooner if you develop symptoms  - Medication should be taken first thing in the morning  - It is your responsibility to safe guard your medication  - If you develop any symptoms such as headache, changes in weight, loss of appetite, chest pain, palpitations, or change in behavior, please contact our office immediately or go to your local emergency rooms for any emergent needs.  - Your next appointment will be walk in visit.  Dr. Pitts is here Monday-Thursdays, and you will need to be signed in by 3 pm in order to guarantee your prescription is filled that day.  You may be seen on Fridays or Saturdays for medication follow up as well.

## 2023-09-13 NOTE — PROGRESS NOTES
"Chief Complaint  ADD and Med Refill    Subjective    History of Present Illness      Patient presents to [facility] for   History of Present Illness  States she feels that Lamictal could be increased. She states when starting the Vyvanse she felt good on the med but she feels that she needs to increase this dose as well.      Review of Systems    I have reviewed and agree with the HPI and ROS information as above.  Idalmis E Swazi, APRN     Objective   Vital Signs:   /86   Temp 97.5 °F (36.4 °C)   Resp 20   Ht 157.5 cm (62\")   Wt 98.4 kg (217 lb)   BMI 39.69 kg/m²            Physical Exam  Vitals and nursing note reviewed.   Constitutional:       General: She is not in acute distress.     Appearance: Normal appearance. She is not ill-appearing.   HENT:      Head: Normocephalic and atraumatic.      Right Ear: External ear normal.      Left Ear: External ear normal.      Nose: Nose normal.   Eyes:      Conjunctiva/sclera: Conjunctivae normal.   Cardiovascular:      Rate and Rhythm: Normal rate and regular rhythm.      Pulses: Normal pulses.      Heart sounds: Normal heart sounds.   Pulmonary:      Effort: Pulmonary effort is normal.      Breath sounds: Normal breath sounds.   Skin:     General: Skin is warm and dry.   Neurological:      Mental Status: She is alert and oriented to person, place, and time. Mental status is at baseline.      GCS: GCS eye subscore is 4. GCS verbal subscore is 5. GCS motor subscore is 6.   Psychiatric:         Mood and Affect: Mood normal.         Behavior: Behavior normal.         Thought Content: Thought content normal.         Judgment: Judgment normal.        KARINA-7: Over the last two weeks, how often have you been bothered by the following problems?  If you checked any problems, how difficult have these problems made it for you to do your work, take care of things at home, or get along with other people: Not difficult at all    PHQ-2 " Depression Screening  Little interest or pleasure in doing things? 0-->not at all   Feeling down, depressed, or hopeless? 0-->not at all   PHQ-2 Total Score 0     PHQ-9 Depression Screening  Little interest or pleasure in doing things? 0-->not at all   Feeling down, depressed, or hopeless? 0-->not at all   Trouble falling or staying asleep, or sleeping too much?     Feeling tired or having little energy?     Poor appetite or overeating?     Feeling bad about yourself - or that you are a failure or have let yourself or your family down?     Trouble concentrating on things, such as reading the newspaper or watching television?     Moving or speaking so slowly that other people could have noticed? Or the opposite - being so fidgety or restless that you have been moving around a lot more than usual?     Thoughts that you would be better off dead, or of hurting yourself in some way?     PHQ-9 Total Score 0   If you checked off any problems, how difficult have these problems made it for you to do your work, take care of things at home, or get along with other people?        Result Review  Data Reviewed:            Office Visit with Lauren Patiño APRN (07/06/2023)   Urine Drug Screen - Urine, Clean Catch (08/18/2023 15:33)            Assessment and Plan      Diagnoses and all orders for this visit:    1. Attention deficit hyperactivity disorder (ADHD), predominantly inattentive type (Primary)    2. Insomnia disorder related to known organic factor  -     QUEtiapine (SEROquel) 50 MG tablet; Take 1 tablet by mouth Every Night.  Dispense: 30 tablet; Refill: 1    3. Bipolar affective disorder, current episode hypomanic  -     lamoTRIgine (LaMICtal) 100 MG tablet; Take 1 tablet by mouth Every Night.  Dispense: 30 tablet; Refill: 0      Patient is seen today following up on ADHD, bipolar, and insomnia.  She has been taking Lamictal 50 mg nightly and Seroquel 50 mg nightly, was recently started on Vyvanse 30 mg for  her ADHD.  She states she feels that her focus is better but there is a lot of room for improvement, we discussed increasing her dose to 40 mg daily which she is agreeable to.  UDS is up-to-date and appropriate, Yusuf pending, patient denies HI/SI, will pend medications to Dr. Pitts.  She does state that she feels her Lamictal is not working quite as well as it used to, feels she needs just a little more push as far as that medication is concerned.  We will go ahead and increase to 100 mg nightly for her and have her follow-up in 1 month.    Plan:  1.  Increase Lamictal to 100 mg nightly  2.  Increase Vyvanse to 40 mg daily  3.  Continue Seroquel 50 mg nightly  4.  Follow-up in 1 month        Follow Up   Return in about 1 month (around 10/13/2023) for adhd, mood.  Patient was given instructions and counseling regarding her condition or for health maintenance advice. Please see specific information pulled into the AVS if appropriate.

## 2023-10-04 ENCOUNTER — OFFICE VISIT (OUTPATIENT)
Dept: FAMILY MEDICINE CLINIC | Facility: CLINIC | Age: 33
End: 2023-10-04
Payer: COMMERCIAL

## 2023-10-04 VITALS
OXYGEN SATURATION: 100 % | SYSTOLIC BLOOD PRESSURE: 115 MMHG | RESPIRATION RATE: 20 BRPM | DIASTOLIC BLOOD PRESSURE: 80 MMHG | HEIGHT: 62 IN | BODY MASS INDEX: 39.2 KG/M2 | WEIGHT: 213 LBS | HEART RATE: 85 BPM | TEMPERATURE: 98.2 F

## 2023-10-04 DIAGNOSIS — F90.0 ATTENTION DEFICIT HYPERACTIVITY DISORDER (ADHD), PREDOMINANTLY INATTENTIVE TYPE: Primary | ICD-10-CM

## 2023-10-04 DIAGNOSIS — R51.9 FREQUENT HEADACHES: ICD-10-CM

## 2023-10-04 DIAGNOSIS — F31.0 BIPOLAR AFFECTIVE DISORDER, CURRENT EPISODE HYPOMANIC: ICD-10-CM

## 2023-10-04 DIAGNOSIS — G47.00 INSOMNIA DISORDER RELATED TO KNOWN ORGANIC FACTOR: ICD-10-CM

## 2023-10-04 PROBLEM — D84.9 IMMUNOCOMPROMISED STATE: Status: ACTIVE | Noted: 2023-09-14

## 2023-10-04 PROBLEM — K21.9 GASTROESOPHAGEAL REFLUX DISEASE WITHOUT ESOPHAGITIS: Status: ACTIVE | Noted: 2023-06-15

## 2023-10-04 PROCEDURE — 1160F RVW MEDS BY RX/DR IN RCRD: CPT | Performed by: NURSE PRACTITIONER

## 2023-10-04 PROCEDURE — 1159F MED LIST DOCD IN RCRD: CPT | Performed by: NURSE PRACTITIONER

## 2023-10-04 PROCEDURE — 99214 OFFICE O/P EST MOD 30 MIN: CPT | Performed by: NURSE PRACTITIONER

## 2023-10-04 RX ORDER — QUETIAPINE FUMARATE 50 MG/1
50 TABLET, FILM COATED ORAL NIGHTLY
Qty: 30 TABLET | Refills: 1 | Status: SHIPPED | OUTPATIENT
Start: 2023-10-04

## 2023-10-04 RX ORDER — LAMOTRIGINE 100 MG/1
50 TABLET ORAL DAILY
Qty: 15 TABLET | Refills: 1 | Status: SHIPPED | OUTPATIENT
Start: 2023-10-04

## 2023-10-04 RX ORDER — POLYETHYLENE GLYCOL 3350 17 G/DOSE
POWDER (GRAM) ORAL
COMMUNITY

## 2023-10-04 RX ORDER — MESALAMINE 375 MG/1
CAPSULE, EXTENDED RELEASE ORAL
COMMUNITY

## 2023-10-04 RX ORDER — LOPERAMIDE HYDROCHLORIDE 2 MG/1
1 CAPSULE ORAL 3 TIMES DAILY PRN
COMMUNITY

## 2023-10-04 NOTE — PROGRESS NOTES
"Chief Complaint  ADHD, Bipolar affective disorder, and Insomnia    Subjective    History of Present Illness      Patient presents to [facility] for   History of Present Illness  Pt is here for a 1 month f/u after having her Lamictal and Vyvanse increased.  Pt reports her mood is fine, just a few days of feeling anxious when she has to go out in public.  Pt states she does not feel the Vyvanse is helping and states she can't tell she's even taking it. She has also had more frequent headaches and is unsure what Is contributing. She has a  shunt but says this was evaluated recently and her current headaches are not related.          Review of Systems    I have reviewed and agree with the HPI and ROS information as above.  GREYSON Longoria     Objective   Vital Signs:   /80   Pulse 85   Temp 98.2 °F (36.8 °C)   Resp 20   Ht 157.5 cm (62\")   Wt 96.6 kg (213 lb)   SpO2 100%   BMI 38.96 kg/m²            Physical Exam  Constitutional:       Appearance: She is well-developed. She is obese.   HENT:      Head: Normocephalic and atraumatic.      Right Ear: Tympanic membrane, ear canal and external ear normal.      Left Ear: Tympanic membrane, ear canal and external ear normal.      Nose: Nose normal. No septal deviation, nasal tenderness or congestion.      Mouth/Throat:      Lips: Pink. No lesions.      Mouth: Mucous membranes are moist. No oral lesions.      Dentition: Normal dentition.      Pharynx: Oropharynx is clear. No pharyngeal swelling, oropharyngeal exudate or posterior oropharyngeal erythema.   Eyes:      General: Lids are normal. Vision grossly intact. No scleral icterus.        Right eye: No discharge.         Left eye: No discharge.      Extraocular Movements: Extraocular movements intact.      Conjunctiva/sclera: Conjunctivae normal.      Right eye: Right conjunctiva is not injected.      Left eye: Left conjunctiva is not injected.      Pupils: Pupils are equal, " round, and reactive to light.   Neck:      Thyroid: No thyroid mass.      Trachea: Trachea normal.   Cardiovascular:      Rate and Rhythm: Normal rate and regular rhythm.      Heart sounds: Normal heart sounds. No murmur heard.    No gallop.   Pulmonary:      Effort: Pulmonary effort is normal.      Breath sounds: Normal breath sounds and air entry. No wheezing, rhonchi or rales.   Abdominal:      General: There is no distension.      Palpations: Abdomen is soft. There is no mass.      Tenderness: There is no abdominal tenderness. There is no right CVA tenderness, left CVA tenderness, guarding or rebound.   Musculoskeletal:         General: No tenderness or deformity. Normal range of motion.      Cervical back: Full passive range of motion without pain, normal range of motion and neck supple.      Thoracic back: Normal.      Right lower leg: No edema.      Left lower leg: No edema.   Skin:     General: Skin is warm and dry.      Coloration: Skin is not jaundiced.      Findings: No rash.   Neurological:      Mental Status: She is alert and oriented to person, place, and time.      Sensory: Sensation is intact.      Motor: Motor function is intact.      Coordination: Coordination is intact.      Gait: Gait is intact.      Deep Tendon Reflexes: Reflexes are normal and symmetric.   Psychiatric:         Mood and Affect: Mood and affect normal.         Judgment: Judgment normal.        KARINA-7: Over the last two weeks, how often have you been bothered by the following problems?  Feeling nervous, anxious or on edge: More than half the days  Not being able to stop or control worrying: Not at all  Worrying too much about different things: Not at all  Trouble Relaxing: Not at all  Being so restless that it is hard to sit still: Not at all  Becoming easily annoyed or irritable: Several days  Feeling afraid as if something awful might happen: Not at all  KARINA 7 Total Score: 3  If you checked any problems, how difficult have these  problems made it for you to do your work, take care of things at home, or get along with other people: Somewhat difficult    PHQ-2 Depression Screening  Little interest or pleasure in doing things? 0-->not at all   Feeling down, depressed, or hopeless? 0-->not at all   PHQ-2 Total Score 0     PHQ-9 Depression Screening  Little interest or pleasure in doing things? 0-->not at all   Feeling down, depressed, or hopeless? 0-->not at all   Trouble falling or staying asleep, or sleeping too much?     Feeling tired or having little energy?     Poor appetite or overeating?     Feeling bad about yourself - or that you are a failure or have let yourself or your family down?     Trouble concentrating on things, such as reading the newspaper or watching television?     Moving or speaking so slowly that other people could have noticed? Or the opposite - being so fidgety or restless that you have been moving around a lot more than usual?     Thoughts that you would be better off dead, or of hurting yourself in some way?     PHQ-9 Total Score 0   If you checked off any problems, how difficult have these problems made it for you to do your work, take care of things at home, or get along with other people?        Result Review  Data Reviewed:          Urine Drug Screen - Urine, Clean Catch (08/18/2023 15:33)  Fentanyl, Urine - Urine, Clean Catch (08/18/2023 15:33)         Assessment and Plan      Diagnoses and all orders for this visit:    1. Attention deficit hyperactivity disorder (ADHD), predominantly inattentive type (Primary)    2. Bipolar affective disorder, current episode hypomanic  -     lamoTRIgine (LaMICtal) 100 MG tablet; Take 0.5 tablets by mouth Daily.  Dispense: 15 tablet; Refill: 1    3. Insomnia disorder related to known organic factor  -     QUEtiapine (SEROquel) 50 MG tablet; Take 1 tablet by mouth Every Night.  Dispense: 30 tablet; Refill: 1    4. Frequent headaches    Ms. Mcqueen presents today for ADHD mood  insomnia follow-up.  She states that her Vyvanse dose has been increased recently from 30 to 40 mg and she still is not able to notice much of a difference.  She is still unable to retain and has lack of motivation.  She also feels that she is having more frequent headaches and feels that this started after we increased her lamotrigine dose to 100 mg.  She has a  shunt but tells me that she has had this recently evaluated and everything was stable and that her current headache symptoms seem unrelated.  Plan:   Dc Vyvanse, start Adderall Xr. UDS is utd.   Cut lamotrigine back to 50mg to see if headaches improve since these are worse since increasing dose.   Continue quetiapine at at bedtime.  Monitor headaches closely.  Reevaluate symptoms in 1 month.  ADHD Follow up:    PDMP reviewed and pending. ADRS (Adult ADHD Assessment Form) reviewed in detail, scanned in chart, and has been reviewed at time of appointment.  All questions, including medication and side effects, were discussed in detail at time of patient's visit. Patient will begin new medication which was discussed at today's visit. Patient is to return in 1 month(s).    Last Urine Toxicity  More data exists         Latest Ref Rng & Units 8/18/2023 6/24/2015   LAST URINE TOXICITY RESULTS   Amphetamine, Urine Qual Negative Negative  Negative    Barbiturates Screen, Urine Negative Negative  Negative    Benzodiazepine Screen, Urine Negative Negative  Negative    Buprenorphine, Screen, Urine Negative Negative  -   Cocaine Screen, Urine Negative Negative  Negative    Fentanyl, Urine Negative Negative  -   Methadone Screen , Urine Negative Negative  Negative    Methamphetamine, Ur Negative Negative  -        Urine Drug Screen Results: UDS RESULTS: Current results appropriate   The following information was discussed with patient/caregiver at the time of the appointment.    Lamictal (lamotrigine):  Rarely, serious (sometimes fatal) skin rashes have occurred while  taking this medication. These rashes are more common in children under 16 than in adults. Rashes may be more likely if you start at too high a dose, if you increase your dose too quickly, or if you take this medication with certain other anti-seizure medications (valproic acid, divalproex). These rashes may occur anytime during use, but most serious rashes have occurred within 2 to 8 weeks of starting lamotrigine. Get medical help right away if you develop any type of skin rash while taking this medication, or if you have other signs of a serious allergic reaction such as hives, fever, swollen lymph glands, painful sores in the mouth or around the eyes, or swelling of the lips or tongue. Your doctor will tell you if you should stop taking lamotrigine. Even after you stop taking this medication, it is still possible for the rash to become life-threatening or cause permanent scars or other problems.    Uses    Lamotrigine is used alone or with other medications to prevent and control seizures. It may also be used to help prevent the extreme mood swings of bipolar disorder in adults and children. Lamotrigine is known as an anticonvulsant or antiepileptic drug. It is thought to work by restoring the balance of certain natural substances in the brain.    How to Use    Read the Medication Guide and, if available, the Patient Information Leaflet provided by your pharmacist before you start taking lamotrigine and each time you get a refill. If you have any questions, ask your doctor or pharmacist. Take this medication by mouth with or without food as directed by your doctor. Swallow the tablets whole since chewing them may leave a bitter taste. Dosage is based on your medical condition, response to treatment, and use of certain interacting drugs. (See also Drug Interactions section.) For children, the dosage is also based on weight. It is very important to follow your doctor's dosing instructions exactly. The dose must be  increased slowly. It may take several weeks or months to reach the best dose for you and to get the full benefit from this medication. Take this medication regularly in order to get the most benefit from it. To help you remember, take it at the same time(s) each day. Do not stop taking this medication without consulting your doctor. Some conditions may become worse when the drug is suddenly stopped. Your dose may need to be gradually decreased. Also, if you have stopped taking this medication, do not restart lamotrigine without consulting your doctor. Tell your doctor if your condition does not improve or if it worsens.    Side Effects    See also Warning section. Dizziness, drowsiness, headache, blurred/double vision, loss of coordination, shaking (tremor), nausea, vomiting, or upset stomach may occur. If any of these effects persist or worsen, tell your doctor or pharmacist promptly. Remember that your doctor has prescribed this medication because he or she has judged that the benefit to you is greater than the risk of side effects. Many people using this medication do not have serious side effects. A small number of people who take anticonvulsants for any condition (such as seizures, bipolar disorder, pain) may experience depression, suicidal thoughts/attempts, or other mental/mood problems. Tell your doctor right away if you or your family/caregiver notice any unusual/sudden changes in your mood, thoughts, or behavior including signs of depression, suicidal thoughts/attempts, thoughts about harming yourself. Tell your doctor right away if any of these rare but serious side effects occur: fainting, easy or unusual bruising/bleeding, unusual tiredness, signs of infection (such as fever, stiff neck, persistent sore throat), muscle pain/tenderness/weakness, dark urine, yellowing eyes/skin, stomach/abdominal pain, persistent nausea/vomiting, change in the amount of urine. A very serious allergic reaction to this drug  is rare. However, get medical help right away if you notice any symptoms of a serious allergic reaction, including: rash, itching/swelling (especially of the face/tongue/throat), severe dizziness, trouble breathing. This is not a complete list of possible side effects. If you notice other effects not listed above, contact your doctor or pharmacist. In the [place] - Call your doctor for medical advice about side effects. You may report side effects to FDA at 7-415-RKT-8941. In [place] - Call your doctor for medical advice about side effects. You may report side effects to [facility] [place] at [phone].    Precautions    Before taking lamotrigine, tell your doctor or pharmacist if you are allergic to it; or if you have any other allergies. This product may contain inactive ingredients, which can cause allergic reactions or other problems. Talk to your pharmacist for more details. Before using this medication, tell your doctor or pharmacist your medical history, especially of: kidney disease, liver disease. This drug may make you dizzy or drowsy or cause blurred vision. Do not drive, use machinery, or do any activity that requires alertness or clear vision until you are sure you can perform such activities safely. Limit alcoholic beverages. Before having surgery, tell your doctor or dentist about all the products you use (including prescription drugs, nonprescription drugs, and herbal products). Older adults may be more sensitive to the side effects of this drug, especially dizziness, loss of coordination, or fainting. These side effects can increase the risk of falling. During pregnancy, this medication should be used only when clearly needed. It may harm an unborn baby. However, since untreated seizures or mental/mood problems (such as bipolar disorder) are serious conditions that can harm both a pregnant woman and her unborn baby, do not stop taking this medication unless directed by your doctor. If you are planning  pregnancy, become pregnant, or think you may be pregnant, immediately talk to your doctor about the benefits and risks of using this medication during pregnancy. Since birth control pills, patches, implants, and injections may not work if taken with this medication (see also Drug Interactions section), discuss reliable forms of birth control with your doctor. This drug passes into breast milk and may have undesirable effects on a nursing infant. Consult your doctor before breast-feeding.    Drug Interaction    Drug interactions may change how your medications work or increase your risk for serious side effects. This document does not contain all possible drug interactions. Keep a list of all the products you use (including prescription/nonprescription drugs and herbal products) and share it with your doctor and pharmacist. Do not start, stop, or change the dosage of any medicines without your doctor's approval. Other medications can affect the removal of lamotrigine from your body, which may affect how lamotrigine works. Examples include hormonal birth control (such as pills, patches), estrogens, other medications to treat seizures (such as carbamazepine, phenobarbital, phenytoin, primidone, valproic acid), certain HIV protease inhibitors (such as lopinavir/ritonavir, atazanavir/ritonavir), and rifampin, among others. Your doctor may need to adjust your dose of lamotrigine if you are on these medications. This medication may decrease the effectiveness of hormonal birth control products (such as pills, patch, ring). This effect can result in pregnancy. Ask your doctor or pharmacist for details. Discuss whether you should use additional reliable birth control methods while using this medication. Also tell your doctor if you have any new spotting or breakthrough bleeding, because these may be signs that your birth control is not working well. Tell your doctor or pharmacist if you are taking other products that cause  drowsiness including alcohol, antihistamines (such as cetirizine, diphenhydramine), drugs for sleep or anxiety (such as alprazolam, diazepam, zolpidem), muscle relaxants, and narcotic pain relievers (such as codeine). Check the labels on all your medicines (such as allergy or cough-and-cold products) because they may contain ingredients that cause drowsiness. Ask your pharmacist about using those products safely. This medication may interfere with certain laboratory tests (including urine drug screening tests), possibly causing false test results. Make sure laboratory personnel and all your doctors know you use this drug.    Overdose    If overdose is suspected, contact a poison control center or emergency room immediately. [place] residents can call their local poison control center at [phone]. [place] residents can call a provincial poison control center. Symptoms of overdose may include: severe drowsiness, unusual eye movements, loss of consciousness.    Notes    Do not share this medication with others. Laboratory and/or medical tests (such as liver and kidney function tests, complete blood count) may be performed periodically to monitor your progress or check for side effects. Consult your doctor for more details. There are different types of this medication available. Some do not have the same effects. There are also some medications that sound the same as this product. Make sure you have the right product before taking it.    Missed Dose    It is important to take each dose at the scheduled time. If you miss a dose, take it as soon as you remember. If it is near the time of the next dose, skip the missed dose and resume your usual dosing schedule. Do not double the dose to catch up.    Storage    Store at room temperature away from light and moisture. Do not store in the bathroom. Keep all medications away from children and pets. Do not flush medications down the toilet or pour them into a drain unless  instructed to do so. Properly discard this product when it is  or no longer needed. Consult your pharmacist or local waste disposal company.    Medical Alert    Your condition can cause complications in a medical emergency. For information about enrolling in MedicAlert, call 1-787.900.1449 ([place]) or [phone] ([place]).    Disclaimer    IMPORTANT: HOW TO USE THIS INFORMATION: This is a summary and does NOT have all possible information about this product. This information does not assure that this product is safe, effective, or appropriate for you. This information is not individual medical advice and does not substitute for the advice of your health care professional. Always ask your health care professional for complete information about this product and your specific health needs.    Source  [employer].       Follow Up   Return in about 1 month (around 2023).  Patient was given instructions and counseling regarding her condition or for health maintenance advice. Please see specific information pulled into the AVS if appropriate.

## 2023-10-05 RX ORDER — DEXTROAMPHETAMINE SACCHARATE, AMPHETAMINE ASPARTATE MONOHYDRATE, DEXTROAMPHETAMINE SULFATE AND AMPHETAMINE SULFATE 6.25; 6.25; 6.25; 6.25 MG/1; MG/1; MG/1; MG/1
25 CAPSULE, EXTENDED RELEASE ORAL EVERY MORNING
Qty: 30 CAPSULE | Refills: 0 | Status: SHIPPED | OUTPATIENT
Start: 2023-10-21

## 2023-11-03 ENCOUNTER — OFFICE VISIT (OUTPATIENT)
Dept: FAMILY MEDICINE CLINIC | Facility: CLINIC | Age: 33
End: 2023-11-03
Payer: COMMERCIAL

## 2023-11-03 VITALS
BODY MASS INDEX: 41.59 KG/M2 | OXYGEN SATURATION: 98 % | HEART RATE: 78 BPM | WEIGHT: 226 LBS | SYSTOLIC BLOOD PRESSURE: 115 MMHG | HEIGHT: 62 IN | DIASTOLIC BLOOD PRESSURE: 75 MMHG | TEMPERATURE: 98.4 F | RESPIRATION RATE: 20 BRPM

## 2023-11-03 DIAGNOSIS — F90.0 ATTENTION DEFICIT HYPERACTIVITY DISORDER (ADHD), PREDOMINANTLY INATTENTIVE TYPE: Primary | ICD-10-CM

## 2023-11-03 DIAGNOSIS — F31.0 BIPOLAR AFFECTIVE DISORDER, CURRENT EPISODE HYPOMANIC: ICD-10-CM

## 2023-11-03 PROCEDURE — 1159F MED LIST DOCD IN RCRD: CPT | Performed by: NURSE PRACTITIONER

## 2023-11-03 PROCEDURE — 99214 OFFICE O/P EST MOD 30 MIN: CPT | Performed by: NURSE PRACTITIONER

## 2023-11-03 PROCEDURE — 1160F RVW MEDS BY RX/DR IN RCRD: CPT | Performed by: NURSE PRACTITIONER

## 2023-11-03 RX ORDER — LAMOTRIGINE 100 MG/1
100 TABLET ORAL DAILY
Qty: 30 TABLET | Refills: 2 | Status: SHIPPED | OUTPATIENT
Start: 2023-11-03

## 2023-11-03 RX ORDER — DEXTROAMPHETAMINE SACCHARATE, AMPHETAMINE ASPARTATE MONOHYDRATE, DEXTROAMPHETAMINE SULFATE AND AMPHETAMINE SULFATE 7.5; 7.5; 7.5; 7.5 MG/1; MG/1; MG/1; MG/1
30 CAPSULE, EXTENDED RELEASE ORAL EVERY MORNING
Qty: 30 CAPSULE | Refills: 0 | Status: SHIPPED | OUTPATIENT
Start: 2023-11-20 | End: 2023-12-20

## 2023-11-03 NOTE — PROGRESS NOTES
"Chief Complaint  ADHD, Bipolar affective disorder, and Insomnia    Subjective    History of Present Illness      Patient presents to [facility] for   History of Present Illness  ADHD/Mood HPI    Visit for:  follow-up. Most recent visit was 1 months ago.  Interim changes to follow up on today: no change in medication  Work/School Performance:  struggling  Cognitive:  unable to focus    Behavior  Hyperactivity: is not hyperactive  Impulsivity: no impulsivity and no unsafe behavior  Tasking: able to initiate tasks, able to complete tasks, and able to mult-task    Social  ADHD social/impulsive symptoms:  not impatient, does not blurt out inappropriate comments, and no excessive talking    Behavioral health  Behavior: no concerns  Emotional coping: demonstrates feelings of anxiety    Her headaches and sleep have both improved since stopping vyvanse.          Review of Systems   Psychiatric/Behavioral:  The patient has insomnia.        I have reviewed and agree with the HPI and ROS information as above.  Christi Woo, APRDIAZ     Objective   Vital Signs:   /75   Pulse 78   Temp 98.4 °F (36.9 °C)   Resp 20   Ht 157.5 cm (62\")   Wt 103 kg (226 lb)   SpO2 98%   BMI 41.34 kg/m²            Physical Exam  Constitutional:       Appearance: She is well-developed.   HENT:      Head: Normocephalic and atraumatic.      Right Ear: Tympanic membrane, ear canal and external ear normal.      Left Ear: Tympanic membrane, ear canal and external ear normal.      Nose: Nose normal. No septal deviation, nasal tenderness or congestion.      Mouth/Throat:      Lips: Pink. No lesions.      Mouth: Mucous membranes are moist. No oral lesions.      Dentition: Normal dentition.      Pharynx: Oropharynx is clear. No pharyngeal swelling, oropharyngeal exudate or posterior oropharyngeal erythema.   Eyes:      General: Lids are normal. Vision grossly intact. No scleral icterus.        Right eye: No discharge.  "        Left eye: No discharge.      Extraocular Movements: Extraocular movements intact.      Conjunctiva/sclera: Conjunctivae normal.      Right eye: Right conjunctiva is not injected.      Left eye: Left conjunctiva is not injected.      Pupils: Pupils are equal, round, and reactive to light.   Neck:      Thyroid: No thyroid mass.      Trachea: Trachea normal.   Cardiovascular:      Rate and Rhythm: Normal rate and regular rhythm.      Heart sounds: Normal heart sounds. No murmur heard.     No gallop.   Pulmonary:      Effort: Pulmonary effort is normal.      Breath sounds: Normal breath sounds and air entry. No wheezing, rhonchi or rales.   Abdominal:      General: There is no distension.      Palpations: Abdomen is soft. There is no mass.      Tenderness: There is no abdominal tenderness. There is no right CVA tenderness, left CVA tenderness, guarding or rebound.   Musculoskeletal:         General: No tenderness or deformity. Normal range of motion.      Cervical back: Full passive range of motion without pain, normal range of motion and neck supple.      Thoracic back: Normal.      Right lower leg: No edema.      Left lower leg: No edema.   Skin:     General: Skin is warm and dry.      Coloration: Skin is not jaundiced.      Findings: No rash.   Neurological:      Mental Status: She is alert and oriented to person, place, and time.      Sensory: Sensation is intact.      Motor: Motor function is intact.      Coordination: Coordination is intact.      Gait: Gait is intact.      Deep Tendon Reflexes: Reflexes are normal and symmetric.   Psychiatric:         Mood and Affect: Mood and affect normal.         Judgment: Judgment normal.          KARINA-7: Over the last two weeks, how often have you been bothered by the following problems?  Feeling nervous, anxious or on edge: Nearly every day  Not being able to stop or control worrying: Several days  Worrying too much about different things: Several days  Trouble  Relaxing: Several days  Being so restless that it is hard to sit still: Not at all  Becoming easily annoyed or irritable: Nearly every day  Feeling afraid as if something awful might happen: Several days  KARINA 7 Total Score: 10  If you checked any problems, how difficult have these problems made it for you to do your work, take care of things at home, or get along with other people: Somewhat difficult    PHQ-2 Depression Screening  Little interest or pleasure in doing things? 3-->nearly every day   Feeling down, depressed, or hopeless? 3-->nearly every day   PHQ-2 Total Score 15     PHQ-9 Depression Screening  Little interest or pleasure in doing things? 3-->nearly every day   Feeling down, depressed, or hopeless? 3-->nearly every day   Trouble falling or staying asleep, or sleeping too much? 0-->not at all   Feeling tired or having little energy? 3-->nearly every day   Poor appetite or overeating? 0-->not at all   Feeling bad about yourself - or that you are a failure or have let yourself or your family down? 3-->nearly every day   Trouble concentrating on things, such as reading the newspaper or watching television? 3-->nearly every day   Moving or speaking so slowly that other people could have noticed? Or the opposite - being so fidgety or restless that you have been moving around a lot more than usual? 0-->not at all   Thoughts that you would be better off dead, or of hurting yourself in some way? 0-->not at all   PHQ-9 Total Score 15   If you checked off any problems, how difficult have these problems made it for you to do your work, take care of things at home, or get along with other people? somewhat difficult      Result Review  Data Reviewed:              Fentanyl, Urine - Urine, Clean Catch (08/18/2023 15:33)  Urine Drug Screen - Urine, Clean Catch (08/18/2023 15:33)     Assessment and Plan      Diagnoses and all orders for this visit:    1. Attention deficit hyperactivity disorder (ADHD), predominantly  inattentive type (Primary)  Comments:  Cautiously increase to adderall xr 30mg q am. UDS is utd.    2. Bipolar affective disorder, current episode hypomanic  Comments:  Increase lamotrigine back to 100mg.  Orders:  -     lamoTRIgine (LaMICtal) 100 MG tablet; Take 1 tablet by mouth Daily.  Dispense: 30 tablet; Refill: 2    ADHD Follow up:    PDMP reviewed and pending. ADRS (Adult ADHD Assessment Form) reviewed in detail, scanned in chart, and has been reviewed at time of appointment.  All questions, including medication and side effects, were discussed in detail at time of patient's visit. Patient will change medication dose which was discussed at today's visit. Patient is to return in 1 month(s).    Last Urine Toxicity  More data exists         Latest Ref Rng & Units 8/18/2023 6/24/2015   LAST URINE TOXICITY RESULTS   Amphetamine, Urine Qual Negative Negative  Negative    Barbiturates Screen, Urine Negative Negative  Negative    Benzodiazepine Screen, Urine Negative Negative  Negative    Buprenorphine, Screen, Urine Negative Negative  -   Cocaine Screen, Urine Negative Negative  Negative    Fentanyl, Urine Negative Negative  -   Methadone Screen , Urine Negative Negative  Negative    Methamphetamine, Ur Negative Negative  -        Urine Drug Screen Results: UDS RESULTS: Current results appropriate   The following information was discussed with patient/caregiver at the time of the appointment.    Lamictal (lamotrigine):  Rarely, serious (sometimes fatal) skin rashes have occurred while taking this medication. These rashes are more common in children under 16 than in adults. Rashes may be more likely if you start at too high a dose, if you increase your dose too quickly, or if you take this medication with certain other anti-seizure medications (valproic acid, divalproex). These rashes may occur anytime during use, but most serious rashes have occurred within 2 to 8 weeks of starting lamotrigine. Get medical help  right away if you develop any type of skin rash while taking this medication, or if you have other signs of a serious allergic reaction such as hives, fever, swollen lymph glands, painful sores in the mouth or around the eyes, or swelling of the lips or tongue. Your doctor will tell you if you should stop taking lamotrigine. Even after you stop taking this medication, it is still possible for the rash to become life-threatening or cause permanent scars or other problems.    Uses    Lamotrigine is used alone or with other medications to prevent and control seizures. It may also be used to help prevent the extreme mood swings of bipolar disorder in adults and children. Lamotrigine is known as an anticonvulsant or antiepileptic drug. It is thought to work by restoring the balance of certain natural substances in the brain.    How to Use    Read the Medication Guide and, if available, the Patient Information Leaflet provided by your pharmacist before you start taking lamotrigine and each time you get a refill. If you have any questions, ask your doctor or pharmacist. Take this medication by mouth with or without food as directed by your doctor. Swallow the tablets whole since chewing them may leave a bitter taste. Dosage is based on your medical condition, response to treatment, and use of certain interacting drugs. (See also Drug Interactions section.) For children, the dosage is also based on weight. It is very important to follow your doctor's dosing instructions exactly. The dose must be increased slowly. It may take several weeks or months to reach the best dose for you and to get the full benefit from this medication. Take this medication regularly in order to get the most benefit from it. To help you remember, take it at the same time(s) each day. Do not stop taking this medication without consulting your doctor. Some conditions may become worse when the drug is suddenly stopped. Your dose may need to be  gradually decreased. Also, if you have stopped taking this medication, do not restart lamotrigine without consulting your doctor. Tell your doctor if your condition does not improve or if it worsens.    Side Effects    See also Warning section. Dizziness, drowsiness, headache, blurred/double vision, loss of coordination, shaking (tremor), nausea, vomiting, or upset stomach may occur. If any of these effects persist or worsen, tell your doctor or pharmacist promptly. Remember that your doctor has prescribed this medication because he or she has judged that the benefit to you is greater than the risk of side effects. Many people using this medication do not have serious side effects. A small number of people who take anticonvulsants for any condition (such as seizures, bipolar disorder, pain) may experience depression, suicidal thoughts/attempts, or other mental/mood problems. Tell your doctor right away if you or your family/caregiver notice any unusual/sudden changes in your mood, thoughts, or behavior including signs of depression, suicidal thoughts/attempts, thoughts about harming yourself. Tell your doctor right away if any of these rare but serious side effects occur: fainting, easy or unusual bruising/bleeding, unusual tiredness, signs of infection (such as fever, stiff neck, persistent sore throat), muscle pain/tenderness/weakness, dark urine, yellowing eyes/skin, stomach/abdominal pain, persistent nausea/vomiting, change in the amount of urine. A very serious allergic reaction to this drug is rare. However, get medical help right away if you notice any symptoms of a serious allergic reaction, including: rash, itching/swelling (especially of the face/tongue/throat), severe dizziness, trouble breathing. This is not a complete list of possible side effects. If you notice other effects not listed above, contact your doctor or pharmacist. In the [place] - Call your doctor for medical advice about side effects. You may  report side effects to FDA at 3-495-HJX-2744. In [place] - Call your doctor for medical advice about side effects. You may report side effects to [facility] [place] at [phone].    Precautions    Before taking lamotrigine, tell your doctor or pharmacist if you are allergic to it; or if you have any other allergies. This product may contain inactive ingredients, which can cause allergic reactions or other problems. Talk to your pharmacist for more details. Before using this medication, tell your doctor or pharmacist your medical history, especially of: kidney disease, liver disease. This drug may make you dizzy or drowsy or cause blurred vision. Do not drive, use machinery, or do any activity that requires alertness or clear vision until you are sure you can perform such activities safely. Limit alcoholic beverages. Before having surgery, tell your doctor or dentist about all the products you use (including prescription drugs, nonprescription drugs, and herbal products). Older adults may be more sensitive to the side effects of this drug, especially dizziness, loss of coordination, or fainting. These side effects can increase the risk of falling. During pregnancy, this medication should be used only when clearly needed. It may harm an unborn baby. However, since untreated seizures or mental/mood problems (such as bipolar disorder) are serious conditions that can harm both a pregnant woman and her unborn baby, do not stop taking this medication unless directed by your doctor. If you are planning pregnancy, become pregnant, or think you may be pregnant, immediately talk to your doctor about the benefits and risks of using this medication during pregnancy. Since birth control pills, patches, implants, and injections may not work if taken with this medication (see also Drug Interactions section), discuss reliable forms of birth control with your doctor. This drug passes into breast milk and may have undesirable effects  on a nursing infant. Consult your doctor before breast-feeding.    Drug Interaction    Drug interactions may change how your medications work or increase your risk for serious side effects. This document does not contain all possible drug interactions. Keep a list of all the products you use (including prescription/nonprescription drugs and herbal products) and share it with your doctor and pharmacist. Do not start, stop, or change the dosage of any medicines without your doctor's approval. Other medications can affect the removal of lamotrigine from your body, which may affect how lamotrigine works. Examples include hormonal birth control (such as pills, patches), estrogens, other medications to treat seizures (such as carbamazepine, phenobarbital, phenytoin, primidone, valproic acid), certain HIV protease inhibitors (such as lopinavir/ritonavir, atazanavir/ritonavir), and rifampin, among others. Your doctor may need to adjust your dose of lamotrigine if you are on these medications. This medication may decrease the effectiveness of hormonal birth control products (such as pills, patch, ring). This effect can result in pregnancy. Ask your doctor or pharmacist for details. Discuss whether you should use additional reliable birth control methods while using this medication. Also tell your doctor if you have any new spotting or breakthrough bleeding, because these may be signs that your birth control is not working well. Tell your doctor or pharmacist if you are taking other products that cause drowsiness including alcohol, antihistamines (such as cetirizine, diphenhydramine), drugs for sleep or anxiety (such as alprazolam, diazepam, zolpidem), muscle relaxants, and narcotic pain relievers (such as codeine). Check the labels on all your medicines (such as allergy or cough-and-cold products) because they may contain ingredients that cause drowsiness. Ask your pharmacist about using those products safely. This  medication may interfere with certain laboratory tests (including urine drug screening tests), possibly causing false test results. Make sure laboratory personnel and all your doctors know you use this drug.    Overdose    If overdose is suspected, contact a poison control center or emergency room immediately. [place] residents can call their local poison control center at [phone]. [place] residents can call a provincial poison control center. Symptoms of overdose may include: severe drowsiness, unusual eye movements, loss of consciousness.    Notes    Do not share this medication with others. Laboratory and/or medical tests (such as liver and kidney function tests, complete blood count) may be performed periodically to monitor your progress or check for side effects. Consult your doctor for more details. There are different types of this medication available. Some do not have the same effects. There are also some medications that sound the same as this product. Make sure you have the right product before taking it.    Missed Dose    It is important to take each dose at the scheduled time. If you miss a dose, take it as soon as you remember. If it is near the time of the next dose, skip the missed dose and resume your usual dosing schedule. Do not double the dose to catch up.    Storage    Store at room temperature away from light and moisture. Do not store in the bathroom. Keep all medications away from children and pets. Do not flush medications down the toilet or pour them into a drain unless instructed to do so. Properly discard this product when it is  or no longer needed. Consult your pharmacist or local waste disposal company.    Medical Alert    Your condition can cause complications in a medical emergency. For information about enrolling in MedicAlert, call 1-906.913.9539 ([place]) or [phone] ([place]).    Disclaimer    IMPORTANT: HOW TO USE THIS INFORMATION: This is a summary and does NOT have all  possible information about this product. This information does not assure that this product is safe, effective, or appropriate for you. This information is not individual medical advice and does not substitute for the advice of your health care professional. Always ask your health care professional for complete information about this product and your specific health needs.    Source  [employer].       Follow Up   Return in about 1 month (around 12/3/2023).  Patient was given instructions and counseling regarding her condition or for health maintenance advice. Please see specific information pulled into the AVS if appropriate.

## 2023-11-30 ENCOUNTER — OFFICE VISIT (OUTPATIENT)
Dept: FAMILY MEDICINE CLINIC | Facility: CLINIC | Age: 33
End: 2023-11-30
Payer: COMMERCIAL

## 2023-11-30 VITALS
DIASTOLIC BLOOD PRESSURE: 85 MMHG | SYSTOLIC BLOOD PRESSURE: 116 MMHG | RESPIRATION RATE: 20 BRPM | HEIGHT: 62 IN | BODY MASS INDEX: 40.83 KG/M2 | HEART RATE: 100 BPM | WEIGHT: 221.9 LBS | OXYGEN SATURATION: 99 %

## 2023-11-30 DIAGNOSIS — M54.12 CERVICAL RADICULOPATHY AT C5: Primary | ICD-10-CM

## 2023-11-30 PROCEDURE — 99213 OFFICE O/P EST LOW 20 MIN: CPT | Performed by: PEDIATRICS

## 2023-11-30 NOTE — PROGRESS NOTES
"Chief Complaint  ADHD    Subjective    History of Present Illness      Patient presents to [facility] for   History of Present Illness  Pt here for 1 month adhd f/u. Pt last seen 11/3/23. Pt reports she has had hx of blood clots. Pt c/o bilaterally arm numbness and pain. Pt reports this randomly happens. Pt does state that medication for ADHD is working.       Review of Systems    I have reviewed and agree with the HPI information as above.  Curtis Pitts MD     Objective   Vital Signs:   /85   Pulse 100   Resp 20   Ht 157.5 cm (62\")   Wt 101 kg (221 lb 14.4 oz)   SpO2 99%   BMI 40.59 kg/m²            Physical Exam  Constitutional:       Appearance: Normal appearance. She is normal weight.   Cardiovascular:      Rate and Rhythm: Normal rate and regular rhythm.      Heart sounds: Normal heart sounds.   Pulmonary:      Effort: Pulmonary effort is normal.      Breath sounds: Normal breath sounds.   Musculoskeletal:      Comments: When neck was gently manipulated the pain numbness and tingling suddenly went away.   Neurological:      Mental Status: She is alert.   Psychiatric:         Mood and Affect: Mood normal.         Behavior: Behavior normal.               Result Review  Data Reviewed:                   Assessment and Plan      Diagnoses and all orders for this visit:    1. Cervical radiculopathy at C5 (Primary)  Assessment & Plan:  Showing a postural compression that was relieved with manipulation.              Follow Up   No follow-ups on file.  Patient was given instructions and counseling regarding her condition or for health maintenance advice. Please see specific information pulled into the AVS if appropriate.       "

## 2023-12-09 ENCOUNTER — HOSPITAL ENCOUNTER (EMERGENCY)
Facility: HOSPITAL | Age: 33
Discharge: HOME | End: 2023-12-10
Attending: STUDENT IN AN ORGANIZED HEALTH CARE EDUCATION/TRAINING PROGRAM | Admitting: STUDENT IN AN ORGANIZED HEALTH CARE EDUCATION/TRAINING PROGRAM
Payer: COMMERCIAL

## 2023-12-09 ENCOUNTER — APPOINTMENT (OUTPATIENT)
Dept: CT IMAGING | Facility: HOSPITAL | Age: 33
End: 2023-12-09
Payer: COMMERCIAL

## 2023-12-09 ENCOUNTER — APPOINTMENT (OUTPATIENT)
Dept: GENERAL RADIOLOGY | Facility: HOSPITAL | Age: 33
End: 2023-12-09
Payer: COMMERCIAL

## 2023-12-09 DIAGNOSIS — R20.0 NUMBNESS AND TINGLING OF RIGHT ARM: ICD-10-CM

## 2023-12-09 DIAGNOSIS — M54.12 CERVICAL RADICULOPATHY: Primary | ICD-10-CM

## 2023-12-09 DIAGNOSIS — R20.0 NUMBNESS AND TINGLING IN LEFT ARM: ICD-10-CM

## 2023-12-09 DIAGNOSIS — R20.2 NUMBNESS AND TINGLING IN LEFT ARM: ICD-10-CM

## 2023-12-09 DIAGNOSIS — R20.2 NUMBNESS AND TINGLING OF RIGHT ARM: ICD-10-CM

## 2023-12-09 DIAGNOSIS — G93.2 PSEUDOTUMOR CEREBRI SYNDROME: ICD-10-CM

## 2023-12-09 DIAGNOSIS — Z98.2 VENTRICULAR SHUNT IN PLACE: ICD-10-CM

## 2023-12-09 LAB
ALBUMIN SERPL-MCNC: 4.1 G/DL (ref 3.5–5.2)
ALBUMIN/GLOB SERPL: 1.5 G/DL
ALP SERPL-CCNC: 86 U/L (ref 39–117)
ALT SERPL W P-5'-P-CCNC: 22 U/L (ref 1–33)
ANION GAP SERPL CALCULATED.3IONS-SCNC: 13 MMOL/L (ref 5–15)
AST SERPL-CCNC: 17 U/L (ref 1–32)
BASOPHILS # BLD AUTO: 0.03 10*3/MM3 (ref 0–0.2)
BASOPHILS NFR BLD AUTO: 0.4 % (ref 0–1.5)
BILIRUB SERPL-MCNC: 0.2 MG/DL (ref 0–1.2)
BUN SERPL-MCNC: 8 MG/DL (ref 6–20)
BUN/CREAT SERPL: 11.4 (ref 7–25)
CALCIUM SPEC-SCNC: 8.9 MG/DL (ref 8.6–10.5)
CHLORIDE SERPL-SCNC: 106 MMOL/L (ref 98–107)
CO2 SERPL-SCNC: 23 MMOL/L (ref 22–29)
CREAT SERPL-MCNC: 0.7 MG/DL (ref 0.57–1)
CRP SERPL-MCNC: 0.75 MG/DL (ref 0–0.5)
DEPRECATED RDW RBC AUTO: 42.3 FL (ref 37–54)
EGFRCR SERPLBLD CKD-EPI 2021: 118 ML/MIN/1.73
EOSINOPHIL # BLD AUTO: 0.21 10*3/MM3 (ref 0–0.4)
EOSINOPHIL NFR BLD AUTO: 2.8 % (ref 0.3–6.2)
ERYTHROCYTE [DISTWIDTH] IN BLOOD BY AUTOMATED COUNT: 12.2 % (ref 12.3–15.4)
ERYTHROCYTE [SEDIMENTATION RATE] IN BLOOD: 13 MM/HR (ref 0–20)
GLOBULIN UR ELPH-MCNC: 2.7 GM/DL
GLUCOSE SERPL-MCNC: 112 MG/DL (ref 65–99)
HCT VFR BLD AUTO: 37.2 % (ref 34–46.6)
HGB BLD-MCNC: 12.1 G/DL (ref 12–15.9)
HOLD SPECIMEN: NORMAL
IMM GRANULOCYTES # BLD AUTO: 0.01 10*3/MM3 (ref 0–0.05)
IMM GRANULOCYTES NFR BLD AUTO: 0.1 % (ref 0–0.5)
LYMPHOCYTES # BLD AUTO: 2.46 10*3/MM3 (ref 0.7–3.1)
LYMPHOCYTES NFR BLD AUTO: 32.3 % (ref 19.6–45.3)
MAGNESIUM SERPL-MCNC: 1.9 MG/DL (ref 1.6–2.6)
MCH RBC QN AUTO: 30.7 PG (ref 26.6–33)
MCHC RBC AUTO-ENTMCNC: 32.5 G/DL (ref 31.5–35.7)
MCV RBC AUTO: 94.4 FL (ref 79–97)
MONOCYTES # BLD AUTO: 0.42 10*3/MM3 (ref 0.1–0.9)
MONOCYTES NFR BLD AUTO: 5.5 % (ref 5–12)
NEUTROPHILS NFR BLD AUTO: 4.49 10*3/MM3 (ref 1.7–7)
NEUTROPHILS NFR BLD AUTO: 58.9 % (ref 42.7–76)
NRBC BLD AUTO-RTO: 0 /100 WBC (ref 0–0.2)
PLATELET # BLD AUTO: 306 10*3/MM3 (ref 140–450)
PMV BLD AUTO: 9.4 FL (ref 6–12)
POTASSIUM SERPL-SCNC: 3.4 MMOL/L (ref 3.5–5.2)
PROT SERPL-MCNC: 6.8 G/DL (ref 6–8.5)
RBC # BLD AUTO: 3.94 10*6/MM3 (ref 3.77–5.28)
SODIUM SERPL-SCNC: 142 MMOL/L (ref 136–145)
WBC NRBC COR # BLD AUTO: 7.62 10*3/MM3 (ref 3.4–10.8)

## 2023-12-09 PROCEDURE — 70450 CT HEAD/BRAIN W/O DYE: CPT

## 2023-12-09 PROCEDURE — 99284 EMERGENCY DEPT VISIT MOD MDM: CPT

## 2023-12-09 PROCEDURE — 36415 COLL VENOUS BLD VENIPUNCTURE: CPT

## 2023-12-09 PROCEDURE — 80053 COMPREHEN METABOLIC PANEL: CPT | Performed by: STUDENT IN AN ORGANIZED HEALTH CARE EDUCATION/TRAINING PROGRAM

## 2023-12-09 PROCEDURE — 85652 RBC SED RATE AUTOMATED: CPT | Performed by: STUDENT IN AN ORGANIZED HEALTH CARE EDUCATION/TRAINING PROGRAM

## 2023-12-09 PROCEDURE — 85025 COMPLETE CBC W/AUTO DIFF WBC: CPT | Performed by: STUDENT IN AN ORGANIZED HEALTH CARE EDUCATION/TRAINING PROGRAM

## 2023-12-09 PROCEDURE — 25010000002 KETOROLAC TROMETHAMINE PER 15 MG: Performed by: STUDENT IN AN ORGANIZED HEALTH CARE EDUCATION/TRAINING PROGRAM

## 2023-12-09 PROCEDURE — 70250 X-RAY EXAM OF SKULL: CPT

## 2023-12-09 PROCEDURE — 83735 ASSAY OF MAGNESIUM: CPT | Performed by: STUDENT IN AN ORGANIZED HEALTH CARE EDUCATION/TRAINING PROGRAM

## 2023-12-09 PROCEDURE — 72125 CT NECK SPINE W/O DYE: CPT

## 2023-12-09 PROCEDURE — 74018 RADEX ABDOMEN 1 VIEW: CPT

## 2023-12-09 PROCEDURE — 86140 C-REACTIVE PROTEIN: CPT | Performed by: STUDENT IN AN ORGANIZED HEALTH CARE EDUCATION/TRAINING PROGRAM

## 2023-12-09 PROCEDURE — 96374 THER/PROPH/DIAG INJ IV PUSH: CPT

## 2023-12-09 PROCEDURE — 71046 X-RAY EXAM CHEST 2 VIEWS: CPT

## 2023-12-09 RX ORDER — HEPARIN SODIUM (PORCINE) LOCK FLUSH IV SOLN 100 UNIT/ML 100 UNIT/ML
300 SOLUTION INTRAVENOUS ONCE
Status: COMPLETED | OUTPATIENT
Start: 2023-12-09 | End: 2023-12-10

## 2023-12-09 RX ORDER — TIZANIDINE 4 MG/1
4 TABLET ORAL ONCE
Status: COMPLETED | OUTPATIENT
Start: 2023-12-09 | End: 2023-12-09

## 2023-12-09 RX ORDER — KETOROLAC TROMETHAMINE 15 MG/ML
15 INJECTION, SOLUTION INTRAMUSCULAR; INTRAVENOUS ONCE
Status: COMPLETED | OUTPATIENT
Start: 2023-12-09 | End: 2023-12-09

## 2023-12-09 RX ADMIN — KETOROLAC TROMETHAMINE 15 MG: 15 INJECTION, SOLUTION INTRAMUSCULAR; INTRAVENOUS at 22:38

## 2023-12-09 RX ADMIN — TIZANIDINE 4 MG: 4 TABLET ORAL at 22:38

## 2023-12-09 NOTE — Clinical Note
[facility] EMERGENCY DEPARTMENT  [address]  [facility] [address]  Phone: [phone]    Kelsey Mcqueen was seen and treated in our emergency department on 12/9/2023.  She may return to work on 12/12/2023.         Thank you for choosing [facility].    Raymond Munoz MD

## 2023-12-09 NOTE — Clinical Note
[facility] EMERGENCY DEPARTMENT  [address]  [facility] [address]  Phone: [phone]    Kelsey Mcqueen was seen and treated in our emergency department on 12/9/2023.  She may return to work on 12/12/2023.         Thank you for choosing [facility].    Raymond Munoz MD

## 2023-12-10 VITALS
DIASTOLIC BLOOD PRESSURE: 78 MMHG | RESPIRATION RATE: 14 BRPM | HEART RATE: 65 BPM | HEIGHT: 62 IN | WEIGHT: 222 LBS | SYSTOLIC BLOOD PRESSURE: 112 MMHG | OXYGEN SATURATION: 99 % | TEMPERATURE: 98.2 F | BODY MASS INDEX: 40.85 KG/M2

## 2023-12-10 PROCEDURE — 25010000002 HEPARIN LOCK FLUSH PER 10 UNITS: Performed by: STUDENT IN AN ORGANIZED HEALTH CARE EDUCATION/TRAINING PROGRAM

## 2023-12-10 RX ORDER — TIZANIDINE HYDROCHLORIDE 4 MG/1
4 CAPSULE, GELATIN COATED ORAL 3 TIMES DAILY
Qty: 12 CAPSULE | Refills: 0 | Status: SHIPPED | OUTPATIENT
Start: 2023-12-10 | End: 2023-12-14

## 2023-12-10 RX ADMIN — HEPARIN SODIUM (PORCINE) LOCK FLUSH IV SOLN 100 UNIT/ML 300 UNITS: 100 SOLUTION at 00:37

## 2023-12-10 NOTE — ED PROVIDER NOTES
"EMERGENCY DEPARTMENT ATTENDING NOTE    Patient Name: Kelsey Mcqueen    Chief Complaint   Patient presents with    Numbness       PATIENT PRESENTATION:  Kelsey Mcqueen is a very pleasant 33 y.o. female with history of pseudotumor MRI status post  shunt placement more than 5 years prior presenting emergency department due to increasing symptoms of right arm numbness and tingling with new left arm numbness and tingling.    Patient states that she had almost gone blind which led to her initial diagnosis.  She follows with our neurosurgery group here doctor who replaced her  shunt many years ago.  She also follows with Dr. Garcia from ophthalmology as an outpatient.  Her symptoms have been fairly well-controlled she is on Lamictal she failed acetazolamide due to kidney issues.  She denies any difficulty walking or true weakness but has noticed about 2 weeks ago that she was having some right arm paresthesias and numbness and states it is continued worsening and now she really is having a left 2.  Since her  shunt on the right she thought maybe it could be due to that.  In triage patient mentions some blurry vision but to me she denies new blurry vision or visual disturbances no headache.  Denies any fevers or chills.  No recent changes in her medications.  No other chest pain shortness of breath nausea vomit abdominal pain.      PHYSICAL EXAM:   VS: /78   Pulse 65   Temp 98.2 °F (36.8 °C) (Oral)   Resp 14   Ht 157.5 cm (62.01\")   Wt 101 kg (222 lb)   LMP 01/21/2019 (Approximate)   SpO2 99%   BMI 40.59 kg/m²   GENERAL: Well-appearing woman sitting up in stretcher no acute distress; well-nourished, well-developed, awake, alert, no acute distress, nontoxic appearing, comfortable  EYES: PERRL, sclerae anicteric, extraocular movements grossly intact, symmetric lids  EARS, NOSE, MOUTH, THROAT: atraumatic external nose and ears, moist mucous membranes  NECK: symmetric, trachea midline  RESPIRATORY: " unlabored respiratory effort, clear to auscultation bilaterally, good air movement  CARDIOVASCULAR: no murmurs, peripheral pulses 2+ and equal in all extremities  GI: soft, nontender, nondistended  MUSCULOSKELETAL/EXTREMITIES: extremities without obvious deformity  SKIN: warm and dry with no obvious rashes  NEUROLOGIC: moving all 4 extremities symmetrically, CN II-XII grossly intact; patient reporting decrease sensation in the radial and median nerve territories of the right hand compared to the left but has sensation; otherwise 5+  strength bilaterally; no pronator drift of the upper extremity; 5+ strength with dorsiflexion plantarflexion of the feet; no pronator drift to the bilateral lower extremities; no ataxia with gait; alert and oriented x 3  PSYCHIATRIC: alert, pleasant and cooperative. Appropriate mood and affect.      MEDICAL DECISION MAKING:    Kelsey Mcqueen is a 33 y.o. female with history of pseudotumor cerebri status post  shunt placement in the distant past presenting emergency department due to some paresthesias in sensations of numbness down her right arm now beginning to her left arm.      Differential Diagnosis Considered: Cervical radiculopathy, shunt obstruction, peripheral neuropathy    Labs Ordered:  Labs Reviewed   COMPREHENSIVE METABOLIC PANEL - Abnormal; Notable for the following components:       Result Value    Glucose 112 (*)     Potassium 3.4 (*)     All other components within normal limits    Narrative:     GFR Normal >60  Chronic Kidney Disease <60  Kidney Failure <15     C-REACTIVE PROTEIN - Abnormal; Notable for the following components:    C-Reactive Protein 0.75 (*)     All other components within normal limits   CBC WITH AUTO DIFFERENTIAL - Abnormal; Notable for the following components:    RDW 12.2 (*)     All other components within normal limits   MAGNESIUM - Normal   SEDIMENTATION RATE - Normal   RAINBOW URINE   CBC AND DIFFERENTIAL    Narrative:     The following  orders were created for panel order CBC & Differential.  Procedure                               Abnormality         Status                     ---------                               -----------         ------                     CBC Auto Differential[[id number]]        Abnormal            Final result                 Please view results for these tests on the individual orders.        Imaging Ordered:   CT Head Without Contrast   Final Result   1. Stable head CT appearance.   2. No hydrocephalus.                                                   This report was signed and finalized on 12/9/2023 10:20 PM by Dr. Shailesh Whitman MD.          CT Cervical Spine Without Contrast   Final Result   1. No acute bony abnormality.                                                           This report was signed and finalized on 12/9/2023 10:22 PM by Dr. Shailesh Whitman MD.          XR Shunt Series   Final Result   1. No abnormality is seen.       This report was signed and finalized on 12/9/2023 9:36 PM by Dr. Shailesh Whitman MD.              Internal chart review:   Past Medical History:   Diagnosis Date    Crohn disease     recently diagnosis, going to start infusion therapy    Depression with anxiety 12/9/2018    Intracranial hypertension     Kidney stones     Lactation disorder     Pseudotumor cerebri 9/14/2016    Tobacco abuse 9/21/2016       Past Surgical History:   Procedure Laterality Date    CYSTOSCOPY ELECTROHYDRAULIC LITHOTRIPSY      KIDNEY STONE SURGERY      OTHER SURGICAL HISTORY      Intracranial shunt     SHUNT INSERTION  11/2015       Allergies   Allergen Reactions    Morphine And Related Shortness Of Breath    Other Other (See Comments)     Other reaction(s): Other (See Comments)  Dissolvable sutures. She developed an abscess from the last ones. Ended up in ER.   Dissolvable sutures. She developed an abscess from the last ones. Ended up in ER.     Sumatriptan Hives    Buspirone Headache    Ibuprofen GI  Intolerance    Ketorolac Unknown - Low Severity     Has Crohn's Disease.    Ketorolac Tromethamine Unknown - Low Severity    Morphine Itching    Demerol [Meperidine] Rash       No current facility-administered medications for this encounter.    Current Outpatient Medications:     amphetamine-dextroamphetamine XR (Adderall XR) 30 MG 24 hr capsule, Take 1 capsule by mouth Every Morning for 30 days, Disp: 30 capsule, Rfl: 0    Apriso 0.375 g 24 hr capsule, , Disp: , Rfl:     [employer] Purelax 17 GM/SCOOP powder, , Disp: , Rfl:     lamoTRIgine (LaMICtal) 100 MG tablet, Take 1 tablet by mouth Daily., Disp: 30 tablet, Rfl: 2    loperamide (IMODIUM) 2 MG capsule, Take 1 capsule by mouth 3 (Three) Times a Day As Needed., Disp: , Rfl:     milnacipran (SAVELLA) 50 MG tablet tablet, Savella 50 mg tablet  TAKE 1 TABLET BY MOUTH THREE TIMES A DAY, Disp: , Rfl:     omeprazole (priLOSEC) 40 MG capsule, omeprazole 40 mg capsule,delayed release  Take 1 capsule every day by oral route for 60 days., Disp: , Rfl:     ondansetron (ZOFRAN) 4 MG tablet, ondansetron HCl 4 mg tablet, Disp: , Rfl:     propranolol (INDERAL) 80 MG tablet, Take 0.5 tablets by mouth Every Night., Disp: , Rfl:     QUEtiapine (SEROquel) 50 MG tablet, Take 1 tablet by mouth Every Night., Disp: 30 tablet, Rfl: 1    TiZANidine (Zanaflex) 4 MG capsule, Take 1 capsule by mouth 3 (Three) Times a Day for 4 days., Disp: 12 capsule, Rfl: 0    My lab interpretation: Normal low blood cell count hemoglobin.  CMP unremarkable.  Normal CRP and ESR.  Normal magnesium.    My imaging interpretation: X-ray shunt series with no evidence of obstruction.  CT of the head head with no evidence of any abnormality.  CT cervical spine with some right convex curvature of the cervicothoracic junction but otherwise no acute abnormalities no evidence of any herniated disks.    ED Course and Re-evaluation: 32yo F with history of pseudotumor cerebri with  shunt in place presenting emergency  department due to sensations of paresthesias and numbness mostly of her right arm but also of her left arm.  Patient's exam and history is fairly benign but given her  shunt status there are multiple more dangerous etiologies that could contribute to her symptoms.  She is sensate though reports it feels different seems like more dull on her right arm compared to her left.  It is radial in the radial and median of territories and on the ulnar territory/suspect this is due to cervical radiculopathy.  X-ray shunt series and CT of the head with no acute findings no evidence of obstruction.  CT of the cervical spine with no cause for cervical radiculopathy such as herniated disc or significant arthritis.  Patient was given Toradol as well as Zanaflex and felt like the Toradol significantly proved her symptoms.  Triage note mentions significant plaints of blurred vision but patient denies any such symptoms during my interview's.  Counseled patient regard results as well as as my desire for her to follow-up with soon as possible with her neurosurgeon as well as her ophthalmologist for full eye evaluation and discussions of management given her neurosurgeon is also a spine surgeon.  Counseled her that the best long-term management of cervical radiculopathy type symptoms is physical therapy but I want her to be cleared by her neurosurgeon prior to this given her  shunt status.  Patient expressed understanding and significant return precautions given the emergency room for worsening symptoms and patient was discharged home.      ED Diagnosis:  Numbness and tingling in left arm; Numbness and tingling of right arm; Ventricular shunt in place; Pseudotumor cerebri syndrome; Cervical radiculopathy    Disposition: to home  Follow up plan: Neurosurgeon follow up within 1 week, ophthalmology follow up within 1 week,, return to ED immediately if symptoms worsen        Signed:  Raymond Munoz MD  Emergency Medicine  Physician    Please note that portions of this note were completed with a voice recognition program.      Raymond Munoz MD  12/10/23 0801

## 2023-12-10 NOTE — DISCHARGE INSTRUCTIONS
Today you are seen for your symptoms all of your work was reassuring.  At this time based on your physical exam at all think your symptoms are necessarily related to pseudotumor cerebri your history is consistent was called cervical radiculopathy.  Your CT did not show any evidence of any obstruction or of your shunt or any issues in your brain.  Your CT of your neck was reassuring there were no signs of any significant arthritis.  Please reason I want to follow-up with her spine surgeon soon as possible and would also like you to follow-up back with your eye doctor soon as possible.  Please call schedule point months.  If any of her symptoms worsen prior please may return to the emergency department for reevaluation.

## 2023-12-21 ENCOUNTER — OFFICE VISIT (OUTPATIENT)
Dept: FAMILY MEDICINE CLINIC | Facility: CLINIC | Age: 33
End: 2023-12-21
Payer: COMMERCIAL

## 2023-12-21 VITALS
WEIGHT: 227 LBS | RESPIRATION RATE: 20 BRPM | TEMPERATURE: 98.6 F | HEART RATE: 106 BPM | HEIGHT: 62 IN | BODY MASS INDEX: 41.77 KG/M2 | DIASTOLIC BLOOD PRESSURE: 86 MMHG | SYSTOLIC BLOOD PRESSURE: 126 MMHG

## 2023-12-21 DIAGNOSIS — M54.12 CERVICAL RADICULOPATHY: ICD-10-CM

## 2023-12-21 DIAGNOSIS — G93.2 PSEUDOTUMOR CEREBRI SYNDROME: ICD-10-CM

## 2023-12-21 DIAGNOSIS — G47.00 INSOMNIA DISORDER RELATED TO KNOWN ORGANIC FACTOR: ICD-10-CM

## 2023-12-21 DIAGNOSIS — F31.0 BIPOLAR AFFECTIVE DISORDER, CURRENT EPISODE HYPOMANIC: ICD-10-CM

## 2023-12-21 DIAGNOSIS — F90.0 ATTENTION DEFICIT HYPERACTIVITY DISORDER (ADHD), PREDOMINANTLY INATTENTIVE TYPE: Primary | ICD-10-CM

## 2023-12-21 DIAGNOSIS — Z98.2 VENTRICULAR SHUNT IN PLACE: ICD-10-CM

## 2023-12-21 PROCEDURE — 1160F RVW MEDS BY RX/DR IN RCRD: CPT

## 2023-12-21 PROCEDURE — 1159F MED LIST DOCD IN RCRD: CPT

## 2023-12-21 RX ORDER — LAMOTRIGINE 150 MG/1
150 TABLET ORAL NIGHTLY
Qty: 30 TABLET | Refills: 0 | Status: SHIPPED | OUTPATIENT
Start: 2023-12-21

## 2023-12-21 RX ORDER — QUETIAPINE FUMARATE 50 MG/1
50 TABLET, FILM COATED ORAL NIGHTLY
Qty: 30 TABLET | Refills: 2 | Status: SHIPPED | OUTPATIENT
Start: 2023-12-21

## 2023-12-21 RX ORDER — METHYLPREDNISOLONE 4 MG/1
TABLET ORAL
COMMUNITY
Start: 2023-12-15

## 2023-12-21 RX ORDER — LISDEXAMFETAMINE DIMESYLATE CAPSULES 40 MG/1
40 CAPSULE ORAL EVERY MORNING
Qty: 30 CAPSULE | Refills: 0 | Status: SHIPPED | OUTPATIENT
Start: 2023-12-21

## 2023-12-21 RX ORDER — TIZANIDINE 4 MG/1
TABLET ORAL
COMMUNITY
Start: 2023-12-10

## 2023-12-21 RX ORDER — AMOXICILLIN 500 MG/1
1 CAPSULE ORAL 3 TIMES DAILY
COMMUNITY
Start: 2023-12-15

## 2023-12-21 NOTE — PROGRESS NOTES
"Chief Complaint  Tingling, Blurred Vision, and Headache    Subjective    History of Present Illness      Patient presents to [facility] for   History of Present Illness  Pt is here today to discuss changing her Adderall XR to Adderall IR.  Pt has a dx of pseudotumor cerebri and a shunt.  Her Neurologist Dr. Power informed patient that people with this condition can not take extended release.  Pt states she stopped taking her Adderall XR for a 1 week and the tingling and blurred vision went away but when she restarted the medicaiton, she started experiencing the tingling again.       Review of Systems    I have reviewed and agree with the HPI and ROS information as above.  Idalmis BARBER Arabic, APRN     Objective   Vital Signs:   /86   Pulse 106   Temp 98.6 °F (37 °C)   Resp 20   Ht 157.5 cm (62.01\")   Wt 103 kg (227 lb)   BMI 41.51 kg/m²            Physical Exam  Vitals and nursing note reviewed.   Constitutional:       General: She is not in acute distress.     Appearance: Normal appearance. She is not ill-appearing.   HENT:      Head: Normocephalic and atraumatic.      Right Ear: External ear normal.      Left Ear: External ear normal.      Nose: Nose normal.   Eyes:      Conjunctiva/sclera: Conjunctivae normal.   Cardiovascular:      Rate and Rhythm: Normal rate and regular rhythm.      Pulses: Normal pulses.      Heart sounds: Normal heart sounds.   Pulmonary:      Effort: Pulmonary effort is normal.      Breath sounds: Normal breath sounds.   Skin:     General: Skin is warm and dry.   Neurological:      Mental Status: She is alert and oriented to person, place, and time. Mental status is at baseline.      GCS: GCS eye subscore is 4. GCS verbal subscore is 5. GCS motor subscore is 6.   Psychiatric:         Mood and Affect: Mood normal.         Behavior: Behavior normal.         Thought Content: Thought content normal.         Judgment: Judgment normal.          KARINA-7:  "     PHQ-2 Depression Screening  Little interest or pleasure in doing things? 0-->not at all   Feeling down, depressed, or hopeless? 0-->not at all   PHQ-2 Total Score 0     PHQ-9 Depression Screening  Little interest or pleasure in doing things? 0-->not at all   Feeling down, depressed, or hopeless? 0-->not at all   Trouble falling or staying asleep, or sleeping too much?     Feeling tired or having little energy?     Poor appetite or overeating?     Feeling bad about yourself - or that you are a failure or have let yourself or your family down?     Trouble concentrating on things, such as reading the newspaper or watching television?     Moving or speaking so slowly that other people could have noticed? Or the opposite - being so fidgety or restless that you have been moving around a lot more than usual?     Thoughts that you would be better off dead, or of hurting yourself in some way?     PHQ-9 Total Score 0   If you checked off any problems, how difficult have these problems made it for you to do your work, take care of things at home, or get along with other people?        Result Review  Data Reviewed:            ED with Raymond Munoz MD (12/09/2023)   XR Shunt Series (12/09/2023 20:38)  CT Head Without Contrast (12/09/2023 22:13)  CT Cervical Spine Without Contrast (12/09/2023 22:13)  Urine Drug Screen - Urine, Clean Catch (08/18/2023 15:33)            Assessment and Plan      Diagnoses and all orders for this visit:    1. Attention deficit hyperactivity disorder (ADHD), predominantly inattentive type (Primary)    2. Bipolar affective disorder, current episode hypomanic  -     lamoTRIgine (LaMICtal) 150 MG tablet; Take 1 tablet by mouth Every Night.  Dispense: 30 tablet; Refill: 0    3. Insomnia disorder related to known organic factor  -     QUEtiapine (SEROquel) 50 MG tablet; Take 1 tablet by mouth Every Night.  Dispense: 30 tablet; Refill: 2    4. Ventricular shunt in place    5. Pseudotumor cerebri  syndrome    6. Cervical radiculopathy      Patient is seen today following up on ADHD.  She has been taking Adderall XR 30 mg daily, however has been experiencing adverse symptoms with this.  States she has been experiencing dizziness, blurred vision, left arm numbness, and headache which prompted her to present to the ER on 12/9.  I did review her imaging from that visit; an x-ray shunt series, CT C-spine, and CT head were completed which did not reveal any acute abnormal findings.  ER diagnosed her with cervical radiculopathy, prescribed her muscle relaxers.  She did follow-up with her ophthalmologist, Dr. Garcia who felt that her symptoms were likely related to extended release stimulant usage.  She states that he told her it is due to the vasoconstriction of her blood vessels and she is wanting to get off of extended release Adderall.  She has inquired about IR Adderall, however I did inform her that Dr. Pitts is not willing to prescribe this solely for her ADHD regimen.  He typically uses this for adjunctive therapy.  Patient states she was previously taking Vyvanse and was having her dosage titrated up.  Before switching to Adderall she was taking Vyvanse 40 mg daily and felt like she did not have any of these neurological side effects with that medication, is inquiring about getting back on her Vyvanse.  She is not having any neurological symptoms at present and has not had any symptoms for the last few days as she has stopped her Adderall XR.  I did discuss this patient's case with Dr. Pitts and he did present to the patient's room to evaluate her as well/discussed medication options with her.  We did discuss nonstimulant options, however patient is not willing to do this at this time as she has previously tried and failed multiple nonstimulants per her report.  Dr. Pitts does feel that it is safe for her to go back on her Vyvanse 40 mg daily at this time and see her back in 1 month for recheck.  Patient  feels good about this plan.  She does have an appointment with Dr. Power in March and she will address this with him as well.  She has touch base with her PCP and they told her to defer to us and neurosurgery.  Dr. Pitts would also like to increase her Lamictal to 150 mg nightly, patient is agreeable.  UDS is up-to-date and appropriate, CSA up-to-date, Yusuf pending, patient denies HI/SI, will pend medication to Dr. Pitts.  She will continue her Seroquel 50 mg daily as well as she is stable on this dose.    Plan:  1.  Increase Lamictal to 150 mg nightly  2.  Stop Adderall XR  3.  Start Vyvanse 40 mg daily  4.  Continue Seroquel 50 mg nightly  5.  Follow-up with neurosurgery as scheduled  6.  Follow-up with us in 1 month    Follow Up   Return in about 1 month (around 1/21/2024).  Patient was given instructions and counseling regarding her condition or for health maintenance advice. Please see specific information pulled into the AVS if appropriate.

## 2023-12-21 NOTE — PATIENT INSTRUCTIONS

## 2024-01-05 DIAGNOSIS — F31.0 BIPOLAR AFFECTIVE DISORDER, CURRENT EPISODE HYPOMANIC: ICD-10-CM

## 2024-01-05 RX ORDER — LAMOTRIGINE 100 MG/1
50 TABLET ORAL DAILY
Qty: 15 TABLET | Refills: 1 | OUTPATIENT
Start: 2024-01-05

## 2024-01-19 ENCOUNTER — OFFICE VISIT (OUTPATIENT)
Dept: FAMILY MEDICINE CLINIC | Facility: CLINIC | Age: 34
End: 2024-01-19
Payer: COMMERCIAL

## 2024-01-19 VITALS
SYSTOLIC BLOOD PRESSURE: 121 MMHG | HEIGHT: 62 IN | BODY MASS INDEX: 41.04 KG/M2 | OXYGEN SATURATION: 97 % | HEART RATE: 79 BPM | DIASTOLIC BLOOD PRESSURE: 66 MMHG | WEIGHT: 223 LBS

## 2024-01-19 DIAGNOSIS — F90.0 ATTENTION DEFICIT HYPERACTIVITY DISORDER (ADHD), PREDOMINANTLY INATTENTIVE TYPE: Primary | ICD-10-CM

## 2024-01-19 DIAGNOSIS — F31.0 BIPOLAR AFFECTIVE DISORDER, CURRENT EPISODE HYPOMANIC: ICD-10-CM

## 2024-01-19 DIAGNOSIS — G47.00 INSOMNIA DISORDER RELATED TO KNOWN ORGANIC FACTOR: ICD-10-CM

## 2024-01-19 PROCEDURE — 1160F RVW MEDS BY RX/DR IN RCRD: CPT

## 2024-01-19 PROCEDURE — 1159F MED LIST DOCD IN RCRD: CPT

## 2024-01-19 PROCEDURE — 99214 OFFICE O/P EST MOD 30 MIN: CPT

## 2024-01-19 PROCEDURE — 96127 BRIEF EMOTIONAL/BEHAV ASSMT: CPT

## 2024-01-19 RX ORDER — CIPROFLOXACIN 500 MG/1
500 TABLET, FILM COATED ORAL 2 TIMES DAILY
COMMUNITY
Start: 2024-01-18

## 2024-01-19 RX ORDER — MESALAMINE 0.38 G/1
375 CAPSULE, EXTENDED RELEASE ORAL DAILY
COMMUNITY

## 2024-01-19 RX ORDER — LAMOTRIGINE 150 MG/1
150 TABLET ORAL NIGHTLY
Qty: 30 TABLET | Refills: 0 | Status: SHIPPED | OUTPATIENT
Start: 2024-01-19

## 2024-01-19 RX ORDER — PHENAZOPYRIDINE HYDROCHLORIDE 200 MG/1
200 TABLET, FILM COATED ORAL 3 TIMES DAILY PRN
COMMUNITY
Start: 2024-01-18

## 2024-01-19 RX ORDER — LISDEXAMFETAMINE DIMESYLATE CAPSULES 50 MG/1
50 CAPSULE ORAL EVERY MORNING
Qty: 30 CAPSULE | Refills: 0 | Status: SHIPPED | OUTPATIENT
Start: 2024-01-19 | End: 2024-01-22 | Stop reason: SDUPTHER

## 2024-01-19 RX ORDER — QUETIAPINE FUMARATE 50 MG/1
50 TABLET, FILM COATED ORAL NIGHTLY
Qty: 30 TABLET | Refills: 2 | OUTPATIENT
Start: 2024-01-19

## 2024-01-19 NOTE — TELEPHONE ENCOUNTER
Caller: Kelsey Mcqueen    Relationship: Self    Best call back number: [phone]     Requested Prescriptions:   Requested Prescriptions     Pending Prescriptions Disp Refills    QUEtiapine (SEROquel) 50 MG tablet 30 tablet 2     Sig: Take 1 tablet by mouth Every Night.        Pharmacy where request should be sent: [id number] - [address] [phone] [facility] [phone] FX     Last office visit with prescribing clinician: 1/19/2024   Last telemedicine visit with prescribing clinician: Visit date not found   Next office visit with prescribing clinician: 2/19/2024       Does the patient have less than a 3 day supply:  [] Yes  [x] No    Would you like a call back once the refill request has been completed: [] Yes [x] No    If the office needs to give you a call back, can they leave a voicemail: [] Yes [x] No    Vincenzo Brewer Rep   01/19/24 12:29 CST

## 2024-01-19 NOTE — PROGRESS NOTES
"Chief Complaint  ADHD, Insomnia, and Mood Disorder    Subjective    History of Present Illness      Patient presents to [facility] for   History of Present Illness  Pt is here today for 1 month Mood, ADHD, and Insomnia f/u. Last visit, several changes were made.  These include;    1.  Increased Lamictal to 150 mg nightly  2.  Stopped Adderall XR  3.  Started Vyvanse 40 mg daily        ADHD/Mood HPI    Visit for:  follow-up. Most recent visit was 1 month ago.  Interim changes to follow up on today: no changes  Work/School Performance:  improving  Cognitive:  able to focus    Behavior  Hyperactivity: is not hyperactive  Impulsivity: no impulsivity  Tasking: able to initiate tasks and able to complete tasks    Social  ADHD social/impulsive symptoms:  not impatient and no excessive talking    Behavioral health  Behavior: no concerns  Emotional coping: demonstrates feelings of no concerns    Side effects experienced with Adderall XR have stopped. No side effects with Vyvanse other than headaches. These have been controlled with Tylenol.  Good mood improvement with Lamictal increase.            Review of Systems    I have reviewed and agree with the HPI and ROS information as above.  Idalmis Heredia, APRN     Objective   Vital Signs:   /66   Pulse 79   Ht 157.5 cm (62\")   Wt 101 kg (223 lb)   SpO2 97%   BMI 40.79 kg/m²            Physical Exam  Vitals and nursing note reviewed.   Constitutional:       General: She is not in acute distress.     Appearance: Normal appearance. She is not ill-appearing.   HENT:      Head: Normocephalic and atraumatic.      Right Ear: External ear normal.      Left Ear: External ear normal.      Nose: Nose normal.   Eyes:      Conjunctiva/sclera: Conjunctivae normal.   Cardiovascular:      Rate and Rhythm: Normal rate and regular rhythm.      Pulses: Normal pulses.      Heart sounds: Normal heart sounds.   Pulmonary:      Effort: Pulmonary effort is " normal.      Breath sounds: Normal breath sounds.   Skin:     General: Skin is warm and dry.   Neurological:      Mental Status: She is alert and oriented to person, place, and time. Mental status is at baseline.      GCS: GCS eye subscore is 4. GCS verbal subscore is 5. GCS motor subscore is 6.   Psychiatric:         Mood and Affect: Mood normal.         Behavior: Behavior normal.         Thought Content: Thought content normal.         Judgment: Judgment normal.          KARINA-7:      PHQ-2 Depression Screening  Little interest or pleasure in doing things?     Feeling down, depressed, or hopeless?     PHQ-2 Total Score       PHQ-9 Depression Screening  Little interest or pleasure in doing things?     Feeling down, depressed, or hopeless?     Trouble falling or staying asleep, or sleeping too much?     Feeling tired or having little energy?     Poor appetite or overeating?     Feeling bad about yourself - or that you are a failure or have let yourself or your family down?     Trouble concentrating on things, such as reading the newspaper or watching television?     Moving or speaking so slowly that other people could have noticed? Or the opposite - being so fidgety or restless that you have been moving around a lot more than usual?     Thoughts that you would be better off dead, or of hurting yourself in some way?     PHQ-9 Total Score     If you checked off any problems, how difficult have these problems made it for you to do your work, take care of things at home, or get along with other people?        Result Review  Data Reviewed:            Office Visit with Idalmis Heredia APRN (12/21/2023)   Urine Drug Screen - Urine, Clean Catch (08/18/2023 15:33)            Assessment and Plan      Diagnoses and all orders for this visit:    1. Attention deficit hyperactivity disorder (ADHD), predominantly inattentive type (Primary)    2. Bipolar affective disorder, current episode hypomanic  -     lamoTRIgine (LaMICtal)  150 MG tablet; Take 1 tablet by mouth Every Night.  Dispense: 30 tablet; Refill: 0      Patient is seen today following up on ADHD and bipolar disorder.  Last visit we switched her from Adderall XR to Vyvanse as she was having some neurological symptoms with her Adderall.  She reports since switching to Vyvanse she has not had any issues or neurological symptoms and is doing much better, although she does feel that the dose needs to be increased as she feels her focus is not quite where it needs to be.  Discussed carefully increasing Vyvanse to 50 mg daily and seeing her back in 1 month for recheck.  UDS is up-to-date and appropriate, Yusuf pending, patient denies HI/SI, will pend medication to Dr. Pitts.  CSA up-to-date.  Last month we also increased her Lamictal dosage to 150 mg nightly; she reports she has noticed a huge difference in her mood is overall much improved.  She states she has patient's now, no more anger outbursts and a longer fuse.  Would like to continue same.    Plan:  1.  Increase Vyvanse to 50 mg daily  2.  Continue Lamictal 150 mg nightly  3.  Follow-up in 1 month        Follow Up   Return in about 1 month (around 2/19/2024).  Patient was given instructions and counseling regarding her condition or for health maintenance advice. Please see specific information pulled into the AVS if appropriate.

## 2024-01-22 DIAGNOSIS — F90.0 ATTENTION DEFICIT HYPERACTIVITY DISORDER (ADHD), PREDOMINANTLY INATTENTIVE TYPE: ICD-10-CM

## 2024-01-22 RX ORDER — LISDEXAMFETAMINE DIMESYLATE CAPSULES 50 MG/1
50 CAPSULE ORAL EVERY MORNING
Qty: 30 CAPSULE | Refills: 0 | Status: SHIPPED | OUTPATIENT
Start: 2024-01-22

## 2024-01-22 NOTE — TELEPHONE ENCOUNTER
Caller: Kelsey Mcqueen    Relationship: Self    Best call back number: [phone]     Requested Prescriptions:   Requested Prescriptions     Pending Prescriptions Disp Refills    lisdexamfetamine (Vyvanse) 50 MG capsule 30 capsule 0     Sig: Take 1 capsule by mouth Every Morning        Pharmacy where request should be sent: [address] - [phone] [facility] [phone]      Last office visit with prescribing clinician: 11/30/2023   Last telemedicine visit with prescribing clinician: Visit date not found   Next office visit with prescribing clinician: Visit date not found     Additional details provided by patient: CALLER STATED THAT SHE HAS BEEN OUT OF VYVANSE SINCE 1.20.24 AND THAT THE PHARMACY THAT THIS WAS SENT TO DOES NOT HAVE IT IN STOCK. PATIENT STATED THAT THE PHARMACY LISTED ABOVE HAS IT IN STOCK. PLEASE CALL WHEN THIS HAS BEEN TRANSFERRED.    Does the patient have less than a 3 day supply:  [x] Yes  [] No    Would you like a call back once the refill request has been completed: [x] Yes [] No    If the office needs to give you a call back, can they leave a voicemail: [x] Yes [] No    Vincenzo Brewer Rep   01/22/24 13:14 CST

## 2024-02-18 DIAGNOSIS — G47.00 INSOMNIA DISORDER RELATED TO KNOWN ORGANIC FACTOR: ICD-10-CM

## 2024-02-19 ENCOUNTER — OFFICE VISIT (OUTPATIENT)
Dept: FAMILY MEDICINE CLINIC | Facility: CLINIC | Age: 34
End: 2024-02-19
Payer: COMMERCIAL

## 2024-02-19 VITALS
RESPIRATION RATE: 20 BRPM | HEIGHT: 62 IN | WEIGHT: 224 LBS | TEMPERATURE: 98.2 F | SYSTOLIC BLOOD PRESSURE: 124 MMHG | BODY MASS INDEX: 41.22 KG/M2 | OXYGEN SATURATION: 99 % | DIASTOLIC BLOOD PRESSURE: 85 MMHG | HEART RATE: 80 BPM

## 2024-02-19 DIAGNOSIS — F90.0 ATTENTION DEFICIT HYPERACTIVITY DISORDER (ADHD), PREDOMINANTLY INATTENTIVE TYPE: Primary | ICD-10-CM

## 2024-02-19 DIAGNOSIS — G47.00 INSOMNIA DISORDER RELATED TO KNOWN ORGANIC FACTOR: ICD-10-CM

## 2024-02-19 DIAGNOSIS — F31.0 BIPOLAR AFFECTIVE DISORDER, CURRENT EPISODE HYPOMANIC: ICD-10-CM

## 2024-02-19 DIAGNOSIS — E66.01 CLASS 3 SEVERE OBESITY DUE TO EXCESS CALORIES WITH SERIOUS COMORBIDITY AND BODY MASS INDEX (BMI) OF 40.0 TO 44.9 IN ADULT: ICD-10-CM

## 2024-02-19 PROCEDURE — 99214 OFFICE O/P EST MOD 30 MIN: CPT

## 2024-02-19 PROCEDURE — 1159F MED LIST DOCD IN RCRD: CPT

## 2024-02-19 PROCEDURE — 1160F RVW MEDS BY RX/DR IN RCRD: CPT

## 2024-02-19 RX ORDER — ALBUTEROL SULFATE 90 UG/1
AEROSOL, METERED RESPIRATORY (INHALATION)
COMMUNITY
Start: 2024-02-02

## 2024-02-19 RX ORDER — LISDEXAMFETAMINE DIMESYLATE CAPSULES 50 MG/1
50 CAPSULE ORAL EVERY MORNING
Qty: 30 CAPSULE | Refills: 0 | Status: SHIPPED | OUTPATIENT
Start: 2024-03-18 | End: 2024-04-17

## 2024-02-19 RX ORDER — QUETIAPINE FUMARATE 50 MG/1
50 TABLET, FILM COATED ORAL NIGHTLY
Qty: 30 TABLET | Refills: 2 | Status: SHIPPED | OUTPATIENT
Start: 2024-02-19

## 2024-02-19 RX ORDER — LAMOTRIGINE 150 MG/1
150 TABLET ORAL NIGHTLY
Qty: 30 TABLET | Refills: 2 | Status: SHIPPED | OUTPATIENT
Start: 2024-02-19

## 2024-02-19 RX ORDER — QUETIAPINE FUMARATE 50 MG/1
50 TABLET, FILM COATED ORAL
Qty: 30 TABLET | Refills: 1 | OUTPATIENT
Start: 2024-02-19

## 2024-02-19 RX ORDER — LISDEXAMFETAMINE DIMESYLATE CAPSULES 50 MG/1
50 CAPSULE ORAL EVERY MORNING
Qty: 30 CAPSULE | Refills: 0 | Status: SHIPPED | OUTPATIENT
Start: 2024-02-19 | End: 2024-03-20

## 2024-02-19 NOTE — PROGRESS NOTES
"Chief Complaint  ADHD    Subjective    History of Present Illness      Patient presents to [facility] for   History of Present Illness  Pt reports things are going well.  Pt also states she was misdiagnosed with Chron's disease.    ADHD/Mood HPI    Visit for:  follow-up. Most recent visit was 1 month ago.  Interim changes to follow up on today: no change in medication  Work/School Performance:  going well  Cognitive:  able to focus    Behavior  Hyperactivity: is not hyperactive  Impulsivity: no impulsivity and no unsafe behavior  Tasking: able to initiate tasks, able to complete tasks, and able to mult-task    Social  ADHD social/impulsive symptoms:  not impatient, does not blurt out inappropriate comments, and no excessive talking    Behavioral health  Behavior: no concerns  Emotional coping: demonstrates feelings of no concerns           Review of Systems   All other systems reviewed and are negative.      I have reviewed and agree with the HPI and ROS information as above.  Linda Gaston, APRN     Objective   Vital Signs:   /85   Pulse 80   Temp 98.2 °F (36.8 °C)   Resp 20   Ht 157.5 cm (62\")   Wt 102 kg (224 lb)   SpO2 99%   BMI 40.97 kg/m²            Physical Exam  Constitutional:       Appearance: Normal appearance. She is well-developed. She is obese.   HENT:      Head: Normocephalic and atraumatic.      Right Ear: Tympanic membrane, ear canal and external ear normal.      Left Ear: Tympanic membrane, ear canal and external ear normal.      Nose: Nose normal. No septal deviation, nasal tenderness or congestion.      Mouth/Throat:      Lips: Pink. No lesions.      Mouth: Mucous membranes are moist. No oral lesions.      Dentition: Normal dentition.      Pharynx: Oropharynx is clear. No pharyngeal swelling, oropharyngeal exudate or posterior oropharyngeal erythema.   Eyes:      General: Lids are normal. Vision grossly intact. No scleral icterus.        Right eye: No " discharge.         Left eye: No discharge.      Extraocular Movements: Extraocular movements intact.      Conjunctiva/sclera: Conjunctivae normal.      Right eye: Right conjunctiva is not injected.      Left eye: Left conjunctiva is not injected.      Pupils: Pupils are equal, round, and reactive to light.   Neck:      Thyroid: No thyroid mass.      Trachea: Trachea normal.   Cardiovascular:      Rate and Rhythm: Normal rate and regular rhythm.      Heart sounds: Normal heart sounds. No murmur heard.     No gallop.   Pulmonary:      Effort: Pulmonary effort is normal.      Breath sounds: Normal breath sounds and air entry. No wheezing, rhonchi or rales.   Abdominal:      General: There is no distension.      Palpations: Abdomen is soft. There is no mass.      Tenderness: There is no abdominal tenderness. There is no right CVA tenderness, left CVA tenderness, guarding or rebound.   Musculoskeletal:         General: No tenderness or deformity. Normal range of motion.      Cervical back: Full passive range of motion without pain, normal range of motion and neck supple.      Thoracic back: Normal.      Right lower leg: No edema.      Left lower leg: No edema.   Skin:     General: Skin is warm and dry.      Coloration: Skin is not jaundiced.      Findings: No rash.   Neurological:      Mental Status: She is alert and oriented to person, place, and time.      Sensory: Sensation is intact.      Motor: Motor function is intact.      Coordination: Coordination is intact.      Gait: Gait is intact.      Deep Tendon Reflexes: Reflexes are normal and symmetric.   Psychiatric:         Mood and Affect: Mood and affect normal.         Judgment: Judgment normal.          KARINA-7: Over the last two weeks, how often have you been bothered by the following problems?  Feeling nervous, anxious or on edge: Not at all  Not being able to stop or control worrying: Not at all  Worrying too much about different things: Not at all  Trouble  Relaxing: Not at all  Being so restless that it is hard to sit still: Not at all  Becoming easily annoyed or irritable: Not at all  Feeling afraid as if something awful might happen: Not at all  KARINA 7 Total Score: 0  If you checked any problems, how difficult have these problems made it for you to do your work, take care of things at home, or get along with other people: Not difficult at all    PHQ-2 Depression Screening  Little interest or pleasure in doing things? 0-->not at all   Feeling down, depressed, or hopeless? 0-->not at all   PHQ-2 Total Score 0     PHQ-9 Depression Screening  Little interest or pleasure in doing things? 0-->not at all   Feeling down, depressed, or hopeless? 0-->not at all   Trouble falling or staying asleep, or sleeping too much?     Feeling tired or having little energy?     Poor appetite or overeating?     Feeling bad about yourself - or that you are a failure or have let yourself or your family down?     Trouble concentrating on things, such as reading the newspaper or watching television?     Moving or speaking so slowly that other people could have noticed? Or the opposite - being so fidgety or restless that you have been moving around a lot more than usual?     Thoughts that you would be better off dead, or of hurting yourself in some way?     PHQ-9 Total Score 0   If you checked off any problems, how difficult have these problems made it for you to do your work, take care of things at home, or get along with other people?        Result Review  Data Reviewed:                   Assessment and Plan      Diagnoses and all orders for this visit:    1. Attention deficit hyperactivity disorder (ADHD), predominantly inattentive type (Primary)    2. Bipolar affective disorder, current episode hypomanic  -     lamoTRIgine (LaMICtal) 150 MG tablet; Take 1 tablet by mouth Every Night.  Dispense: 30 tablet; Refill: 2    3. Insomnia disorder related to known organic factor  -     QUEtiapine  (SEROquel) 50 MG tablet; Take 1 tablet by mouth Every Night.  Dispense: 30 tablet; Refill: 2    4. Class 3 severe obesity due to excess calories with serious comorbidity and body mass index (BMI) of 40.0 to 44.9 in adult      Patient is seen today for ADHD, bipolar disorder along with insomnia.  Patient is doing well with current medication regimens and wishes to continue same.  Patient did have another PCP but wishes to switch over to our office.    Plan:  1.  ADHD stable and well-controlled with Vyvanse 50.  UDS up-to-date and Yusuf pending.  2.  Bipolar stable with Lamictal.  Patient has any HI/SI.  3. Insomnia stable with Seroquel 50mg.         Follow Up   No follow-ups on file.  Patient was given instructions and counseling regarding her condition or for health maintenance advice. Please see specific information pulled into the AVS if appropriate.

## 2024-02-26 ENCOUNTER — APPOINTMENT (OUTPATIENT)
Dept: GENERAL RADIOLOGY | Facility: HOSPITAL | Age: 34
End: 2024-02-26
Payer: COMMERCIAL

## 2024-02-26 ENCOUNTER — APPOINTMENT (OUTPATIENT)
Dept: CT IMAGING | Facility: HOSPITAL | Age: 34
End: 2024-02-26
Payer: COMMERCIAL

## 2024-02-26 ENCOUNTER — HOSPITAL ENCOUNTER (EMERGENCY)
Facility: HOSPITAL | Age: 34
Discharge: HOME | End: 2024-02-26
Attending: EMERGENCY MEDICINE
Payer: COMMERCIAL

## 2024-02-26 ENCOUNTER — TELEPHONE (OUTPATIENT)
Dept: NEUROSURGERY | Facility: CLINIC | Age: 34
End: 2024-02-26
Payer: COMMERCIAL

## 2024-02-26 VITALS
RESPIRATION RATE: 18 BRPM | TEMPERATURE: 98 F | DIASTOLIC BLOOD PRESSURE: 88 MMHG | BODY MASS INDEX: 39.93 KG/M2 | SYSTOLIC BLOOD PRESSURE: 136 MMHG | HEART RATE: 88 BPM | OXYGEN SATURATION: 100 % | WEIGHT: 217 LBS | HEIGHT: 62 IN

## 2024-02-26 DIAGNOSIS — R51.9 NONINTRACTABLE EPISODIC HEADACHE, UNSPECIFIED HEADACHE TYPE: Primary | ICD-10-CM

## 2024-02-26 PROCEDURE — 71046 X-RAY EXAM CHEST 2 VIEWS: CPT

## 2024-02-26 PROCEDURE — 70250 X-RAY EXAM OF SKULL: CPT

## 2024-02-26 PROCEDURE — 70450 CT HEAD/BRAIN W/O DYE: CPT

## 2024-02-26 PROCEDURE — 74018 RADEX ABDOMEN 1 VIEW: CPT

## 2024-02-26 PROCEDURE — 99284 EMERGENCY DEPT VISIT MOD MDM: CPT

## 2024-02-26 PROCEDURE — 63710000001 DIPHENHYDRAMINE PER 50 MG: Performed by: EMERGENCY MEDICINE

## 2024-02-26 PROCEDURE — 63710000001 PROMETHAZINE PER 25 MG: Performed by: EMERGENCY MEDICINE

## 2024-02-26 RX ORDER — KETOROLAC TROMETHAMINE 10 MG/1
20 TABLET, FILM COATED ORAL ONCE
Status: COMPLETED | OUTPATIENT
Start: 2024-02-26 | End: 2024-02-26

## 2024-02-26 RX ORDER — DIPHENHYDRAMINE HCL 25 MG
25 CAPSULE ORAL ONCE
Status: COMPLETED | OUTPATIENT
Start: 2024-02-26 | End: 2024-02-26

## 2024-02-26 RX ORDER — PROMETHAZINE HYDROCHLORIDE 25 MG/1
25 TABLET ORAL ONCE
Status: COMPLETED | OUTPATIENT
Start: 2024-02-26 | End: 2024-02-26

## 2024-02-26 RX ORDER — DIPHENHYDRAMINE HYDROCHLORIDE 50 MG/ML
25 INJECTION INTRAMUSCULAR; INTRAVENOUS ONCE
Status: DISCONTINUED | OUTPATIENT
Start: 2024-02-26 | End: 2024-02-26

## 2024-02-26 RX ORDER — KETOROLAC TROMETHAMINE 30 MG/ML
30 INJECTION, SOLUTION INTRAMUSCULAR; INTRAVENOUS EVERY 6 HOURS PRN
Status: DISCONTINUED | OUTPATIENT
Start: 2024-02-26 | End: 2024-02-26

## 2024-02-26 RX ADMIN — DIPHENHYDRAMINE HYDROCHLORIDE 25 MG: 25 CAPSULE ORAL at 19:44

## 2024-02-26 RX ADMIN — KETOROLAC TROMETHAMINE 20 MG: 10 TABLET, FILM COATED ORAL at 19:44

## 2024-02-26 RX ADMIN — PROMETHAZINE HYDROCHLORIDE 25 MG: 25 TABLET ORAL at 19:44

## 2024-02-26 NOTE — Clinical Note
[facility] EMERGENCY DEPARTMENT  [address]  Located within [facility] [address]  Phone: [phone]    Kelsey Mcqueen was seen and treated in our emergency department on 2/26/2024.  She may return to work on 02/29/2024.         Thank you for choosing [facility].    Jhonny Medina MD

## 2024-02-27 NOTE — DISCHARGE INSTRUCTIONS
Follow-up with primary care in 48 to 72 hours and return immediately to the emergency department for any worsening symptoms or for any other reason

## 2024-02-27 NOTE — TELEPHONE ENCOUNTER
Placed a call to inform pt she has an appt 02/28/24 at [facility]. Ended call with pt understanding and agree with date and time of appt.

## 2024-02-27 NOTE — TELEPHONE ENCOUNTER
"Of note: patient left a VM today @ 12:44 pm stating she has had a headache for 5 days and cannot get relief with OTC medication.  She also stated in the VM that she had some vision changes and her tubing felt \"tight\".  I was working with another physician today in clinic all day and did not get her VM until 7:15 pm.  When reviewing her chart it was noted the patient had presented to the ER for evaluation.  At this time, 7:43 pm, the patient is still in the ER.    DARYN HOLCOMB  [facility] NEUROSURGERY    "

## 2024-02-27 NOTE — ED PROVIDER NOTES
Subjective   History of Present Illness  Patient is a 33-year-old female with a history of  shunt.  Complains of headache of 1 week duration.  Feels like when she had the shunt placed.  History of pseudotumor cerebri and was seen as far back as 2021 for similar complaints.  Denies blurred vision or vomiting and has had no fevers and denies photophobia        Review of Systems   Constitutional:  Negative for activity change, appetite change, chills and fever.   HENT:  Negative for dental problem, ear pain, hearing loss, sinus pressure, sinus pain, sore throat and trouble swallowing.    Eyes:  Negative for photophobia, pain, discharge, redness and visual disturbance.   Respiratory:  Negative for cough, chest tightness, shortness of breath and wheezing.    Cardiovascular:  Negative for chest pain and palpitations.   Gastrointestinal:  Negative for abdominal pain, diarrhea, nausea and vomiting.   Genitourinary:  Negative for difficulty urinating, dysuria, flank pain, frequency and urgency.   Musculoskeletal:  Negative for arthralgias, myalgias, neck pain and neck stiffness.   Skin:  Negative for pallor and rash.   Neurological:  Positive for headaches (1 week). Negative for dizziness, syncope, speech difficulty, weakness, light-headedness and numbness.   Psychiatric/Behavioral:  Negative for confusion.        Past Medical History:   Diagnosis Date    ADHD (attention deficit hyperactivity disorder) 2012    Crohn disease     recently diagnosis, going to start infusion therapy    Deep vein thrombosis 2021    Depression with anxiety 12/09/2018    Fibromyalgia, primary 2016    Intracranial hypertension     Kidney stones     Lactation disorder     Pseudotumor cerebri 09/14/2016    Tobacco abuse 09/21/2016       Allergies   Allergen Reactions    Morphine And Related Shortness Of Breath    Other Other (See Comments)     Other reaction(s): Other (See Comments)  Dissolvable sutures. She developed an abscess from the last ones.  Ended up in ER.   Dissolvable sutures. She developed an abscess from the last ones. Ended up in ER.     Sumatriptan Hives    Buspirone Headache    Ketorolac Unknown - Low Severity     Has Crohn's Disease.    Ketorolac Tromethamine Unknown - Low Severity    Morphine Itching    Demerol [Meperidine] Rash       Past Surgical History:   Procedure Laterality Date    BRAIN SURGERY      COLONOSCOPY      CYSTOSCOPY ELECTROHYDRAULIC LITHOTRIPSY      HYSTERECTOMY  2019    KIDNEY STONE SURGERY      OTHER SURGICAL HISTORY      Intracranial shunt     SHUNT INSERTION  2015       Family History   Problem Relation Age of Onset    Hypertension Mother     Hypertension Father     Prolactinoma Neg Hx        Social History     Socioeconomic History    Marital status: Single    Number of children: 1   Tobacco Use    Smoking status: Some Days     Packs/day: 0.25     Years: 3.00     Additional pack years: 0.00     Total pack years: 0.75     Types: Cigarettes     Start date: 2019     Last attempt to quit: 2022     Years since quittin.3     Passive exposure: Past    Smokeless tobacco: Never    Tobacco comments:     She has recently quit.    Vaping Use    Vaping Use: Never used   Substance and Sexual Activity    Alcohol use: No    Drug use: No    Sexual activity: Yes     Partners: Male     Birth control/protection: Hysterectomy           Objective   Physical Exam  Vitals and nursing note reviewed. Exam conducted with a chaperone present.   Constitutional:       General: She is not in acute distress.     Appearance: Normal appearance. She is obese. She is not ill-appearing or toxic-appearing.   HENT:      Head: Normocephalic and atraumatic.      Right Ear: External ear normal.      Left Ear: External ear normal.      Nose: Nose normal. No congestion or rhinorrhea.      Mouth/Throat:      Mouth: Mucous membranes are moist.      Pharynx: Oropharynx is clear. No oropharyngeal exudate or posterior oropharyngeal erythema.    Eyes:      General: No scleral icterus.        Right eye: No discharge.         Left eye: No discharge.      Extraocular Movements: Extraocular movements intact.      Conjunctiva/sclera: Conjunctivae normal.      Pupils: Pupils are equal, round, and reactive to light.   Cardiovascular:      Rate and Rhythm: Normal rate and regular rhythm.      Pulses: Normal pulses.      Heart sounds: Normal heart sounds. No murmur heard.     No friction rub. No gallop.   Pulmonary:      Effort: Pulmonary effort is normal. No respiratory distress.      Breath sounds: Normal breath sounds. No wheezing, rhonchi or rales.   Abdominal:      General: Abdomen is flat. Bowel sounds are normal. There is no distension.      Palpations: Abdomen is soft. There is no mass.      Tenderness: There is no abdominal tenderness. There is no guarding or rebound.   Musculoskeletal:         General: No swelling, tenderness, deformity or signs of injury. Normal range of motion.      Cervical back: Normal range of motion and neck supple. No rigidity or tenderness.      Right lower leg: No edema.      Left lower leg: No edema.   Skin:     General: Skin is warm and dry.      Capillary Refill: Capillary refill takes less than 2 seconds.      Coloration: Skin is not jaundiced or pale.      Findings: No lesion or rash.   Neurological:      General: No focal deficit present.      Mental Status: She is alert and oriented to person, place, and time. Mental status is at baseline.      Cranial Nerves: No cranial nerve deficit.      Sensory: No sensory deficit.      Motor: No weakness.      Coordination: Coordination normal.      Gait: Gait normal.   Psychiatric:         Attention and Perception: Attention and perception normal.         Mood and Affect: Mood is anxious. Affect is labile and tearful.         Speech: Speech normal.         Procedures           ED Course  ED Course as of 03/01/24 1627   Mon Feb 26, 2024   1923 CT of the head shows no acute  abnormality [TE]   1923    IMPRESSION:     1. No acute intracranial abnormality.     This report was signed and finalized on 2/26/2024 7:09 PM by Ryan Vergara.   [TE]      ED Course User Index  [TE] Jhonny Medina MD                                             Medical Decision Making  33-year-old female with history of  shunt presents to the emergency department complaining of headaches for 1 week.  Patient reports that this feels like when she had the  shunt placed.  Patient reports no relief with butalbital or ibuprofen and states that she contacted her neurosurgeon this week but has not heard back.  Differential includes intractable migraine,  shunt malfunction, sinusitis.  CT head and  shunt series negative.  Reesults discussed with patient and need for outpatient follow up with neurosurgery.  Patient states she has been trying to make and appointment with neurosurgery without success but states she will attempt to contact them again.  Discussed need to return immediately to the ER for worsening of symptoms including halo's around lights, worsening of headache, dizziness, difficulty walking, blurred vision.    Problems Addressed:  Nonintractable episodic headache, unspecified headache type: complicated acute illness or injury    Amount and/or Complexity of Data Reviewed  Radiology: ordered.    Risk  Prescription drug management.        Final diagnoses:   None       ED Disposition  ED Disposition       None            No follow-up provider specified.       Medication List      No changes were made to your prescriptions during this visit.            Jhonny Medina MD  03/01/24 [id number]

## 2024-02-27 NOTE — TELEPHONE ENCOUNTER
Patient called back in today and is very upset, crying and says she is scared she will go blind again, asking if I knew how it felt to go blind and not be able to see my kids.  I explained to her that imaging in the ER was normal, Ean is out today and Dr Holcomb doesn't have anything.  We offered her an appt with Ean for Friday morning but she became upset again asking what if she woke up tomorrow and was blind.  She says she has pain in the back of her head and is worried about her eyes.  I told her I would talk to Dr Holcomb and see what he wants to do but he may want her to get an eye exam to check her vision to which she stated it isn't her eyes!    I ended the conversation with her understanding that I am in clinic today & would talk to DR Holcomb when we are finished and call her back but it may be 5 or 6 o'clock tonight.    DARYN HOLCOMB  [facility] NEUROSURGERY

## 2024-02-28 ENCOUNTER — OFFICE VISIT (OUTPATIENT)
Dept: NEUROSURGERY | Facility: CLINIC | Age: 34
End: 2024-02-28
Payer: COMMERCIAL

## 2024-02-28 VITALS — BODY MASS INDEX: 40.85 KG/M2 | HEIGHT: 62 IN | WEIGHT: 222 LBS

## 2024-02-28 DIAGNOSIS — E66.09 CLASS 2 OBESITY DUE TO EXCESS CALORIES WITHOUT SERIOUS COMORBIDITY WITH BODY MASS INDEX (BMI) OF 39.0 TO 39.9 IN ADULT: ICD-10-CM

## 2024-02-28 DIAGNOSIS — Z87.891 FORMER SMOKER: ICD-10-CM

## 2024-02-28 DIAGNOSIS — G93.2 PSEUDOTUMOR CEREBRI: Primary | ICD-10-CM

## 2024-02-28 NOTE — PROGRESS NOTES
Chief complaint:   Chief Complaint   Patient presents with    Headache     Pt here for E/R F/U. Pt states she has been having constant headaches, dizziness and blurred vision for 1wk.        Subjective     HPI: This is a 33-year-old female patient with follow-up for benign intracranial hypertension.  The patient have a shunt placed in November 2015.  The patient was showing signs of papilledema.  Her LP at that time did show an opening pressure of 29.  After the placement of the  shunt her symptoms did not improve.  She was last seen in March 2023 and overall was doing well and felt like her vision had improved.  She was following with her ophthalmologist.  She was not complaining of any headaches.    She comes in today and says that she has been noticing increasing headaches.  She is also noticed some vision changes along with nausea.  She is not having any numbness or tingling.  She says things have seemed to gotten worse in the last week she was having difficulty with sleeping.  She also does relate to having lost her eyeglasses and has not been using any corrective lenses for some time now.  She does have an appointment to see her ophthalmologist tomorrow.  She says the majority the time her headaches do seem to be behind her eyes but noticed in the last week that the headache seem to be over her shunt.  She did end up going to the emergency room and imaging was done.  She wanted to have further follow-up..  She does state today however that her symptoms are improved.  They are not completely gone but they are improved compared to what they have been    Review of Systems   Eyes:  Positive for visual disturbance.   Gastrointestinal:  Positive for abdominal pain and nausea.   Neurological:  Positive for dizziness and headaches.        Past Medical History:   Diagnosis Date    ADHD (attention deficit hyperactivity disorder) 2012    Crohn disease     recently diagnosis, going to start infusion therapy    Deep  "vein thrombosis     Depression with anxiety 2018    Fibromyalgia, primary 2016    Intracranial hypertension     Kidney stones     Lactation disorder     Pseudotumor cerebri 2016    Tobacco abuse 2016     Past Surgical History:   Procedure Laterality Date    BRAIN SURGERY  2015    COLONOSCOPY      CYSTOSCOPY ELECTROHYDRAULIC LITHOTRIPSY      HYSTERECTOMY  2019    KIDNEY STONE SURGERY      OTHER SURGICAL HISTORY      Intracranial shunt     SHUNT INSERTION  2015     Family History   Problem Relation Age of Onset    Hypertension Mother     Hypertension Father     Prolactinoma Neg Hx      Social History     Tobacco Use    Smoking status: Some Days     Packs/day: 0.25     Years: 3.00     Additional pack years: 0.00     Total pack years: 0.75     Types: Cigarettes     Start date: 2019     Last attempt to quit: 2022     Years since quittin.3     Passive exposure: Past    Smokeless tobacco: Never    Tobacco comments:     She has recently quit.    Vaping Use    Vaping Use: Never used   Substance Use Topics    Alcohol use: No    Drug use: No     (Not in a hospital admission)    Allergies:  Morphine and related, Other, Sumatriptan, Buspirone, Ketorolac, Ketorolac tromethamine, Morphine, and Demerol [meperidine]    Objective      Vital Signs  Ht 157.5 cm (62\")   Wt 101 kg (222 lb)   LMP 2019 (Approximate)   BMI 40.60 kg/m²     Physical Exam  Constitutional:       Appearance: She is well-developed.   HENT:      Head: Normocephalic.   Eyes:      Extraocular Movements: EOM normal.      Pupils: Pupils are equal, round, and reactive to light.   Pulmonary:      Effort: Pulmonary effort is normal.   Musculoskeletal:         General: Normal range of motion.      Cervical back: Normal range of motion.   Skin:     General: Skin is warm.   Neurological:      Mental Status: She is alert and oriented to person, place, and time.      GCS: GCS eye subscore is 4. GCS verbal subscore is 5. GCS " motor subscore is 6.      Cranial Nerves: No cranial nerve deficit.      Sensory: No sensory deficit.      Motor: Motor strength is normal.     Gait: Gait is intact. Gait normal.      Deep Tendon Reflexes: Reflexes are normal and symmetric.   Psychiatric:         Speech: Speech normal.         Behavior: Behavior normal.         Thought Content: Thought content normal.         Neurologic Exam     Mental Status   Oriented to person, place, and time.   Attention: normal. Concentration: normal.   Speech: speech is normal   Level of consciousness: alert  Normal comprehension.     Cranial Nerves     CN II   Visual fields full to confrontation.     CN III, IV, VI   Pupils are equal, round, and reactive to light.  Extraocular motions are normal.     CN V   Facial sensation intact.     CN VII   Facial expression full, symmetric.     CN VIII   CN VIII normal.     CN IX, X   CN IX normal.   CN X normal.     CN XI   CN XI normal.     CN XII   CN XII normal.     Motor Exam   Muscle bulk: normal    Strength   Strength 5/5 throughout.     Sensory Exam   Light touch normal.     Gait, Coordination, and Reflexes     Gait  Gait: normal    Reflexes   Reflexes 2+ except as noted.       Imaging review: CT scan of the head that was done on February 26, 2020 stable appearance of the ventricles.  The shunt is on the right side.  No acute intracranial abnormalities are noted.    X-ray of the shunt does not show any concern for any discontinuation in the shunt tubing.    Assessment/Plan: The patient is complaining of worsening headaches and some changes.  She says her symptoms have improved today.  I will order Fioricet to see if this will help with her headaches.  She is going to have an appoint with her ophthalmologist tomorrow we will see if there is any changes and any papilledema.  She was told to notify us if she has any worsening symptoms.  Right now we will plan to see her back in March with her routine appointment with Dr. Power  unless she needs a sooner.  Patient is a nonsmoker  The patient's Body mass index is 40.6 kg/m².. BMI is above normal parameters. Recommendations include: educational material and nutrition counseling    Diagnoses and all orders for this visit:    1. Pseudotumor cerebri (Primary)    2. Former smoker    3. Class 2 obesity due to excess calories without serious comorbidity with body mass index (BMI) of 39.0 to 39.9 in adult          I discussed the patients findings and my recommendations with patient    Ean Treviño, APRN  02/28/24  14:25 CST

## 2024-02-28 NOTE — PATIENT INSTRUCTIONS
"BMI for Adults  What is BMI?  Body mass index (BMI) is a number that is calculated from a person's weight and height. BMI can help estimate how much of a person's weight is composed of fat. BMI does not measure body fat directly. Rather, it is an alternative to procedures that directly measure body fat, which can be difficult and expensive.  BMI can help identify people who may be at higher risk for certain medical problems.  What are BMI measurements used for?  BMI is used as a screening tool to identify possible weight problems. It helps determine whether a person is obese, overweight, a healthy weight, or underweight.  BMI is useful for:  Identifying a weight problem that may be related to a medical condition or may increase the risk for medical problems.  Promoting changes, such as changes in diet and exercise, to help reach a healthy weight. BMI screening can be repeated to see if these changes are working.  How is BMI calculated?  BMI involves measuring your weight in relation to your height. Both height and weight are measured, and the BMI is calculated from those numbers. This can be done either in English (U[place] or metric measurements. Note that charts and online BMI calculators are available to help you find your BMI quickly and easily without having to do these calculations yourself.  To calculate your BMI in English ([place] measurements:    Measure your weight in pounds (lb).  Multiply the number of pounds by 703.  For example, for a person who weighs 180 lb, multiply that number by 703, which equals 126,540.  Measure your height in inches. Then multiply that number by itself to get a measurement called \"inches squared.\"  For example, for a person who is 70 inches tall, the \"inches squared\" measurement is 70 inches x 70 inches, which equals 4,900 inches squared.  Divide the total from step 2 (number of lb x 703) by the total from step 3 (inches squared): 126,540 ÷ 4,900 = 25.8. This is your BMI.    To " "calculate your BMI in metric measurements:  Measure your weight in kilograms (kg).  Measure your height in meters (m). Then multiply that number by itself to get a measurement called \"meters squared.\"  For example, for a person who is 1.75 m tall, the \"meters squared\" measurement is 1.75 m x 1.75 m, which is equal to 3.1 meters squared.  Divide the number of kilograms (your weight) by the meters squared number. In this example: 70 ÷ 3.1 = 22.6. This is your BMI.  What do the results mean?  BMI charts are used to identify whether you are underweight, normal weight, overweight, or obese. The following guidelines will be used:  Underweight: BMI less than 18.5.  Normal weight: BMI between 18.5 and 24.9.  Overweight: BMI between 25 and 29.9.  Obese: BMI of 30 or above.  Keep these notes in mind:  Weight includes both fat and muscle, so someone with a muscular build, such as an athlete, may have a BMI that is higher than 24.9. In cases like these, BMI is not an accurate measure of body fat.  To determine if excess body fat is the cause of a BMI of 25 or higher, further assessments may need to be done by a health care provider.  BMI is usually interpreted in the same way for men and women.  Where to find more information  For more information about BMI, including tools to quickly calculate your BMI, go to these websites:  Centers for Disease Control and Prevention: www.cdc.gov  American Heart Association: www.heart.org  National Heart, Lung, and Blood [place]: www.nhlbi.nih.gov  Summary  Body mass index (BMI) is a number that is calculated from a person's weight and height.  BMI may help estimate how much of a person's weight is composed of fat. BMI can help identify those who may be at higher risk for certain medical problems.  BMI can be measured using English measurements or metric measurements.  BMI charts are used to identify whether you are underweight, normal weight, overweight, or obese.  This information is not " "intended to replace advice given to you by your health care provider. Make sure you discuss any questions you have with your health care provider.  Document Revised: 09/09/2020 Document Reviewed: 07/17/2020  [employer] Patient Education © 2021 [employer] https://www.nhlbi.nih.gov/files/docs/public/heart/dash_brief.pdf\">   DASH Eating Plan  DASH stands for Dietary Approaches to Stop Hypertension. The DASH eating plan is a healthy eating plan that has been shown to:  Reduce high blood pressure (hypertension).  Reduce your risk for type 2 diabetes, heart disease, and stroke.  Help with weight loss.  What are tips for following this plan?  Reading food labels  Check food labels for the amount of salt (sodium) per serving. Choose foods with less than 5 percent of the Daily Value of sodium. Generally, foods with less than 300 milligrams (mg) of sodium per serving fit into this eating plan.  To find whole grains, look for the word \"whole\" as the first word in the ingredient list.  Shopping  Buy products labeled as \"low-sodium\" or \"no salt added.\"  Buy fresh foods. Avoid canned foods and pre-made or frozen meals.  Cooking  Avoid adding salt when cooking. Use salt-free seasonings or herbs instead of table salt or sea salt. Check with your health care provider or pharmacist before using salt substitutes.  Do not galan foods. Cook foods using healthy methods such as baking, boiling, grilling, roasting, and broiling instead.  Cook with heart-healthy oils, such as olive, canola, avocado, soybean, or sunflower oil.  Meal planning    Eat a balanced diet that includes:  4 or more servings of fruits and 4 or more servings of vegetables each day. Try to fill one-half of your plate with fruits and vegetables.  6-8 servings of whole grains each day.  Less than 6 oz (170 g) of lean meat, poultry, or fish each day. A 3-oz (85-g) serving of meat is about the same size as a deck of cards. One egg equals 1 oz (28 g).  2-3 servings of low-fat " dairy each day. One serving is 1 cup (237 mL).  1 serving of nuts, seeds, or beans 5 times each week.  2-3 servings of heart-healthy fats. Healthy fats called omega-3 fatty acids are found in foods such as walnuts, flaxseeds, fortified milks, and eggs. These fats are also found in cold-water fish, such as sardines, salmon, and mackerel.  Limit how much you eat of:  Canned or prepackaged foods.  Food that is high in trans fat, such as some fried foods.  Food that is high in saturated fat, such as fatty meat.  Desserts and other sweets, sugary drinks, and other foods with added sugar.  Full-fat dairy products.  Do not salt foods before eating.  Do not eat more than 4 egg yolks a week.  Try to eat at least 2 vegetarian meals a week.  Eat more home-cooked food and less restaurant, buffet, and fast food.    Lifestyle  When eating at a restaurant, ask that your food be prepared with less salt or no salt, if possible.  If you drink alcohol:  Limit how much you use to:  0-1 drink a day for women who are not pregnant.  0-2 drinks a day for men.  Be aware of how much alcohol is in your drink. In the [place] one drink equals one 12 oz bottle of beer (355 mL), one 5 oz glass of wine (148 mL), or one 1½ oz glass of hard liquor (44 mL).  General information  Avoid eating more than 2,300 mg of salt a day. If you have hypertension, you may need to reduce your sodium intake to 1,500 mg a day.  Work with your health care provider to maintain a healthy body weight or to lose weight. Ask what an ideal weight is for you.  Get at least 30 minutes of exercise that causes your heart to beat faster (aerobic exercise) most days of the week. Activities may include walking, swimming, or biking.  Work with your health care provider or dietitian to adjust your eating plan to your individual calorie needs.  What foods should I eat?  Fruits  All fresh, dried, or frozen fruit. Canned fruit in natural juice (without added sugar).  Vegetables  Fresh or  frozen vegetables (raw, steamed, roasted, or grilled). Low-sodium or reduced-sodium tomato and vegetable juice. Low-sodium or reduced-sodium tomato sauce and tomato paste. Low-sodium or reduced-sodium canned vegetables.  Grains  Whole-grain or whole-wheat bread. Whole-grain or whole-wheat pasta. Brown rice. Oatmeal. Quinoa. Bulgur. Whole-grain and low-sodium cereals. Kristi bread. Low-fat, low-sodium crackers. Whole-wheat flour tortillas.  Meats and other proteins  Skinless chicken or turkey. Ground chicken or turkey. Pork with fat trimmed off. Fish and seafood. Egg whites. Dried beans, peas, or lentils. Unsalted nuts, nut butters, and seeds. Unsalted canned beans. Lean cuts of beef with fat trimmed off. Low-sodium, lean precooked or cured meat, such as sausages or meat loaves.  Dairy  Low-fat (1%) or fat-free (skim) milk. Reduced-fat, low-fat, or fat-free cheeses. Nonfat, low-sodium ricotta or cottage cheese. Low-fat or nonfat yogurt. Low-fat, low-sodium cheese.  Fats and oils  Soft margarine without trans fats. Vegetable oil. Reduced-fat, low-fat, or light mayonnaise and salad dressings (reduced-sodium). Canola, safflower, olive, avocado, soybean, and sunflower oils. Avocado.  Seasonings and condiments  Herbs. Spices. Seasoning mixes without salt.  Other foods  Unsalted popcorn and pretzels. Fat-free sweets.  The items listed above may not be a complete list of foods and beverages you can eat. Contact a dietitian for more information.  What foods should I avoid?  Fruits  Canned fruit in a light or heavy syrup. Fried fruit. Fruit in cream or butter sauce.  Vegetables  Creamed or fried vegetables. Vegetables in a cheese sauce. Regular canned vegetables (not low-sodium or reduced-sodium). Regular canned tomato sauce and paste (not low-sodium or reduced-sodium). Regular tomato and vegetable juice (not low-sodium or reduced-sodium). Pickles. Olives.  Grains  Baked goods made with fat, such as croissants, muffins, or some  breads. Dry pasta or rice meal packs.  Meats and other proteins  Fatty cuts of meat. Ribs. Fried meat. Gregg. Bologna, salami, and other precooked or cured meats, such as sausages or meat loaves. Fat from the back of a pig (fatback). Bratwurst. Salted nuts and seeds. Canned beans with added salt. Canned or smoked fish. Whole eggs or egg yolks. Chicken or turkey with skin.  Dairy  Whole or 2% milk, cream, and half-and-half. Whole or full-fat cream cheese. Whole-fat or sweetened yogurt. Full-fat cheese. Nondairy creamers. Whipped toppings. Processed cheese and cheese spreads.  Fats and oils  Butter. Stick margarine. Lard. Shortening. Ghee. Gregg fat. Tropical oils, such as coconut, palm kernel, or palm oil.  Seasonings and condiments  Onion salt, garlic salt, seasoned salt, table salt, and sea salt. Worcestershire sauce. Tartar sauce. Barbecue sauce. Teriyaki sauce. Soy sauce, including reduced-sodium. Steak sauce. Canned and packaged gravies. Fish sauce. Oyster sauce. Cocktail sauce. Store-bought horseradish. Ketchup. Mustard. Meat flavorings and tenderizers. Bouillon cubes. Hot sauces. Pre-made or packaged marinades. Pre-made or packaged taco seasonings. Relishes. Regular salad dressings.  Other foods  Salted popcorn and pretzels.  The items listed above may not be a complete list of foods and beverages you should avoid. Contact a dietitian for more information.  Where to find more information  National Heart, Lung, and Blood [place]: www.nhlbi.nih.gov  American Heart Association: www.heart.org  Academy of Nutrition and Dietetics: www.eatright.org  National Kidney Foundation: www.kidney.org  Summary  The DASH eating plan is a healthy eating plan that has been shown to reduce high blood pressure (hypertension). It may also reduce your risk for type 2 diabetes, heart disease, and stroke.  When on the DASH eating plan, aim to eat more fresh fruits and vegetables, whole grains, lean proteins, low-fat dairy, and  heart-healthy fats.  With the DASH eating plan, you should limit salt (sodium) intake to 2,300 mg a day. If you have hypertension, you may need to reduce your sodium intake to 1,500 mg a day.  Work with your health care provider or dietitian to adjust your eating plan to your individual calorie needs.  This information is not intended to replace advice given to you by your health care provider. Make sure you discuss any questions you have with your health care provider.  Document Revised: 11/20/2020 Document Reviewed: 11/20/2020  Guide Patient Education © 2021 [employer] High-Protein and High-Calorie Diet  Eating high-protein and high-calorie foods can help you to gain weight, heal after an injury, and recover after an illness or surgery. The specific amount of daily protein and calories you need depends on:  Your body weight.  The reason this diet is recommended for you.  What is my plan?  Generally, a high-protein, high-calorie diet involves:  Eating 250-500 extra calories each day.  Making sure that you get enough of your daily calories from protein. Ask your health care provider how many of your calories should come from protein.  Talk with a health care provider, such as a diet and nutrition specialist (dietitian), about how much protein and how many calories you need each day. Follow the diet as directed by your health care provider.  What are tips for following this plan?    Preparing meals  Add whole milk, half-and-half, or heavy cream to cereal, pudding, soup, or hot cocoa.  Add whole milk to instant breakfast drinks.  Add peanut butter to oatmeal or smoothies.  Add powdered milk to baked goods, smoothies, or milkshakes.  Add powdered milk, cream, or butter to mashed potatoes.  Add cheese to cooked vegetables.  Make whole-milk yogurt parfaits. Top them with granola, fruit, or nuts.  Add cottage cheese to your fruit.  Add avocado, cheese, or both to sandwiches or salads.  Add meat, poultry, or seafood  to rice, pasta, casseroles, salads, and soups.  Use mayonnaise when making egg salad, chicken salad, or tuna salad.  Use peanut butter as a dip for vegetables or as a topping for pretzels, celery, or crackers.  Add beans to casseroles, dips, and spreads.  Add pureed beans to sauces and soups.  Replace calorie-free drinks with calorie-containing drinks, such as milk and fruit juice.  Replace water with milk or heavy cream when making foods such as oatmeal, pudding, or cocoa.  General instructions  Ask your health care provider if you should take a nutritional supplement.  Try to eat six small meals each day instead of three large meals.  Eat a balanced diet. In each meal, include one food that is high in protein.  Keep nutritious snacks available, such as nuts, trail mixes, dried fruit, and yogurt.  If you have kidney disease or diabetes, talk with your health care provider about how much protein is safe for you. Too much protein may put extra stress on your kidneys.  Drink your calories. Choose high-calorie drinks and have them after your meals.  What high-protein foods should I eat?    Vegetables  Soybeans. Peas.  Grains  Quinoa. Bulgur wheat.  Meats and other proteins  Beef, pork, and poultry. Fish and seafood. Eggs. Tofu. Textured vegetable protein (TVP). Peanut butter. Nuts and seeds. Dried beans. Protein powders.  Dairy  Whole milk. Whole-milk yogurt. Powdered milk. Cheese. Cottage Cheese. Eggnog.  Beverages  High-protein supplement drinks. Soy milk.  Other foods  Protein bars.  The items listed above may not be a complete list of high-protein foods and beverages. Contact a dietitian for more options.  What high-calorie foods should I eat?  Fruits  Dried fruit. Fruit leather. Canned fruit in syrup. Fruit juice. Avocado.  Vegetables  Vegetables cooked in oil or butter. Fried potatoes.  Grains  Pasta. Quick breads. Muffins. Pancakes. Ready-to-eat cereal.  Meats and other proteins  Peanut butter. Nuts and  seeds.  Dairy  Heavy cream. Whipped cream. Cream cheese. Sour cream. Ice cream. Custard. Pudding.  Beverages  Meal-replacement beverages. Nutrition shakes. Fruit juice. Sugar-sweetened soft drinks.  Seasonings and condiments  Salad dressing. Mayonnaise. Rodolfo sauce. Fruit preserves or jelly. Honey. Syrup.  Sweets and desserts  Cake. Cookies. Pie. Pastries. Candy bars. Chocolate.  Fats and oils  Butter or margarine. Oil. Gravy.  Other foods  Meal-replacement bars.  The items listed above may not be a complete list of high-calorie foods and beverages. Contact a dietitian for more options.  Summary  A high-protein, high-calorie diet can help you gain weight or heal faster after an injury, illness, or surgery.  To increase your protein and calories, add ingredients such as whole milk, peanut butter, cheese, beans, meat, or seafood to meal items.  To get enough extra calories each day, include high-calorie foods and beverages at each meal.  Adding a high-calorie drink or shake can be an easy way to help you get enough calories each day. Talk with your healthcare provider or dietitian about the best options for you.  This information is not intended to replace advice given to you by your health care provider. Make sure you discuss any questions you have with your health care provider.  Document Revised: 11/30/2018 Document Reviewed: 10/30/2018  [employer] Patient Education © 2021 [employer] For more information:    Quit Now [place]  1-800-QUIT-NOW  https://[place].quitlogix.org/en-[place]/  Steps to Quit Smoking  Smoking tobacco can be harmful to your health and can affect almost every organ in your body. Smoking puts you, and those around you, at risk for developing many serious chronic diseases. Quitting smoking is difficult, but it is one of the best things that you can do for your health. It is never too late to quit.  What are the benefits of quitting smoking?  When you quit smoking, you lower your risk of developing  serious diseases and conditions, such as:  Lung cancer or lung disease, such as COPD.  Heart disease.  Stroke.  Heart attack.  Infertility.  Osteoporosis and bone fractures.  Additionally, symptoms such as coughing, wheezing, and shortness of breath may get better when you quit. You may also find that you get sick less often because your body is stronger at fighting off colds and infections. If you are pregnant, quitting smoking can help to reduce your chances of having a baby of low birth weight.  How do I get ready to quit?  When you decide to quit smoking, create a plan to make sure that you are successful. Before you quit:  Pick a date to quit. Set a date within the next two weeks to give you time to prepare.  Write down the reasons why you are quitting. Keep this list in places where you will see it often, such as on your bathroom mirror or in your car or wallet.  Identify the people, places, things, and activities that make you want to smoke (triggers) and avoid them. Make sure to take these actions:  Throw away all cigarettes at home, at work, and in your car.  Throw away smoking accessories, such as ashtrays and lighters.  Clean your car and make sure to empty the ashtray.  Clean your home, including curtains and carpets.  Tell your family, friends, and coworkers that you are quitting. Support from your loved ones can make quitting easier.  Talk with your health care provider about your options for quitting smoking.  Find out what treatment options are covered by your health insurance.  What strategies can I use to quit smoking?  Talk with your healthcare provider about different strategies to quit smoking. Some strategies include:  Quitting smoking altogether instead of gradually lessening how much you smoke over a period of time. Research shows that quitting “cold turkey” is more successful than gradually quitting.  Attending in-person counseling to help you build problem-solving skills. You are more  likely to have success in quitting if you attend several counseling sessions. Even short sessions of 10 minutes can be effective.  Finding resources and support systems that can help you to quit smoking and remain smoke-free after you quit. These resources are most helpful when you use them often. They can include:  Online chats with a counselor.  Telephone quitlines.  Printed self-help materials.  Support groups or group counseling.  Text messaging programs.  Mobile phone applications.  Taking medicines to help you quit smoking. (If you are pregnant or breastfeeding, talk with your health care provider first.) Some medicines contain nicotine and some do not. Both types of medicines help with cravings, but the medicines that include nicotine help to relieve withdrawal symptoms. Your health care provider may recommend:  Nicotine patches, gum, or lozenges.  Nicotine inhalers or sprays.  Non-nicotine medicine that is taken by mouth.  Talk with your health care provider about combining strategies, such as taking medicines while you are also receiving in-person counseling. Using these two strategies together makes you more likely to succeed in quitting than if you used either strategy on its own.  If you are pregnant or breastfeeding, talk with your health care provider about finding counseling or other support strategies to quit smoking. Do not take medicine to help you quit smoking unless told to do so by your health care provider.  What things can I do to make it easier to quit?  Quitting smoking might feel overwhelming at first, but there is a lot that you can do to make it easier. Take these important actions:  Reach out to your family and friends and ask that they support and encourage you during this time. Call telephone quitlines, reach out to support groups, or work with a counselor for support.  Ask people who smoke to avoid smoking around you.  Avoid places that trigger you to smoke, such as bars, parties, or  smoke-break areas at work.  Spend time around people who do not smoke.  Lessen stress in your life, because stress can be a smoking trigger for some people. To lessen stress, try:  Exercising regularly.  Deep-breathing exercises.  Yoga.  Meditating.  Performing a body scan. This involves closing your eyes, scanning your body from head to toe, and noticing which parts of your body are particularly tense. Purposefully relax the muscles in those areas.  Download or purchase mobile phone or tablet apps (applications) that can help you stick to your quit plan by providing reminders, tips, and encouragement. There are many free apps, such as QuitGuide from the CDC (Centers for Disease Control and Prevention). You can find other support for quitting smoking (smoking cessation) through smokefree.gov and other websites.  How will I feel when I quit smoking?  Within the first 24 hours of quitting smoking, you may start to feel some withdrawal symptoms. These symptoms are usually most noticeable 2-3 days after quitting, but they usually do not last beyond 2-3 weeks. Changes or symptoms that you might experience include:  Mood swings.  Restlessness, anxiety, or irritation.  Difficulty concentrating.  Dizziness.  Strong cravings for sugary foods in addition to nicotine.  Mild weight gain.  Constipation.  Nausea.  Coughing or a sore throat.  Changes in how your medicines work in your body.  A depressed mood.  Difficulty sleeping (insomnia).  After the first 2-3 weeks of quitting, you may start to notice more positive results, such as:  Improved sense of smell and taste.  Decreased coughing and sore throat.  Slower heart rate.  Lower blood pressure.  Clearer skin.  The ability to breathe more easily.  Fewer sick days.  Quitting smoking is very challenging for most people. Do not get discouraged if you are not successful the first time. Some people need to make many attempts to quit before they achieve long-term success. Do your  best to stick to your quit plan, and talk with your health care provider if you have any questions or concerns.  This information is not intended to replace advice given to you by your health care provider. Make sure you discuss any questions you have with your health care provider.  Document Released: 12/12/2002 Document Revised: 08/15/2017 Document Reviewed: 05/03/2016  [employer] Interactive Patient Education © 2017 [employer]

## 2024-02-29 DIAGNOSIS — G93.2 PSEUDOTUMOR CEREBRI: Primary | ICD-10-CM

## 2024-02-29 RX ORDER — BUTALBITAL, ACETAMINOPHEN AND CAFFEINE 50; 325; 40 MG/1; MG/1; MG/1
1 TABLET ORAL EVERY 6 HOURS PRN
Qty: 10 TABLET | Refills: 0 | Status: SHIPPED | OUTPATIENT
Start: 2024-02-29

## 2024-03-01 ENCOUNTER — OFFICE VISIT (OUTPATIENT)
Dept: FAMILY MEDICINE CLINIC | Facility: CLINIC | Age: 34
End: 2024-03-01
Payer: COMMERCIAL

## 2024-03-01 VITALS
SYSTOLIC BLOOD PRESSURE: 116 MMHG | DIASTOLIC BLOOD PRESSURE: 78 MMHG | HEIGHT: 62 IN | WEIGHT: 223 LBS | OXYGEN SATURATION: 99 % | BODY MASS INDEX: 41.04 KG/M2 | HEART RATE: 72 BPM

## 2024-03-01 DIAGNOSIS — G47.00 INSOMNIA DISORDER RELATED TO KNOWN ORGANIC FACTOR: ICD-10-CM

## 2024-03-01 DIAGNOSIS — G89.29 CHRONIC NONINTRACTABLE HEADACHE, UNSPECIFIED HEADACHE TYPE: Primary | ICD-10-CM

## 2024-03-01 DIAGNOSIS — R51.9 CHRONIC NONINTRACTABLE HEADACHE, UNSPECIFIED HEADACHE TYPE: Primary | ICD-10-CM

## 2024-03-01 DIAGNOSIS — R03.0 ELEVATED BLOOD PRESSURE, SITUATIONAL: ICD-10-CM

## 2024-03-01 RX ORDER — QUETIAPINE FUMARATE 100 MG/1
100 TABLET, FILM COATED ORAL NIGHTLY
Qty: 30 TABLET | Refills: 0 | Status: SHIPPED | OUTPATIENT
Start: 2024-03-01

## 2024-03-01 NOTE — PROGRESS NOTES
"Chief Complaint  Hypertension and Headache    Subjective    History of Present Illness      Patient presents to [facility] for   History of Present Illness  Pt is here today for ER f/u for headache and Hypertension. She is taking Propanolol, but has been on the same dose for ~5 years now. She was previously taking this at night as it makes her drowsy. This week she has changed to taking it after waking up in the morning.    She has seen her neurosurgeon and ophthalmologist this week for this as well.    Of note, her boyfriend and family had moved into a new home around [place] and they have recently found black mold. They have now moved again, but she is wondering if this is related to her symptoms.        Review of Systems    I have reviewed and agree with the HPI and ROS information as above.  Idalmis Heredia, GREYSON     Objective   Vital Signs:   /78   Pulse 72   Ht 157.5 cm (62\")   Wt 101 kg (223 lb)   SpO2 99%   BMI 40.79 kg/m²            Physical Exam  Vitals and nursing note reviewed.   Constitutional:       General: She is not in acute distress.     Appearance: Normal appearance. She is not ill-appearing.   HENT:      Head: Normocephalic and atraumatic.      Right Ear: External ear normal.      Left Ear: External ear normal.      Nose: Nose normal.   Eyes:      Conjunctiva/sclera: Conjunctivae normal.   Cardiovascular:      Rate and Rhythm: Normal rate and regular rhythm.      Pulses: Normal pulses.      Heart sounds: Normal heart sounds.   Pulmonary:      Effort: Pulmonary effort is normal.      Breath sounds: Normal breath sounds.   Skin:     General: Skin is warm and dry.   Neurological:      Mental Status: She is alert and oriented to person, place, and time. Mental status is at baseline.      GCS: GCS eye subscore is 4. GCS verbal subscore is 5. GCS motor subscore is 6.   Psychiatric:         Mood and Affect: Mood normal.         Behavior: Behavior normal.    "      Thought Content: Thought content normal.         Judgment: Judgment normal.          KARINA-7:      PHQ-2 Depression Screening  Little interest or pleasure in doing things?     Feeling down, depressed, or hopeless?     PHQ-2 Total Score       PHQ-9 Depression Screening  Little interest or pleasure in doing things?     Feeling down, depressed, or hopeless?     Trouble falling or staying asleep, or sleeping too much?     Feeling tired or having little energy?     Poor appetite or overeating?     Feeling bad about yourself - or that you are a failure or have let yourself or your family down?     Trouble concentrating on things, such as reading the newspaper or watching television?     Moving or speaking so slowly that other people could have noticed? Or the opposite - being so fidgety or restless that you have been moving around a lot more than usual?     Thoughts that you would be better off dead, or of hurting yourself in some way?     PHQ-9 Total Score     If you checked off any problems, how difficult have these problems made it for you to do your work, take care of things at home, or get along with other people?        Result Review  Data Reviewed:            ED with Jhonny Medina MD (02/26/2024)   XR Shunt Series (02/26/2024 19:27)  CT Head Without Contrast (02/26/2024 18:57)  Office Visit with Ean Treviño APRN (02/28/2024)            Assessment and Plan      Diagnoses and all orders for this visit:    1. Chronic nonintractable headache, unspecified headache type (Primary)    2. Insomnia disorder related to known organic factor  -     QUEtiapine (SEROquel) 100 MG tablet; Take 1 tablet by mouth Every Night.  Dispense: 30 tablet; Refill: 0    3. Elevated blood pressure, situational      Patient is seen today wanting to address chronic headaches that she has been having that recently sent her to the ER.  She does have history of a  shunt and follows with neurosurgery for this.  She saw Ean Treviño  with neurosurgery last week for the same complaints, also saw Dr. Garcia yesterday for an eye exam.  Ean started her on Fioricet.  Dr. Garcia thought maybe this had something to do with her blood pressure.     I did review her ER note, at that time she had a normal CT scan of her head as well as x-ray shunt series.  Her blood pressure was initially elevated in triage but was normal on discharge.  Patient states they never even checked her blood pressure at discharge so how could that be the case.  Her blood pressure is normal here in office today at 116/78.  Her blood pressure is typically always normal in our office on chart review.  She is taking propranolol for anxiety from her previous PCP as well as Seroquel at night for sleep which we prescribed her.  States her headache is worse at night and is keeping her from being able to sleep. She is mostly concerned about not being able to sleep.  She states the Fioricet is not helping her headache and she is wanting to know what else she can do.  She states she has previously been on Norco and Percocet for this but was really trying not to get back on pain medicine.  She would like to increase her Seroquel to seemingly help with sleep which I am happy to do, however she will need to address the Fioricet with neurosurgery.  She has an appointment with them coming up in a few weeks on the 19th.  She is wondering if some of this could have anything to do with the fact that her new house has black mold in it.  She states she has read up on this and black mold can cause neurological symptoms as well as headaches.  I have encouraged her to stay away from the black mold and wear a mask when she is working on that house.  She states that she does have concerns about control of her headaches since it is the weekend, if it gets worse she will need to present back to the ER as our office is not open.  She states she has previously tried and failed several migraine  medications and does not feel that this is a migraine.  She is hesitant to start any other medications due to her history.  She feels she is going to have to to get back on Norco or Percocet but understands that we are not able to do this for her today, Dr. Pitts is currently out of the office and does not typically prescribe narcotics like this.  Ultimately she has elected to increase her Seroquel dosage to see if this helps with sleep and then readdress on Monday if needed, reports she may end up reaching out to neurosurgery to see if they will restart her [place].     Physical exam is generally unremarkable, there are no focal neurodeficits noted on exam.   and pedal strength is normal and equal bilaterally.  Pupils are PERRLA, EOM intact, no pain with EOM.    Plan:  1.  Increase Seroquel to 100 mg nightly as needed  2.  Can continue Fioricet as needed until able to follow-up with neurosurgery; will present to ER over the weekend if symptoms worsen  3.  Follow-up with us in 1 month        Follow Up   Return in about 1 month (around 4/1/2024).  Patient was given instructions and counseling regarding her condition or for health maintenance advice. Please see specific information pulled into the AVS if appropriate.

## 2024-03-05 ENCOUNTER — TELEPHONE (OUTPATIENT)
Dept: NEUROSURGERY | Facility: CLINIC | Age: 34
End: 2024-03-05
Payer: COMMERCIAL

## 2024-03-06 NOTE — TELEPHONE ENCOUNTER
I have contacted the eye doctor and they are going to fax over her last exam notes.    I did speak to Dr Holcomb and he said he is not giving her pain medication for treatment of black mold symptoms.  She will need to talk to her primary care provider.    DARYN HOLCOMB  [facility]

## 2024-03-06 NOTE — TELEPHONE ENCOUNTER
Placed a call to inform pt I have spoken to Dr Power and he said he is not giving her pain medication for treatment of black mold symptoms. She will need to talk to her primary care provider. Pt stated I know that but I thought he would have some experience in what to do on this situation. Informed pt again you will need to speak with your PCP. Ended call with pt understanding and acknowledgement.

## 2024-03-19 ENCOUNTER — OFFICE VISIT (OUTPATIENT)
Dept: NEUROSURGERY | Facility: CLINIC | Age: 34
End: 2024-03-19
Payer: COMMERCIAL

## 2024-03-19 VITALS — BODY MASS INDEX: 41.04 KG/M2 | WEIGHT: 223 LBS | HEIGHT: 62 IN

## 2024-03-19 DIAGNOSIS — F17.200 CURRENT EVERY DAY SMOKER: ICD-10-CM

## 2024-03-19 DIAGNOSIS — G93.2 PSEUDOTUMOR CEREBRI: Primary | ICD-10-CM

## 2024-03-19 PROCEDURE — 99214 OFFICE O/P EST MOD 30 MIN: CPT | Performed by: NEUROLOGICAL SURGERY

## 2024-03-19 PROCEDURE — 1160F RVW MEDS BY RX/DR IN RCRD: CPT | Performed by: NEUROLOGICAL SURGERY

## 2024-03-19 PROCEDURE — 1159F MED LIST DOCD IN RCRD: CPT | Performed by: NEUROLOGICAL SURGERY

## 2024-03-19 RX ORDER — HYDROCODONE BITARTRATE AND ACETAMINOPHEN 10; 325 MG/1; MG/1
TABLET ORAL
COMMUNITY
Start: 2024-03-15 | End: 2024-03-25

## 2024-03-19 RX ORDER — TIZANIDINE 4 MG/1
TABLET ORAL
COMMUNITY
Start: 2024-03-15

## 2024-03-19 RX ORDER — METHOCARBAMOL 750 MG/1
TABLET, FILM COATED ORAL
COMMUNITY
Start: 2024-03-07 | End: 2024-03-25

## 2024-03-19 NOTE — PROGRESS NOTES
"SUBJECTIVE:  Patient ID: Kelsey Mcqueen is a 33 y.o. female is here today for follow-up.    Chief Complaint:headache  Chief Complaint   Patient presents with    PSEUDOTUMOR CEREBRI     Patient states her vision symptoms have improved but she continues to have constant headache (behind her eyes).    Patient did have an eye exam on 2/29/24 which was stable.  She states she has a \"heat, hot\" feeling when she lays down.       HPI  33-year-old female known to the service with a history of benign intracranial hypertension.  She had a  shunt placed in November 2015.  She has done well for the most part since then.  She has had worsening headaches for the last few months and some complaints of vision changes.  She has the headaches are constant and daily.  She was recently seen by ophthalmology who did not detect any papilledema or visual field deficit.  She has been managing the headaches with hydrocodone from her primary care provider.    The following portions of the patient's history were reviewed and updated as appropriate: allergies, current medications, past family history, past medical history, past social history, past surgical history and problem list.    OBJECTIVE:    Review of Systems   Neurological:  Positive for headaches.   All other systems reviewed and are negative.         Physical Exam  Eyes:      Extraocular Movements: EOM normal.      Pupils: Pupils are equal, round, and reactive to light.   Neurological:      Mental Status: She is oriented to person, place, and time.      Motor: Motor strength is normal.     Coordination: Finger-Nose-Finger Test normal.      Gait: Gait is intact.   Psychiatric:         Speech: Speech normal.         Neurologic Exam     Mental Status   Oriented to person, place, and time.   Attention: normal.   Speech: speech is normal   Level of consciousness: alert  Knowledge: good.     Cranial Nerves     CN II   Visual fields full to confrontation.     CN III, IV, VI   Pupils are " equal, round, and reactive to light.  Extraocular motions are normal.     CN V   Facial sensation intact.     CN VII   Facial expression full, symmetric.     CN VIII   CN VIII normal.     CN IX, X   CN IX normal.   CN X normal.     CN XI   CN XI normal.     CN XII   CN XII normal.     Motor Exam   Muscle bulk: normal  Overall muscle tone: normal  Right arm pronator drift: absent  Left arm pronator drift: absent    Strength   Strength 5/5 throughout.     Sensory Exam   Light touch normal.   Pinprick normal.     Gait, Coordination, and Reflexes     Gait  Gait: normal    Coordination   Finger to nose coordination: normal    Tremor   Resting tremor: absent  Intention tremor: absent  Action tremor: absent    Reflexes   Reflexes 2+ except as noted.       Independent Review of Radiographic Studies:   Recent CT scan and shunt series unremarkable.    ASSESSMENT/PLAN:  The patient complains of worsening chronic daily headaches in the setting of a diagnosis of benign intracranial hypertension.  Her ophthalmology exam does not show any papilledema.  Given the change in her headaches I think would be reasonable to do a nuclear medicine shunt study to get an opening pressure and test the patency of the shunt.  We discussed this at length.  We will get this scheduled soon as possible.      1. Pseudotumor cerebri    2. Current every day smoker        The patient's Body mass index is 40.79 kg/m².. BMI is above normal parameters. Recommendations include: educational material    Return for FOLLOW UP BASED ON FINDINGS.      Joe Power MD

## 2024-03-19 NOTE — PATIENT INSTRUCTIONS
"PATIENT TO CONTINUE TO FOLLOW UP WITH HER PRIMARY CARE PROVIDER FOR YEARLY PHYSICAL EXAMS TO ENSURE COMPLETE HEALTH MAINTENANCE    BMI for Adults  Body mass index (BMI) is a number found using a person's weight and height. BMI can help tell how much of a person's weight is made up of fat. BMI does not measure body fat directly. It is used instead of tests that directly measure body fat, which can be difficult and expensive.  What are BMI measurements used for?  BMI is useful to:  Find out if your weight puts you at higher risk for medical problems.  Help recommend changes, such as in diet and exercise. This can help you reach a healthy weight. BMI screening can be done again to see if these changes are working.  How is BMI calculated?  Your height and weight are measured. The BMI is found from those numbers. This can be done with U.S[place] or metric measurements. Note that charts and online BMI calculators are available to help you find your BMI quickly and easily without doing these calculations.  To calculate your BMI in U.S[place] measurements:  Measure your weight in pounds (lb).  Multiply the number of pounds by 703.  So, for an adult who weighs 150 lb, multiply that number by 703: 150 x 703, which equals 105,450.  Measure your height in inches. Then multiply that number by itself to get a measurement called \"inches squared.\"  So, for an adult who is 70 inches tall, the \"inches squared\" measurement is 70 inches x 70 inches, which equals 4,900 inches squared.  Divide the total from step 2 (number of lb x 703) by the total from step 3 (inches squared): 105,450 ÷ 4,900 = 21.5. This is your BMI.  To calculate your BMI in metric measurements:    Measure your weight in kilograms (kg).  For this example, the weight is 70 kg.  Measure your height in meters (m). Then multiply that number by itself to get a measurement called \"meters squared.\"  So, for an adult who is 1.75 m tall, the \"meters squared\" measurement is 1.75 m x 1.75 " m, which equals 3.1 meters squared.  Divide the number of kilograms (your weight) by the meters squared number. In this example: 70 ÷ 3.1 = 22.6. This is your BMI.  What do the results mean?  BMI charts are used to see if you are underweight, normal weight, overweight, or obese. The following guidelines will be used:  Underweight: BMI less than 18.5.  Normal weight: BMI between 18.5 and 24.9.  Overweight: BMI between 25 and 29.9.  Obese: BMI of 30 or above.  BMI is a tool and cannot diagnose a condition. Talk with your health care provider about what your BMI means for you. Keep these notes in mind:  Weight includes fat and muscle. Someone with a muscular build, such as an athlete, may have a BMI that is higher than 24.9. In cases like these, BMI is not a correct measure of body fat.  If you have a BMI of 25 or higher, your provider may need to do more testing to find out if excess body fat is the cause.  BMI is measured the same way for males and females. Females usually have more body fat than males of the same height and weight.  Where to find more information  For more information about BMI, including tools to quickly find your BMI, go to:  Centers for Disease Control and Prevention: cdc.gov  American Heart Association: heart.org  National Heart, Lung, and Blood [place]: nhlbi.nih.gov  This information is not intended to replace advice given to you by your health care provider. Make sure you discuss any questions you have with your health care provider.  Document Revised: 09/07/2023 Document Reviewed: 08/31/2023  [employer] Patient Education © 2023 [employer]  DASH Eating Plan  DASH stands for Dietary Approaches to Stop Hypertension. The DASH eating plan is a healthy eating plan that has been shown to:  Reduce high blood pressure (hypertension).  Reduce your risk for type 2 diabetes, heart disease, and stroke.  Help with weight loss.  What are tips for following this plan?  Reading food labels  Check food  "labels for the amount of salt (sodium) per serving. Choose foods with less than 5 percent of the Daily Value of sodium. Generally, foods with less than 300 milligrams (mg) of sodium per serving fit into this eating plan.  To find whole grains, look for the word \"whole\" as the first word in the ingredient list.  Shopping  Buy products labeled as \"low-sodium\" or \"no salt added.\"  Buy fresh foods. Avoid canned foods and pre-made or frozen meals.  Cooking  Avoid adding salt when cooking. Use salt-free seasonings or herbs instead of table salt or sea salt. Check with your health care provider or pharmacist before using salt substitutes.  Do not galan foods. Cook foods using healthy methods such as baking, boiling, grilling, roasting, and broiling instead.  Cook with heart-healthy oils, such as olive, canola, avocado, soybean, or sunflower oil.  Meal planning    Eat a balanced diet that includes:  4 or more servings of fruits and 4 or more servings of vegetables each day. Try to fill one-half of your plate with fruits and vegetables.  6-8 servings of whole grains each day.  Less than 6 oz (170 g) of lean meat, poultry, or fish each day. A 3-oz (85-g) serving of meat is about the same size as a deck of cards. One egg equals 1 oz (28 g).  2-3 servings of low-fat dairy each day. One serving is 1 cup (237 mL).  1 serving of nuts, seeds, or beans 5 times each week.  2-3 servings of heart-healthy fats. Healthy fats called omega-3 fatty acids are found in foods such as walnuts, flaxseeds, fortified milks, and eggs. These fats are also found in cold-water fish, such as sardines, salmon, and mackerel.  Limit how much you eat of:  Canned or prepackaged foods.  Food that is high in trans fat, such as some fried foods.  Food that is high in saturated fat, such as fatty meat.  Desserts and other sweets, sugary drinks, and other foods with added sugar.  Full-fat dairy products.  Do not salt foods before eating.  Do not eat more than 4 " egg yolks a week.  Try to eat at least 2 vegetarian meals a week.  Eat more home-cooked food and less restaurant, buffet, and fast food.  Lifestyle  When eating at a restaurant, ask that your food be prepared with less salt or no salt, if possible.  If you drink alcohol:  Limit how much you use to:  0-1 drink a day for women who are not pregnant.  0-2 drinks a day for men.  Be aware of how much alcohol is in your drink. In the [place] one drink equals one 12 oz bottle of beer (355 mL), one 5 oz glass of wine (148 mL), or one 1½ oz glass of hard liquor (44 mL).  General information  Avoid eating more than 2,300 mg of salt a day. If you have hypertension, you may need to reduce your sodium intake to 1,500 mg a day.  Work with your health care provider to maintain a healthy body weight or to lose weight. Ask what an ideal weight is for you.  Get at least 30 minutes of exercise that causes your heart to beat faster (aerobic exercise) most days of the week. Activities may include walking, swimming, or biking.  Work with your health care provider or dietitian to adjust your eating plan to your individual calorie needs.  What foods should I eat?  Fruits  All fresh, dried, or frozen fruit. Canned fruit in natural juice (without added sugar).  Vegetables  Fresh or frozen vegetables (raw, steamed, roasted, or grilled). Low-sodium or reduced-sodium tomato and vegetable juice. Low-sodium or reduced-sodium tomato sauce and tomato paste. Low-sodium or reduced-sodium canned vegetables.  Grains  Whole-grain or whole-wheat bread. Whole-grain or whole-wheat pasta. Brown rice. Oatmeal. Quinoa. Bulgur. Whole-grain and low-sodium cereals. Kristi bread. Low-fat, low-sodium crackers. Whole-wheat flour tortillas.  Meats and other proteins  Skinless chicken or turkey. Ground chicken or turkey. Pork with fat trimmed off. Fish and seafood. Egg whites. Dried beans, peas, or lentils. Unsalted nuts, nut butters, and seeds. Unsalted canned beans.  Lean cuts of beef with fat trimmed off. Low-sodium, lean precooked or cured meat, such as sausages or meat loaves.  Dairy  Low-fat (1%) or fat-free (skim) milk. Reduced-fat, low-fat, or fat-free cheeses. Nonfat, low-sodium ricotta or cottage cheese. Low-fat or nonfat yogurt. Low-fat, low-sodium cheese.  Fats and oils  Soft margarine without trans fats. Vegetable oil. Reduced-fat, low-fat, or light mayonnaise and salad dressings (reduced-sodium). Canola, safflower, olive, avocado, soybean, and sunflower oils. Avocado.  Seasonings and condiments  Herbs. Spices. Seasoning mixes without salt.  Other foods  Unsalted popcorn and pretzels. Fat-free sweets.  The items listed above may not be a complete list of foods and beverages you can eat. Contact a dietitian for more information.  What foods should I avoid?  Fruits  Canned fruit in a light or heavy syrup. Fried fruit. Fruit in cream or butter sauce.  Vegetables  Creamed or fried vegetables. Vegetables in a cheese sauce. Regular canned vegetables (not low-sodium or reduced-sodium). Regular canned tomato sauce and paste (not low-sodium or reduced-sodium). Regular tomato and vegetable juice (not low-sodium or reduced-sodium). Pickles. Olives.  Grains  Baked goods made with fat, such as croissants, muffins, or some breads. Dry pasta or rice meal packs.  Meats and other proteins  Fatty cuts of meat. Ribs. Fried meat. Gregg. Bologna, salami, and other precooked or cured meats, such as sausages or meat loaves. Fat from the back of a pig (fatback). Bratwurst. Salted nuts and seeds. Canned beans with added salt. Canned or smoked fish. Whole eggs or egg yolks. Chicken or turkey with skin.  Dairy  Whole or 2% milk, cream, and half-and-half. Whole or full-fat cream cheese. Whole-fat or sweetened yogurt. Full-fat cheese. Nondairy creamers. Whipped toppings. Processed cheese and cheese spreads.  Fats and oils  Butter. Stick margarine. Lard. Shortening. Ghee. Gregg fat. Tropical oils,  such as coconut, palm kernel, or palm oil.  Seasonings and condiments  Onion salt, garlic salt, seasoned salt, table salt, and sea salt. Worcestershire sauce. Tartar sauce. Barbecue sauce. Teriyaki sauce. Soy sauce, including reduced-sodium. Steak sauce. Canned and packaged gravies. Fish sauce. Oyster sauce. Cocktail sauce. Store-bought horseradish. Ketchup. Mustard. Meat flavorings and tenderizers. Bouillon cubes. Hot sauces. Pre-made or packaged marinades. Pre-made or packaged taco seasonings. Relishes. Regular salad dressings.  Other foods  Salted popcorn and pretzels.  The items listed above may not be a complete list of foods and beverages you should avoid. Contact a dietitian for more information.  Where to find more information  National Heart, Lung, and Blood [place]: www.nhlbi.nih.gov  American Heart Association: www.heart.org  Academy of Nutrition and Dietetics: www.eatright.org  National Kidney Foundation: www.kidney.org  Summary  The DASH eating plan is a healthy eating plan that has been shown to reduce high blood pressure (hypertension). It may also reduce your risk for type 2 diabetes, heart disease, and stroke.  When on the DASH eating plan, aim to eat more fresh fruits and vegetables, whole grains, lean proteins, low-fat dairy, and heart-healthy fats.  With the DASH eating plan, you should limit salt (sodium) intake to 2,300 mg a day. If you have hypertension, you may need to reduce your sodium intake to 1,500 mg a day.  Work with your health care provider or dietitian to adjust your eating plan to your individual calorie needs.  This information is not intended to replace advice given to you by your health care provider. Make sure you discuss any questions you have with your health care provider.  Document Revised: 11/20/2020 Document Reviewed: 11/20/2020  Else[employer] Patient Education © 2023 [employer].

## 2024-03-20 DIAGNOSIS — F90.0 ATTENTION DEFICIT HYPERACTIVITY DISORDER (ADHD), PREDOMINANTLY INATTENTIVE TYPE: ICD-10-CM

## 2024-03-20 RX ORDER — LISDEXAMFETAMINE DIMESYLATE CAPSULES 50 MG/1
50 CAPSULE ORAL EVERY MORNING
Qty: 30 CAPSULE | Refills: 0 | Status: SHIPPED | OUTPATIENT
Start: 2024-03-20

## 2024-03-20 NOTE — TELEPHONE ENCOUNTER
Rx Refill Note  Requested Prescriptions     Pending Prescriptions Disp Refills    lisdexamfetamine (Vyvanse) 50 MG capsule 30 capsule 0     Sig: Take 1 capsule by mouth Every Morning      Last office visit with prescribing clinician: 2/19/2024   Last telemedicine visit with prescribing clinician: Visit date not found   Next office visit with prescribing clinician: 5/17/2024                       Urine Drug Screen - Urine, Clean Catch (08/18/2023 15:33)       Tessie De Dios RN  03/20/24, 11:41 CDT

## 2024-03-20 NOTE — TELEPHONE ENCOUNTER
Caller: Kelsey Mcqueen    Relationship: Self    Best call back number: [phone]    Requested Prescriptions:   Requested Prescriptions     Pending Prescriptions Disp Refills    lisdexamfetamine (Vyvanse) 50 MG capsule 30 capsule 0     Sig: Take 1 capsule by mouth Every Morning        Pharmacy where request should be sent: [facility] #[id number] - [place] - [address] - [phone] [facility] [phone] FX     Last office visit with prescribing clinician: 2/19/2024   Last telemedicine visit with prescribing clinician: Visit date not found   Next office visit with prescribing clinician: 5/17/2024     Does the patient have less than a 3 day supply:  [x] Yes  [] No    Would you like a call back once the refill request has been completed: [x] Yes [] No    If the office needs to give you a call back, can they leave a voicemail: [x] Yes [] No    Vincenzo Velarde   03/20/24 10:03 CDT

## 2024-03-21 ENCOUNTER — TELEPHONE (OUTPATIENT)
Dept: NEUROSURGERY | Facility: CLINIC | Age: 34
End: 2024-03-21
Payer: COMMERCIAL

## 2024-03-21 NOTE — TELEPHONE ENCOUNTER
Order has been updated, testing has been scheduled, and patient has been notified of day/time.      DARYN HARRISON [facility]  DR SUNNY HOLCOMB  [facility] NEUROSURGERY

## 2024-03-21 NOTE — TELEPHONE ENCOUNTER
Caller: Kelsey Mcqueen    Relationship to patient: Self    Best call back number: [phone]    Patient is needing: PATIENT CALLED, STATES RADIOLOGY ORDER WAS NOT CREATED AS STAT. PATIENT STATES IN ORDER TO SCHEDULE APPT FOR FRIDAY, RADIOLOGY NEEDS ORDER UPDATED TO STAT. PLEASE UPDATE ORDER. PATIENT IS ALSO REQUESTING A CALL FROM DARYN TO DISCUSS WHAT IS GOING ON. PLEASE CALL PATIENT TO ADVISE.    THANK YOU

## 2024-03-21 NOTE — TELEPHONE ENCOUNTER
PT IS AWARE OF NUC MED SHUNT STUDY APT SCHEDULED FOR 03/28/2024 8:00 CHECK IN [facility] MAIN ENTRANCE

## 2024-03-25 ENCOUNTER — OFFICE VISIT (OUTPATIENT)
Dept: FAMILY MEDICINE CLINIC | Facility: CLINIC | Age: 34
End: 2024-03-25
Payer: COMMERCIAL

## 2024-03-25 VITALS
HEIGHT: 62 IN | BODY MASS INDEX: 40.12 KG/M2 | OXYGEN SATURATION: 98 % | DIASTOLIC BLOOD PRESSURE: 87 MMHG | HEART RATE: 80 BPM | WEIGHT: 218 LBS | SYSTOLIC BLOOD PRESSURE: 119 MMHG

## 2024-03-25 DIAGNOSIS — G93.2 INCREASED INTRACRANIAL PRESSURE: Primary | ICD-10-CM

## 2024-03-25 DIAGNOSIS — G93.2 PSEUDOTUMOR CEREBRI: ICD-10-CM

## 2024-03-25 PROCEDURE — 99214 OFFICE O/P EST MOD 30 MIN: CPT | Performed by: PEDIATRICS

## 2024-03-25 RX ORDER — PROPRANOLOL HYDROCHLORIDE 80 MG/1
80 CAPSULE, EXTENDED RELEASE ORAL DAILY
COMMUNITY

## 2024-03-25 RX ORDER — ACETAZOLAMIDE 125 MG/1
125 TABLET ORAL 3 TIMES DAILY
Qty: 90 TABLET | Refills: 1 | Status: SHIPPED | OUTPATIENT
Start: 2024-03-25

## 2024-03-25 NOTE — ASSESSMENT & PLAN NOTE
Symptoms are suspicious  work up is in progress old  shunt from 9 years ago may be an issue  discussed her care via secure chat with Dr hester and he agrees with the addition of diamox

## 2024-03-25 NOTE — PROGRESS NOTES
"Chief Complaint  Headache and Blurred Vision    Subjective    History of Present Illness      Patient presents to [facility] PRIMARY CARE for   History of Present Illness  Pt is here today with c/o blurred vision and headaches that has been occurring since December 2023. She does have a  shunt. She has been seen by Neuro and ER without any intervention done at this point.   She reports that she finally feels mentally well, but now physically unwell.        Review of Systems    I have reviewed and agree with the HPI information as above.  Curtis Pitts MD     Objective   Vital Signs:   /87   Pulse 80   Ht 157.5 cm (62\")   Wt 98.9 kg (218 lb)   SpO2 98%   BMI 39.87 kg/m²            Physical Exam  Constitutional:       Appearance: Normal appearance. She is normal weight.   Eyes:      Comments: Fundoscopically left is more pale than right   Cardiovascular:      Rate and Rhythm: Normal rate and regular rhythm.      Heart sounds: Normal heart sounds.   Pulmonary:      Effort: Pulmonary effort is normal.      Breath sounds: Normal breath sounds.   Neurological:      Mental Status: She is alert.   Psychiatric:         Mood and Affect: Mood normal.         Behavior: Behavior normal.             Result Review  Data Reviewed:          Office Visit with Joe Hester MD (03/19/2024)     XR Shunt Series (02/26/2024 19:27)  CT Head Without Contrast (02/26/2024 18:57)     Assessment and Plan      Diagnoses and all orders for this visit:    1. Increased intracranial pressure (Primary)  Assessment & Plan:  Symptoms are suspicious  work up is in progress old  shunt from 9 years ago may be an issue  discussed her care via secure chat with Dr hester and he agrees with the addition of diamox    Orders:  -     acetaZOLAMIDE (DIAMOX) 125 MG tablet; Take 1 tablet by mouth 3 (Three) Times a Day.  Dispense: 90 tablet; Refill: 1    2. Pseudotumor cerebri  Assessment & Plan:  Symptomatic now    worry about " shunt issues  Dr Power has work up in progress    Orders:  -     acetaZOLAMIDE (DIAMOX) 125 MG tablet; Take 1 tablet by mouth 3 (Three) Times a Day.  Dispense: 90 tablet; Refill: 1            Follow Up   Return in about 2 weeks (around 4/8/2024) for Recheck.  Patient was given instructions and counseling regarding her condition or for health maintenance advice. Please see specific information pulled into the AVS if appropriate.

## 2024-03-28 ENCOUNTER — HOSPITAL ENCOUNTER (OUTPATIENT)
Dept: NUCLEAR MEDICINE | Facility: HOSPITAL | Age: 34
Discharge: HOME | End: 2024-03-28
Payer: COMMERCIAL

## 2024-03-28 DIAGNOSIS — G93.2 PSEUDOTUMOR CEREBRI: ICD-10-CM

## 2024-03-28 LAB
APPEARANCE CSF: CLEAR
COLOR CSF: COLORLESS
COLOR SPUN CSF: COLORLESS
GLUCOSE CSF-MCNC: 86 MG/DL (ref 40–70)
NUC CELL # CSF MANUAL: 1 /MM3 (ref 0–8)
PROT CSF-MCNC: 16.9 MG/DL (ref 15–45)
RBC # CSF MANUAL: 0 /MM3 (ref 0–0)
SPECIMEN VOL CSF: 3 ML
TUBE # CSF: 3

## 2024-03-28 PROCEDURE — 87205 SMEAR GRAM STAIN: CPT | Performed by: NURSE PRACTITIONER

## 2024-03-28 PROCEDURE — 87070 CULTURE OTHR SPECIMN AEROBIC: CPT | Performed by: NURSE PRACTITIONER

## 2024-03-28 PROCEDURE — 78645 CSF SHUNT EVALUATION: CPT

## 2024-03-28 PROCEDURE — 83605 ASSAY OF LACTIC ACID: CPT | Performed by: NURSE PRACTITIONER

## 2024-03-28 PROCEDURE — A9540 TC99M MAA: HCPCS | Performed by: NEUROLOGICAL SURGERY

## 2024-03-28 PROCEDURE — 61070 BRAIN CANAL SHUNT PROCEDURE: CPT | Performed by: NURSE PRACTITIONER

## 2024-03-28 PROCEDURE — 87015 SPECIMEN INFECT AGNT CONCNTJ: CPT | Performed by: NURSE PRACTITIONER

## 2024-03-28 PROCEDURE — 89050 BODY FLUID CELL COUNT: CPT | Performed by: NURSE PRACTITIONER

## 2024-03-28 PROCEDURE — 84157 ASSAY OF PROTEIN OTHER: CPT | Performed by: NURSE PRACTITIONER

## 2024-03-28 PROCEDURE — 0 TECHNETIUM ALBUMIN AGGREGATED: Performed by: NEUROLOGICAL SURGERY

## 2024-03-28 PROCEDURE — 82945 GLUCOSE OTHER FLUID: CPT | Performed by: NURSE PRACTITIONER

## 2024-03-28 RX ADMIN — KIT FOR THE PREPARATION OF TECHNETIUM TC 99M ALBUMIN AGGREGATED 1 DOSE: 2.5 INJECTION, POWDER, FOR SOLUTION INTRAVENOUS at 08:48

## 2024-03-28 NOTE — PROCEDURES
Nuclear medicine shunt evaluation  Date of procedure:  3/28/2024  Procedural indication: Shunt malfunction, headache      Ms. Mcqueen was brought into the nuclear med room where she was laid supine.  The shunt reservoir was identified over the right parietal region.  The patient's skin was cleansed using a Betadine solution x 3 and draped in sterile fashion.  A 25-gauge butterfly needle was inserted into the shunt reservoir.  There was not brisk return of CSF. gentle aspiration was applied which did produce clear CSF which was allowed to fill the manometer.  Opening pressure of 13 cm H20. Gentle aspiration was applied and 3mL of CSF was withdrawn.  Proximal and distal flow was checked and was found to be patent.  Closing pressure was 0.  While occluding the distal tubing, I then injected the radioisotope followed by 0.5 mL of CSF fluid.  Once the isotope was injected I withdrew the butterfly needle.  There was 0 mL's of blood loss.  The patient tolerated procedure well.  She then proceeded with the rest of the nuclear med shunt study.  All needles were removed from the tray and placed in the sharps container.  CSF specimens were collected per sterile technique, labeled at bedside per hospital protocol, and walked to lab.    Ean Treviño, APRN; 3/28/2024; 09:21 CDT    Procedure 49080: CSF shunt evaluation           
neck

## 2024-03-29 ENCOUNTER — TELEPHONE (OUTPATIENT)
Dept: NEUROSURGERY | Facility: CLINIC | Age: 34
End: 2024-03-29
Payer: COMMERCIAL

## 2024-03-29 ENCOUNTER — TELEPHONE (OUTPATIENT)
Dept: FAMILY MEDICINE CLINIC | Facility: CLINIC | Age: 34
End: 2024-03-29
Payer: COMMERCIAL

## 2024-03-29 DIAGNOSIS — G43.001 MIGRAINE WITHOUT AURA AND WITH STATUS MIGRAINOSUS, NOT INTRACTABLE: Primary | ICD-10-CM

## 2024-03-29 LAB — LACTATE CSF-MCNC: 18 MG/DL (ref 10–22)

## 2024-03-29 NOTE — TELEPHONE ENCOUNTER
Called patient to discuss [employer]uke[employer]'s and results.  Nuclear medicine rods were normal.  Her opening pressure on the shunt tap was also within normal limits.  At this point she does not have a shunt malfunction.  In issue related to the shunt does not explain the concerns that she has at this point.  She also had concerns about her CSF glucose level.  I explained to her very clearly that the CSF glucose level while elevated taken out of context from a blood glucose level is not clinically significant and is not related to her complaints.  She also had concerns about reversal of cervical lordosis mentioned on the report of a CT scan of her cervical spine.  I explained to her that her most recent shunt study imaging does not demonstrate reversal of cervical lordosis while she is standing so I do not think this is clinically significant nor does it explain any of her concerns or complaints.  I do not think this requires any further imaging.  I explained to her very clearly and very patiently that I would recommend referral to neurology service as there appears to be no issue for neurosurgical intervention.  She acknowledged understand this.  Her questions and concerns were patiently addressed.  This telephone encounter was conducted in the presence of my medical assistant and nurse practitioner.

## 2024-03-29 NOTE — TELEPHONE ENCOUNTER
"Patient contacted office regarding shunt surgery and not feeling well after and going over lab results. I advised patient that we can not go over labs if we are not the ordering provider. I also explained to her if she is concerned about complications from surgery, she will need to contact the surgeon or report to the ED. She states \"Dr Pitts is aware of all of this. Is there a way to send him a message? He responds quicker than the surgeon will.\" I again, advised her that he is not in the office on Friday's and she will need to contact the surgeon or go to the ED if symptoms persist or worsen.   "

## 2024-04-02 ENCOUNTER — OFFICE VISIT (OUTPATIENT)
Dept: FAMILY MEDICINE CLINIC | Facility: CLINIC | Age: 34
End: 2024-04-02
Payer: COMMERCIAL

## 2024-04-02 VITALS
OXYGEN SATURATION: 99 % | HEIGHT: 62 IN | SYSTOLIC BLOOD PRESSURE: 111 MMHG | HEART RATE: 68 BPM | BODY MASS INDEX: 40.12 KG/M2 | WEIGHT: 218 LBS | DIASTOLIC BLOOD PRESSURE: 76 MMHG

## 2024-04-02 DIAGNOSIS — G93.2 INCREASED INTRACRANIAL PRESSURE: Primary | ICD-10-CM

## 2024-04-02 PROCEDURE — 99213 OFFICE O/P EST LOW 20 MIN: CPT | Performed by: PEDIATRICS

## 2024-04-02 NOTE — PROGRESS NOTES
"Chief Complaint  Headache, Shunt Problem, and Neuro Referral    Subjective    History of Present Illness      Patient presents to [facility] PRIMARY CARE for   History of Present Illness  Pt is here today requesting a referral to a new Neurologist. She has continued Shunt/neuro problems occurring.       Review of Systems    I have reviewed and agree with the HPI information as above.  Curtis Pitts MD     Objective   Vital Signs:   /76   Pulse 68   Ht 157.5 cm (62\")   Wt 98.9 kg (218 lb)   SpO2 99%   BMI 39.87 kg/m²            Physical Exam  Constitutional:       Appearance: Normal appearance. She is normal weight.   Cardiovascular:      Rate and Rhythm: Normal rate and regular rhythm.      Heart sounds: Normal heart sounds.   Pulmonary:      Effort: Pulmonary effort is normal.      Breath sounds: Normal breath sounds.   Neurological:      Mental Status: She is alert.   Psychiatric:         Mood and Affect: Mood normal.         Behavior: Behavior normal.             Result Review  Data Reviewed:              Procedures by Ean Treviño APRN (03/28/2024 08:30)      Assessment and Plan      Diagnoses and all orders for this visit:    1. Increased intracranial pressure (Primary)  Assessment & Plan:  Felt relief immediately after spinal      on diamox  may be slowly helping can't be sure.  Will get back into Dr Pizarro for neurological evaluation              Follow Up   No follow-ups on file.  Patient was given instructions and counseling regarding her condition or for health maintenance advice. Please see specific information pulled into the AVS if appropriate.       "

## 2024-04-02 NOTE — ASSESSMENT & PLAN NOTE
Felt relief immediately after spinal      on diamox  may be slowly helping can't be sure.  Neurology appt is being arranged by Dr Power

## 2024-04-07 LAB — GRAM STN SPEC: NORMAL

## 2024-04-24 ENCOUNTER — TELEPHONE (OUTPATIENT)
Dept: VASCULAR SURGERY | Age: 34
End: 2024-04-24

## 2024-04-24 NOTE — TELEPHONE ENCOUNTER
Contacted the patient back and she stated that she saw Dr. Ortiz yesterday and it was okay for the port to be removed.  I let her know that we would need an order from Dr. Ortiz before scheduling that port removal.  The patient voiced understanding.

## 2024-04-24 NOTE — TELEPHONE ENCOUNTER
Pt called back stating she was disconnected from office. Pt is trying to get port removal. There are no notes I could find. Please contact pt to discuss, thank you.

## 2024-05-17 ENCOUNTER — TELEPHONE (OUTPATIENT)
Dept: FAMILY MEDICINE CLINIC | Facility: CLINIC | Age: 34
End: 2024-05-17
Payer: COMMERCIAL

## 2024-05-21 DIAGNOSIS — G93.2 PSEUDOTUMOR CEREBRI: ICD-10-CM

## 2024-05-21 DIAGNOSIS — G93.2 INCREASED INTRACRANIAL PRESSURE: ICD-10-CM

## 2024-05-21 DIAGNOSIS — G47.00 INSOMNIA DISORDER RELATED TO KNOWN ORGANIC FACTOR: ICD-10-CM

## 2024-05-21 RX ORDER — ACETAZOLAMIDE 125 MG/1
125 TABLET ORAL 3 TIMES DAILY
Qty: 90 TABLET | Refills: 1 | OUTPATIENT
Start: 2024-05-21

## 2024-05-21 RX ORDER — QUETIAPINE FUMARATE 50 MG/1
50 TABLET, FILM COATED ORAL
Qty: 30 TABLET | Refills: 2 | OUTPATIENT
Start: 2024-05-21

## 2024-05-30 ENCOUNTER — TREATMENT (OUTPATIENT)
Dept: PHYSICAL THERAPY | Facility: CLINIC | Age: 34
End: 2024-05-30
Payer: COMMERCIAL

## 2024-05-30 DIAGNOSIS — M54.12 RADICULOPATHY, CERVICAL: ICD-10-CM

## 2024-05-30 DIAGNOSIS — M54.2 CERVICALGIA: Primary | ICD-10-CM

## 2024-05-30 PROCEDURE — 97162 PT EVAL MOD COMPLEX 30 MIN: CPT | Performed by: PHYSICAL THERAPIST

## 2024-05-30 NOTE — PROGRESS NOTES
Physical Therapy Initial Evaluation and Plan of Care  [address] [place] [address]    Patient: Kelsey Mcqueen               : 1990  Visit Date: 2024  Referring practitioner: GREYSON Pires  Date of Initial Visit: 2024  Patient seen for 1 sessions    Visit Diagnoses:    ICD-10-CM ICD-9-CM   1. Cervicalgia  M54.2 723.1   2. Radiculopathy, cervical  M54.12 723.4     Past Medical History:   Diagnosis Date    ADHD (attention deficit hyperactivity disorder)     Crohn disease     recently diagnosis, going to start infusion therapy    Deep vein thrombosis     Depression with anxiety 2018    Fibromyalgia, primary 2016    Headache     Intracranial hypertension     Kidney stones     Lactation disorder     Pseudotumor cerebri 2016    Tobacco abuse 2016     Past Surgical History:   Procedure Laterality Date    BRAIN SURGERY  2015    COLONOSCOPY      CYSTOSCOPY ELECTROHYDRAULIC LITHOTRIPSY      HYSTERECTOMY  2019    KIDNEY STONE SURGERY      OTHER SURGICAL HISTORY      Intracranial shunt     SHUNT INSERTION  2015         SUBJECTIVE     Subjective Evaluation    History of Present Illness  Date of onset: 2023  Mechanism of injury: She is here with c/o neck pain with tae arm numbness (right worse than left). She has a h/o of a  shunt. She feels like her arm is not functioning out she wants it to. It was just with sleeping but now it happens when she is awake. She says she was tested for MS and these markers were higher. She also says she has had issues with vision and she is more sensitive to light. She says on Easter, she says her right leg wouldn't cooperate with walking. She has noticed an increase in bladder incontinence over the last few weeks.       Patient Occupation: disabled Pain  Current pain ratin  At worst pain rating: 10  Location: neck pain    Social Support  Lives with: significant  other (7 yo, BF, children (12,13))    Hand dominance: right    Diagnostic Tests  CT scan: abnormal    Patient Goals  Patient goals for therapy: decreased pain, increased motion, increased strength and independence with ADLs/IADLs      CT neck: reversal of cervical lordosis         OBJECTIVE     Objective          Static Posture     Head  Forward.    Shoulders  Asymmetric shoulders.    Thoracic Spine  Hyperkyphosis.    Palpation     Right   Hypertonic in the suboccipitals and upper trapezius. Tenderness of the suboccipitals and upper trapezius.     Tenderness   Cervical Spine   Tenderness in the facet joint (upper cervical) and right 1st rib.     Neurological Testing     Sensation   Cervical/Thoracic     Right   Diminished: light touch    Comments   Right light touch: right C7.     Active Range of Motion   Cervical/Thoracic Spine   Cervical    Left rotation: 52 degrees   Right rotation: 48 degrees     Strength/Myotome Testing     Left Shoulder     Planes of Motion   Flexion: 5     Right Shoulder     Planes of Motion   Flexion: 4     Left Elbow   Flexion: 5  Extension: 5    Right Elbow   Flexion: 4  Extension: 4    Left Wrist/Hand   Wrist extension: 5  Wrist flexion: 5    Right Wrist/Hand   Wrist extension: 4  Wrist flexion: 4    Tests   Cervical     Left   Negative Spurling's sign.     Right   Positive cervical distraction and Spurling's sign.     Additional Tests Details  Positive Shannan's right      Female  strength (pounds)  AGE Right Hand RH Norms Left Hand LH Norms   30-34 50* 79+19.2 75 68+17.7   (MAGUI Soliman et al; Hand Dynometer: Effects of trials and sessions.  Perpetual and Motor Skills 61:195-8, 1985)  Key  * = Dominant hand  > = Intervention     Therapy Education/Self Care 62553   Education offered today HEP  Anatomy review of imaging and radiculopathy   Medbride Code    Ongoing HEP   Posture  Scapular retraction   Timed Minutes        Total Timed Treatment:     0   mins  Total Time of Visit:            30    mins    ASSESSMENT/PLAN     GOALS:  Goals                                                    Progress Note due by 6/29/24                                                                Recert due by 8/27/24   STG by: 3 weeks Comments Date Status   Improve mobility through thoracic and CT junction       Decreased muscle guarding  throughout neck and shoulder girdle                       LTG by: 6 weeks       Reports no radicular symptoms for a week       Improve cervical rotation tae to 60 deg with no pain      Report no neck/shoulder pain except occasional minor twinges no more than 2-3/10      Understands improved ergonomics for work and HEP for flexibility and posture        Improve NDI score to 20%                    Assessment & Plan       Assessment  Impairments: abnormal muscle firing, abnormal muscle tone, abnormal or restricted ROM, activity intolerance, impaired physical strength, lacks appropriate home exercise program and pain with function   Functional limitations: uncomfortable because of pain, sitting and unable to perform repetitive tasks   Assessment details: Her symptoms are consistent with facet pain with right cervical radiculopathy. She c/o headaches and shoulder pain consistent with facet referral pain. She has secondary muscle guarding as well. Her forward head and hyperkyphosis of her thoracic would tend to put extra stress on her neck. There may also be a component of TOS but her cervical appears to be the primary issue. She is going to see a neurologist to r/o possible MS.  This patient would benefit from skilled PT.   Prognosis: good    Plan  Therapy options: will be seen for skilled therapy services  Planned modality interventions: dry needling, low level laser therapy, TENS and traction  Planned therapy interventions: manual therapy, neuromuscular re-education, spinal/joint mobilization, home exercise program, body mechanics training, stretching, therapeutic activities, strengthening,  soft tissue mobilization and postural training  Frequency: 3x week  Duration in weeks: 12  Treatment plan discussed with: patient  Plan details: Focus early on pain relief with soft tissue work and other modalities, including potentially dry needling. Manual therapy used for mobilizations of the spine and upper thoracic and flexibility through the upper girdle. Progress with stability for posture and the shoulder girdle and progress HEP for the same.         SIGNATURE: Vahe Mendoza PT, [place] License #: [id number]  Electronically Signed on 5/30/2024      Initial Certification  Certification Period: 5/30/2024 through 8/27/2024  I certify that the therapy services are furnished while this patient is under my care.  The services outlined above are required by this patient, and will be reviewed every 90 days.     PHYSICIAN: Sol Lopez APRN (NPI: [id number])    Signature____________________________________________DATE: _________     Please sign and return via fax to [phone].   Thank you so much for letting us work with Kelsey. I appreciate your letting us work with your patients. If you have any questions or concerns, please don't hesitate to contact me.          [address] [address]  [phone]

## 2024-06-11 ENCOUNTER — TREATMENT (OUTPATIENT)
Dept: PHYSICAL THERAPY | Facility: CLINIC | Age: 34
End: 2024-06-11
Payer: COMMERCIAL

## 2024-06-11 DIAGNOSIS — M54.2 CERVICALGIA: Primary | ICD-10-CM

## 2024-06-11 DIAGNOSIS — M54.12 RADICULOPATHY, CERVICAL: ICD-10-CM

## 2024-06-11 NOTE — PROGRESS NOTES
Physical Therapy Treatment Note  [address] [place] [address]    Patient: Kelsey Mcqueen                                                 Visit Date: 2024  :     1990    Referring practitioner:    GREYSON Pires  Date of Initial Visit:          Type: THERAPY  Noted: 2024    Patient seen for 2 sessions    Visit Diagnoses:    ICD-10-CM ICD-9-CM   1. Cervicalgia  M54.2 723.1   2. Radiculopathy, cervical  M54.12 723.4     SUBJECTIVE     Subjective: States she went to [place] last week, and the doctors think she could have Chiari Malformation and will be having some MRI's to confirm. Notes her R arm is pretty much staying numb. When she wakes up in the mornings her neck and R arm are numb and burning, with it slightly being in her L arm.       PAIN: 4/10     OBJECTIVE     Objective     Manual Therapy     [id number]  Comments   B UT/LS stretches    Cx distraction/suboccipital release    Median/ulnar nerve glides Positive on both   STM to B UT/LS    RUE LAD    Timed Minutes 30       Therapy Education/Self Care 49961   Education offered today HEP, nature of condition, ice/heat, imaging results   Medbride Code     Ongoing HEP   Posture  Scapular retraction    ADDED TODAY:  Seated Cx EXT w/ towel roll  Supine Cx EXT laying w/ towel roll under neck   Timed Minutes  38       Total Timed Treatment:     68   mins  Total Time of Visit:             68   mins         ASSESSMENT/PLAN     GOALS  Goals                                                    Progress Note due by 24                                                                Recert due by 24   STG by: 3 weeks Comments Date Status   Improve mobility through thoracic and CT junction         Decreased muscle guarding  throughout neck and shoulder girdle                             LTG by: 6 weeks         Reports no radicular symptoms for a week         Improve cervical  rotation tae to 60 deg with no pain         Report no neck/shoulder pain except occasional minor twinges no more than 2-3/10         Understands improved ergonomics for work and HEP for flexibility and posture          Improve NDI score to 20%                       Assessment/Plan     ASSESSMENT:   No goals assessed d/t being first visit after eval. Pt reported cont R sided Cx pain, with rad s/s down her RUE to her fingers. She noted her doctor in [place] told her that she could possibly have a Chiari Malformation and he wants to get imaging to confirm/rule it out. She presented with increased R UT/LS and Cx charisse tightness, with TP activity present. She noted decreased pain and rad s/s during Cx traction and lying supine with a towel under her Cx. She reported decreased pain, tightness, and rad s/s following treatment.    PLAN:   Focus early on pain relief with soft tissue work and other modalities, including potentially dry needling. Manual therapy used for mobilizations of the spine and upper thoracic and flexibility through the upper girdle. Progress with stability for posture and the shoulder girdle and progress HEP for the same     SIGNATURE: Jose Adams PTA, [place] License #: [id number]  Electronically Signed on 6/11/2024        [address] [place] [address]. [id number]  [phone]

## 2024-06-13 ENCOUNTER — TELEPHONE (OUTPATIENT)
Dept: PHYSICAL THERAPY | Facility: CLINIC | Age: 34
End: 2024-06-13
Payer: COMMERCIAL

## 2024-06-13 DIAGNOSIS — M54.2 CERVICALGIA: Primary | ICD-10-CM

## 2024-06-13 DIAGNOSIS — M54.12 RADICULOPATHY, CERVICAL: ICD-10-CM

## 2024-06-13 NOTE — TELEPHONE ENCOUNTER
Returned Kelsey's call yesterday.  She says that since her last PT visit, she has been in more pain with her left arm now going weak and numb. I told her the nerves may have been irritated the last visit and needed to rest.  I advised her to lie flat supine with arms overhead if this is more comfortable for her arms. Told her to hold on her HEP for now. I educated her on what a Chiari malformation is and how this might fit with some of her overall complaints that she has had.

## 2024-06-17 ENCOUNTER — TELEPHONE (OUTPATIENT)
Dept: NEUROSURGERY | Facility: CLINIC | Age: 34
End: 2024-06-17
Payer: COMMERCIAL

## 2024-06-17 NOTE — TELEPHONE ENCOUNTER
Mario called and requested information on this patient's  shunt.  They are trying to schedule the patient for some imaging and needed to know what kind of shunt she has.  I talked with Ean and called her back.  She has a medtronic strata valve, MRI compatible.    DARYN HARRISON [facility]  PHYSICIAN LEAD  DR SUNNY HOLCOMB  [facility] NEUROSURGERY

## 2024-06-18 ENCOUNTER — TREATMENT (OUTPATIENT)
Dept: PHYSICAL THERAPY | Facility: CLINIC | Age: 34
End: 2024-06-18
Payer: COMMERCIAL

## 2024-06-18 DIAGNOSIS — M54.12 RADICULOPATHY, CERVICAL: ICD-10-CM

## 2024-06-18 DIAGNOSIS — M54.2 CERVICALGIA: Primary | ICD-10-CM

## 2024-06-18 NOTE — PROGRESS NOTES
Physical Therapy Treatment Note  [address] [place] [address]    Patient: Kelsey Mcqueen                                                 Visit Date: 2024  :     1990    Referring practitioner:    GREYSON Pires  Date of Initial Visit:          Type: THERAPY  Noted: 2024    Patient seen for 3 sessions    Visit Diagnoses:    ICD-10-CM ICD-9-CM   1. Cervicalgia  M54.2 723.1   2. Radiculopathy, cervical  M54.12 723.4     SUBJECTIVE     Subjective: Pt reports that yesterday, , was the first day she was able to return to typical activities as pain had subsided. She is relieved that she has a confirmed diagnosis of chiari malformation from a secondary doctor that she believes explains her symptoms.       PAIN: 4 to 5/10     OBJECTIVE     Objective     Manual Therapy     [id number]  Comments   STM to suboccipital muscles     Cx distraction/suboccipital release    STM to B UT            Timed Minutes 30     Therapeutic Exercises    66123 Units Comments   Chin tuck- supine x10 with 3 sec hold     Scapular retractions x 15 with 5 second hold     B UT stretch               Timed Minutes 13        Therapy Education/Self Care 29837   Education offered today    Medbride Code    Ongoing HEP   Posture  Scapular retraction  Seated Cx EXT w/ towel roll  Supine Cx EXT laying w/ towel roll under neck   Timed Minutes         Total Timed Treatment:   43    mins  Total Time of Visit:             43   mins         ASSESSMENT/PLAN     GOALS  Goals                                                    Progress Note due by 24                                                                Recert due by 24   STG by: 3 weeks Comments Date Status   Improve mobility through thoracic and CT junction         Decreased muscle guarding  throughout neck and shoulder girdle                             LTG by: 6 weeks         Reports no radicular  symptoms for a week         Improve cervical rotation tae to 60 deg with no pain         Report no neck/shoulder pain except occasional minor twinges no more than 2-3/10         Understands improved ergonomics for work and HEP for flexibility and posture          Improve NDI score to 20%                       Assessment/Plan     ASSESSMENT:   Focused on very gently manual therapy today with an addition of gentle stretching to tight UT. Pt tolerated gentle distraction and STM to posterior head/neck musculature as well as therapeutic exercises offered.     PLAN:   Focus early on pain relief with soft tissue work and other modalities, including potentially dry needling. Manual therapy used for mobilizations of the spine and upper thoracic and flexibility through the upper girdle. Progress with stability for posture and the shoulder girdle and progress HEP for the same     SIGNATURE: Sonja Leigh PT, [place] License #: [id number]  Electronically Signed on 6/18/2024        [facility] [address] [address]  [phone]

## 2024-06-18 NOTE — PROGRESS NOTES
The clinical instructor and/or supervising staff, Vahe Mendoza PT, was present in clinic guiding the visit by approving, concurring, and confirming the skilled judgement for all services rendered.    Signature:  Vahe Mendoza PT, [place] License #: [id number]  Electronically signed on 6/18/2024

## 2024-06-25 ENCOUNTER — TREATMENT (OUTPATIENT)
Dept: PHYSICAL THERAPY | Facility: CLINIC | Age: 34
End: 2024-06-25
Payer: COMMERCIAL

## 2024-06-25 DIAGNOSIS — M54.2 CERVICALGIA: Primary | ICD-10-CM

## 2024-06-25 DIAGNOSIS — M54.12 RADICULOPATHY, CERVICAL: ICD-10-CM

## 2024-06-25 NOTE — PROGRESS NOTES
Physical Therapy Treatment Note  [address] [place] [address]    Patient: Kelsey Mcqueen                                                 Visit Date: 2024  :     1990    Referring practitioner:    GREYSON Pires  Date of Initial Visit:          Type: THERAPY  Noted: 2024    Patient seen for 4 sessions    Visit Diagnoses:    ICD-10-CM ICD-9-CM   1. Cervicalgia  M54.2 723.1   2. Radiculopathy, cervical  M54.12 723.4     SUBJECTIVE     Subjective: States she woke up with a HA this morning, but has not had any radiating pain down her arm today. Notes she went swimming the other day and had some pain and radiating in her shoulder afterwards.     PAIN: 2-3/10     OBJECTIVE     Objective     Manual Therapy     [id number]  Comments   Inferior thoracic PA mobs  Started around CT junction; prone    STM to suboccipital muscles     TPR to B UT/LS    STM to B UT/LS    Seated pec stretch w/ bolster    Seated Cx EXT mobs w/ pillow case roll    Timed Minutes 32        Therapy Education/Self Care [id number]   Education offered today HEP, ways to stretch at home, sleeping positions, when to rest during work around the house   Medbride Code    Ongoing HEP   Posture  Scapular retraction  Seated Cx EXT w/ towel roll  Supine Cx EXT laying w/ towel roll under neck    3 way pec stretch   Timed Minutes  30       Total Timed Treatment:    62   mins  Total Time of Visit:             62   mins         ASSESSMENT/PLAN     GOALS  Goals                                                    Progress Note due by 24                                                                Recert due by 24   STG by: 3 weeks Comments Date Status   Improve mobility through thoracic and CT junction         Decreased muscle guarding  throughout neck and shoulder girdle                             LTG by: 6 weeks         Reports no radicular symptoms for a week          Improve cervical rotation tae to 60 deg with no pain         Report no neck/shoulder pain except occasional minor twinges no more than 2-3/10         Understands improved ergonomics for work and HEP for flexibility and posture          Improve NDI score to 20%                       Assessment/Plan     ASSESSMENT:   Pt reported cont pain today, noting she has been carrying boxes lately and has been having tightness and burning down her RUE. She presented with increased tightness and TP activity in her B UT/LS R>L. She reported significant decreased pain and tightness during inferior thoracic PA mobs. Following treatment she reported slight decreased tightness, and was instructed on when to rest while working and different stretches and sleeping positions to help relax her Cx/Tx musculature.    PLAN:   Focus early on pain relief with soft tissue work and other modalities, including potentially dry needling. Manual therapy used for mobilizations of the spine and upper thoracic and flexibility through the upper girdle. Progress with stability for posture and the shoulder girdle and progress HEP for the same     SIGNATURE: Jose Adams PTA [place] License #: [id number]  Electronically Signed on 6/25/2024        [address] [place] [address] [place] [address]  [phone]

## 2024-07-09 ENCOUNTER — TRANSCRIBE ORDERS (OUTPATIENT)
Dept: PHYSICAL THERAPY | Facility: CLINIC | Age: 34
End: 2024-07-09
Payer: COMMERCIAL

## 2024-07-09 DIAGNOSIS — M54.2 CERVICALGIA: Primary | ICD-10-CM

## 2024-08-23 ENCOUNTER — HOSPITAL ENCOUNTER (INPATIENT)
Facility: HOSPITAL | Age: 34
LOS: 3 days | Discharge: HOME | End: 2024-08-26
Attending: FAMILY MEDICINE | Admitting: INTERNAL MEDICINE
Payer: COMMERCIAL

## 2024-08-23 ENCOUNTER — APPOINTMENT (OUTPATIENT)
Dept: CT IMAGING | Facility: HOSPITAL | Age: 34
End: 2024-08-23
Payer: COMMERCIAL

## 2024-08-23 DIAGNOSIS — K56.7 ILEUS: Primary | ICD-10-CM

## 2024-08-23 PROBLEM — K52.9 GASTROENTERITIS: Status: ACTIVE | Noted: 2024-08-23

## 2024-08-23 PROBLEM — E87.6 HYPOKALEMIA: Status: ACTIVE | Noted: 2024-08-23

## 2024-08-23 PROBLEM — N13.30 HYDRONEPHROSIS OF LEFT KIDNEY: Status: ACTIVE | Noted: 2024-08-23

## 2024-08-23 PROBLEM — N20.0 RENAL CALCULUS: Status: ACTIVE | Noted: 2024-08-23

## 2024-08-23 LAB
ALBUMIN SERPL-MCNC: 4.4 G/DL (ref 3.5–5.2)
ALBUMIN/GLOB SERPL: 1.5 G/DL
ALP SERPL-CCNC: 76 U/L (ref 39–117)
ALT SERPL W P-5'-P-CCNC: 13 U/L (ref 1–33)
ANION GAP SERPL CALCULATED.3IONS-SCNC: 14 MMOL/L (ref 5–15)
AST SERPL-CCNC: 16 U/L (ref 1–32)
BASOPHILS # BLD AUTO: 0.04 10*3/MM3 (ref 0–0.2)
BASOPHILS # BLD AUTO: 0.04 10*3/MM3 (ref 0–0.2)
BASOPHILS NFR BLD AUTO: 0.3 % (ref 0–1.5)
BASOPHILS NFR BLD AUTO: 0.3 % (ref 0–1.5)
BILIRUB SERPL-MCNC: 0.5 MG/DL (ref 0–1.2)
BILIRUB UR QL STRIP: NEGATIVE
BUN SERPL-MCNC: 11 MG/DL (ref 6–20)
BUN/CREAT SERPL: 17.7 (ref 7–25)
CALCIUM SPEC-SCNC: 9.5 MG/DL (ref 8.6–10.5)
CHLORIDE SERPL-SCNC: 106 MMOL/L (ref 98–107)
CLARITY UR: CLEAR
CO2 SERPL-SCNC: 21 MMOL/L (ref 22–29)
COLOR UR: ABNORMAL
CREAT SERPL-MCNC: 0.62 MG/DL (ref 0.57–1)
DEPRECATED RDW RBC AUTO: 40.8 FL (ref 37–54)
DEPRECATED RDW RBC AUTO: 41.2 FL (ref 37–54)
EGFRCR SERPLBLD CKD-EPI 2021: 121.5 ML/MIN/1.73
EOSINOPHIL # BLD AUTO: 0.03 10*3/MM3 (ref 0–0.4)
EOSINOPHIL # BLD AUTO: 0.04 10*3/MM3 (ref 0–0.4)
EOSINOPHIL NFR BLD AUTO: 0.2 % (ref 0.3–6.2)
EOSINOPHIL NFR BLD AUTO: 0.3 % (ref 0.3–6.2)
ERYTHROCYTE [DISTWIDTH] IN BLOOD BY AUTOMATED COUNT: 12.6 % (ref 12.3–15.4)
ERYTHROCYTE [DISTWIDTH] IN BLOOD BY AUTOMATED COUNT: 12.6 % (ref 12.3–15.4)
FLUAV RNA RESP QL NAA+PROBE: NOT DETECTED
FLUBV RNA RESP QL NAA+PROBE: NOT DETECTED
GLOBULIN UR ELPH-MCNC: 3 GM/DL
GLUCOSE SERPL-MCNC: 106 MG/DL (ref 65–99)
GLUCOSE UR STRIP-MCNC: NEGATIVE MG/DL
HCT VFR BLD AUTO: 37.8 % (ref 34–46.6)
HCT VFR BLD AUTO: 39.9 % (ref 34–46.6)
HGB BLD-MCNC: 12.8 G/DL (ref 12–15.9)
HGB BLD-MCNC: 13.8 G/DL (ref 12–15.9)
HGB UR QL STRIP.AUTO: NEGATIVE
IMM GRANULOCYTES # BLD AUTO: 0.05 10*3/MM3 (ref 0–0.05)
IMM GRANULOCYTES # BLD AUTO: 0.05 10*3/MM3 (ref 0–0.05)
IMM GRANULOCYTES NFR BLD AUTO: 0.4 % (ref 0–0.5)
IMM GRANULOCYTES NFR BLD AUTO: 0.4 % (ref 0–0.5)
KETONES UR QL STRIP: ABNORMAL
LEUKOCYTE ESTERASE UR QL STRIP.AUTO: NEGATIVE
LIPASE SERPL-CCNC: 52 U/L (ref 13–60)
LYMPHOCYTES # BLD AUTO: 2.62 10*3/MM3 (ref 0.7–3.1)
LYMPHOCYTES # BLD AUTO: 3.6 10*3/MM3 (ref 0.7–3.1)
LYMPHOCYTES NFR BLD AUTO: 20.2 % (ref 19.6–45.3)
LYMPHOCYTES NFR BLD AUTO: 26.5 % (ref 19.6–45.3)
MAGNESIUM SERPL-MCNC: 1.9 MG/DL (ref 1.6–2.6)
MCH RBC QN AUTO: 30.6 PG (ref 26.6–33)
MCH RBC QN AUTO: 30.7 PG (ref 26.6–33)
MCHC RBC AUTO-ENTMCNC: 33.9 G/DL (ref 31.5–35.7)
MCHC RBC AUTO-ENTMCNC: 34.6 G/DL (ref 31.5–35.7)
MCV RBC AUTO: 88.7 FL (ref 79–97)
MCV RBC AUTO: 90.4 FL (ref 79–97)
MONOCYTES # BLD AUTO: 0.66 10*3/MM3 (ref 0.1–0.9)
MONOCYTES # BLD AUTO: 0.68 10*3/MM3 (ref 0.1–0.9)
MONOCYTES NFR BLD AUTO: 5 % (ref 5–12)
MONOCYTES NFR BLD AUTO: 5.1 % (ref 5–12)
NEUTROPHILS NFR BLD AUTO: 67.6 % (ref 42.7–76)
NEUTROPHILS NFR BLD AUTO: 73.7 % (ref 42.7–76)
NEUTROPHILS NFR BLD AUTO: 9.18 10*3/MM3 (ref 1.7–7)
NEUTROPHILS NFR BLD AUTO: 9.53 10*3/MM3 (ref 1.7–7)
NITRITE UR QL STRIP: NEGATIVE
NRBC BLD AUTO-RTO: 0 /100 WBC (ref 0–0.2)
NRBC BLD AUTO-RTO: 0 /100 WBC (ref 0–0.2)
PH UR STRIP.AUTO: 6 [PH] (ref 5–8)
PLATELET # BLD AUTO: 318 10*3/MM3 (ref 140–450)
PLATELET # BLD AUTO: 337 10*3/MM3 (ref 140–450)
PMV BLD AUTO: 9.4 FL (ref 6–12)
PMV BLD AUTO: 9.7 FL (ref 6–12)
POTASSIUM SERPL-SCNC: 3.2 MMOL/L (ref 3.5–5.2)
PROT SERPL-MCNC: 7.4 G/DL (ref 6–8.5)
PROT UR QL STRIP: ABNORMAL
RBC # BLD AUTO: 4.18 10*6/MM3 (ref 3.77–5.28)
RBC # BLD AUTO: 4.5 10*6/MM3 (ref 3.77–5.28)
SARS-COV-2 RNA RESP QL NAA+PROBE: NOT DETECTED
SODIUM SERPL-SCNC: 141 MMOL/L (ref 136–145)
SP GR UR STRIP: >1.03 (ref 1–1.03)
UROBILINOGEN UR QL STRIP: ABNORMAL
WBC NRBC COR # BLD AUTO: 12.94 10*3/MM3 (ref 3.4–10.8)
WBC NRBC COR # BLD AUTO: 13.58 10*3/MM3 (ref 3.4–10.8)

## 2024-08-23 PROCEDURE — 25010000002 METOCLOPRAMIDE PER 10 MG: Performed by: NURSE PRACTITIONER

## 2024-08-23 PROCEDURE — 25010000002 SODIUM CHLORIDE 0.9 % WITH KCL 20 MEQ 20-0.9 MEQ/L-% SOLUTION: Performed by: NURSE PRACTITIONER

## 2024-08-23 PROCEDURE — 25010000002 POTASSIUM CHLORIDE 10 MEQ/100ML SOLUTION: Performed by: NURSE PRACTITIONER

## 2024-08-23 PROCEDURE — 25010000002 CEFTRIAXONE PER 250 MG: Performed by: NURSE PRACTITIONER

## 2024-08-23 PROCEDURE — 74177 CT ABD & PELVIS W/CONTRAST: CPT

## 2024-08-23 PROCEDURE — 85025 COMPLETE CBC W/AUTO DIFF WBC: CPT | Performed by: NURSE PRACTITIONER

## 2024-08-23 PROCEDURE — 25010000002 HYDROMORPHONE PER 4 MG: Performed by: NURSE PRACTITIONER

## 2024-08-23 PROCEDURE — 83690 ASSAY OF LIPASE: CPT | Performed by: NURSE PRACTITIONER

## 2024-08-23 PROCEDURE — 0 HYDROMORPHONE 1 MG/ML SOLUTION: Performed by: NURSE PRACTITIONER

## 2024-08-23 PROCEDURE — 25010000002 ONDANSETRON PER 1 MG: Performed by: NURSE PRACTITIONER

## 2024-08-23 PROCEDURE — 81003 URINALYSIS AUTO W/O SCOPE: CPT | Performed by: NURSE PRACTITIONER

## 2024-08-23 PROCEDURE — 25010000002 METOCLOPRAMIDE PER 10 MG: Performed by: FAMILY MEDICINE

## 2024-08-23 PROCEDURE — 25510000001 IOPAMIDOL 61 % SOLUTION: Performed by: NURSE PRACTITIONER

## 2024-08-23 PROCEDURE — 83735 ASSAY OF MAGNESIUM: CPT | Performed by: NURSE PRACTITIONER

## 2024-08-23 PROCEDURE — 87636 SARSCOV2 & INF A&B AMP PRB: CPT | Performed by: NURSE PRACTITIONER

## 2024-08-23 PROCEDURE — 80053 COMPREHEN METABOLIC PANEL: CPT | Performed by: NURSE PRACTITIONER

## 2024-08-23 PROCEDURE — 25810000003 SODIUM CHLORIDE 0.9 % SOLUTION: Performed by: NURSE PRACTITIONER

## 2024-08-23 PROCEDURE — 99285 EMERGENCY DEPT VISIT HI MDM: CPT

## 2024-08-23 RX ORDER — ACETAMINOPHEN 160 MG/5ML
650 SOLUTION ORAL EVERY 4 HOURS PRN
Status: DISCONTINUED | OUTPATIENT
Start: 2024-08-23 | End: 2024-08-26 | Stop reason: HOSPADM

## 2024-08-23 RX ORDER — METOCLOPRAMIDE HYDROCHLORIDE 5 MG/ML
10 INJECTION INTRAMUSCULAR; INTRAVENOUS ONCE
Status: COMPLETED | OUTPATIENT
Start: 2024-08-23 | End: 2024-08-23

## 2024-08-23 RX ORDER — IOPAMIDOL 612 MG/ML
100 INJECTION, SOLUTION INTRAVASCULAR
Status: COMPLETED | OUTPATIENT
Start: 2024-08-23 | End: 2024-08-23

## 2024-08-23 RX ORDER — HYDROMORPHONE HYDROCHLORIDE 1 MG/ML
0.5 INJECTION, SOLUTION INTRAMUSCULAR; INTRAVENOUS; SUBCUTANEOUS ONCE
Status: COMPLETED | OUTPATIENT
Start: 2024-08-23 | End: 2024-08-23

## 2024-08-23 RX ORDER — ONDANSETRON 2 MG/ML
4 INJECTION INTRAMUSCULAR; INTRAVENOUS EVERY 6 HOURS PRN
Status: DISCONTINUED | OUTPATIENT
Start: 2024-08-23 | End: 2024-08-26 | Stop reason: HOSPADM

## 2024-08-23 RX ORDER — PANTOPRAZOLE SODIUM 40 MG/10ML
40 INJECTION, POWDER, LYOPHILIZED, FOR SOLUTION INTRAVENOUS EVERY 12 HOURS SCHEDULED
Status: DISCONTINUED | OUTPATIENT
Start: 2024-08-23 | End: 2024-08-26 | Stop reason: HOSPADM

## 2024-08-23 RX ORDER — SODIUM CHLORIDE 0.9 % (FLUSH) 0.9 %
10 SYRINGE (ML) INJECTION AS NEEDED
Status: DISCONTINUED | OUTPATIENT
Start: 2024-08-23 | End: 2024-08-26 | Stop reason: HOSPADM

## 2024-08-23 RX ORDER — SODIUM CHLORIDE 9 MG/ML
40 INJECTION, SOLUTION INTRAVENOUS AS NEEDED
Status: DISCONTINUED | OUTPATIENT
Start: 2024-08-23 | End: 2024-08-26 | Stop reason: HOSPADM

## 2024-08-23 RX ORDER — SODIUM CHLORIDE 0.9 % (FLUSH) 0.9 %
10 SYRINGE (ML) INJECTION EVERY 12 HOURS SCHEDULED
Status: DISCONTINUED | OUTPATIENT
Start: 2024-08-23 | End: 2024-08-26 | Stop reason: HOSPADM

## 2024-08-23 RX ORDER — ACETAMINOPHEN 650 MG/1
650 SUPPOSITORY RECTAL EVERY 4 HOURS PRN
Status: DISCONTINUED | OUTPATIENT
Start: 2024-08-23 | End: 2024-08-26 | Stop reason: HOSPADM

## 2024-08-23 RX ORDER — NALOXONE HCL 0.4 MG/ML
0.4 VIAL (ML) INJECTION
Status: DISCONTINUED | OUTPATIENT
Start: 2024-08-23 | End: 2024-08-25

## 2024-08-23 RX ORDER — CLONAZEPAM 1 MG/1
2 TABLET ORAL 2 TIMES DAILY PRN
Status: DISCONTINUED | OUTPATIENT
Start: 2024-08-23 | End: 2024-08-24 | Stop reason: SDUPTHER

## 2024-08-23 RX ORDER — SODIUM CHLORIDE AND POTASSIUM CHLORIDE 150; 900 MG/100ML; MG/100ML
50 INJECTION, SOLUTION INTRAVENOUS CONTINUOUS
Status: DISCONTINUED | OUTPATIENT
Start: 2024-08-23 | End: 2024-08-26 | Stop reason: HOSPADM

## 2024-08-23 RX ORDER — POTASSIUM CHLORIDE 7.45 MG/ML
10 INJECTION INTRAVENOUS
Status: DISPENSED | OUTPATIENT
Start: 2024-08-23 | End: 2024-08-23

## 2024-08-23 RX ORDER — ACETAMINOPHEN 325 MG/1
650 TABLET ORAL EVERY 4 HOURS PRN
Status: DISCONTINUED | OUTPATIENT
Start: 2024-08-23 | End: 2024-08-26 | Stop reason: HOSPADM

## 2024-08-23 RX ADMIN — PANTOPRAZOLE SODIUM 40 MG: 40 INJECTION, POWDER, FOR SOLUTION INTRAVENOUS at 20:14

## 2024-08-23 RX ADMIN — IOPAMIDOL 100 ML: 612 INJECTION, SOLUTION INTRAVENOUS at 14:26

## 2024-08-23 RX ADMIN — HYDROMORPHONE HYDROCHLORIDE 0.5 MG: 1 INJECTION, SOLUTION INTRAMUSCULAR; INTRAVENOUS; SUBCUTANEOUS at 15:38

## 2024-08-23 RX ADMIN — HYDROMORPHONE HYDROCHLORIDE 1 MG: 1 INJECTION, SOLUTION INTRAMUSCULAR; INTRAVENOUS; SUBCUTANEOUS at 20:34

## 2024-08-23 RX ADMIN — CLONAZEPAM 2 MG: 1 TABLET ORAL at 19:19

## 2024-08-23 RX ADMIN — SODIUM CHLORIDE 1000 ML: 9 INJECTION, SOLUTION INTRAVENOUS at 13:43

## 2024-08-23 RX ADMIN — Medication 10 ML: at 20:14

## 2024-08-23 RX ADMIN — ONDANSETRON 4 MG: 2 INJECTION INTRAMUSCULAR; INTRAVENOUS at 17:32

## 2024-08-23 RX ADMIN — POTASSIUM CHLORIDE 10 MEQ: 7.46 INJECTION, SOLUTION INTRAVENOUS at 20:34

## 2024-08-23 RX ADMIN — HYDROMORPHONE HYDROCHLORIDE 0.5 MG: 1 INJECTION, SOLUTION INTRAMUSCULAR; INTRAVENOUS; SUBCUTANEOUS at 13:47

## 2024-08-23 RX ADMIN — SODIUM CHLORIDE 1000 MG: 900 INJECTION INTRAVENOUS at 19:46

## 2024-08-23 RX ADMIN — POTASSIUM CHLORIDE AND SODIUM CHLORIDE 100 ML/HR: 900; 150 INJECTION, SOLUTION INTRAVENOUS at 19:42

## 2024-08-23 RX ADMIN — METOCLOPRAMIDE HYDROCHLORIDE 10 MG: 5 INJECTION INTRAMUSCULAR; INTRAVENOUS at 13:44

## 2024-08-23 RX ADMIN — HYDROMORPHONE HYDROCHLORIDE 1 MG: 1 INJECTION, SOLUTION INTRAMUSCULAR; INTRAVENOUS; SUBCUTANEOUS at 22:31

## 2024-08-23 RX ADMIN — POTASSIUM CHLORIDE 10 MEQ: 7.46 INJECTION, SOLUTION INTRAVENOUS at 20:33

## 2024-08-23 RX ADMIN — HYDROMORPHONE HYDROCHLORIDE 1 MG: 1 INJECTION, SOLUTION INTRAMUSCULAR; INTRAVENOUS; SUBCUTANEOUS at 17:31

## 2024-08-23 RX ADMIN — METOCLOPRAMIDE HYDROCHLORIDE 10 MG: 5 INJECTION INTRAMUSCULAR; INTRAVENOUS at 20:33

## 2024-08-23 NOTE — ED NOTES
Nursing report ED to floor  Kelsey Mcqueen  33 y.o.  female    HPI:   Chief Complaint   Patient presents with    Abdominal Pain       Admitting doctor:   Bren López DO    Consulting provider(s):  Consults       Date and Time Order Name Status Description    8/23/2024  5:39 PM Inpatient Gastroenterology Consult      8/23/2024  5:15 PM Inpatient Urology Consult               Admitting diagnosis:   The encounter diagnosis was Ileus.    Code status:   Current Code Status       Date Active Code Status Order ID Comments User Context       8/23/2024 1715 CPR (Attempt to Resuscitate) [id number]  Jen Vergara, APRN ED        Question Answer    Code Status (Patient has no pulse and is not breathing) CPR (Attempt to Resuscitate)    Medical Interventions (Patient has pulse or is breathing) Full Support    Level Of Support Discussed With Patient                    Allergies:   Morphine and codeine, Other, Sumatriptan, Buspirone, Ketorolac, Ketorolac tromethamine, Morphine, and Demerol [meperidine]    Intake and Output    Intake/Output Summary (Last 24 hours) at 8/23/2024 1746  Last data filed at 8/23/2024 1521  Gross per 24 hour   Intake 1000 ml   Output --   Net 1000 ml       Weight:       08/23/24  1215   Weight: 94.6 kg (208 lb 8 oz)       Most recent vitals:   Vitals:    08/23/24 1400 08/23/24 1520 08/23/24 1603 08/23/24 1700   BP: 107/66 120/77 120/66 129/84   BP Location:       Patient Position:       Pulse: 52 56 50 51   Resp:  18  16   Temp:       TempSrc:       SpO2: 97% 98% 97% 99%   Weight:       Height:         Oxygen Therapy: .    Active LDAs/IV Access:   Lines, Drains & Airways       Active LDAs       Name Placement date Placement time Site Days    Peripheral IV Lower;Posterior;Right --  --  --  --    Single Lumen Implantable Port 04/01/23 Left Subclavian 04/01/23  --  Subclavian  510                    Labs (abnormal labs have a star):   Labs Reviewed   COMPREHENSIVE METABOLIC PANEL - Abnormal;  Notable for the following components:       Result Value    Glucose 106 (*)     Potassium 3.2 (*)     CO2 21.0 (*)     All other components within normal limits    Narrative:     GFR Normal >60  Chronic Kidney Disease <60  Kidney Failure <15     URINALYSIS W/ MICROSCOPIC IF INDICATED (NO CULTURE) - Abnormal; Notable for the following components:    Color, UA Dark Yellow (*)     Specific Gravity, UA >1.030 (*)     Ketones, UA 40 mg/dL (2+) (*)     Protein, UA Trace (*)     All other components within normal limits    Narrative:     Urine microscopic not indicated.   CBC WITH AUTO DIFFERENTIAL - Abnormal; Notable for the following components:    WBC 12.94 (*)     Neutrophils, Absolute 9.53 (*)     All other components within normal limits   COVID-19 AND FLU A/B, NP SWAB IN TRANSPORT MEDIA 1 HR TAT - Normal    Narrative:     Fact sheet for providers: https://www.fda.gov/media/864506/download    Fact sheet for patients: https://www.fda.gov/media/391884/download    Test performed by PCR.   LIPASE - Normal   GASTROINTESTINAL PANEL, PCR (PREFERRED) DOES NOT INCLUDE CDIFF   MAGNESIUM   CBC WITH AUTO DIFFERENTIAL   CBC AND DIFFERENTIAL    Narrative:     The following orders were created for panel order CBC & Differential.  Procedure                               Abnormality         Status                     ---------                               -----------         ------                     CBC Auto Differential[[id number]]        Abnormal            Final result                 Please view results for these tests on the individual orders.   CBC AND DIFFERENTIAL    Narrative:     The following orders were created for panel order CBC & Differential.  Procedure                               Abnormality         Status                     ---------                               -----------         ------                     CBC Auto Differential[[id number]]                                                         Please view  results for these tests on the individual orders.       Meds given in ED:   Medications   sodium chloride 0.9 % flush 10 mL (has no administration in time range)   sodium chloride 0.9 % flush 10 mL (has no administration in time range)   sodium chloride 0.9 % flush 10 mL (has no administration in time range)   sodium chloride 0.9 % infusion 40 mL (has no administration in time range)   sodium chloride 0.9 % with KCl 20 mEq/L infusion (has no administration in time range)   HYDROmorphone (DILAUDID) injection 1 mg (1 mg Intravenous Given 8/23/24 1731)     And   naloxone (NARCAN) injection 0.4 mg (has no administration in time range)   ondansetron (ZOFRAN) injection 4 mg (4 mg Intravenous Given 8/23/24 1732)   pantoprazole (PROTONIX) injection 40 mg (has no administration in time range)   cefTRIAXone (ROCEPHIN) 1,000 mg in sodium chloride 0.9 % 100 mL MBP (has no administration in time range)   acetaminophen (TYLENOL) tablet 650 mg (has no administration in time range)     Or   acetaminophen (TYLENOL) 160 MG/5ML oral solution 650 mg (has no administration in time range)     Or   acetaminophen (TYLENOL) suppository 650 mg (has no administration in time range)   potassium chloride 10 mEq in 100 mL IVPB (has no administration in time range)   sodium chloride 0.9 % bolus 1,000 mL (0 mL Intravenous Stopped 8/23/24 1521)   metoclopramide (REGLAN) injection 10 mg (10 mg Intravenous Given 8/23/24 1344)   HYDROmorphone (DILAUDID) injection 0.5 mg (0.5 mg Intravenous Given 8/23/24 1347)   iopamidol (ISOVUE-300) 61 % injection 100 mL (100 mL Intravenous Given 8/23/24 1426)   HYDROmorphone (DILAUDID) injection 0.5 mg (0.5 mg Intravenous Given 8/23/24 1538)     sodium chloride 0.9 % with KCl 20 mEq, 100 mL/hr         NIH Stroke Scale:       Isolation/Infection(s):  No active isolations   No active infections     COVID Testing  Collected .  Resulted .    Nursing report ED to floor:  Mental status: .a&ox4  Ambulatory status: .up  adlib  Precautions: .none    ED nurse phone extentsion- .. [phone]  Report given to ANASTASIYA Verde

## 2024-08-23 NOTE — H&P
[facility]  HISTORY AND PHYSICAL    Date of Admission: 8/23/2024  Primary Care Physician: Sol Lopez APRN    Subjective   Primary Historian: Patient deemed reliable historian    Chief Complaint: Nausea, vomiting, diarrhea, abdominal pain    History of Present Illness  Kelsey Mcqueen is a 33-year-old  female with past medical history significant for kidney stones, depression, and reported Crohn's disease.  She presented to [facility] emergency room 8/23/2024 with complaints of nausea, vomiting and abdominal pain.  Patient reported last week she had back pain and cramping down to the pelvic area and felt as though she passed a kidney stone as she has passed multiple kidney stones in the past.  She reported left flank pain and abdominal pain consistent with kidney stone.  She was prescribed Macrobid and Pyridium by primary care provider and completed treatment.  On 8/19 patient began experiencing nausea, vomiting and she was unable to keep down oral intake despite taking Zofran.  Patient reported losing 10 pounds the past week.  She described decreased appetite, abdominal pain associated as sharp, knot-like and twisting.  She reported vomiting bile content.  Patient reported being concerned regarding possible Crohn's flare.  She is followed by Dr. Santiago gastroenterology in [place].    Lab potassium 3.2, WBC 12.94, lipase 52.  Urinalysis trace protein.  CT abdomen showed moderate left hydronephrosis with normal left ureter without evidence of ureteral calculi.  May represent chronic inflammatory process/chronic pyelonephritis.  This was not noted previous study 2020.  Several nonobstructing left renal calculi.  Normal appendix.  Fluid-filled nondistended nondilated small bowel loops and proximal large bowel may represent ileus.  No findings to suggest obstruction.  Ventriculoperitoneal shunt.  Normal saline fluid bolus, Reglan, Dilaudid  given in ER.        Review of Systems   Constitutional:  Positive for appetite change.   HENT:  Negative for congestion and trouble swallowing.    Eyes:  Negative for photophobia and visual disturbance.   Respiratory:  Negative for cough, shortness of breath and wheezing.    Cardiovascular:  Negative for chest pain, palpitations and leg swelling.   Gastrointestinal:  Positive for abdominal pain, diarrhea, nausea and vomiting.   Endocrine: Negative for cold intolerance, heat intolerance and polyuria.   Genitourinary:  Positive for dysuria.   Musculoskeletal:  Positive for back pain (Left-sided).   Skin:  Negative for color change, pallor and rash.   Allergic/Immunologic: Negative for immunocompromised state.   Neurological:  Positive for weakness.   Hematological:  Negative for adenopathy. Does not bruise/bleed easily.   Psychiatric/Behavioral:  Negative for agitation, behavioral problems and confusion.       Otherwise complete ROS reviewed and negative except as mentioned in the HPI.    Past Medical History:   Past Medical History:   Diagnosis Date    ADHD (attention deficit hyperactivity disorder) 2012    Crohn disease     recently diagnosis, going to start infusion therapy    Deep vein thrombosis 2021    Depression with anxiety 12/09/2018    Fibromyalgia, primary 2016    Headache     Intracranial hypertension     Kidney stones     Lactation disorder     Pseudotumor cerebri 09/14/2016    Tobacco abuse 09/21/2016     Past Surgical History:  Past Surgical History:   Procedure Laterality Date    BRAIN SURGERY  2015    COLONOSCOPY  2022    CYSTOSCOPY ELECTROHYDRAULIC LITHOTRIPSY      HYSTERECTOMY  2019    KIDNEY STONE SURGERY      OTHER SURGICAL HISTORY      Intracranial shunt     SHUNT INSERTION  11/2015     Social History:  reports that she has been smoking cigarettes. She started smoking about 5 years ago. She has a 1 pack-year smoking history. She has been exposed to tobacco smoke. She has never used smokeless  "tobacco. She reports that she does not drink alcohol and does not use drugs.    Family History: family history includes Hypertension in her father and mother.       Allergies:  Allergies   Allergen Reactions    Morphine And Codeine Shortness Of Breath    Other Other (See Comments)     Other reaction(s): Other (See Comments)  Dissolvable sutures. She developed an abscess from the last ones. Ended up in ER.   Dissolvable sutures. She developed an abscess from the last ones. Ended up in ER.     Sumatriptan Hives    Buspirone Headache    Ketorolac Unknown - Low Severity     Has Crohn's Disease.    Ketorolac Tromethamine Unknown - Low Severity    Morphine Itching    Demerol [Meperidine] Rash       Medications:  Prior to Admission medications    Medication Sig Start Date End Date Taking? Authorizing Provider   omeprazole (priLOSEC) 40 MG capsule omeprazole 40 mg capsule,delayed release   Take 1 capsule every day by oral route for 60 days.    ProviderJessica MD   ondansetron (ZOFRAN) 4 MG tablet ondansetron HCl 4 mg tablet    ProviderJessica MD   propranolol LA (INDERAL LA) 80 MG 24 hr capsule Take 1 capsule by mouth Daily.    ProviderJessica MD   Ventolin  (90 Base) MCG/ACT inhaler  2/2/24   ProviderJessica MD     I have utilized all available immediate resources to obtain, update, or review the patient's current medications (including all prescriptions, over-the-counter products, herbals, cannabis/cannabidiol products, and vitamin/mineral/dietary (nutritional) supplements).    Objective     Vital Signs: /84   Pulse 51   Temp 98 °F (36.7 °C) (Oral)   Resp 16   Ht 157.5 cm (62\")   Wt 94.6 kg (208 lb 8 oz)   LMP 01/21/2019 (Approximate)   SpO2 99%   BMI 38.14 kg/m²   Physical Exam  Vitals and nursing note reviewed.   Constitutional:       Appearance: She is obese.      Comments: Lying on stretcher in the emergency room.  Tearful.  Mother in room.  No oxygen use.   HENT:      " Head: Normocephalic and atraumatic.      Nose: No congestion.      Mouth/Throat:      Pharynx: No oropharyngeal exudate or posterior oropharyngeal erythema.   Eyes:      Extraocular Movements: Extraocular movements intact.      Pupils: Pupils are equal, round, and reactive to light.   Cardiovascular:      Rate and Rhythm: Normal rate and regular rhythm.      Heart sounds: No murmur heard.  Pulmonary:      Breath sounds: No wheezing, rhonchi or rales.      Comments: No oxygen use.  Abdominal:      Palpations: Abdomen is soft.      Tenderness: There is abdominal tenderness (Diffuse).   Genitourinary:     Comments: Voiding.  Musculoskeletal:         General: No swelling or tenderness.      Cervical back: Normal range of motion and neck supple.   Skin:     General: Skin is warm and dry.   Neurological:      General: No focal deficit present.      Mental Status: She is alert and oriented to person, place, and time.   Psychiatric:         Mood and Affect: Mood normal.         Behavior: Behavior normal.         Thought Content: Thought content normal.         Judgment: Judgment normal.            Results Reviewed:  Lab Results (last 24 hours)       Procedure Component Value Units Date/Time    Urinalysis With Microscopic If Indicated (No Culture) - Urine, Clean Catch [[id number]]  (Abnormal) Collected: 08/23/24 1529    Specimen: Urine, Clean Catch Updated: 08/23/24 1543     Color, UA Dark Yellow     Appearance, UA Clear     pH, UA 6.0     Specific Gravity, UA >1.030     Glucose, UA Negative     Ketones, UA 40 mg/dL (2+)     Bilirubin, UA Negative     Blood, UA Negative     Protein, UA Trace     Leuk Esterase, UA Negative     Nitrite, UA Negative     Urobilinogen, UA 0.2 E.U./dL    Narrative:      Urine microscopic not indicated.    COVID-19 and FLU A/B PCR, 1 HR TAT - Swab, Nasopharynx [[id number]]  (Normal) Collected: 08/23/24 1339    Specimen: Swab from Nasopharynx Updated: 08/23/24 1416     COVID19 Not Detected      Influenza A PCR Not Detected     Influenza B PCR Not Detected    Narrative:      Fact sheet for providers: https://www.fda.gov/media/559112/download    Fact sheet for patients: https://www.fda.gov/media/309117/download    Test performed by PCR.    Comprehensive Metabolic Panel [[id number]]  (Abnormal) Collected: 08/23/24 1337    Specimen: Blood Updated: 08/23/24 1413     Glucose 106 mg/dL      BUN 11 mg/dL      Creatinine 0.62 mg/dL      Sodium 141 mmol/L      Potassium 3.2 mmol/L      Chloride 106 mmol/L      CO2 21.0 mmol/L      Calcium 9.5 mg/dL      Total Protein 7.4 g/dL      Albumin 4.4 g/dL      ALT (SGPT) 13 U/L      AST (SGOT) 16 U/L      Alkaline Phosphatase 76 U/L      Total Bilirubin 0.5 mg/dL      Globulin 3.0 gm/dL      A/G Ratio 1.5 g/dL      BUN/Creatinine Ratio 17.7     Anion Gap 14.0 mmol/L      eGFR 121.5 mL/min/1.73     Narrative:      GFR Normal >60  Chronic Kidney Disease <60  Kidney Failure <15      Lipase [[id number]]  (Normal) Collected: 08/23/24 1337    Specimen: Blood Updated: 08/23/24 1408     Lipase 52 U/L     CBC & Differential [[id number]]  (Abnormal) Collected: 08/23/24 1337    Specimen: Blood Updated: 08/23/24 1355    Narrative:      The following orders were created for panel order CBC & Differential.  Procedure                               Abnormality         Status                     ---------                               -----------         ------                     CBC Auto Differential[[id number]]        Abnormal            Final result                 Please view results for these tests on the individual orders.    CBC Auto Differential [[id number]]  (Abnormal) Collected: 08/23/24 1337    Specimen: Blood Updated: 08/23/24 1355     WBC 12.94 10*3/mm3      RBC 4.50 10*6/mm3      Hemoglobin 13.8 g/dL      Hematocrit 39.9 %      MCV 88.7 fL      MCH 30.7 pg      MCHC 34.6 g/dL      RDW 12.6 %      RDW-SD 40.8 fl      MPV 9.7 fL      Platelets 337 10*3/mm3      Neutrophil % 73.7 %       Lymphocyte % 20.2 %      Monocyte % 5.1 %      Eosinophil % 0.3 %      Basophil % 0.3 %      Immature Grans % 0.4 %      Neutrophils, Absolute 9.53 10*3/mm3      Lymphocytes, Absolute 2.62 10*3/mm3      Monocytes, Absolute 0.66 10*3/mm3      Eosinophils, Absolute 0.04 10*3/mm3      Basophils, Absolute 0.04 10*3/mm3      Immature Grans, Absolute 0.05 10*3/mm3      nRBC 0.0 /100 WBC           Imaging Results (Last 24 Hours)       Procedure Component Value Units Date/Time    CT Abdomen Pelvis With Contrast [[id number]] Collected: 08/23/24 1436     Updated: 08/23/24 1454    Narrative:      EXAMINATION: CT ABDOMEN PELVIS W CONTRAST-      8/23/2024 1:20 PM     HISTORY: abdominal pain; llq/left flank; hx of crohn's and kidney stones     In order to have a CT radiation dose as low as reasonably achievable  Automated Exposure Control was utilized for adjustment of the mA and/or  KV according to patient size.     Total DLP = 547.91 mGy.cm     CT scan of the abdomen and pelvis is performed after intravenous  contrast enhancement.     Images are acquired in axial plane and subsequent reconstruction in  coronal and sagittal planes.     The comparison is made with the previous study dated 3/16/2020.     The lung bases included in the study are unremarkable.     A ventriculoperitoneal shunt is seen in place similar to the previous  study. It enters the right upper abdomen adjacent and anterior to the  segment 4B of the liver and distal end of the catheter is in the right  midabdomen below the liver margin.     Limited visualized cardiomediastinal structures are unremarkable.  Significant artifacts are produced by a catheter in the distal SVC.  There is mild cardiomegaly.     The liver and spleen are normal.     The gallbladder is surgically absent. There is no significant dilatation  of common bile duct.     The pancreas is normal.     The adrenal glands bilaterally are normal.     There is moderate lobulation of renal contour  bilaterally more severe on  the left side. The tiny low-density nodule in the right kidney was not  noted in the previous study. These are too small to be further  characterized. There is significant irregularity of the left renal  cortex suggesting chronic renal cortical scarring. No discrete mass.  There are several small radiopaque calculi in the left kidney the  largest one in the lower pole measuring 5 mm in diameter. There is  evidence of left hydronephrosis. The left ureter is not dilated. No  calculi in the left ureter. The right collecting system is normal. The  urinary bladder is poorly distended. No intrinsic abnormality or  calculus.     The stomach is decompressed. No focal abnormality. Duodenum is minimally  dilated with fluid. Fluid-filled nondistended and nondilated small bowel  loops are seen. Maximum lumen diameter of the distal ileum is 2.2 cm. No  zone of transition. Normal retrocecal appendix is seen. Moderate gas  fluid and stool are seen in the colon. No finding to suggest  obstruction. Moderate thickening of the wall of the distal/descending  and sigmoid colon is probably due to incomplete distention. No  surrounding peritoneal infiltration are stranding to suggest  inflammatory/infectious process.     There is trace free fluid in the pelvis.     There is a tiny fat-containing umbilical hernia.     Normal abdominal aorta and iliac arteries.     There is no evidence of abdominal or pelvic lymphadenopathy.     Images reviewed in bone window show no acute bony abnormality.    Impression:      1. A moderate left hydronephrosis with a normal left ureter without  evidence of ureteral calculi. This may represent chronic inflammatory  process/chronic pyelonephritis. This was not noted in the previous study  in 2020.  2. Several nonobstructing left renal calculi.  3. Normal appendix.  4. Fluid-filled nondistended nondilated small bowel loops and proximal  large bowel may represent ileus. No finding to  suggest obstruction.  5. Ventriculoperitoneal shunt in place. A trace free fluid in the pelvis  may be due to ventriculoperitoneal shunt.                       This report was signed and finalized on 8/23/2024 2:51 PM by Dr. Glenys Yancey MD.             I have personally reviewed and interpreted the radiology studies and ECG obtained at time of admission.     Assessment / Plan   Assessment:   Active Hospital Problems    Diagnosis     **Ileus     Hypokalemia due to nausea, vomiting, diarrhea     Hydronephrosis of left kidney     Nonobstructing left renal calculi     Gastroenteritis        Treatment Plan  The patient will be admitted to Dr. López's service at [facility].     1.  Ileus.  Patient presented to ER 8/23 with nausea, vomiting, diarrhea, abdominal pain.  1 week ago she noted renal colic and has passed numerous kidney stones in the past.  She experienced left flank pain, abdominal pain consistent with additional kidney stone.  On 8/18 she developed nausea with vomiting and unable to keep down any liquid.  Patient reported subjective fever.  There was concern for Crohn's flare.  Patient is followed by Dr. Pamela MYRICK in [place].  CT abdomen shows fluid-filled nondistended nondilated small bowel loops and proximal large bowel loops represents ileus.  NPO.  IV fluids at 100 mL/h.  Rocephin IV, Protonix IV.    Zofran for nausea, Dilaudid IV for severe pain, Tylenol for mild pain.  Repeat CBC, CMP in AM.  Rocephin IV every 24 hours.  Consult gastroenterology for persistent nausea, vomiting, abdominal pain in the setting of Crohn's disease.  Check GI PCR panel.    2.  Gastroenteritis.  Presented with nausea, vomiting, diarrhea and abdominal pain for the past 4 days.  Check GI PCR panel.  Continue IV fluids.  Zofran for nausea.    3.  Moderate left hydronephrosis with normal left ureter.  May represent chronic inflammatory process/chronic pyelonephritis.  Not present in 2020.  Urology consulted.    4.   Severe nonobstructing left renal calculi.  Consult urology.    5.  Hypokalemia.  Potassium 3.2.  Place potassium 40 mEq IV x 4 doses and repeat CMP in AM.    6.  Reported history of Crohn's disease.  Presented with nausea, vomiting, abdominal pain, diarrhea.  Check GI PCR panel.  Consult gastroenterology    7.  SCDs for deep vein thrombosis prophylaxis.      Medical Decision Making  Number and Complexity of problems: 6  Ileus: Acute, high complexity poses threat to life and bodily function, not at baseline  Gastroenteritis: Acute, high complexity poses threat to life and bodily function, not at baseline  Moderate left hydronephrosis: Acute/chronic, moderate complexity  Severe nonobstructing left renal calculi: Acute/chronic, moderate complexity hypokalemia: Acute, high complexity, not at baseline  Crohn's disease: Chronic, high complexity, not at baseline    Differential Diagnosis: None    Conditions and Status        Condition is unchanged.     [facility] Data  External documents reviewed: [facility] everywhere  Cardiac tracing (EKG, telemetry) interpretation: None  Radiology interpretation: Reviewed radiology interpretation CT abdomen  Labs reviewed:   CMP 8/23/2024.  Repeat CMP in AM.  CBC 8/23/2024.  Repeat CBC in AM.  Urinalysis    Any tests that were considered but not ordered: None     Decision rules/scores evaluated (example [id number], [place], etc): Dr. López     Discussed with: Dr. López, patient, and mother present in room     Care Planning  Shared decision making: Dr. López, patient and mother.  Patient agrees to admission, n.p.o., IV fluids, IV pain medication, Zofran for nausea, GI consult, urology consult, follow-up lab work  Code status and discussions: Full code  The patient surrogate decision maker is her mother. Marixa    Disposition  Social Determinants of Health that impact treatment or disposition: None  Estimated length of stay is 2-4 days.     I confirmed that the patient's advanced care plan is  present, code status is documented, and a surrogate decision maker is listed in the patient's medical record.     The patient's surrogate decision maker is her mother Marixa.     The patient was seen and examined by me on 08/23/2024 at 1649.    Electronically signed by GREYSON Salter, 08/23/24, 17:46 CDT.

## 2024-08-23 NOTE — ED PROVIDER NOTES
Subjective   History of Present Illness  Patient is a 33-year-old female who presents to the ER with complaints of nausea with vomiting with abdominal pain.  Last week patient began experiencing renal colic.  She reports history of kidney stones, states she has passed numerous kidney stones in the past on her own.  She states that she began having left flank pain and abdominal pain consistent with another kidney stone.  She was prescribed antibiotics for urinary symptoms.  Patient states on Monday she began experiencing nausea with vomiting and has been unable to keep anything down even while taking Zofran.  She states that she has lost 10 pounds in the past week.  Patient continues to have abdominal pain described as cramping, decreased appetite, inability to keep anything down, subjective fevers.  She was concerned about a possible Crohn's flare.  Patient is followed by Dr. Santiago in [place] with GI.  Due to symptoms described she came the ER for evaluation and treatment.  Past medical history significant for ADHD, Crohn's disease, DVT, depression, anxiety, fibromyalgia, intracranial hypertension, kidney stones, pseudotumor cerebri        Review of Systems   Constitutional:  Positive for activity change, appetite change and unexpected weight change.   HENT: Negative.  Negative for congestion.    Respiratory: Negative.  Negative for cough and shortness of breath.    Cardiovascular: Negative.  Negative for chest pain.   Gastrointestinal:  Positive for abdominal pain, diarrhea, nausea and vomiting.   Genitourinary:  Positive for flank pain. Negative for dysuria.   Musculoskeletal:  Positive for back pain.   Skin: Negative.    All other systems reviewed and are negative.      Past Medical History:   Diagnosis Date    ADHD (attention deficit hyperactivity disorder) 2012    Crohn disease     recently diagnosis, going to start infusion therapy    Deep vein thrombosis 2021    Depression with anxiety 12/09/2018     Fibromyalgia, primary 2016    Headache     Intracranial hypertension     Kidney stones     Lactation disorder     Pseudotumor cerebri 2016    Tobacco abuse 2016       Allergies   Allergen Reactions    Morphine And Codeine Shortness Of Breath    Other Other (See Comments)     Other reaction(s): Other (See Comments)  Dissolvable sutures. She developed an abscess from the last ones. Ended up in ER.   Dissolvable sutures. She developed an abscess from the last ones. Ended up in ER.     Sumatriptan Hives    Buspirone Headache    Ketorolac Unknown - Low Severity     Has Crohn's Disease.    Ketorolac Tromethamine Unknown - Low Severity    Morphine Itching    Demerol [Meperidine] Rash       Past Surgical History:   Procedure Laterality Date    BRAIN SURGERY      COLONOSCOPY      CYSTOSCOPY ELECTROHYDRAULIC LITHOTRIPSY      HYSTERECTOMY  2019    KIDNEY STONE SURGERY      OTHER SURGICAL HISTORY      Intracranial shunt     SHUNT INSERTION  2015       Family History   Problem Relation Age of Onset    Hypertension Mother     Hypertension Father     Prolactinoma Neg Hx        Social History     Socioeconomic History    Marital status: Single    Number of children: 1   Tobacco Use    Smoking status: Every Day     Current packs/day: 0.00     Average packs/day: 0.3 packs/day for 3.8 years (1.0 ttl pk-yrs)     Types: Cigarettes     Start date: 2019     Last attempt to quit: 2022     Years since quittin.8     Passive exposure: Current    Smokeless tobacco: Never    Tobacco comments:     Smoking currently 3..24     AVS   Vaping Use    Vaping status: Never Used   Substance and Sexual Activity    Alcohol use: No    Drug use: No    Sexual activity: Yes     Partners: Male     Birth control/protection: Hysterectomy           Objective   Physical Exam  Vitals and nursing note reviewed.   Constitutional:       Appearance: She is well-developed.   HENT:      Head: Normocephalic and atraumatic.      Nose:  Nose normal.      Mouth/Throat:      Mouth: Mucous membranes are moist.   Eyes:      Extraocular Movements: Extraocular movements intact.      Conjunctiva/sclera: Conjunctivae normal.      Pupils: Pupils are equal, round, and reactive to light.   Cardiovascular:      Rate and Rhythm: Normal rate and regular rhythm.      Heart sounds: Normal heart sounds.   Pulmonary:      Effort: Pulmonary effort is normal.      Breath sounds: Normal breath sounds.   Abdominal:      General: Bowel sounds are normal.      Palpations: Abdomen is soft.      Tenderness: There is generalized abdominal tenderness.   Musculoskeletal:         General: Normal range of motion.      Cervical back: Normal range of motion and neck supple.   Skin:     General: Skin is warm and dry.      Capillary Refill: Capillary refill takes less than 2 seconds.   Neurological:      Mental Status: She is alert and oriented to person, place, and time.   Psychiatric:         Behavior: Behavior normal.         Procedures           ED Course                                             Medical Decision Making  Patient is a 33-year-old female who presents to the ER with complaints of nausea with vomiting with abdominal pain.  Last week patient began experiencing renal colic.  She reports history of kidney stones, states she has passed numerous kidney stones in the past on her own.  She states that she began having left flank pain and abdominal pain consistent with another kidney stone.  She was prescribed antibiotics for urinary symptoms.  Patient states on Monday she began experiencing nausea with vomiting and has been unable to keep anything down even while taking Zofran.  She states that she has lost 10 pounds in the past week.  Patient continues to have abdominal pain described as cramping, decreased appetite, inability to keep anything down, subjective fevers.  She was concerned about a possible Crohn's flare.  Patient is followed by Dr. Santiago in [place] with  GI.  Due to symptoms described she came the ER for evaluation and treatment.  Past medical history significant for ADHD, Crohn's disease, DVT, depression, anxiety, fibromyalgia, intracranial hypertension, kidney stones, pseudotumor cerebri  Differential diagnosis: Bowel obstruction, kidney stone, UTI, Crohn's flare, ileus, colitis, viral illness, and other    Labs Reviewed  COMPREHENSIVE METABOLIC PANEL - Abnormal; Notable for the following components:     Glucose                       106 (*)                Potassium                     3.2 (*)                CO2                           21.0 (*)            All other components within normal limits         Narrative: GFR Normal >60                  Chronic Kidney Disease <60                  Kidney Failure <15                    URINALYSIS W/ MICROSCOPIC IF INDICATED (NO CULTURE) - Abnormal; Notable for the following components:     Color, UA                     Dark Yellow (*)               Specific Gravity, UA          >1.030 (*)               Ketones, UA                     (*)                  Protein, UA                   Trace (*)            All other components within normal limits         Narrative: Urine microscopic not indicated.  CBC WITH AUTO DIFFERENTIAL - Abnormal; Notable for the following components:     WBC                           12.94 (*)               Neutrophils, Absolute         9.53 (*)            All other components within normal limits  COVID-19 AND FLU A/B, NP SWAB IN TRANSPORT MEDIA 1 HR TAT - Normal         Narrative: Fact sheet for providers: https://www.fda.gov/media/111548/download                                    Fact sheet for patients: https://www.fda.gov/media/132020/download                                    Test performed by PCR.  LIPASE - Normal  CBC AND DIFFERENTIAL     CT Abdomen Pelvis With Contrast   Final Result    1. A moderate left hydronephrosis with a normal left ureter without    evidence of ureteral calculi.  This may represent chronic inflammatory    process/chronic pyelonephritis. This was not noted in the previous study    in 2020.    2. Several nonobstructing left renal calculi.    3. Normal appendix.    4. Fluid-filled nondistended nondilated small bowel loops and proximal    large bowel may represent ileus. No finding to suggest obstruction.    5. Ventriculoperitoneal shunt in place. A trace free fluid in the pelvis    may be due to ventriculoperitoneal shunt.                                       This report was signed and finalized on 8/23/2024 2:51 PM by Dr. Glenys Yancey MD.           Patient received iv fluids and pain medication in the ER. She failed po challenge. She does not think she can go home and has had continued n/v with zofran at home. Plan is to admit to the hospitalist services. See their note for details.    Problems Addressed:  Ileus: acute illness or injury    Amount and/or Complexity of Data Reviewed  Labs: ordered. Decision-making details documented in ED Course.  Radiology: ordered. Decision-making details documented in ED Course.  Discussion of management or test interpretation with external provider(s): Discussed with hospitalist    Risk  Prescription drug management.  Decision regarding hospitalization.        Final diagnoses:   Ileus       ED Disposition  ED Disposition       ED Disposition   Decision to Admit    Condition   --    Comment   Level of Care: Med/Surg [1]   Diagnosis: Ileus [880400]   Admitting Physician: LUIS BAKER [[id number]]   Attending Physician: LUIS BAKER [[id number]]   Certification: I Certify That Inpatient Hospital Services Are Medically Necessary For Greater Than 2 Midnights                 No follow-up provider specified.       Medication List      No changes were made to your prescriptions during this visit.            Monika Pizarro, APRN  08/23/24 1405

## 2024-08-23 NOTE — ED NOTES
Pt requested that her port be accessed for blood draws. Pt states that she has the port for blood draws and to be flushed but does not receive any infusions through the port.

## 2024-08-24 ENCOUNTER — APPOINTMENT (OUTPATIENT)
Dept: NUCLEAR MEDICINE | Facility: HOSPITAL | Age: 34
End: 2024-08-24
Payer: COMMERCIAL

## 2024-08-24 LAB
ADV 40+41 DNA STL QL NAA+NON-PROBE: NOT DETECTED
ALBUMIN SERPL-MCNC: 3.7 G/DL (ref 3.5–5.2)
ALBUMIN/GLOB SERPL: 1.6 G/DL
ALP SERPL-CCNC: 64 U/L (ref 39–117)
ALT SERPL W P-5'-P-CCNC: 23 U/L (ref 1–33)
ANION GAP SERPL CALCULATED.3IONS-SCNC: 10 MMOL/L (ref 5–15)
AST SERPL-CCNC: 26 U/L (ref 1–32)
ASTRO TYP 1-8 RNA STL QL NAA+NON-PROBE: NOT DETECTED
BASOPHILS # BLD AUTO: 0.04 10*3/MM3 (ref 0–0.2)
BASOPHILS NFR BLD AUTO: 0.4 % (ref 0–1.5)
BILIRUB SERPL-MCNC: 0.3 MG/DL (ref 0–1.2)
BUN SERPL-MCNC: 10 MG/DL (ref 6–20)
BUN/CREAT SERPL: 16.4 (ref 7–25)
C CAYETANENSIS DNA STL QL NAA+NON-PROBE: NOT DETECTED
C COLI+JEJ+UPSA DNA STL QL NAA+NON-PROBE: NOT DETECTED
CALCIUM SPEC-SCNC: 8.1 MG/DL (ref 8.6–10.5)
CHLORIDE SERPL-SCNC: 109 MMOL/L (ref 98–107)
CO2 SERPL-SCNC: 22 MMOL/L (ref 22–29)
CREAT SERPL-MCNC: 0.61 MG/DL (ref 0.57–1)
CRYPTOSP DNA STL QL NAA+NON-PROBE: NOT DETECTED
DEPRECATED RDW RBC AUTO: 41.3 FL (ref 37–54)
E HISTOLYT DNA STL QL NAA+NON-PROBE: NOT DETECTED
EAEC PAA PLAS AGGR+AATA ST NAA+NON-PRB: NOT DETECTED
EC STX1+STX2 GENES STL QL NAA+NON-PROBE: NOT DETECTED
EGFRCR SERPLBLD CKD-EPI 2021: 122 ML/MIN/1.73
EOSINOPHIL # BLD AUTO: 0.08 10*3/MM3 (ref 0–0.4)
EOSINOPHIL NFR BLD AUTO: 0.8 % (ref 0.3–6.2)
EPEC EAE GENE STL QL NAA+NON-PROBE: NOT DETECTED
ERYTHROCYTE [DISTWIDTH] IN BLOOD BY AUTOMATED COUNT: 12.6 % (ref 12.3–15.4)
ETEC LTA+ST1A+ST1B TOX ST NAA+NON-PROBE: NOT DETECTED
G LAMBLIA DNA STL QL NAA+NON-PROBE: NOT DETECTED
GLOBULIN UR ELPH-MCNC: 2.3 GM/DL
GLUCOSE SERPL-MCNC: 97 MG/DL (ref 65–99)
HCT VFR BLD AUTO: 34.1 % (ref 34–46.6)
HGB BLD-MCNC: 11.6 G/DL (ref 12–15.9)
IMM GRANULOCYTES # BLD AUTO: 0.04 10*3/MM3 (ref 0–0.05)
IMM GRANULOCYTES NFR BLD AUTO: 0.4 % (ref 0–0.5)
LYMPHOCYTES # BLD AUTO: 3.7 10*3/MM3 (ref 0.7–3.1)
LYMPHOCYTES NFR BLD AUTO: 34.9 % (ref 19.6–45.3)
MAGNESIUM SERPL-MCNC: 1.8 MG/DL (ref 1.6–2.6)
MCH RBC QN AUTO: 30.9 PG (ref 26.6–33)
MCHC RBC AUTO-ENTMCNC: 34 G/DL (ref 31.5–35.7)
MCV RBC AUTO: 90.9 FL (ref 79–97)
MONOCYTES # BLD AUTO: 0.61 10*3/MM3 (ref 0.1–0.9)
MONOCYTES NFR BLD AUTO: 5.8 % (ref 5–12)
NEUTROPHILS NFR BLD AUTO: 57.7 % (ref 42.7–76)
NEUTROPHILS NFR BLD AUTO: 6.12 10*3/MM3 (ref 1.7–7)
NOROVIRUS GI+II RNA STL QL NAA+NON-PROBE: NOT DETECTED
NRBC BLD AUTO-RTO: 0 /100 WBC (ref 0–0.2)
P SHIGELLOIDES DNA STL QL NAA+NON-PROBE: NOT DETECTED
PLATELET # BLD AUTO: 272 10*3/MM3 (ref 140–450)
PMV BLD AUTO: 9.6 FL (ref 6–12)
POTASSIUM SERPL-SCNC: 3.5 MMOL/L (ref 3.5–5.2)
PROT SERPL-MCNC: 6 G/DL (ref 6–8.5)
RBC # BLD AUTO: 3.75 10*6/MM3 (ref 3.77–5.28)
RVA RNA STL QL NAA+NON-PROBE: NOT DETECTED
S ENT+BONG DNA STL QL NAA+NON-PROBE: NOT DETECTED
SAPO I+II+IV+V RNA STL QL NAA+NON-PROBE: NOT DETECTED
SHIGELLA SP+EIEC IPAH ST NAA+NON-PROBE: NOT DETECTED
SODIUM SERPL-SCNC: 141 MMOL/L (ref 136–145)
V CHOL+PARA+VUL DNA STL QL NAA+NON-PROBE: NOT DETECTED
V CHOLERAE DNA STL QL NAA+NON-PROBE: NOT DETECTED
WBC NRBC COR # BLD AUTO: 10.59 10*3/MM3 (ref 3.4–10.8)
Y ENTEROCOL DNA STL QL NAA+NON-PROBE: NOT DETECTED

## 2024-08-24 PROCEDURE — 25010000002 METHOCARBAMOL 1000 MG/10ML SOLUTION 10 ML VIAL: Performed by: FAMILY MEDICINE

## 2024-08-24 PROCEDURE — 25010000002 PROCHLORPERAZINE 10 MG/2ML SOLUTION: Performed by: NURSE PRACTITIONER

## 2024-08-24 PROCEDURE — 25010000002 PROMETHAZINE PER 50 MG: Performed by: NURSE PRACTITIONER

## 2024-08-24 PROCEDURE — 25010000002 CEFTRIAXONE PER 250 MG: Performed by: NURSE PRACTITIONER

## 2024-08-24 PROCEDURE — 99221 1ST HOSP IP/OBS SF/LOW 40: CPT | Performed by: INTERNAL MEDICINE

## 2024-08-24 PROCEDURE — 99222 1ST HOSP IP/OBS MODERATE 55: CPT | Performed by: UROLOGY

## 2024-08-24 PROCEDURE — 83735 ASSAY OF MAGNESIUM: CPT | Performed by: NURSE PRACTITIONER

## 2024-08-24 PROCEDURE — 0 HYDROMORPHONE 1 MG/ML SOLUTION: Performed by: NURSE PRACTITIONER

## 2024-08-24 PROCEDURE — 85025 COMPLETE CBC W/AUTO DIFF WBC: CPT | Performed by: NURSE PRACTITIONER

## 2024-08-24 PROCEDURE — 0 TECHNETIUM MERTIATIDE: Performed by: FAMILY MEDICINE

## 2024-08-24 PROCEDURE — 80053 COMPREHEN METABOLIC PANEL: CPT | Performed by: NURSE PRACTITIONER

## 2024-08-24 PROCEDURE — 25010000002 FUROSEMIDE PER 20 MG: Performed by: FAMILY MEDICINE

## 2024-08-24 PROCEDURE — 78708 K FLOW/FUNCT IMAGE W/DRUG: CPT

## 2024-08-24 PROCEDURE — A9562 TC99M MERTIATIDE: HCPCS | Performed by: FAMILY MEDICINE

## 2024-08-24 PROCEDURE — 25010000002 ONDANSETRON PER 1 MG: Performed by: NURSE PRACTITIONER

## 2024-08-24 PROCEDURE — 87507 IADNA-DNA/RNA PROBE TQ 12-25: CPT | Performed by: NURSE PRACTITIONER

## 2024-08-24 RX ORDER — CLONAZEPAM 1 MG/1
2 TABLET ORAL 3 TIMES DAILY PRN
Status: ON HOLD | COMMUNITY
End: 2024-08-24

## 2024-08-24 RX ORDER — HYDROCODONE BITARTRATE AND ACETAMINOPHEN 10; 325 MG/1; MG/1
1 TABLET ORAL EVERY 4 HOURS PRN
COMMUNITY

## 2024-08-24 RX ORDER — PROCHLORPERAZINE EDISYLATE 5 MG/ML
5 INJECTION INTRAMUSCULAR; INTRAVENOUS EVERY 6 HOURS PRN
Status: DISCONTINUED | OUTPATIENT
Start: 2024-08-24 | End: 2024-08-26 | Stop reason: HOSPADM

## 2024-08-24 RX ORDER — FUROSEMIDE 10 MG/ML
40 INJECTION INTRAMUSCULAR; INTRAVENOUS
Status: COMPLETED | OUTPATIENT
Start: 2024-08-24 | End: 2024-08-24

## 2024-08-24 RX ORDER — HYDROCODONE BITARTRATE AND ACETAMINOPHEN 10; 325 MG/1; MG/1
1 TABLET ORAL EVERY 4 HOURS PRN
Status: DISCONTINUED | OUTPATIENT
Start: 2024-08-24 | End: 2024-08-26 | Stop reason: HOSPADM

## 2024-08-24 RX ORDER — VENLAFAXINE HYDROCHLORIDE 75 MG/1
75 CAPSULE, EXTENDED RELEASE ORAL DAILY
Status: DISCONTINUED | OUTPATIENT
Start: 2024-08-24 | End: 2024-08-26 | Stop reason: HOSPADM

## 2024-08-24 RX ORDER — PROPRANOLOL HYDROCHLORIDE 80 MG/1
80 TABLET ORAL DAILY PRN
COMMUNITY

## 2024-08-24 RX ORDER — DICYCLOMINE HYDROCHLORIDE 10 MG/1
20 CAPSULE ORAL 2 TIMES DAILY PRN
Status: DISCONTINUED | OUTPATIENT
Start: 2024-08-24 | End: 2024-08-26 | Stop reason: HOSPADM

## 2024-08-24 RX ORDER — VENLAFAXINE HYDROCHLORIDE 75 MG/1
75 CAPSULE, EXTENDED RELEASE ORAL DAILY
COMMUNITY

## 2024-08-24 RX ORDER — CLONAZEPAM 1 MG/1
2 TABLET ORAL 3 TIMES DAILY PRN
Status: DISCONTINUED | OUTPATIENT
Start: 2024-08-24 | End: 2024-08-24 | Stop reason: SDUPTHER

## 2024-08-24 RX ORDER — CLONAZEPAM 2 MG/1
2 TABLET ORAL 3 TIMES DAILY PRN
COMMUNITY

## 2024-08-24 RX ORDER — CLONAZEPAM 1 MG/1
2 TABLET ORAL 3 TIMES DAILY PRN
Status: DISCONTINUED | OUTPATIENT
Start: 2024-08-24 | End: 2024-08-26 | Stop reason: HOSPADM

## 2024-08-24 RX ORDER — POTASSIUM CHLORIDE 750 MG/1
40 CAPSULE, EXTENDED RELEASE ORAL ONCE
Status: COMPLETED | OUTPATIENT
Start: 2024-08-24 | End: 2024-08-24

## 2024-08-24 RX ADMIN — PROMETHAZINE HYDROCHLORIDE 12.5 MG: 25 INJECTION INTRAMUSCULAR; INTRAVENOUS at 18:19

## 2024-08-24 RX ADMIN — CLONAZEPAM 2 MG: 1 TABLET ORAL at 15:08

## 2024-08-24 RX ADMIN — HYDROMORPHONE HYDROCHLORIDE 1 MG: 1 INJECTION, SOLUTION INTRAMUSCULAR; INTRAVENOUS; SUBCUTANEOUS at 12:48

## 2024-08-24 RX ADMIN — TECHNESCAN TC 99M MERTIATIDE 1 DOSE: 1 INJECTION, POWDER, LYOPHILIZED, FOR SOLUTION INTRAVENOUS at 13:10

## 2024-08-24 RX ADMIN — PANTOPRAZOLE SODIUM 40 MG: 40 INJECTION, POWDER, FOR SOLUTION INTRAVENOUS at 08:44

## 2024-08-24 RX ADMIN — DICYCLOMINE HYDROCHLORIDE 20 MG: 10 CAPSULE ORAL at 14:12

## 2024-08-24 RX ADMIN — ONDANSETRON 4 MG: 2 INJECTION INTRAMUSCULAR; INTRAVENOUS at 12:48

## 2024-08-24 RX ADMIN — SODIUM CHLORIDE 1000 MG: 900 INJECTION INTRAVENOUS at 16:16

## 2024-08-24 RX ADMIN — Medication 10 ML: at 20:37

## 2024-08-24 RX ADMIN — CLONAZEPAM 2 MG: 1 TABLET ORAL at 23:04

## 2024-08-24 RX ADMIN — HYDROCODONE BITARTRATE AND ACETAMINOPHEN 1 TABLET: 10; 325 TABLET ORAL at 18:18

## 2024-08-24 RX ADMIN — VENLAFAXINE HYDROCHLORIDE 75 MG: 75 CAPSULE, EXTENDED RELEASE ORAL at 14:12

## 2024-08-24 RX ADMIN — METHOCARBAMOL 500 MG: 100 INJECTION INTRAMUSCULAR; INTRAVENOUS at 00:43

## 2024-08-24 RX ADMIN — HYDROMORPHONE HYDROCHLORIDE 1 MG: 1 INJECTION, SOLUTION INTRAMUSCULAR; INTRAVENOUS; SUBCUTANEOUS at 00:49

## 2024-08-24 RX ADMIN — PROCHLORPERAZINE EDISYLATE 5 MG: 5 INJECTION INTRAMUSCULAR; INTRAVENOUS at 16:09

## 2024-08-24 RX ADMIN — PROMETHAZINE HYDROCHLORIDE 12.5 MG: 25 INJECTION INTRAMUSCULAR; INTRAVENOUS at 23:57

## 2024-08-24 RX ADMIN — FUROSEMIDE 40 MG: 10 INJECTION, SOLUTION INTRAMUSCULAR; INTRAVENOUS at 13:30

## 2024-08-24 RX ADMIN — HYDROMORPHONE HYDROCHLORIDE 1 MG: 1 INJECTION, SOLUTION INTRAMUSCULAR; INTRAVENOUS; SUBCUTANEOUS at 06:31

## 2024-08-24 RX ADMIN — CLONAZEPAM 2 MG: 1 TABLET ORAL at 08:44

## 2024-08-24 RX ADMIN — PANTOPRAZOLE SODIUM 40 MG: 40 INJECTION, POWDER, FOR SOLUTION INTRAVENOUS at 20:37

## 2024-08-24 RX ADMIN — POTASSIUM CHLORIDE 40 MEQ: 750 CAPSULE, EXTENDED RELEASE ORAL at 18:19

## 2024-08-24 RX ADMIN — HYDROMORPHONE HYDROCHLORIDE 1 MG: 1 INJECTION, SOLUTION INTRAMUSCULAR; INTRAVENOUS; SUBCUTANEOUS at 20:46

## 2024-08-24 RX ADMIN — HYDROMORPHONE HYDROCHLORIDE 1 MG: 1 INJECTION, SOLUTION INTRAMUSCULAR; INTRAVENOUS; SUBCUTANEOUS at 04:32

## 2024-08-24 RX ADMIN — HYDROMORPHONE HYDROCHLORIDE 1 MG: 1 INJECTION, SOLUTION INTRAMUSCULAR; INTRAVENOUS; SUBCUTANEOUS at 08:44

## 2024-08-24 RX ADMIN — Medication 10 ML: at 08:44

## 2024-08-24 RX ADMIN — HYDROMORPHONE HYDROCHLORIDE 1 MG: 1 INJECTION, SOLUTION INTRAMUSCULAR; INTRAVENOUS; SUBCUTANEOUS at 15:04

## 2024-08-24 RX ADMIN — HYDROMORPHONE HYDROCHLORIDE 1 MG: 1 INJECTION, SOLUTION INTRAMUSCULAR; INTRAVENOUS; SUBCUTANEOUS at 10:57

## 2024-08-24 NOTE — CONSULTS
[facility]   Consult Note    Patient Name: Kelsye Mcqueen  : 1990  MRN: [id number]  Primary Care Physician:  Sol Lopez APRN  Referring Physician: No ref. provider found  Date of admission: 2024    Subjective   Subjective     Reason for Consult/ Chief Complaint: Left hydronephrosis    HPI:  Kelsey Mcqueen is a 33 y.o. female a known urologic history of mild left hydronephrosis without obstruction and a history of urolithiasis as well as Crohn's disease. She presents to the hospital with one week of abdominal pain. The pain started as a typical stone episode, with left flank pain and hematuria. She feels the stone passed, as she had a change in the pain and she heard a clink in the toilet. Subsequently, her pain became more diffuse, and seems to her to be more from her Crohn's. CT abdomen revealed what appears to be dilated left renal calyces, but no dilation of the renal pelvis or ureter. Stones seen in the left kidney, but no ureteral stones or dilation.     Review of Systems   All systems were reviewed and negative except for: as above    Personal History     Past Medical History:   Diagnosis Date    ADHD (attention deficit hyperactivity disorder)     Crohn disease     recently diagnosis, going to start infusion therapy    Deep vein thrombosis     Depression with anxiety 2018    Fibromyalgia, primary 2016    Headache     Intracranial hypertension     Kidney stones     Lactation disorder     Pseudotumor cerebri 2016    Tobacco abuse 2016       Past Surgical History:   Procedure Laterality Date    BRAIN SURGERY      COLONOSCOPY      CYSTOSCOPY ELECTROHYDRAULIC LITHOTRIPSY      HYSTERECTOMY      KIDNEY STONE SURGERY      OTHER SURGICAL HISTORY      Intracranial shunt     SHUNT INSERTION  2015       Family History: family history includes Hypertension in her father and mother. Otherwise pertinent FHx was reviewed and not pertinent to  current issue.    Social History:  reports that she has been smoking cigarettes. She started smoking about 5 years ago. She has a 1 pack-year smoking history. She has been exposed to tobacco smoke. She has never used smokeless tobacco. She reports that she does not drink alcohol and does not use drugs.    Home Medications:  QUEtiapine, acetaZOLAMIDE, albuterol sulfate HFA, butalbital-acetaminophen-caffeine, lamoTRIgine, lisdexamfetamine, loperamide, omeprazole, ondansetron, propranolol LA, tiZANidine, and venlafaxine XR    Allergies:  Allergies   Allergen Reactions    Morphine And Codeine Shortness Of Breath    Other Other (See Comments)     Other reaction(s): Other (See Comments)  Dissolvable sutures. She developed an abscess from the last ones. Ended up in ER.   Dissolvable sutures. She developed an abscess from the last ones. Ended up in ER.     Sumatriptan Hives    Buspirone Headache    Ketorolac Unknown - Low Severity     Has Crohn's Disease.    Ketorolac Tromethamine Unknown - Low Severity    Morphine Itching    Demerol [Meperidine] Rash       Objective    Objective     Vitals:   Temp:  [98 °F (36.7 °C)-98.4 °F (36.9 °C)] 98.2 °F (36.8 °C)  Heart Rate:  [50-92] 55  Resp:  [16-19] 16  BP: (101-129)/(48-84) 101/48    Physical Exam:   Constitutional: Awake, alert   Eyes: PERRLA, sclerae anicteric, no conjunctival injection   HENT: NCAT, mucous membranes moist   Neck: Supple, no thyromegaly, no lymphadenopathy, trachea midline   Respiratory: Clear to auscultation bilaterally, nonlabored respirations    Cardiovascular: RRR, no murmurs, rubs, or gallops, palpable pedal pulses bilaterally   Gastrointestinal: Positive bowel sounds, soft, nontender, nondistended   Musculoskeletal: No bilateral ankle edema, no clubbing or cyanosis to extremities   Psychiatric: Appropriate affect, cooperative   Neurologic: Oriented x 3, strength symmetric in all extremities, Cranial Nerves grossly intact to confrontation, speech  clear   Skin: No rashes       Result Review    Result Review:  I have personally reviewed the results from the time of this admission to 8/24/2024 09:40 CDT and agree with these findings:  [x]  Laboratory list / accordion  [x]  Microbiology  [x]  Radiology  []  EKG/Telemetry   []  Cardiology/Vascular   []  Pathology  [x]  Old records  []  Other:  Most notable findings include:   EXAMINATION: CT ABDOMEN PELVIS W CONTRAST-      8/23/2024 1:20 PM     HISTORY: abdominal pain; llq/left flank; hx of crohn's and kidney stones     In order to have a CT radiation dose as low as reasonably achievable  Automated Exposure Control was utilized for adjustment of the mA and/or  KV according to patient size.     Total DLP = 547.91 mGy.cm     CT scan of the abdomen and pelvis is performed after intravenous  contrast enhancement.     Images are acquired in axial plane and subsequent reconstruction in  coronal and sagittal planes.     The comparison is made with the previous study dated 3/16/2020.     The lung bases included in the study are unremarkable.     A ventriculoperitoneal shunt is seen in place similar to the previous  study. It enters the right upper abdomen adjacent and anterior to the  segment 4B of the liver and distal end of the catheter is in the right  midabdomen below the liver margin.     Limited visualized cardiomediastinal structures are unremarkable.  Significant artifacts are produced by a catheter in the distal SVC.  There is mild cardiomegaly.     The liver and spleen are normal.     The gallbladder is surgically absent. There is no significant dilatation  of common bile duct.     The pancreas is normal.     The adrenal glands bilaterally are normal.     There is moderate lobulation of renal contour bilaterally more severe on  the left side. The tiny low-density nodule in the right kidney was not  noted in the previous study. These are too small to be further  characterized. There is significant irregularity  of the left renal  cortex suggesting chronic renal cortical scarring. No discrete mass.  There are several small radiopaque calculi in the left kidney the  largest one in the lower pole measuring 5 mm in diameter. There is  evidence of left hydronephrosis. The left ureter is not dilated. No  calculi in the left ureter. The right collecting system is normal. The  urinary bladder is poorly distended. No intrinsic abnormality or  calculus.     The stomach is decompressed. No focal abnormality. Duodenum is minimally  dilated with fluid. Fluid-filled nondistended and nondilated small bowel  loops are seen. Maximum lumen diameter of the distal ileum is 2.2 cm. No  zone of transition. Normal retrocecal appendix is seen. Moderate gas  fluid and stool are seen in the colon. No finding to suggest  obstruction. Moderate thickening of the wall of the distal/descending  and sigmoid colon is probably due to incomplete distention. No  surrounding peritoneal infiltration are stranding to suggest  inflammatory/infectious process.     There is trace free fluid in the pelvis.     There is a tiny fat-containing umbilical hernia.     Normal abdominal aorta and iliac arteries.     There is no evidence of abdominal or pelvic lymphadenopathy.     Images reviewed in bone window show no acute bony abnormality.  IMPRESSION:  1. A moderate left hydronephrosis with a normal left ureter without  evidence of ureteral calculi. This may represent chronic inflammatory  process/chronic pyelonephritis. This was not noted in the previous study  in 2020.  2. Several nonobstructing left renal calculi.  3. Normal appendix.  4. Fluid-filled nondistended nondilated small bowel loops and proximal  large bowel may represent ileus. No finding to suggest obstruction.  5. Ventriculoperitoneal shunt in place. A trace free fluid in the pelvis  may be due to ventriculoperitoneal shunt.                       This report was signed and finalized on 8/23/2024 2:51 PM  by Dr. Glenys Yancey MD.         Assessment & Plan   Assessment / Plan     Brief Patient Summary:  Kelsey Mcqueen is a 33 y.o. female who has a history of stones, likely recently passed one, and nonobstructed mild left hydronephrosis, which appears stable on comparison to a CT from 2020.    Active Hospital Problems:  Active Hospital Problems    Diagnosis     **Ileus     Hypokalemia due to nausea, vomiting, diarrhea     Hydronephrosis of left kidney     Nonobstructing left renal calculi     Gastroenteritis      Plan: Will check a NM lasix renal scan to confirm no significant obstruction, although given the appearance of the ureter and renal pelvis, it is unlikely that a stent would be necessary or helpful. Presuming no need for intervention, subsequently will follow up with her regular urologist.    Jeffry Al MD

## 2024-08-24 NOTE — NURSING NOTE
Patient is complaining of constant nausea and pain. Patient says zofran works so little and wears off so quickly, but the zofran dose is not available again until 2330. Patient asking for something else. States the reglan in the E.R. worked pretty well. Will reach out to overnight provider for recommendations.

## 2024-08-24 NOTE — CONSULTS
[facility]  Inpatient Consult Note  Today's date:  08/24/24    Kelsey Mcqueen  1990       Referring Provider: No ref. provider found  Primary Physician: Sol Lopez APRN     Date of Admission: 8/23/2024  Date of Service:  08/24/24    Reason for Consultation/Chief Complaint:   Nausea and vomiting  History of Crohn's disease    History of present illness:    Kelsey is a 34 y/o female that was admitted to the hospital yesterday. She presented to the hospital with nausea, vomiting, diarrhea, and abdominal pain. Last week, she started having left flank pain and lower abdominal/pelvic pain similar to the pain that she has had in the past with kidney stones. She was seen by her PCP and prescribed Macrobid and Pyridium. This pain resolved. Then on Monday, she said that she had new onset of nausea and vomiting. She says that she has been unable to keep anything down since that time. She has some abdominal cramping and has also had diarrhea. She says that she is having several loose stools daily. This is new for her starting this past week. Prior to this week, she was moving her bowels about once daily with formed stool.    She does report history of Crohn's disease. Follow with outpatient gastroenterologist. She has been on Humira and Entyvio in the past. However, these were stopped and she is currently only treated with Mesalamine. She says that her last colonoscopy was earlier this year and she was told that her Crohn's disease was in remission.    On admission, labs revealed mild leukocytosis with wbc 13.5. Labs were otherwise unremarkable. CT abdomen/pelvis showed irregularity in the left renal contour with small calculi in the left kidney and left hydronephrosis, but no obvious calculi in the left ureter. There was also fluid filled small bowel with some wall thickening in the descending and sigmoid colon which could be due to incomplete distention but no surrounding  stranding., and findings of possible ileus.     Past Medical History:   Diagnosis Date    ADHD (attention deficit hyperactivity disorder)     Crohn disease     recently diagnosis, going to start infusion therapy    Deep vein thrombosis     Depression with anxiety 2018    Fibromyalgia, primary 2016    Headache     Intracranial hypertension     Kidney stones     Lactation disorder     Pseudotumor cerebri 2016    Tobacco abuse 2016         Past Surgical History:   Procedure Laterality Date    BRAIN SURGERY      COLONOSCOPY      CYSTOSCOPY ELECTROHYDRAULIC LITHOTRIPSY      HYSTERECTOMY  2019    KIDNEY STONE SURGERY      OTHER SURGICAL HISTORY      Intracranial shunt     SHUNT INSERTION  2015          Allergies   Allergen Reactions    Morphine And Codeine Shortness Of Breath    Other Other (See Comments)     Other reaction(s): Other (See Comments)  Dissolvable sutures. She developed an abscess from the last ones. Ended up in ER.   Dissolvable sutures. She developed an abscess from the last ones. Ended up in ER.     Sumatriptan Hives    Buspirone Headache    Ketorolac Unknown - Low Severity     Has Crohn's Disease.    Ketorolac Tromethamine Unknown - Low Severity    Morphine Itching    Demerol [Meperidine] Rash         Social History     Tobacco Use    Smoking status: Every Day     Current packs/day: 0.00     Average packs/day: 0.3 packs/day for 3.8 years (1.0 ttl pk-yrs)     Types: Cigarettes     Start date: 2019     Last attempt to quit: 2022     Years since quittin.8     Passive exposure: Current    Smokeless tobacco: Never    Tobacco comments:     Smoking currently 3     AVS   Substance Use Topics    Alcohol use: No          Family History   Problem Relation Age of Onset    Hypertension Mother     Hypertension Father     Prolactinoma Neg Hx          Medications Prior to Admission   Medication Sig Dispense Refill Last Dose    acetaZOLAMIDE (DIAMOX) 125 MG tablet  Take 1 tablet by mouth 3 (Three) Times a Day. 90 tablet 1     butalbital-acetaminophen-caffeine (FIORICET, ESGIC) -40 MG per tablet Take 1 tablet by mouth Every 6 (Six) Hours As Needed for Headache. (Patient not taking: Reported on 4/2/2024) 10 tablet 0     lamoTRIgine (LaMICtal) 150 MG tablet Take 1 tablet by mouth Every Night. 30 tablet 2     lisdexamfetamine (Vyvanse) 50 MG capsule Take 1 capsule by mouth Every Morning (Patient not taking: Reported on 4/2/2024) 30 capsule 0     loperamide (IMODIUM) 2 MG capsule Take 1 capsule by mouth 3 (Three) Times a Day As Needed.       omeprazole (priLOSEC) 40 MG capsule omeprazole 40 mg capsule,delayed release   Take 1 capsule every day by oral route for 60 days.       ondansetron (ZOFRAN) 4 MG tablet ondansetron HCl 4 mg tablet       propranolol LA (INDERAL LA) 80 MG 24 hr capsule Take 1 capsule by mouth Daily.       QUEtiapine (SEROquel) 100 MG tablet Take 1 tablet by mouth Every Night. 30 tablet 0     tiZANidine (ZANAFLEX) 4 MG tablet        venlafaxine XR (EFFEXOR-XR) 75 MG 24 hr capsule Take 1 capsule by mouth Daily.       Ventolin  (90 Base) MCG/ACT inhaler                 Review of Systems:  Constitutional: No unexpected weight change, no fatigue, no unexplained fever, no sweats or chills.   HEENT: No icteric sclera.  No hearing or visual deficits.  No sore throat.  No chronic nasal discharge.  Pulmonary: No chronic cough.  No hemoptysis.  No shortness of breath.  Cardiovascular: No chest pain.  No palpitations.  No shortness of breath.  Gastrointestinal: As above.  Musculoskeletal/extremities: No peripheral edema.  No cyanosis.  No claudications.  No back pain.  Genitourinary: No dysuria.  No blood in stool.  No urethral discharges.  Neurologic: No seizures.  No headaches.  No dizziness.  No gait problems.  Skin: No rash.  No icterus.  Mental: No psychosis.  No confusions.  No hallucinations.      Physical Exam:  Temp:  [98 °F (36.7 °C)-98.4 °F (36.9  °C)] 98.2 °F (36.8 °C)  Heart Rate:  [50-92] 55  Resp:  [16-19] 16  BP: (101-129)/(48-84) 101/48    General:   HEENT: Nonicteric sclerae.  Moist oral mucosa.  PERRLA.  EOMI.  Clear pharynx.  Lungs: Clear to auscultation bilaterally.  No wheezing, rales or rhonchi.  Heart: Regular rate and rhythm.  Normal S1 and S2, no S3, S4 or murmur.  Abdomen: Soft, nondistended, mild to moderate tenderness to palpation, no rebound  Extremities: No cyanosis, edema or pulse deficits.  Skin: No rash or jaundice.      Results Review:  Lab Results (last 24 hours)       Procedure Component Value Units Date/Time    Gastrointestinal Panel, PCR - Stool, Per Rectum [[id number]]  (Normal) Collected: 08/24/24 0449    Specimen: Stool from Per Rectum Updated: 08/24/24 0611     Campylobacter Not Detected     Plesiomonas shigelloides Not Detected     Salmonella Not Detected     Vibrio Not Detected     Vibrio cholerae Not Detected     Yersinia enterocolitica Not Detected     Enteroaggregative E. coli (EAEC) Not Detected     Enteropathogenic E. coli (EPEC) Not Detected     Enterotoxigenic E. coli (ETEC) lt/st Not Detected     Shiga-like toxin-producing E. coli (STEC) stx1/stx2 Not Detected     Shigella/Enteroinvasive E. coli (EIEC) Not Detected     Cryptosporidium Not Detected     Cyclospora cayetanensis Not Detected     Entamoeba histolytica Not Detected     Giardia lamblia Not Detected     Adenovirus F40/41 Not Detected     Astrovirus Not Detected     Norovirus GI/GII Not Detected     Rotavirus A Not Detected     Sapovirus (I, II, IV or V) Not Detected    Narrative:      If Aeromonas, Staphylococcus aureus or Bacillus cereus are suspected, please order [id number]: Stool Culture, Aeromonas, S aureus, B Cereus.    Magnesium [[id number]]  (Normal) Collected: 08/24/24 0425    Specimen: Blood Updated: 08/24/24 0501     Magnesium 1.8 mg/dL     Comprehensive Metabolic Panel [[id number]]  (Abnormal) Collected: 08/24/24 0425    Specimen: Blood Updated:  08/24/24 0501     Glucose 97 mg/dL      BUN 10 mg/dL      Creatinine 0.61 mg/dL      Sodium 141 mmol/L      Potassium 3.5 mmol/L      Chloride 109 mmol/L      CO2 22.0 mmol/L      Calcium 8.1 mg/dL      Total Protein 6.0 g/dL      Albumin 3.7 g/dL      ALT (SGPT) 23 U/L      AST (SGOT) 26 U/L      Alkaline Phosphatase 64 U/L      Total Bilirubin 0.3 mg/dL      Globulin 2.3 gm/dL      A/G Ratio 1.6 g/dL      BUN/Creatinine Ratio 16.4     Anion Gap 10.0 mmol/L      eGFR 122.0 mL/min/1.73     Narrative:      GFR Normal >60  Chronic Kidney Disease <60  Kidney Failure <15      CBC & Differential [[id number]]  (Abnormal) Collected: 08/24/24 0425    Specimen: Blood Updated: 08/24/24 0438    Narrative:      The following orders were created for panel order CBC & Differential.  Procedure                               Abnormality         Status                     ---------                               -----------         ------                     CBC Auto Differential[[id number]]        Abnormal            Final result                 Please view results for these tests on the individual orders.    CBC Auto Differential [[id number]]  (Abnormal) Collected: 08/24/24 0425    Specimen: Blood Updated: 08/24/24 0438     WBC 10.59 10*3/mm3      RBC 3.75 10*6/mm3      Hemoglobin 11.6 g/dL      Hematocrit 34.1 %      MCV 90.9 fL      MCH 30.9 pg      MCHC 34.0 g/dL      RDW 12.6 %      RDW-SD 41.3 fl      MPV 9.6 fL      Platelets 272 10*3/mm3      Neutrophil % 57.7 %      Lymphocyte % 34.9 %      Monocyte % 5.8 %      Eosinophil % 0.8 %      Basophil % 0.4 %      Immature Grans % 0.4 %      Neutrophils, Absolute 6.12 10*3/mm3      Lymphocytes, Absolute 3.70 10*3/mm3      Monocytes, Absolute 0.61 10*3/mm3      Eosinophils, Absolute 0.08 10*3/mm3      Basophils, Absolute 0.04 10*3/mm3      Immature Grans, Absolute 0.04 10*3/mm3      nRBC 0.0 /100 WBC     Magnesium [[id number]]  (Normal) Collected: 08/23/24 1927    Specimen: Blood  Updated: 08/23/24 1947     Magnesium 1.9 mg/dL     CBC & Differential [[id number]]  (Abnormal) Collected: 08/23/24 1927    Specimen: Blood Updated: 08/23/24 1937    Narrative:      The following orders were created for panel order CBC & Differential.  Procedure                               Abnormality         Status                     ---------                               -----------         ------                     CBC Auto Differential[[id number]]        Abnormal            Final result                 Please view results for these tests on the individual orders.    CBC Auto Differential [[id number]]  (Abnormal) Collected: 08/23/24 1927    Specimen: Blood Updated: 08/23/24 1937     WBC 13.58 10*3/mm3      RBC 4.18 10*6/mm3      Hemoglobin 12.8 g/dL      Hematocrit 37.8 %      MCV 90.4 fL      MCH 30.6 pg      MCHC 33.9 g/dL      RDW 12.6 %      RDW-SD 41.2 fl      MPV 9.4 fL      Platelets 318 10*3/mm3      Neutrophil % 67.6 %      Lymphocyte % 26.5 %      Monocyte % 5.0 %      Eosinophil % 0.2 %      Basophil % 0.3 %      Immature Grans % 0.4 %      Neutrophils, Absolute 9.18 10*3/mm3      Lymphocytes, Absolute 3.60 10*3/mm3      Monocytes, Absolute 0.68 10*3/mm3      Eosinophils, Absolute 0.03 10*3/mm3      Basophils, Absolute 0.04 10*3/mm3      Immature Grans, Absolute 0.05 10*3/mm3      nRBC 0.0 /100 WBC     Urinalysis With Microscopic If Indicated (No Culture) - Urine, Clean Catch [[id number]]  (Abnormal) Collected: 08/23/24 1529    Specimen: Urine, Clean Catch Updated: 08/23/24 1543     Color, UA Dark Yellow     Appearance, UA Clear     pH, UA 6.0     Specific Gravity, UA >1.030     Glucose, UA Negative     Ketones, UA 40 mg/dL (2+)     Bilirubin, UA Negative     Blood, UA Negative     Protein, UA Trace     Leuk Esterase, UA Negative     Nitrite, UA Negative     Urobilinogen, UA 0.2 E.U./dL    Narrative:      Urine microscopic not indicated.    COVID-19 and FLU A/B PCR, 1 HR TAT - Swab, Nasopharynx  [[id number]]  (Normal) Collected: 08/23/24 1339    Specimen: Swab from Nasopharynx Updated: 08/23/24 1416     COVID19 Not Detected     Influenza A PCR Not Detected     Influenza B PCR Not Detected    Narrative:      Fact sheet for providers: https://www.fda.gov/media/146694/download    Fact sheet for patients: https://www.fda.gov/media/001563/download    Test performed by PCR.    Comprehensive Metabolic Panel [[id number]]  (Abnormal) Collected: 08/23/24 1337    Specimen: Blood Updated: 08/23/24 1413     Glucose 106 mg/dL      BUN 11 mg/dL      Creatinine 0.62 mg/dL      Sodium 141 mmol/L      Potassium 3.2 mmol/L      Chloride 106 mmol/L      CO2 21.0 mmol/L      Calcium 9.5 mg/dL      Total Protein 7.4 g/dL      Albumin 4.4 g/dL      ALT (SGPT) 13 U/L      AST (SGOT) 16 U/L      Alkaline Phosphatase 76 U/L      Total Bilirubin 0.5 mg/dL      Globulin 3.0 gm/dL      A/G Ratio 1.5 g/dL      BUN/Creatinine Ratio 17.7     Anion Gap 14.0 mmol/L      eGFR 121.5 mL/min/1.73     Narrative:      GFR Normal >60  Chronic Kidney Disease <60  Kidney Failure <15      Lipase [[id number]]  (Normal) Collected: 08/23/24 1337    Specimen: Blood Updated: 08/23/24 1408     Lipase 52 U/L     CBC & Differential [[id number]]  (Abnormal) Collected: 08/23/24 1337    Specimen: Blood Updated: 08/23/24 1355    Narrative:      The following orders were created for panel order CBC & Differential.  Procedure                               Abnormality         Status                     ---------                               -----------         ------                     CBC Auto Differential[[id number]]        Abnormal            Final result                 Please view results for these tests on the individual orders.    CBC Auto Differential [[id number]]  (Abnormal) Collected: 08/23/24 1337    Specimen: Blood Updated: 08/23/24 1355     WBC 12.94 10*3/mm3      RBC 4.50 10*6/mm3      Hemoglobin 13.8 g/dL      Hematocrit 39.9 %      MCV 88.7 fL      MCH  30.7 pg      MCHC 34.6 g/dL      RDW 12.6 %      RDW-SD 40.8 fl      MPV 9.7 fL      Platelets 337 10*3/mm3      Neutrophil % 73.7 %      Lymphocyte % 20.2 %      Monocyte % 5.1 %      Eosinophil % 0.3 %      Basophil % 0.3 %      Immature Grans % 0.4 %      Neutrophils, Absolute 9.53 10*3/mm3      Lymphocytes, Absolute 2.62 10*3/mm3      Monocytes, Absolute 0.66 10*3/mm3      Eosinophils, Absolute 0.04 10*3/mm3      Basophils, Absolute 0.04 10*3/mm3      Immature Grans, Absolute 0.05 10*3/mm3      nRBC 0.0 /100 WBC               Radiology Review:  Imaging Results (Last 72 Hours)       Procedure Component Value Units Date/Time    CT Abdomen Pelvis With Contrast [[id number]] Collected: 08/23/24 1436     Updated: 08/23/24 1454    Narrative:      EXAMINATION: CT ABDOMEN PELVIS W CONTRAST-      8/23/2024 1:20 PM     HISTORY: abdominal pain; llq/left flank; hx of crohn's and kidney stones     In order to have a CT radiation dose as low as reasonably achievable  Automated Exposure Control was utilized for adjustment of the mA and/or  KV according to patient size.     Total DLP = 547.91 mGy.cm     CT scan of the abdomen and pelvis is performed after intravenous  contrast enhancement.     Images are acquired in axial plane and subsequent reconstruction in  coronal and sagittal planes.     The comparison is made with the previous study dated 3/16/2020.     The lung bases included in the study are unremarkable.     A ventriculoperitoneal shunt is seen in place similar to the previous  study. It enters the right upper abdomen adjacent and anterior to the  segment 4B of the liver and distal end of the catheter is in the right  midabdomen below the liver margin.     Limited visualized cardiomediastinal structures are unremarkable.  Significant artifacts are produced by a catheter in the distal SVC.  There is mild cardiomegaly.     The liver and spleen are normal.     The gallbladder is surgically absent. There is no significant  dilatation  of common bile duct.     The pancreas is normal.     The adrenal glands bilaterally are normal.     There is moderate lobulation of renal contour bilaterally more severe on  the left side. The tiny low-density nodule in the right kidney was not  noted in the previous study. These are too small to be further  characterized. There is significant irregularity of the left renal  cortex suggesting chronic renal cortical scarring. No discrete mass.  There are several small radiopaque calculi in the left kidney the  largest one in the lower pole measuring 5 mm in diameter. There is  evidence of left hydronephrosis. The left ureter is not dilated. No  calculi in the left ureter. The right collecting system is normal. The  urinary bladder is poorly distended. No intrinsic abnormality or  calculus.     The stomach is decompressed. No focal abnormality. Duodenum is minimally  dilated with fluid. Fluid-filled nondistended and nondilated small bowel  loops are seen. Maximum lumen diameter of the distal ileum is 2.2 cm. No  zone of transition. Normal retrocecal appendix is seen. Moderate gas  fluid and stool are seen in the colon. No finding to suggest  obstruction. Moderate thickening of the wall of the distal/descending  and sigmoid colon is probably due to incomplete distention. No  surrounding peritoneal infiltration are stranding to suggest  inflammatory/infectious process.     There is trace free fluid in the pelvis.     There is a tiny fat-containing umbilical hernia.     Normal abdominal aorta and iliac arteries.     There is no evidence of abdominal or pelvic lymphadenopathy.     Images reviewed in bone window show no acute bony abnormality.    Impression:      1. A moderate left hydronephrosis with a normal left ureter without  evidence of ureteral calculi. This may represent chronic inflammatory  process/chronic pyelonephritis. This was not noted in the previous study  in 2020.  2. Several nonobstructing  left renal calculi.  3. Normal appendix.  4. Fluid-filled nondistended nondilated small bowel loops and proximal  large bowel may represent ileus. No finding to suggest obstruction.  5. Ventriculoperitoneal shunt in place. A trace free fluid in the pelvis  may be due to ventriculoperitoneal shunt.                       This report was signed and finalized on 8/23/2024 2:51 PM by Dr. Glenys Yancey MD.               Impression:  Acute onset nausea, vomiting, diarrhea. With sudden onset of her symptoms and CT findings, this could be acute infectious enteritis. Her stool culture is negative. Doubt that this is a Crohn's flare with sudden onset of her symptoms and colonoscopy earlier this year with reported remission of her Crohn's disease. Does not appear to have bowel obstruction, but could have mild ileus along with infectious gastroenteritis or kidney stones.  Recurrent kidney stones. CT scan with small stones in the left kidney and left hydronephrosis, but no obvious stone in the left ureter.    Plan:  She is going to try clear liquid diet today. Continue Zofran as needed. She as also give dose of Reglan, so will see if she has some improvement with this. Will try adding Dicyclomine 20 mg as needed for her abdominal pain. I do not think that she needs colonoscopy at this time and likely would not be able to tolerate a prep anyway. She will need outpatient follow up wit her primary gastroenterologist after discharge. Urology has been consulted regarding her recurrent kidney stones and hydronephrosis on CT scan. Will continue to follow and make further recommendations pending her clinical course.      Raffaele Russ MD  08/24/24   09:16 CDT

## 2024-08-24 NOTE — PLAN OF CARE
Goal Outcome Evaluation:         Patient A/O x4 this shift. Patient treated for pain/nausea per MAR. Patient rec'd a one time dose of IV robaxin--this helped a lot and the patient was able to get some rest. Patient maintained strict NPO overnight. Patient rec'd IVF per MAR. Patient able to ambulate to the bathroom independently. Potassium replaced. Stool collected for GI PCR panel as ordered, pending results. Will update oncoming dayshift nurse at bedside shift report in the a.m. as appropriate.

## 2024-08-24 NOTE — NURSING NOTE
Patient states 10/10 pain. Patient states  IV pain medication currently ordered helps but wears off very  quickly and is asking for something else for pain in between. Will reach  out to overnight provider for recommendations.

## 2024-08-24 NOTE — PROGRESS NOTES
[facility] Medicine Services  INPATIENT PROGRESS NOTE    Length of Stay: 1  Date of Admission: 8/23/2024  Primary Care Physician: Sol Lopez APRN    Subjective   Chief Complaint: Nausea, vomiting, diarrhea, abdominal pain    HPI     Kelsey Mcqueen is a 33-year-old  female with past medical history significant for kidney stones, depression, and reported Crohn's disease.  She presented to [facility] emergency room 8/23/2024 with complaints of nausea, vomiting and abdominal pain.  Patient reported last week she had back pain and cramping down to the pelvic area and felt as though she passed a kidney stone as she has passed multiple kidney stones in the past.  She reported left flank pain and abdominal pain consistent with kidney stone.  She was prescribed Macrobid and Pyridium by primary care provider and completed treatment.  On 8/19 patient began experiencing nausea, vomiting, abdominal pain, diarrhea and she was unable to keep down oral intake despite taking Zofran.  Patient reported losing 10 pounds the past week.  She described decreased appetite, abdominal pain associated as sharp, knot-like and twisting.  She reported vomiting bile content.  She developed nonbloody diarrhea. Patient reported being concerned regarding possible Crohn's flare.  She is followed by Dr. Santiago gastroenterology in [place].  She had been on Humira and Entyvio in the past but these were stopped and was treated with mesalamine no longer taking.  Last colonoscopy earlier this year and was told Crohn's disease in remission.     Lab potassium 3.2, WBC 12.94, lipase 52.  Urinalysis trace protein.  CT abdomen showed moderate left hydronephrosis with normal left ureter without evidence of ureteral calculi.  May represent chronic inflammatory process/chronic pyelonephritis.  This was not noted previous study 2020.  Several nonobstructing left renal calculi.  Normal appendix.   Fluid-filled nondistended nondilated small bowel loops and proximal large bowel may represent ileus.  No findings to suggest obstruction.  Ventriculoperitoneal shunt.  Normal saline fluid bolus, Reglan, Dilaudid given in ER.     Today  Lying in bed.  She says she feels much better today and abdominal cramping is better.  Abdominal pain improved she still has some nausea.  She is asking for clear liquids today.  Zofran seems to give her a headache and will add Compazine.  GI consulted added dicyclomine for abdominal pain.  Doubts Crohn's flare with sudden onset of symptoms.  Likely enteritis no bowel obstruction.  Urology consulted and recommend nuclear med Lasix scan normal.  Stent not necessary.    Review of Systems   Constitutional:  Positive for appetite change and fatigue.   HENT:  Negative for congestion and trouble swallowing.    Eyes:  Negative for photophobia and visual disturbance.   Respiratory:  Negative for shortness of breath and wheezing.    Cardiovascular:  Negative for chest pain, palpitations and leg swelling.   Gastrointestinal:  Positive for abdominal pain (Improving), diarrhea (Improved), nausea and vomiting.   Endocrine: Negative for cold intolerance, heat intolerance and polyuria.   Genitourinary:  Negative for dysuria, frequency and urgency.   Musculoskeletal:  Negative for gait problem.   Skin:  Negative for color change, pallor, rash and wound.   Allergic/Immunologic: Negative for immunocompromised state.   Neurological:  Positive for weakness. Negative for light-headedness.   Hematological:  Negative for adenopathy. Does not bruise/bleed easily.   Psychiatric/Behavioral:  Negative for agitation, behavioral problems and confusion.           All pertinent negatives and positives are as above. All other systems have been reviewed and are negative unless otherwise stated.     Objective    Temp:  [98 °F (36.7 °C)-98.7 °F (37.1 °C)] 98.7 °F (37.1 °C)  Heart Rate:  [50-70] 70  Resp:  [16-18]  18  BP: ()/(48-84) 97/57    Physical Exam  Vitals and nursing note reviewed.   Constitutional:       Appearance: She is obese.      Comments: Sitting up in bed.  No oxygen use.  No visitors in room.   HENT:      Head: Normocephalic and atraumatic.      Mouth/Throat:      Pharynx: No oropharyngeal exudate or posterior oropharyngeal erythema.   Eyes:      Extraocular Movements: Extraocular movements intact.      Pupils: Pupils are equal, round, and reactive to light.   Cardiovascular:      Rate and Rhythm: Normal rate and regular rhythm.      Heart sounds: No murmur heard.  Pulmonary:      Breath sounds: No wheezing, rhonchi or rales.      Comments: No oxygen use.  Abdominal:      General: There is no distension.      Palpations: Abdomen is soft.      Tenderness: There is abdominal tenderness (Improved).   Genitourinary:     Comments: Voiding.  Musculoskeletal:         General: No swelling or tenderness.      Cervical back: Normal range of motion and neck supple.   Skin:     General: Skin is warm and dry.   Neurological:      General: No focal deficit present.      Mental Status: She is alert and oriented to person, place, and time.   Psychiatric:         Mood and Affect: Mood normal.         Behavior: Behavior normal.         Thought Content: Thought content normal.         Judgment: Judgment normal.           Results Review:  I have reviewed the labs, radiology results, and diagnostic studies.    Laboratory Data:      Results from last 7 days   Lab Units 08/24/24  0425 08/23/24 1927 08/23/24  1337   WBC 10*3/mm3 10.59 13.58* 12.94*   HEMOGLOBIN g/dL 11.6* 12.8 13.8   HEMATOCRIT % 34.1 37.8 39.9   PLATELETS 10*3/mm3 272 318 337        Results from last 7 days   Lab Units 08/24/24  0425 08/23/24  1337   SODIUM mmol/L 141 141   POTASSIUM mmol/L 3.5 3.2*   CHLORIDE mmol/L 109* 106   CO2 mmol/L 22.0 21.0*   BUN mg/dL 10 11   CREATININE mg/dL 0.61 0.62   GLUCOSE mg/dL 97 106*   CALCIUM mg/dL 8.1* 9.5   ALT (SGPT)  U/L 23 13         Culture Data:      Microbiology Results (last 10 days)       Procedure Component Value - Date/Time    Gastrointestinal Panel, PCR - Stool, Per Rectum [[id number]]  (Normal) Collected: 08/24/24 0449    Lab Status: Final result Specimen: Stool from Per Rectum Updated: 08/24/24 0611     Campylobacter Not Detected     Plesiomonas shigelloides Not Detected     Salmonella Not Detected     Vibrio Not Detected     Vibrio cholerae Not Detected     Yersinia enterocolitica Not Detected     Enteroaggregative E. coli (EAEC) Not Detected     Enteropathogenic E. coli (EPEC) Not Detected     Enterotoxigenic E. coli (ETEC) lt/st Not Detected     Shiga-like toxin-producing E. coli (STEC) stx1/stx2 Not Detected     Shigella/Enteroinvasive E. coli (EIEC) Not Detected     Cryptosporidium Not Detected     Cyclospora cayetanensis Not Detected     Entamoeba histolytica Not Detected     Giardia lamblia Not Detected     Adenovirus F40/41 Not Detected     Astrovirus Not Detected     Norovirus GI/GII Not Detected     Rotavirus A Not Detected     Sapovirus (I, II, IV or V) Not Detected    Narrative:      If Aeromonas, Staphylococcus aureus or Bacillus cereus are suspected, please order [id number]: Stool Culture, Aeromonas, S aureus, B Cereus.    COVID-19 and FLU A/B PCR, 1 HR TAT - Swab, Nasopharynx [[id number]]  (Normal) Collected: 08/23/24 1339    Lab Status: Final result Specimen: Swab from Nasopharynx Updated: 08/23/24 1416     COVID19 Not Detected     Influenza A PCR Not Detected     Influenza B PCR Not Detected    Narrative:      Fact sheet for providers: https://www.fda.gov/media/732849/download    Fact sheet for patients: https://www.fda.gov/media/889230/download    Test performed by PCR.              Radiology Data:   Imaging Results (Last 72 Hours)       Procedure Component Value Units Date/Time    NM Renal With Flow & Function With Pharmacological Intervention [[id number]] Collected: 08/24/24 1435     Updated: 08/24/24  1451    Narrative:      EXAMINATION:  NM RENAL W FLOW AND FUNCTION W PHARMACOLOGICAL  INTERVENTION-  8/24/2024 12:11 PM     HISTORY: Mild left hydronephrosis without obvious obstruction to be done  routine Saturday days 8/24/2024 per radiology; K56.7-Ileus, unspecified.     COMPARISON: No comparison study.     TECHNIQUE: The patient was injected with 10.3 uCi of technetium 99 M  MAG3 intravenously. The patient was given 40 mg of Lasix IV at 20  minutes.     FINDINGS: The vascular phase is normal and symmetric bilaterally. The  time to peak activity on the right side is a little over 5 minutes.  There is progressive increase in activity within the left kidney with  the time to peak activity at about 20 minutes. The split function  analysis is approximately 49% of the function by the left kidney and 51  by the right kidney. The right kidney is excreting contrast from 5  minutes to 20 minutes. After the administration of Lasix, there is more  accelerated emptying of the collecting systems bilaterally. The T1/2  emptying time on the left side is about 10 minutes. The T1/2 emptying  time on the right side is around 4-5 minutes. Therefore, no significant  obstructive changes seen.          Impression:      1. Vascular phase is normal.  2. Split function analysis demonstrates approximately equal renal  function bilaterally.  3. There is progressive uptake within the left renal collecting system  until the administration of Lasix. This suggest that there probably is  some UPJ narrowing on this side. However, after Lasix administration,  the T1/2 emptying time is about 10 minutes. 20 minutes or greater is  considered significant.  4. No significant obstruction on the right side.     This report was signed and finalized on 8/24/2024 2:48 PM by Dr. Leonardo Adams MD.       CT Abdomen Pelvis With Contrast [[id number]] Collected: 08/23/24 1436     Updated: 08/23/24 1451    Narrative:      EXAMINATION: CT ABDOMEN PELVIS W  CONTRAST-      8/23/2024 1:20 PM     HISTORY: abdominal pain; llq/left flank; hx of crohn's and kidney stones     In order to have a CT radiation dose as low as reasonably achievable  Automated Exposure Control was utilized for adjustment of the mA and/or  KV according to patient size.     Total DLP = 547.91 mGy.cm     CT scan of the abdomen and pelvis is performed after intravenous  contrast enhancement.     Images are acquired in axial plane and subsequent reconstruction in  coronal and sagittal planes.     The comparison is made with the previous study dated 3/16/2020.     The lung bases included in the study are unremarkable.     A ventriculoperitoneal shunt is seen in place similar to the previous  study. It enters the right upper abdomen adjacent and anterior to the  segment 4B of the liver and distal end of the catheter is in the right  midabdomen below the liver margin.     Limited visualized cardiomediastinal structures are unremarkable.  Significant artifacts are produced by a catheter in the distal SVC.  There is mild cardiomegaly.     The liver and spleen are normal.     The gallbladder is surgically absent. There is no significant dilatation  of common bile duct.     The pancreas is normal.     The adrenal glands bilaterally are normal.     There is moderate lobulation of renal contour bilaterally more severe on  the left side. The tiny low-density nodule in the right kidney was not  noted in the previous study. These are too small to be further  characterized. There is significant irregularity of the left renal  cortex suggesting chronic renal cortical scarring. No discrete mass.  There are several small radiopaque calculi in the left kidney the  largest one in the lower pole measuring 5 mm in diameter. There is  evidence of left hydronephrosis. The left ureter is not dilated. No  calculi in the left ureter. The right collecting system is normal. The  urinary bladder is poorly distended. No intrinsic  abnormality or  calculus.     The stomach is decompressed. No focal abnormality. Duodenum is minimally  dilated with fluid. Fluid-filled nondistended and nondilated small bowel  loops are seen. Maximum lumen diameter of the distal ileum is 2.2 cm. No  zone of transition. Normal retrocecal appendix is seen. Moderate gas  fluid and stool are seen in the colon. No finding to suggest  obstruction. Moderate thickening of the wall of the distal/descending  and sigmoid colon is probably due to incomplete distention. No  surrounding peritoneal infiltration are stranding to suggest  inflammatory/infectious process.     There is trace free fluid in the pelvis.     There is a tiny fat-containing umbilical hernia.     Normal abdominal aorta and iliac arteries.     There is no evidence of abdominal or pelvic lymphadenopathy.     Images reviewed in bone window show no acute bony abnormality.    Impression:      1. A moderate left hydronephrosis with a normal left ureter without  evidence of ureteral calculi. This may represent chronic inflammatory  process/chronic pyelonephritis. This was not noted in the previous study  in 2020.  2. Several nonobstructing left renal calculi.  3. Normal appendix.  4. Fluid-filled nondistended nondilated small bowel loops and proximal  large bowel may represent ileus. No finding to suggest obstruction.  5. Ventriculoperitoneal shunt in place. A trace free fluid in the pelvis  may be due to ventriculoperitoneal shunt.                       This report was signed and finalized on 8/23/2024 2:51 PM by Dr. Glenys Yancey MD.               Intake/Output    Intake/Output Summary (Last 24 hours) at 8/24/2024 1549  Last data filed at 8/24/2024 1314  Gross per 24 hour   Intake 1361.45 ml   Output 700 ml   Net 661.45 ml       Scheduled Meds  cefTRIAXone, 1,000 mg, Intravenous, Q24H  pantoprazole, 40 mg, Intravenous, Q12H  sodium chloride, 10 mL, Intravenous, Q12H  venlafaxine XR, 75 mg, Oral,  Daily        I have reviewed the patient current medications.     Assessment/Plan     Active Hospital Problems    Diagnosis     **Ileus     Hypokalemia due to nausea, vomiting, diarrhea     Hydronephrosis of left kidney     Nonobstructing left renal calculi     Gastroenteritis       Treatment Plan     1.  Ileus.  Patient presented to ER 8/23 with nausea, vomiting, diarrhea, abdominal pain.  1 week ago she noted renal colic and has passed numerous kidney stones in the past.  She experienced left flank pain, abdominal pain consistent with additional kidney stone.  On 8/18 she developed nausea with vomiting and unable to keep down any liquid.  Patient reported subjective fever.  There was concern for Crohn's flare.  Patient is followed by Dr. Pamela MYRICK in [place].  CT abdomen shows fluid-filled nondistended nondilated small bowel loops and proximal large bowel loops represents ileus.  NPO.  IV fluids at 100 mL/h.  Rocephin IV, Protonix IV.  Allow clear liquid diet.  GI PCR panel negative.     Zofran for nausea, Dilaudid IV for severe pain, Tylenol for mild pain.  Repeat CBC, CMP in AM.  Rocephin IV every 24 hours. Gastroenterology consulted and Dr. Russ doubts Crohn's flare due with sudden onset of symptoms and colonoscopy earlier this year reported remission of Crohn's.  No bowel obstruction.  Likely ileus with gastroenteritis.  Reglan given and dicyclomine added for abdominal pain.  No need for colonoscopy and would not be able to tolerate prep.  Follow-up with primary gastroenterologist after discharge.    2.  Gastroenteritis.  Presented with nausea, vomiting, diarrhea and abdominal pain for the past 4 days.  GI PCR panel negative.  Continue IV fluids.  Full liquid diet.  Continue Zofran.  Add Compazine.     3.  Moderate left hydronephrosis with normal left ureter.  May represent chronic inflammatory process/chronic pyelonephritis.  Not present in 2020.  Urology consulted.     4.  Severe nonobstructing left renal  calculi.  Urology consulted and discussed with Dr. Al today who recommended nuclear med Lasix scan to confirm no significant obstruction.  Given appearance of ureter and renal pelvis unlikely stent would be necessary.  Nuclear med renal scan splint function equal renal function bilaterally.  Progressive uptake within the left renal collecting system until administration of Lasix suggest probably some UPJ narrowing on the side.  After Lasix T1/2 emptying time 10 minutes.  No significant obstruction on the right.     5.  Hypokalemia.  Potassium 3.2.  Place potassium 40 mEq IV x 4 doses.  Potassium 3.5 today.  Will replace 40 mEq of potassium.  Repeat CMP in AM.     6.  History of Crohn's disease.  Presented with nausea, vomiting, abdominal pain, diarrhea.  PCR panel negative.  GI consulted and doubts Crohn's flare due to sudden onset of symptoms.  No need for colonoscopy.  Follow-up with primary gastroenterologist after discharge.     7.  SCDs for deep vein thrombosis prophylaxis.      Medical Decision Making  Number and Complexity of problems: 6  Ileus: Acute, high complexity poses threat to life and bodily function, proving  Gastroenteritis: Acute, high complexity poses threat to life and bodily function, improving  Moderate left hydronephrosis: Chronic, moderate complexity, stable  Severe nonobstructing left renal calculi: Acute/chronic, moderate complexity hypokalemia: Acute, high complexity, stable  Crohn's disease: Chronic, high complexity, stable  Hypokalemia: Acute, moderate complexity, stable     Differential Diagnosis: None     Conditions and Status        Condition is improving     MDM Data  External documents reviewed: [facility], [facility] everywhere  Cardiac tracing (EKG, telemetry) interpretation: None  Radiology interpretation: Reviewed radiology interpretation CT abdomen  Labs reviewed:   CMP 8/24/2024.  Repeat CMP in AM.  CBC 8/24/2024.  Repeat CBC in AM.  Urinalysis  GI PCR panel negative    Decision  rules/scores evaluated (example [id number], Wells, etc): None     Discussed with: Dr. Llamas and patient     Care Planning  Shared decision making: Dr. Llamas and patient.  Patient agrees to IV fluids, liquid diet, GI consult, Bentyl per GI, urology consult and nuclear med Lasix scan.  Add Compazine  Code status and discussions: Full code  Patient surrogate decision maker is her mother, Marixa    Disposition  Social Determinants of Health that impact treatment or disposition: None  I expect the patient to be discharged to home in 2-3 days.     Electronically signed by GREYSON Salter, 08/24/24, 15:49 CDT.    The above documentation resulted from a face-to-face encounter by me Jen RUBI, [facility].

## 2024-08-24 NOTE — NURSING NOTE
Order clarified with overnight provider due to original order for norflex IV on backorder and not available. New order rec'd per overnight provider IV robaxin.

## 2024-08-24 NOTE — PLAN OF CARE
Goal Outcome Evaluation:   VSS. A&Ox4. Up ad gregory. Room air. Port to L chest accessed in ER. Nurse draw for labs. Patient complains of pain and nausea. See MAR. No relief per patient from interventions. Tolerating a clear liquid diet. Will continue to monitor.

## 2024-08-24 NOTE — PAYOR COMM NOTE
"ADMIT INPT 8-23-24  NPO  UR  [phone]    Johnny Cervantes (33 y.o. Female)       Date of Birth   1990    Social Security Number       Address   [address] [place] [address]    Home Phone   [phone]    MRN   [id number]       Advent   [facility]    Marital Status   Single                            Admission Date   8/23/24    Admission Type   Emergency    Admitting Provider   Eduardo Llamas MD    Attending Provider   Eduardo Llamas MD    Department, Room/Bed   [facility] 3C, 362/1       Discharge Date       Discharge Disposition       Discharge Destination                                 Attending Provider: Eduardo Llamas MD    Allergies: Morphine And Codeine, Other, Sumatriptan, Buspirone, Ketorolac, Ketorolac Tromethamine, Morphine, Demerol [Meperidine]    Isolation: None   Infection: None   Code Status: CPR    Ht: 157.5 cm (62\")   Wt: 94.6 kg (208 lb 8 oz)    Admission Cmt: None   Principal Problem: Ileus [K56.7]                   Active Insurance as of 8/23/2024       Primary Coverage       Payor Plan Insurance Group Employer/Plan Group    [employer] [place] [employer] MEDICAID        Payor Plan Address Payor Plan Phone Number Payor Plan Fax Number Effective Dates    [address] [address] [phone]  1/8/2018 - None Entered    [facility] [id number]         Subscriber Name Subscriber Birth Date Member ID       JOHNNY CERVANTES 1990 [id number]                     Emergency Contacts        (Rel.) Home Phone Work Phone Mobile Phone    Marixa Cervantes (Mother) -- -- [phone]    abi cervantes (Father) -- -- [phone]    sissy handy (Significant Other) -- -- [phone]                 History & Physical        Jen Vergara APRN at 08/23/24 1649       Attestation signed by Bren López DO at 08/23/24 1802    Chart reviewed.  Patient examined.    Patient been having symptoms for approximately 2 " weeks.  Initially felt it was a kidney stone.  Subsequently developed diarrhea abdominal pain and vomiting.  Vomit is mostly bile colored.  She does have some occasional black-colored diarrhea.      Lungs are clear to auscultation bilaterally  Cardiovascular regular rate and rhythm without murmur gallop  Abdomen is soft bowel sounds are present she is markedly tender to palpation particular across the left side of her abdomen.  There is guarding.  Extremities no clubbing or cyanosis no edema.  I performed a substantive part of the MDM during the patient’s E/M visit. I personally evaluated   and examined the patient. I personally made or approved the documented management plan and acknowledge its risk   of complications.   (Independent Interpretation) My (EKG/X-Ray/US/CT) interpretation by reviewed.   (Discussion) Management/test interpretation discussed with GREYSON Avina.       Electronically signed by Bren López DO, 8/23/2024, 18:01 CDT.                        [facility] Medicine Services  HISTORY AND PHYSICAL    Date of Admission: 8/23/2024  Primary Care Physician: Sol Lopez APRN    Subjective   Primary Historian: Patient deemed reliable historian    Chief Complaint: Nausea, vomiting, diarrhea, abdominal pain    History of Present Illness  Kelsey Mcqueen is a 33-year-old  female with past medical history significant for kidney stones, depression, and reported Crohn's disease.  She presented to [facility] emergency room 8/23/2024 with complaints of nausea, vomiting and abdominal pain.  Patient reported last week she had back pain and cramping down to the pelvic area and felt as though she passed a kidney stone as she has passed multiple kidney stones in the past.  She reported left flank pain and abdominal pain consistent with kidney stone.  She was prescribed Macrobid and Pyridium by primary care provider and completed treatment.  On  8/19 patient began experiencing nausea, vomiting and she was unable to keep down oral intake despite taking Zofran.  Patient reported losing 10 pounds the past week.  She described decreased appetite, abdominal pain associated as sharp, knot-like and twisting.  She reported vomiting bile content.  Patient reported being concerned regarding possible Crohn's flare.  She is followed by Dr. Santiago gastroenterology in [place].    Lab potassium 3.2, WBC 12.94, lipase 52.  Urinalysis trace protein.  CT abdomen showed moderate left hydronephrosis with normal left ureter without evidence of ureteral calculi.  May represent chronic inflammatory process/chronic pyelonephritis.  This was not noted previous study 2020.  Several nonobstructing left renal calculi.  Normal appendix.  Fluid-filled nondistended nondilated small bowel loops and proximal large bowel may represent ileus.  No findings to suggest obstruction.  Ventriculoperitoneal shunt.  Normal saline fluid bolus, Reglan, Dilaudid given in ER.        Review of Systems   Constitutional:  Positive for appetite change.   HENT:  Negative for congestion and trouble swallowing.    Eyes:  Negative for photophobia and visual disturbance.   Respiratory:  Negative for cough, shortness of breath and wheezing.    Cardiovascular:  Negative for chest pain, palpitations and leg swelling.   Gastrointestinal:  Positive for abdominal pain, diarrhea, nausea and vomiting.   Endocrine: Negative for cold intolerance, heat intolerance and polyuria.   Genitourinary:  Positive for dysuria.   Musculoskeletal:  Positive for back pain (Left-sided).   Skin:  Negative for color change, pallor and rash.   Allergic/Immunologic: Negative for immunocompromised state.   Neurological:  Positive for weakness.   Hematological:  Negative for adenopathy. Does not bruise/bleed easily.   Psychiatric/Behavioral:  Negative for agitation, behavioral problems and confusion.       Otherwise complete ROS reviewed and  negative except as mentioned in the HPI.    Past Medical History:   Past Medical History:   Diagnosis Date    ADHD (attention deficit hyperactivity disorder) 2012    Crohn disease     recently diagnosis, going to start infusion therapy    Deep vein thrombosis 2021    Depression with anxiety 12/09/2018    Fibromyalgia, primary 2016    Headache     Intracranial hypertension     Kidney stones     Lactation disorder     Pseudotumor cerebri 09/14/2016    Tobacco abuse 09/21/2016     Past Surgical History:  Past Surgical History:   Procedure Laterality Date    BRAIN SURGERY  2015    COLONOSCOPY  2022    CYSTOSCOPY ELECTROHYDRAULIC LITHOTRIPSY      HYSTERECTOMY  2019    KIDNEY STONE SURGERY      OTHER SURGICAL HISTORY      Intracranial shunt     SHUNT INSERTION  11/2015     Social History:  reports that she has been smoking cigarettes. She started smoking about 5 years ago. She has a 1 pack-year smoking history. She has been exposed to tobacco smoke. She has never used smokeless tobacco. She reports that she does not drink alcohol and does not use drugs.    Family History: family history includes Hypertension in her father and mother.       Allergies:  Allergies   Allergen Reactions    Morphine And Codeine Shortness Of Breath    Other Other (See Comments)     Other reaction(s): Other (See Comments)  Dissolvable sutures. She developed an abscess from the last ones. Ended up in ER.   Dissolvable sutures. She developed an abscess from the last ones. Ended up in ER.     Sumatriptan Hives    Buspirone Headache    Ketorolac Unknown - Low Severity     Has Crohn's Disease.    Ketorolac Tromethamine Unknown - Low Severity    Morphine Itching    Demerol [Meperidine] Rash       Medications:  Prior to Admission medications    Medication Sig Start Date End Date Taking? Authorizing Provider   omeprazole (priLOSEC) 40 MG capsule omeprazole 40 mg capsule,delayed release   Take 1 capsule every day by oral route for 60 days.    Provider,  "MD Jessica   ondansetron (ZOFRAN) 4 MG tablet ondansetron HCl 4 mg tablet    ProviderJessica MD   propranolol LA (INDERAL LA) 80 MG 24 hr capsule Take 1 capsule by mouth Daily.    ProviderJessica MD   Ventolin  (90 Base) MCG/ACT inhaler  2/2/24   ProviderJessica MD     I have utilized all available immediate resources to obtain, update, or review the patient's current medications (including all prescriptions, over-the-counter products, herbals, cannabis/cannabidiol products, and vitamin/mineral/dietary (nutritional) supplements).    Objective     Vital Signs: /84   Pulse 51   Temp 98 °F (36.7 °C) (Oral)   Resp 16   Ht 157.5 cm (62\")   Wt 94.6 kg (208 lb 8 oz)   LMP 01/21/2019 (Approximate)   SpO2 99%   BMI 38.14 kg/m²   Physical Exam  Vitals and nursing note reviewed.   Constitutional:       Appearance: She is obese.      Comments: Lying on stretcher in the emergency room.  Tearful.  Mother in room.  No oxygen use.   HENT:      Head: Normocephalic and atraumatic.      Nose: No congestion.      Mouth/Throat:      Pharynx: No oropharyngeal exudate or posterior oropharyngeal erythema.   Eyes:      Extraocular Movements: Extraocular movements intact.      Pupils: Pupils are equal, round, and reactive to light.   Cardiovascular:      Rate and Rhythm: Normal rate and regular rhythm.      Heart sounds: No murmur heard.  Pulmonary:      Breath sounds: No wheezing, rhonchi or rales.      Comments: No oxygen use.  Abdominal:      Palpations: Abdomen is soft.      Tenderness: There is abdominal tenderness (Diffuse).   Genitourinary:     Comments: Voiding.  Musculoskeletal:         General: No swelling or tenderness.      Cervical back: Normal range of motion and neck supple.   Skin:     General: Skin is warm and dry.   Neurological:      General: No focal deficit present.      Mental Status: She is alert and oriented to person, place, and time.   Psychiatric:         Mood and Affect: " Mood normal.         Behavior: Behavior normal.         Thought Content: Thought content normal.         Judgment: Judgment normal.            Results Reviewed:  Lab Results (last 24 hours)       Procedure Component Value Units Date/Time    Urinalysis With Microscopic If Indicated (No Culture) - Urine, Clean Catch [[id number]]  (Abnormal) Collected: 08/23/24 1529    Specimen: Urine, Clean Catch Updated: 08/23/24 1543     Color, UA Dark Yellow     Appearance, UA Clear     pH, UA 6.0     Specific Gravity, UA >1.030     Glucose, UA Negative     Ketones, UA 40 mg/dL (2+)     Bilirubin, UA Negative     Blood, UA Negative     Protein, UA Trace     Leuk Esterase, UA Negative     Nitrite, UA Negative     Urobilinogen, UA 0.2 E.U./dL    Narrative:      Urine microscopic not indicated.    COVID-19 and FLU A/B PCR, 1 HR TAT - Swab, Nasopharynx [[id number]]  (Normal) Collected: 08/23/24 1339    Specimen: Swab from Nasopharynx Updated: 08/23/24 1416     COVID19 Not Detected     Influenza A PCR Not Detected     Influenza B PCR Not Detected    Narrative:      Fact sheet for providers: https://www.fda.gov/media/634889/download    Fact sheet for patients: https://www.fda.gov/media/428476/download    Test performed by PCR.    Comprehensive Metabolic Panel [[id number]]  (Abnormal) Collected: 08/23/24 1337    Specimen: Blood Updated: 08/23/24 1413     Glucose 106 mg/dL      BUN 11 mg/dL      Creatinine 0.62 mg/dL      Sodium 141 mmol/L      Potassium 3.2 mmol/L      Chloride 106 mmol/L      CO2 21.0 mmol/L      Calcium 9.5 mg/dL      Total Protein 7.4 g/dL      Albumin 4.4 g/dL      ALT (SGPT) 13 U/L      AST (SGOT) 16 U/L      Alkaline Phosphatase 76 U/L      Total Bilirubin 0.5 mg/dL      Globulin 3.0 gm/dL      A/G Ratio 1.5 g/dL      BUN/Creatinine Ratio 17.7     Anion Gap 14.0 mmol/L      eGFR 121.5 mL/min/1.73     Narrative:      GFR Normal >60  Chronic Kidney Disease <60  Kidney Failure <15      Lipase [[id number]]  (Normal)  Collected: 08/23/24 1337    Specimen: Blood Updated: 08/23/24 1408     Lipase 52 U/L     CBC & Differential [[id number]]  (Abnormal) Collected: 08/23/24 1337    Specimen: Blood Updated: 08/23/24 1355    Narrative:      The following orders were created for panel order CBC & Differential.  Procedure                               Abnormality         Status                     ---------                               -----------         ------                     CBC Auto Differential[[id number]]        Abnormal            Final result                 Please view results for these tests on the individual orders.    CBC Auto Differential [[id number]]  (Abnormal) Collected: 08/23/24 1337    Specimen: Blood Updated: 08/23/24 1355     WBC 12.94 10*3/mm3      RBC 4.50 10*6/mm3      Hemoglobin 13.8 g/dL      Hematocrit 39.9 %      MCV 88.7 fL      MCH 30.7 pg      MCHC 34.6 g/dL      RDW 12.6 %      RDW-SD 40.8 fl      MPV 9.7 fL      Platelets 337 10*3/mm3      Neutrophil % 73.7 %      Lymphocyte % 20.2 %      Monocyte % 5.1 %      Eosinophil % 0.3 %      Basophil % 0.3 %      Immature Grans % 0.4 %      Neutrophils, Absolute 9.53 10*3/mm3      Lymphocytes, Absolute 2.62 10*3/mm3      Monocytes, Absolute 0.66 10*3/mm3      Eosinophils, Absolute 0.04 10*3/mm3      Basophils, Absolute 0.04 10*3/mm3      Immature Grans, Absolute 0.05 10*3/mm3      nRBC 0.0 /100 WBC           Imaging Results (Last 24 Hours)       Procedure Component Value Units Date/Time    CT Abdomen Pelvis With Contrast [[id number]] Collected: 08/23/24 1436     Updated: 08/23/24 1454    Narrative:      EXAMINATION: CT ABDOMEN PELVIS W CONTRAST-      8/23/2024 1:20 PM     HISTORY: abdominal pain; llq/left flank; hx of crohn's and kidney stones     In order to have a CT radiation dose as low as reasonably achievable  Automated Exposure Control was utilized for adjustment of the mA and/or  KV according to patient size.     Total DLP = 547.91 mGy.cm     CT scan of the  abdomen and pelvis is performed after intravenous  contrast enhancement.     Images are acquired in axial plane and subsequent reconstruction in  coronal and sagittal planes.     The comparison is made with the previous study dated 3/16/2020.     The lung bases included in the study are unremarkable.     A ventriculoperitoneal shunt is seen in place similar to the previous  study. It enters the right upper abdomen adjacent and anterior to the  segment 4B of the liver and distal end of the catheter is in the right  midabdomen below the liver margin.     Limited visualized cardiomediastinal structures are unremarkable.  Significant artifacts are produced by a catheter in the distal SVC.  There is mild cardiomegaly.     The liver and spleen are normal.     The gallbladder is surgically absent. There is no significant dilatation  of common bile duct.     The pancreas is normal.     The adrenal glands bilaterally are normal.     There is moderate lobulation of renal contour bilaterally more severe on  the left side. The tiny low-density nodule in the right kidney was not  noted in the previous study. These are too small to be further  characterized. There is significant irregularity of the left renal  cortex suggesting chronic renal cortical scarring. No discrete mass.  There are several small radiopaque calculi in the left kidney the  largest one in the lower pole measuring 5 mm in diameter. There is  evidence of left hydronephrosis. The left ureter is not dilated. No  calculi in the left ureter. The right collecting system is normal. The  urinary bladder is poorly distended. No intrinsic abnormality or  calculus.     The stomach is decompressed. No focal abnormality. Duodenum is minimally  dilated with fluid. Fluid-filled nondistended and nondilated small bowel  loops are seen. Maximum lumen diameter of the distal ileum is 2.2 cm. No  zone of transition. Normal retrocecal appendix is seen. Moderate gas  fluid and stool  are seen in the colon. No finding to suggest  obstruction. Moderate thickening of the wall of the distal/descending  and sigmoid colon is probably due to incomplete distention. No  surrounding peritoneal infiltration are stranding to suggest  inflammatory/infectious process.     There is trace free fluid in the pelvis.     There is a tiny fat-containing umbilical hernia.     Normal abdominal aorta and iliac arteries.     There is no evidence of abdominal or pelvic lymphadenopathy.     Images reviewed in bone window show no acute bony abnormality.    Impression:      1. A moderate left hydronephrosis with a normal left ureter without  evidence of ureteral calculi. This may represent chronic inflammatory  process/chronic pyelonephritis. This was not noted in the previous study  in 2020.  2. Several nonobstructing left renal calculi.  3. Normal appendix.  4. Fluid-filled nondistended nondilated small bowel loops and proximal  large bowel may represent ileus. No finding to suggest obstruction.  5. Ventriculoperitoneal shunt in place. A trace free fluid in the pelvis  may be due to ventriculoperitoneal shunt.                       This report was signed and finalized on 8/23/2024 2:51 PM by Dr. Glenys Yancey MD.             I have personally reviewed and interpreted the radiology studies and ECG obtained at time of admission.     Assessment / Plan   Assessment:   Active Hospital Problems    Diagnosis     **Ileus     Hypokalemia due to nausea, vomiting, diarrhea     Hydronephrosis of left kidney     Nonobstructing left renal calculi     Gastroenteritis        Treatment Plan  The patient will be admitted to Dr. López's service at [facility].     1.  Ileus.  Patient presented to ER 8/23 with nausea, vomiting, diarrhea, abdominal pain.  1 week ago she noted renal colic and has passed numerous kidney stones in the past.  She experienced left flank pain, abdominal pain consistent with additional kidney stone.   On 8/18 she developed nausea with vomiting and unable to keep down any liquid.  Patient reported subjective fever.  There was concern for Crohn's flare.  Patient is followed by Dr. Pamela MYRICK in [place].  CT abdomen shows fluid-filled nondistended nondilated small bowel loops and proximal large bowel loops represents ileus.  NPO.  IV fluids at 100 mL/h.  Rocephin IV, Protonix IV.    Zofran for nausea, Dilaudid IV for severe pain, Tylenol for mild pain.  Repeat CBC, CMP in AM.  Rocephin IV every 24 hours.  Consult gastroenterology for persistent nausea, vomiting, abdominal pain in the setting of Crohn's disease.  Check GI PCR panel.    2.  Gastroenteritis.  Presented with nausea, vomiting, diarrhea and abdominal pain for the past 4 days.  Check GI PCR panel.  Continue IV fluids.  Zofran for nausea.    3.  Moderate left hydronephrosis with normal left ureter.  May represent chronic inflammatory process/chronic pyelonephritis.  Not present in 2020.  Urology consulted.    4.  Severe nonobstructing left renal calculi.  Consult urology.    5.  Hypokalemia.  Potassium 3.2.  Place potassium 40 mEq IV x 4 doses and repeat CMP in AM.    6.  Reported history of Crohn's disease.  Presented with nausea, vomiting, abdominal pain, diarrhea.  Check GI PCR panel.  Consult gastroenterology    7.  SCDs for deep vein thrombosis prophylaxis.      Medical Decision Making  Number and Complexity of problems: 6  Ileus: Acute, high complexity poses threat to life and bodily function, not at baseline  Gastroenteritis: Acute, high complexity poses threat to life and bodily function, not at baseline  Moderate left hydronephrosis: Acute/chronic, moderate complexity  Severe nonobstructing left renal calculi: Acute/chronic, moderate complexity hypokalemia: Acute, high complexity, not at baseline  Crohn's disease: Chronic, high complexity, not at baseline    Differential Diagnosis: None    Conditions and Status        Condition is unchanged.     MDM  Data  External documents reviewed: [facility] everywhere  Cardiac tracing (EKG, telemetry) interpretation: None  Radiology interpretation: Reviewed radiology interpretation CT abdomen  Labs reviewed:   CMP 8/23/2024.  Repeat CMP in AM.  CBC 8/23/2024.  Repeat CBC in AM.  Urinalysis    Any tests that were considered but not ordered: None     Decision rules/scores evaluated (example [id number], Wells, etc): Dr. López     Discussed with: Dr. López, patient, and mother present in room     Care Planning  Shared decision making: Dr. López, patient and mother.  Patient agrees to admission, n.p.o., IV fluids, IV pain medication, Zofran for nausea, GI consult, urology consult, follow-up lab work  Code status and discussions: Full code  The patient surrogate decision maker is her mother. Marixa    Disposition  Social Determinants of Health that impact treatment or disposition: None  Estimated length of stay is 2-4 days.     I confirmed that the patient's advanced care plan is present, code status is documented, and a surrogate decision maker is listed in the patient's medical record.     The patient's surrogate decision maker is her mother Marixa.     The patient was seen and examined by me on 08/23/2024 at 1649.    Electronically signed by GREYSON Salter, 08/23/24, 17:46 CDT.               Electronically signed by Bren López DO at 08/23/24 1802          Emergency Department Notes        Preethi Mckeon, RN at 08/23/24 1740          Nursing report ED to floor  Kelsey Mcqueen  33 y.o.  female    HPI:   Chief Complaint   Patient presents with    Abdominal Pain       Admitting doctor:   Bren López DO    Consulting provider(s):  Consults       Date and Time Order Name Status Description    8/23/2024  5:39 PM Inpatient Gastroenterology Consult      8/23/2024  5:15 PM Inpatient Urology Consult               Admitting diagnosis:   The encounter diagnosis was Ileus.    Code status:   Current Code  Status       Date Active Code Status Order ID Comments User Context       8/23/2024 1715 CPR (Attempt to Resuscitate) [id number]  Jen Vergara, GREYSON ED        Question Answer    Code Status (Patient has no pulse and is not breathing) CPR (Attempt to Resuscitate)    Medical Interventions (Patient has pulse or is breathing) Full Support    Level Of Support Discussed With Patient                    Allergies:   Morphine and codeine, Other, Sumatriptan, Buspirone, Ketorolac, Ketorolac tromethamine, Morphine, and Demerol [meperidine]    Intake and Output    Intake/Output Summary (Last 24 hours) at 8/23/2024 1746  Last data filed at 8/23/2024 1521  Gross per 24 hour   Intake 1000 ml   Output --   Net 1000 ml       Weight:       08/23/24  1215   Weight: 94.6 kg (208 lb 8 oz)       Most recent vitals:   Vitals:    08/23/24 1400 08/23/24 1520 08/23/24 1603 08/23/24 1700   BP: 107/66 120/77 120/66 129/84   BP Location:       Patient Position:       Pulse: 52 56 50 51   Resp:  18  16   Temp:       TempSrc:       SpO2: 97% 98% 97% 99%   Weight:       Height:         Oxygen Therapy: .    Active LDAs/IV Access:   Lines, Drains & Airways       Active LDAs       Name Placement date Placement time Site Days    Peripheral IV Lower;Posterior;Right --  --  --  --    Single Lumen Implantable Port 04/01/23 Left Subclavian 04/01/23  --  Subclavian  510                    Labs (abnormal labs have a star):   Labs Reviewed   COMPREHENSIVE METABOLIC PANEL - Abnormal; Notable for the following components:       Result Value    Glucose 106 (*)     Potassium 3.2 (*)     CO2 21.0 (*)     All other components within normal limits    Narrative:     GFR Normal >60  Chronic Kidney Disease <60  Kidney Failure <15     URINALYSIS W/ MICROSCOPIC IF INDICATED (NO CULTURE) - Abnormal; Notable for the following components:    Color, UA Dark Yellow (*)     Specific Gravity, UA >1.030 (*)     Ketones, UA 40 mg/dL (2+) (*)     Protein, UA Trace (*)     All  other components within normal limits    Narrative:     Urine microscopic not indicated.   CBC WITH AUTO DIFFERENTIAL - Abnormal; Notable for the following components:    WBC 12.94 (*)     Neutrophils, Absolute 9.53 (*)     All other components within normal limits   COVID-19 AND FLU A/B, NP SWAB IN TRANSPORT MEDIA 1 HR TAT - Normal    Narrative:     Fact sheet for providers: https://www.fda.gov/media/559313/download    Fact sheet for patients: https://www.fda.gov/media/392531/download    Test performed by PCR.   LIPASE - Normal   GASTROINTESTINAL PANEL, PCR (PREFERRED) DOES NOT INCLUDE CDIFF   MAGNESIUM   CBC WITH AUTO DIFFERENTIAL   CBC AND DIFFERENTIAL    Narrative:     The following orders were created for panel order CBC & Differential.  Procedure                               Abnormality         Status                     ---------                               -----------         ------                     CBC Auto Differential[[id number]]        Abnormal            Final result                 Please view results for these tests on the individual orders.   CBC AND DIFFERENTIAL    Narrative:     The following orders were created for panel order CBC & Differential.  Procedure                               Abnormality         Status                     ---------                               -----------         ------                     CBC Auto Differential[[id number]]                                                         Please view results for these tests on the individual orders.       Meds given in ED:   Medications   sodium chloride 0.9 % flush 10 mL (has no administration in time range)   sodium chloride 0.9 % flush 10 mL (has no administration in time range)   sodium chloride 0.9 % flush 10 mL (has no administration in time range)   sodium chloride 0.9 % infusion 40 mL (has no administration in time range)   sodium chloride 0.9 % with KCl 20 mEq/L infusion (has no administration in time range)    HYDROmorphone (DILAUDID) injection 1 mg (1 mg Intravenous Given 8/23/24 1731)     And   naloxone (NARCAN) injection 0.4 mg (has no administration in time range)   ondansetron (ZOFRAN) injection 4 mg (4 mg Intravenous Given 8/23/24 1732)   pantoprazole (PROTONIX) injection 40 mg (has no administration in time range)   cefTRIAXone (ROCEPHIN) 1,000 mg in sodium chloride 0.9 % 100 mL MBP (has no administration in time range)   acetaminophen (TYLENOL) tablet 650 mg (has no administration in time range)     Or   acetaminophen (TYLENOL) 160 MG/5ML oral solution 650 mg (has no administration in time range)     Or   acetaminophen (TYLENOL) suppository 650 mg (has no administration in time range)   potassium chloride 10 mEq in 100 mL IVPB (has no administration in time range)   sodium chloride 0.9 % bolus 1,000 mL (0 mL Intravenous Stopped 8/23/24 1521)   metoclopramide (REGLAN) injection 10 mg (10 mg Intravenous Given 8/23/24 1344)   HYDROmorphone (DILAUDID) injection 0.5 mg (0.5 mg Intravenous Given 8/23/24 1347)   iopamidol (ISOVUE-300) 61 % injection 100 mL (100 mL Intravenous Given 8/23/24 1426)   HYDROmorphone (DILAUDID) injection 0.5 mg (0.5 mg Intravenous Given 8/23/24 1538)     sodium chloride 0.9 % with KCl 20 mEq, 100 mL/hr         NIH Stroke Scale:       Isolation/Infection(s):  No active isolations   No active infections     COVID Testing  Collected .  Resulted .    Nursing report ED to floor:  Mental status: .a&ox4  Ambulatory status: .up adlib  Precautions: .none    ED nurse phone extentsion- .. [phone]  Report given to ANASTASIYA Verde      Electronically signed by Preethi Mckeon RN at 08/23/24 2658       Monika Pizarro APRN at 08/23/24 1556       Attestation signed by Marianela Zamora MD at 08/23/24 2740        SUPERVISE: For this patient encounter, I reviewed the APC's documentation, treatment plan, and medical decision making.  Marianela Zamora MD 8/23/2024 17:28 CDT                         Subjective    History of Present Illness  Patient is a 33-year-old female who presents to the ER with complaints of nausea with vomiting with abdominal pain.  Last week patient began experiencing renal colic.  She reports history of kidney stones, states she has passed numerous kidney stones in the past on her own.  She states that she began having left flank pain and abdominal pain consistent with another kidney stone.  She was prescribed antibiotics for urinary symptoms.  Patient states on Monday she began experiencing nausea with vomiting and has been unable to keep anything down even while taking Zofran.  She states that she has lost 10 pounds in the past week.  Patient continues to have abdominal pain described as cramping, decreased appetite, inability to keep anything down, subjective fevers.  She was concerned about a possible Crohn's flare.  Patient is followed by Dr. Santiago in [place] with GI.  Due to symptoms described she came the ER for evaluation and treatment.  Past medical history significant for ADHD, Crohn's disease, DVT, depression, anxiety, fibromyalgia, intracranial hypertension, kidney stones, pseudotumor cerebri        Review of Systems   Constitutional:  Positive for activity change, appetite change and unexpected weight change.   HENT: Negative.  Negative for congestion.    Respiratory: Negative.  Negative for cough and shortness of breath.    Cardiovascular: Negative.  Negative for chest pain.   Gastrointestinal:  Positive for abdominal pain, diarrhea, nausea and vomiting.   Genitourinary:  Positive for flank pain. Negative for dysuria.   Musculoskeletal:  Positive for back pain.   Skin: Negative.    All other systems reviewed and are negative.      Past Medical History:   Diagnosis Date    ADHD (attention deficit hyperactivity disorder) 2012    Crohn disease     recently diagnosis, going to start infusion therapy    Deep vein thrombosis 2021    Depression with anxiety 12/09/2018    Fibromyalgia,  primary 2016    Headache     Intracranial hypertension     Kidney stones     Lactation disorder     Pseudotumor cerebri 2016    Tobacco abuse 2016       Allergies   Allergen Reactions    Morphine And Codeine Shortness Of Breath    Other Other (See Comments)     Other reaction(s): Other (See Comments)  Dissolvable sutures. She developed an abscess from the last ones. Ended up in ER.   Dissolvable sutures. She developed an abscess from the last ones. Ended up in ER.     Sumatriptan Hives    Buspirone Headache    Ketorolac Unknown - Low Severity     Has Crohn's Disease.    Ketorolac Tromethamine Unknown - Low Severity    Morphine Itching    Demerol [Meperidine] Rash       Past Surgical History:   Procedure Laterality Date    BRAIN SURGERY      COLONOSCOPY      CYSTOSCOPY ELECTROHYDRAULIC LITHOTRIPSY      HYSTERECTOMY  2019    KIDNEY STONE SURGERY      OTHER SURGICAL HISTORY      Intracranial shunt     SHUNT INSERTION  2015       Family History   Problem Relation Age of Onset    Hypertension Mother     Hypertension Father     Prolactinoma Neg Hx        Social History     Socioeconomic History    Marital status: Single    Number of children: 1   Tobacco Use    Smoking status: Every Day     Current packs/day: 0.00     Average packs/day: 0.3 packs/day for 3.8 years (1.0 ttl pk-yrs)     Types: Cigarettes     Start date: 2019     Last attempt to quit: 2022     Years since quittin.8     Passive exposure: Current    Smokeless tobacco: Never    Tobacco comments:     Smoking currently 3..24     AVS   Vaping Use    Vaping status: Never Used   Substance and Sexual Activity    Alcohol use: No    Drug use: No    Sexual activity: Yes     Partners: Male     Birth control/protection: Hysterectomy           Objective   Physical Exam  Vitals and nursing note reviewed.   Constitutional:       Appearance: She is well-developed.   HENT:      Head: Normocephalic and atraumatic.      Nose: Nose normal.       Mouth/Throat:      Mouth: Mucous membranes are moist.   Eyes:      Extraocular Movements: Extraocular movements intact.      Conjunctiva/sclera: Conjunctivae normal.      Pupils: Pupils are equal, round, and reactive to light.   Cardiovascular:      Rate and Rhythm: Normal rate and regular rhythm.      Heart sounds: Normal heart sounds.   Pulmonary:      Effort: Pulmonary effort is normal.      Breath sounds: Normal breath sounds.   Abdominal:      General: Bowel sounds are normal.      Palpations: Abdomen is soft.      Tenderness: There is generalized abdominal tenderness.   Musculoskeletal:         General: Normal range of motion.      Cervical back: Normal range of motion and neck supple.   Skin:     General: Skin is warm and dry.      Capillary Refill: Capillary refill takes less than 2 seconds.   Neurological:      Mental Status: She is alert and oriented to person, place, and time.   Psychiatric:         Behavior: Behavior normal.         Procedures          ED Course                                             Medical Decision Making  Patient is a 33-year-old female who presents to the ER with complaints of nausea with vomiting with abdominal pain.  Last week patient began experiencing renal colic.  She reports history of kidney stones, states she has passed numerous kidney stones in the past on her own.  She states that she began having left flank pain and abdominal pain consistent with another kidney stone.  She was prescribed antibiotics for urinary symptoms.  Patient states on Monday she began experiencing nausea with vomiting and has been unable to keep anything down even while taking Zofran.  She states that she has lost 10 pounds in the past week.  Patient continues to have abdominal pain described as cramping, decreased appetite, inability to keep anything down, subjective fevers.  She was concerned about a possible Crohn's flare.  Patient is followed by Dr. Santiago in [place] with GI.  Due to  symptoms described she came the ER for evaluation and treatment.  Past medical history significant for ADHD, Crohn's disease, DVT, depression, anxiety, fibromyalgia, intracranial hypertension, kidney stones, pseudotumor cerebri  Differential diagnosis: Bowel obstruction, kidney stone, UTI, Crohn's flare, ileus, colitis, viral illness, and other    Labs Reviewed  COMPREHENSIVE METABOLIC PANEL - Abnormal; Notable for the following components:     Glucose                       106 (*)                Potassium                     3.2 (*)                CO2                           21.0 (*)            All other components within normal limits         Narrative: GFR Normal >60                  Chronic Kidney Disease <60                  Kidney Failure <15                    URINALYSIS W/ MICROSCOPIC IF INDICATED (NO CULTURE) - Abnormal; Notable for the following components:     Color, UA                     Dark Yellow (*)               Specific Gravity, UA          >1.030 (*)               Ketones, UA                     (*)                  Protein, UA                   Trace (*)            All other components within normal limits         Narrative: Urine microscopic not indicated.  CBC WITH AUTO DIFFERENTIAL - Abnormal; Notable for the following components:     WBC                           12.94 (*)               Neutrophils, Absolute         9.53 (*)            All other components within normal limits  COVID-19 AND FLU A/B, NP SWAB IN TRANSPORT MEDIA 1 HR TAT - Normal         Narrative: Fact sheet for providers: https://www.fda.gov/media/761184/download                                    Fact sheet for patients: https://www.fda.gov/media/141954/download                                    Test performed by PCR.  LIPASE - Normal  CBC AND DIFFERENTIAL     CT Abdomen Pelvis With Contrast   Final Result    1. A moderate left hydronephrosis with a normal left ureter without    evidence of ureteral calculi. This may  represent chronic inflammatory    process/chronic pyelonephritis. This was not noted in the previous study    in 2020.    2. Several nonobstructing left renal calculi.    3. Normal appendix.    4. Fluid-filled nondistended nondilated small bowel loops and proximal    large bowel may represent ileus. No finding to suggest obstruction.    5. Ventriculoperitoneal shunt in place. A trace free fluid in the pelvis    may be due to ventriculoperitoneal shunt.                                       This report was signed and finalized on 8/23/2024 2:51 PM by Dr. Glenys Yancey MD.           Patient received iv fluids and pain medication in the ER. She failed po challenge. She does not think she can go home and has had continued n/v with zofran at home. Plan is to admit to the hospitalist services. See their note for details.    Problems Addressed:  Ileus: acute illness or injury    Amount and/or Complexity of Data Reviewed  Labs: ordered. Decision-making details documented in ED Course.  Radiology: ordered. Decision-making details documented in ED Course.  Discussion of management or test interpretation with external provider(s): Discussed with hospitalist    Risk  Prescription drug management.  Decision regarding hospitalization.        Final diagnoses:   Ileus       ED Disposition  ED Disposition       ED Disposition   Decision to Admit    Condition   --    Comment   Level of Care: Med/Surg [1]   Diagnosis: Ileus [[id number]]   Admitting Physician: LUIS BAKER [[id number]]   Attending Physician: LUIS BAKER [[id number]]   Certification: I Certify That Inpatient Hospital Services Are Medically Necessary For Greater Than 2 Midnights                 No follow-up provider specified.       Medication List      No changes were made to your prescriptions during this visit.            Monika Pizarro, GREYSON  08/23/24 1726      Electronically signed by Marianela Zamora MD at 08/23/24 1728       Preethi Mckeon RN at  08/23/24 1330          Pt requested that her port be accessed for blood draws. Pt states that she has the port for blood draws and to be flushed but does not receive any infusions through the port.     Electronically signed by Preethi Mckeon RN at 08/23/24 1405       Lines, Drains & Airways       Active LDAs       Name Placement date Placement time Site Days    Single Lumen Implantable Port 04/01/23 Left Subclavian 04/01/23  --  Subclavian  511              Inactive LDAs       Name Placement date Placement time Removal date Removal time Site Days    [REMOVED] Peripheral IV 08/24/24 Lower;Posterior;Right 08/24/24  --  08/24/24  --  --  less than 1                  Facility-Administered Medications as of 8/24/2024   Medication Dose Route Frequency Provider Last Rate Last Admin    acetaminophen (TYLENOL) tablet 650 mg  650 mg Oral Q4H PRN Jen Vergara APRN        Or    acetaminophen (TYLENOL) 160 MG/5ML oral solution 650 mg  650 mg Oral Q4H PRN Jen Vergara APRN        Or    acetaminophen (TYLENOL) suppository 650 mg  650 mg Rectal Q4H PRN Jen Vergara APRN        cefTRIAXone (ROCEPHIN) 1,000 mg in sodium chloride 0.9 % 100 mL MBP  1,000 mg Intravenous Q24H Jen Vergara APRN 200 mL/hr at 08/23/24 1946 1,000 mg at 08/23/24 1946    clonazePAM (KlonoPIN) tablet 2 mg  2 mg Oral BID PRN Bren López DO   2 mg at 08/24/24 0844    dicyclomine (BENTYL) capsule 20 mg  20 mg Oral BID PRN Raffaele Russ MD        [COMPLETED] HYDROmorphone (DILAUDID) injection 0.5 mg  0.5 mg Intravenous Once Monika Pizarro APRN   0.5 mg at 08/23/24 1347    [COMPLETED] HYDROmorphone (DILAUDID) injection 0.5 mg  0.5 mg Intravenous Once Monika Pizarro APRN   0.5 mg at 08/23/24 1538    HYDROmorphone (DILAUDID) injection 1 mg  1 mg Intravenous Q2H PRN Jen Vergara APRN   1 mg at 08/24/24 0844    And    naloxone (NARCAN) injection 0.4 mg  0.4 mg Intravenous Q5 Min PRN Jen Vergara, APRN         [COMPLETED] iopamidol (ISOVUE-300) 61 % injection 100 mL  100 mL Intravenous Once in imaging Monika Pizarro APRN   100 mL at 24 1426    [COMPLETED] Methocarbamol (ROBAXIN) 500 mg in sodium chloride 0.9 % 100 mL IVPB  500 mg Intravenous Once Osmani Beaulieu .3 mL/hr at 24 0043 500 mg at 24 0043    [COMPLETED] metoclopramide (REGLAN) injection 10 mg  10 mg Intravenous Once Monika Pizarro APRN   10 mg at 24 1344    [COMPLETED] metoclopramide (REGLAN) injection 10 mg  10 mg Intravenous Once Osmani Beaulieu MD   10 mg at 24 2033    ondansetron (ZOFRAN) injection 4 mg  4 mg Intravenous Q6H PRN Jen Vergara APRN   4 mg at 24 1732    pantoprazole (PROTONIX) injection 40 mg  40 mg Intravenous Q12H Jen Vergara APRN   40 mg at 24 0844    [] potassium chloride 10 mEq in 100 mL IVPB  10 mEq Intravenous Q1H Jen Vergara APRN 100 mL/hr at 244 10 mEq at 24 203    [COMPLETED] sodium chloride 0.9 % bolus 1,000 mL  1,000 mL Intravenous Once Monika Pizarro APRN   Stopped at 24 1521    sodium chloride 0.9 % flush 10 mL  10 mL Intravenous PRN Monika Pizarro APRN        sodium chloride 0.9 % flush 10 mL  10 mL Intravenous Q12H Jen Vergara APRN   10 mL at 24 0844    sodium chloride 0.9 % flush 10 mL  10 mL Intravenous PRN Jen Vergara APRN        sodium chloride 0.9 % infusion 40 mL  40 mL Intravenous PRN Jen Vergara APRN        sodium chloride 0.9 % with KCl 20 mEq/L infusion  100 mL/hr Intravenous Continuous Jen Vergara APRN 100 mL/hr at 24 1942 100 mL/hr at 24 1942     Orders (all)        Start     Ordered    24 1739  Auto Discontinue GI Panel in 48 Hours if not Collected  ONCE GI PANEL         24 1739    24 1019  dicyclomine (BENTYL) capsule 20 mg  2 Times Daily PRN         24 1019    24 0824  Diet: Liquid; Clear Liquid; Fluid  Consistency: Thin (IDDSI 0)  Diet Effective Now         08/24/24 0823    08/24/24 0600  Comprehensive Metabolic Panel  Daily       08/23/24 1715    08/24/24 0600  CBC Auto Differential  PROCEDURE ONCE         08/23/24 2201 08/24/24 0115  Methocarbamol (ROBAXIN) 500 mg in sodium chloride 0.9 % 100 mL IVPB  Once         08/24/24 0018    08/23/24 2136  NM Renal With Flow & Function With Pharmacological Intervention  1 Time Imaging         08/23/24 2137 08/23/24 2125  NM Renal With Flow & Function With Pharmacological Intervention  1 Time Imaging,   Status:  Canceled         08/23/24 2125 08/23/24 2115  metoclopramide (REGLAN) injection 10 mg  Once         08/23/24 2026 08/23/24 2100  sodium chloride 0.9 % flush 10 mL  Every 12 Hours Scheduled         08/23/24 1715 08/23/24 2100  pantoprazole (PROTONIX) injection 40 mg  Every 12 Hours Scheduled         08/23/24 1715 08/23/24 2000  Vital Signs  Every 4 Hours       08/23/24 1715 08/23/24 1856  clonazePAM (KlonoPIN) tablet 2 mg  2 Times Daily PRN         08/23/24 1856    08/23/24 1841  CT Abdomen Pelvis Without Contrast  1 Time Imaging,   Status:  Canceled         08/23/24 1841 08/23/24 1819  Initiate & Follow Hypercapnic Monitoring Guideline for Opioid Administration via EtCO2 and / or SpO2  Continuous        Comments: Follow Hypercapnic Monitoring Guideline As Outlined in Process Instructions (Open Order Report to View Full Instructions)    08/23/24 1830 08/23/24 1819  Opioid Administration - Document EtCO2 and / or SpO2 With Each Set of Vitals & Any Change in Patient Status  Per Order Details        Comments: With Each Set of Vitals & Any Change in Patient Status    08/23/24 1830    08/23/24 1819  Opioid Administration - Notify Provider Hypercapnic Monitoring  Continuous        Comments: Open Order Report to View Parameters Requiring Provider Notification    08/23/24 1830    08/23/24 1819  Opioid Administration - Continuous Pulse Oximetry  "(SpO2)  Continuous         08/23/24 1830    08/23/24 1818  Ok to use port for lab draws.  Nursing Communication  Once        Comments: Ok to use port for lab draws.    08/23/24 1817    08/23/24 1800  Oral Care  2 Times Daily       08/23/24 1715    08/23/24 1755  potassium chloride 10 mEq in 100 mL IVPB  Every 1 Hour         08/23/24 1740    08/23/24 1740  Inpatient Gastroenterology Consult  Once        Specialty:  Gastroenterology  Provider:  Raffaele Rsus MD    08/23/24 1739    08/23/24 1739  Gastrointestinal Panel, PCR - Stool, Per Rectum  Once         08/23/24 1739    08/23/24 1731  sodium chloride 0.9 % with KCl 20 mEq/L infusion  Continuous         08/23/24 1715    08/23/24 1730  cefTRIAXone (ROCEPHIN) 1,000 mg in sodium chloride 0.9 % 100 mL MBP  Every 24 Hours         08/23/24 1715    08/23/24 1724  Inpatient Admission  Once         08/23/24 1724    08/23/24 1716  CBC & Differential  Daily       08/23/24 1715    08/23/24 1716  Magnesium  Daily       08/23/24 1715    08/23/24 1716  CBC Auto Differential  PROCEDURE ONCE         08/23/24 1715 08/23/24 1714  acetaminophen (TYLENOL) tablet 650 mg  Every 4 Hours PRN        Placed in \"Or\" Linked Group    08/23/24 1715    08/23/24 1714  acetaminophen (TYLENOL) 160 MG/5ML oral solution 650 mg  Every 4 Hours PRN        Placed in \"Or\" Linked Group    08/23/24 1715    08/23/24 1714  acetaminophen (TYLENOL) suppository 650 mg  Every 4 Hours PRN        Placed in \"Or\" Linked Group    08/23/24 1715    08/23/24 1713  Bowel Regimen Not Indicated  Once         08/23/24 1715 08/23/24 1712  ondansetron (ZOFRAN) injection 4 mg  Every 6 Hours PRN         08/23/24 1715    08/23/24 1712  HYDROmorphone (DILAUDID) injection 1 mg  Every 2 Hours PRN        Placed in \"And\" Linked Group    08/23/24 1715    08/23/24 1712  naloxone (NARCAN) injection 0.4 mg  Every 5 Minutes PRN        Placed in \"And\" Linked Group    08/23/24 1715    08/23/24 1711  NPO Diet NPO Type: Strict NPO  Diet " Effective Now,   Status:  Canceled         08/23/24 1715    08/23/24 1711  Inpatient Urology Consult  Once        Specialty:  Urology  Provider:  Jeffry Al MD    08/23/24 1715 08/23/24 1710  Intake & Output  Every Shift       08/23/24 1715 08/23/24 1710  Weigh Patient  Once         08/23/24 1715 08/23/24 1710  Insert Peripheral IV  Once         08/23/24 1715 08/23/24 1710  Saline Lock & Maintain IV Access  Continuous         08/23/24 1715 08/23/24 1710  Code Status and Medical Interventions: CPR (Attempt to Resuscitate); Full Support  Continuous         08/23/24 1715 08/23/24 1710  Place Sequential Compression Device  Once         08/23/24 1715 08/23/24 1710  Maintain Sequential Compression Device  Continuous         08/23/24 1715 08/23/24 1709  sodium chloride 0.9 % flush 10 mL  As Needed         08/23/24 1715    08/23/24 1709  sodium chloride 0.9 % infusion 40 mL  As Needed         08/23/24 1715 08/23/24 1551  HYDROmorphone (DILAUDID) injection 0.5 mg  Once         08/23/24 1535    08/23/24 1358  iopamidol (ISOVUE-300) 61 % injection 100 mL  Once in Imaging         08/23/24 1342    08/23/24 1303  sodium chloride 0.9 % bolus 1,000 mL  Once         08/23/24 1247    08/23/24 1303  metoclopramide (REGLAN) injection 10 mg  Once         08/23/24 1247    08/23/24 1303  HYDROmorphone (DILAUDID) injection 0.5 mg  Once         08/23/24 1247    08/23/24 1248  CT Abdomen Pelvis With Contrast  1 Time Imaging         08/23/24 1247    08/23/24 1247  COVID-19 and FLU A/B PCR, 1 HR TAT - Swab, Nasopharynx  Once         08/23/24 1247    08/23/24 1244  Comprehensive Metabolic Panel  Once         08/23/24 1247    08/23/24 1244  CBC & Differential  Once         08/23/24 1247    08/23/24 1244  Lipase  Once         08/23/24 1247    08/23/24 1244  Urinalysis With Microscopic If Indicated (No Culture) - Urine, Clean Catch  Once         08/23/24 1247    08/23/24 1244  Insert Peripheral IV  Once       "  Placed in \"And\" Linked Group    24 1247    24 1244  CBC Auto Differential  PROCEDURE ONCE         24 1248    24 1243  sodium chloride 0.9 % flush 10 mL  As Needed        Placed in \"And\" Linked Group    24 1247    --  venlafaxine XR (EFFEXOR-XR) 75 MG 24 hr capsule  Daily         24 0856                  Physician Progress Notes (last 72 hours)  Notes from 24 1024 through 24 1024   No notes of this type exist for this encounter.          Consult Notes (last 72 hours)        Jeffry Al MD at 24 0940        Consult Orders    1. Inpatient Urology Consult [[id number]] ordered by eJn Vergara APRN at 24 1715                   [facility]   Consult Note    Patient Name: Kelsey Mcqueen  : 1990  MRN: [id number]  Primary Care Physician:  Sol Lopez APRN  Referring Physician: No ref. provider found  Date of admission: 2024    Subjective   Subjective     Reason for Consult/ Chief Complaint: Left hydronephrosis    HPI:  Kelsey Mcqueen is a 33 y.o. female a known urologic history of mild left hydronephrosis without obstruction and a history of urolithiasis as well as Crohn's disease. She presents to the hospital with one week of abdominal pain. The pain started as a typical stone episode, with left flank pain and hematuria. She feels the stone passed, as she had a change in the pain and she heard a clink in the toilet. Subsequently, her pain became more diffuse, and seems to her to be more from her Crohn's. CT abdomen revealed what appears to be dilated left renal calyces, but no dilation of the renal pelvis or ureter. Stones seen in the left kidney, but no ureteral stones or dilation.     Review of Systems   All systems were reviewed and negative except for: as above    Personal History     Past Medical History:   Diagnosis Date    ADHD (attention deficit hyperactivity disorder) 2012    Crohn disease     recently " diagnosis, going to start infusion therapy    Deep vein thrombosis 2021    Depression with anxiety 12/09/2018    Fibromyalgia, primary 2016    Headache     Intracranial hypertension     Kidney stones     Lactation disorder     Pseudotumor cerebri 09/14/2016    Tobacco abuse 09/21/2016       Past Surgical History:   Procedure Laterality Date    BRAIN SURGERY  2015    COLONOSCOPY  2022    CYSTOSCOPY ELECTROHYDRAULIC LITHOTRIPSY      HYSTERECTOMY  2019    KIDNEY STONE SURGERY      OTHER SURGICAL HISTORY      Intracranial shunt     SHUNT INSERTION  11/2015       Family History: family history includes Hypertension in her father and mother. Otherwise pertinent FHx was reviewed and not pertinent to current issue.    Social History:  reports that she has been smoking cigarettes. She started smoking about 5 years ago. She has a 1 pack-year smoking history. She has been exposed to tobacco smoke. She has never used smokeless tobacco. She reports that she does not drink alcohol and does not use drugs.    Home Medications:  QUEtiapine, acetaZOLAMIDE, albuterol sulfate HFA, butalbital-acetaminophen-caffeine, lamoTRIgine, lisdexamfetamine, loperamide, omeprazole, ondansetron, propranolol LA, tiZANidine, and venlafaxine XR    Allergies:  Allergies   Allergen Reactions    Morphine And Codeine Shortness Of Breath    Other Other (See Comments)     Other reaction(s): Other (See Comments)  Dissolvable sutures. She developed an abscess from the last ones. Ended up in ER.   Dissolvable sutures. She developed an abscess from the last ones. Ended up in ER.     Sumatriptan Hives    Buspirone Headache    Ketorolac Unknown - Low Severity     Has Crohn's Disease.    Ketorolac Tromethamine Unknown - Low Severity    Morphine Itching    Demerol [Meperidine] Rash       Objective    Objective     Vitals:   Temp:  [98 °F (36.7 °C)-98.4 °F (36.9 °C)] 98.2 °F (36.8 °C)  Heart Rate:  [50-92] 55  Resp:  [16-19] 16  BP: (101-129)/(48-84)  101/48    Physical Exam:   Constitutional: Awake, alert   Eyes: PERRLA, sclerae anicteric, no conjunctival injection   HENT: NCAT, mucous membranes moist   Neck: Supple, no thyromegaly, no lymphadenopathy, trachea midline   Respiratory: Clear to auscultation bilaterally, nonlabored respirations    Cardiovascular: RRR, no murmurs, rubs, or gallops, palpable pedal pulses bilaterally   Gastrointestinal: Positive bowel sounds, soft, nontender, nondistended   Musculoskeletal: No bilateral ankle edema, no clubbing or cyanosis to extremities   Psychiatric: Appropriate affect, cooperative   Neurologic: Oriented x 3, strength symmetric in all extremities, Cranial Nerves grossly intact to confrontation, speech clear   Skin: No rashes       Result Review    Result Review:  I have personally reviewed the results from the time of this admission to 8/24/2024 09:40 CDT and agree with these findings:  [x]  Laboratory list / accordion  [x]  Microbiology  [x]  Radiology  []  EKG/Telemetry   []  Cardiology/Vascular   []  Pathology  [x]  Old records  []  Other:  Most notable findings include:   EXAMINATION: CT ABDOMEN PELVIS W CONTRAST-      8/23/2024 1:20 PM     HISTORY: abdominal pain; llq/left flank; hx of crohn's and kidney stones     In order to have a CT radiation dose as low as reasonably achievable  Automated Exposure Control was utilized for adjustment of the mA and/or  KV according to patient size.     Total DLP = 547.91 mGy.cm     CT scan of the abdomen and pelvis is performed after intravenous  contrast enhancement.     Images are acquired in axial plane and subsequent reconstruction in  coronal and sagittal planes.     The comparison is made with the previous study dated 3/16/2020.     The lung bases included in the study are unremarkable.     A ventriculoperitoneal shunt is seen in place similar to the previous  study. It enters the right upper abdomen adjacent and anterior to the  segment 4B of the liver and distal end  of the catheter is in the right  midabdomen below the liver margin.     Limited visualized cardiomediastinal structures are unremarkable.  Significant artifacts are produced by a catheter in the distal SVC.  There is mild cardiomegaly.     The liver and spleen are normal.     The gallbladder is surgically absent. There is no significant dilatation  of common bile duct.     The pancreas is normal.     The adrenal glands bilaterally are normal.     There is moderate lobulation of renal contour bilaterally more severe on  the left side. The tiny low-density nodule in the right kidney was not  noted in the previous study. These are too small to be further  characterized. There is significant irregularity of the left renal  cortex suggesting chronic renal cortical scarring. No discrete mass.  There are several small radiopaque calculi in the left kidney the  largest one in the lower pole measuring 5 mm in diameter. There is  evidence of left hydronephrosis. The left ureter is not dilated. No  calculi in the left ureter. The right collecting system is normal. The  urinary bladder is poorly distended. No intrinsic abnormality or  calculus.     The stomach is decompressed. No focal abnormality. Duodenum is minimally  dilated with fluid. Fluid-filled nondistended and nondilated small bowel  loops are seen. Maximum lumen diameter of the distal ileum is 2.2 cm. No  zone of transition. Normal retrocecal appendix is seen. Moderate gas  fluid and stool are seen in the colon. No finding to suggest  obstruction. Moderate thickening of the wall of the distal/descending  and sigmoid colon is probably due to incomplete distention. No  surrounding peritoneal infiltration are stranding to suggest  inflammatory/infectious process.     There is trace free fluid in the pelvis.     There is a tiny fat-containing umbilical hernia.     Normal abdominal aorta and iliac arteries.     There is no evidence of abdominal or pelvic  lymphadenopathy.     Images reviewed in bone window show no acute bony abnormality.  IMPRESSION:  1. A moderate left hydronephrosis with a normal left ureter without  evidence of ureteral calculi. This may represent chronic inflammatory  process/chronic pyelonephritis. This was not noted in the previous study  in 2020.  2. Several nonobstructing left renal calculi.  3. Normal appendix.  4. Fluid-filled nondistended nondilated small bowel loops and proximal  large bowel may represent ileus. No finding to suggest obstruction.  5. Ventriculoperitoneal shunt in place. A trace free fluid in the pelvis  may be due to ventriculoperitoneal shunt.                       This report was signed and finalized on 8/23/2024 2:51 PM by Dr. Glenys Yancey MD.         Assessment & Plan   Assessment / Plan     Brief Patient Summary:  Kelsey Mcqueen is a 33 y.o. female who has a history of stones, likely recently passed one, and nonobstructed mild left hydronephrosis, which appears stable on comparison to a CT from 2020.    Active Hospital Problems:  Active Hospital Problems    Diagnosis     **Ileus     Hypokalemia due to nausea, vomiting, diarrhea     Hydronephrosis of left kidney     Nonobstructing left renal calculi     Gastroenteritis      Plan: Will check a NM lasix renal scan to confirm no significant obstruction, although given the appearance of the ureter and renal pelvis, it is unlikely that a stent would be necessary or helpful. Presuming no need for intervention, subsequently will follow up with her regular urologist.    Jeffry Al MD    Electronically signed by Jeffry Al MD at 08/24/24 0947       Raffaele Russ MD at 08/24/24 0916        Consult Orders    1. Inpatient Gastroenterology Consult [[id number]] ordered by Jen Vergara APRN at 08/23/24 8403                         [facility]  Inpatient Consult Note  Today's date:  08/24/24    Kelsey Mcqueen  1990        Referring Provider: No ref. provider found  Primary Physician: Sol Lopez, APRN     Date of Admission: 8/23/2024  Date of Service:  08/24/24    Reason for Consultation/Chief Complaint:   Nausea and vomiting  History of Crohn's disease    History of present illness:    Kelsey is a 34 y/o female that was admitted to the hospital yesterday. She presented to the hospital with nausea, vomiting, diarrhea, and abdominal pain. Last week, she started having left flank pain and lower abdominal/pelvic pain similar to the pain that she has had in the past with kidney stones. She was seen by her PCP and prescribed Macrobid and Pyridium. This pain resolved. Then on Monday, she said that she had new onset of nausea and vomiting. She says that she has been unable to keep anything down since that time. She has some abdominal cramping and has also had diarrhea. She says that she is having several loose stools daily. This is new for her starting this past week. Prior to this week, she was moving her bowels about once daily with formed stool.    She does report history of Crohn's disease. Follow with outpatient gastroenterologist. She has been on Humira and Entyvio in the past. However, these were stopped and she is currently only treated with Mesalamine. She says that her last colonoscopy was earlier this year and she was told that her Crohn's disease was in remission.    On admission, labs revealed mild leukocytosis with wbc 13.5. Labs were otherwise unremarkable. CT abdomen/pelvis showed irregularity in the left renal contour with small calculi in the left kidney and left hydronephrosis, but no obvious calculi in the left ureter. There was also fluid filled small bowel with some wall thickening in the descending and sigmoid colon which could be due to incomplete distention but no surrounding stranding., and findings of possible ileus.     Past Medical History:   Diagnosis Date    ADHD (attention deficit hyperactivity  disorder)     Crohn disease     recently diagnosis, going to start infusion therapy    Deep vein thrombosis     Depression with anxiety 2018    Fibromyalgia, primary 2016    Headache     Intracranial hypertension     Kidney stones     Lactation disorder     Pseudotumor cerebri 2016    Tobacco abuse 2016         Past Surgical History:   Procedure Laterality Date    BRAIN SURGERY  2015    COLONOSCOPY      CYSTOSCOPY ELECTROHYDRAULIC LITHOTRIPSY      HYSTERECTOMY  2019    KIDNEY STONE SURGERY      OTHER SURGICAL HISTORY      Intracranial shunt     SHUNT INSERTION  2015          Allergies   Allergen Reactions    Morphine And Codeine Shortness Of Breath    Other Other (See Comments)     Other reaction(s): Other (See Comments)  Dissolvable sutures. She developed an abscess from the last ones. Ended up in ER.   Dissolvable sutures. She developed an abscess from the last ones. Ended up in ER.     Sumatriptan Hives    Buspirone Headache    Ketorolac Unknown - Low Severity     Has Crohn's Disease.    Ketorolac Tromethamine Unknown - Low Severity    Morphine Itching    Demerol [Meperidine] Rash         Social History     Tobacco Use    Smoking status: Every Day     Current packs/day: 0.00     Average packs/day: 0.3 packs/day for 3.8 years (1.0 ttl pk-yrs)     Types: Cigarettes     Start date: 2019     Last attempt to quit: 2022     Years since quittin.8     Passive exposure: Current    Smokeless tobacco: Never    Tobacco comments:     Smoking currently 3     AVS   Substance Use Topics    Alcohol use: No          Family History   Problem Relation Age of Onset    Hypertension Mother     Hypertension Father     Prolactinoma Neg Hx          Medications Prior to Admission   Medication Sig Dispense Refill Last Dose    acetaZOLAMIDE (DIAMOX) 125 MG tablet Take 1 tablet by mouth 3 (Three) Times a Day. 90 tablet 1     butalbital-acetaminophen-caffeine (FIORICET, ESGIC) -40 MG  per tablet Take 1 tablet by mouth Every 6 (Six) Hours As Needed for Headache. (Patient not taking: Reported on 4/2/2024) 10 tablet 0     lamoTRIgine (LaMICtal) 150 MG tablet Take 1 tablet by mouth Every Night. 30 tablet 2     lisdexamfetamine (Vyvanse) 50 MG capsule Take 1 capsule by mouth Every Morning (Patient not taking: Reported on 4/2/2024) 30 capsule 0     loperamide (IMODIUM) 2 MG capsule Take 1 capsule by mouth 3 (Three) Times a Day As Needed.       omeprazole (priLOSEC) 40 MG capsule omeprazole 40 mg capsule,delayed release   Take 1 capsule every day by oral route for 60 days.       ondansetron (ZOFRAN) 4 MG tablet ondansetron HCl 4 mg tablet       propranolol LA (INDERAL LA) 80 MG 24 hr capsule Take 1 capsule by mouth Daily.       QUEtiapine (SEROquel) 100 MG tablet Take 1 tablet by mouth Every Night. 30 tablet 0     tiZANidine (ZANAFLEX) 4 MG tablet        venlafaxine XR (EFFEXOR-XR) 75 MG 24 hr capsule Take 1 capsule by mouth Daily.       Ventolin  (90 Base) MCG/ACT inhaler                 Review of Systems:  Constitutional: No unexpected weight change, no fatigue, no unexplained fever, no sweats or chills.   HEENT: No icteric sclera.  No hearing or visual deficits.  No sore throat.  No chronic nasal discharge.  Pulmonary: No chronic cough.  No hemoptysis.  No shortness of breath.  Cardiovascular: No chest pain.  No palpitations.  No shortness of breath.  Gastrointestinal: As above.  Musculoskeletal/extremities: No peripheral edema.  No cyanosis.  No claudications.  No back pain.  Genitourinary: No dysuria.  No blood in stool.  No urethral discharges.  Neurologic: No seizures.  No headaches.  No dizziness.  No gait problems.  Skin: No rash.  No icterus.  Mental: No psychosis.  No confusions.  No hallucinations.      Physical Exam:  Temp:  [98 °F (36.7 °C)-98.4 °F (36.9 °C)] 98.2 °F (36.8 °C)  Heart Rate:  [50-92] 55  Resp:  [16-19] 16  BP: (101-129)/(48-84) 101/48    General:   HEENT: Nonicteric  sclerae.  Moist oral mucosa.  PERRLA.  EOMI.  Clear pharynx.  Lungs: Clear to auscultation bilaterally.  No wheezing, rales or rhonchi.  Heart: Regular rate and rhythm.  Normal S1 and S2, no S3, S4 or murmur.  Abdomen: Soft, nondistended, mild to moderate tenderness to palpation, no rebound  Extremities: No cyanosis, edema or pulse deficits.  Skin: No rash or jaundice.      Results Review:  Lab Results (last 24 hours)       Procedure Component Value Units Date/Time    Gastrointestinal Panel, PCR - Stool, Per Rectum [[id number]]  (Normal) Collected: 08/24/24 0449    Specimen: Stool from Per Rectum Updated: 08/24/24 0611     Campylobacter Not Detected     Plesiomonas shigelloides Not Detected     Salmonella Not Detected     Vibrio Not Detected     Vibrio cholerae Not Detected     Yersinia enterocolitica Not Detected     Enteroaggregative E. coli (EAEC) Not Detected     Enteropathogenic E. coli (EPEC) Not Detected     Enterotoxigenic E. coli (ETEC) lt/st Not Detected     Shiga-like toxin-producing E. coli (STEC) stx1/stx2 Not Detected     Shigella/Enteroinvasive E. coli (EIEC) Not Detected     Cryptosporidium Not Detected     Cyclospora cayetanensis Not Detected     Entamoeba histolytica Not Detected     Giardia lamblia Not Detected     Adenovirus F40/41 Not Detected     Astrovirus Not Detected     Norovirus GI/GII Not Detected     Rotavirus A Not Detected     Sapovirus (I, II, IV or V) Not Detected    Narrative:      If Aeromonas, Staphylococcus aureus or Bacillus cereus are suspected, please order [id number]: Stool Culture, Aeromonas, S aureus, B Cereus.    Magnesium [[id number]]  (Normal) Collected: 08/24/24 0425    Specimen: Blood Updated: 08/24/24 0501     Magnesium 1.8 mg/dL     Comprehensive Metabolic Panel [[id number]]  (Abnormal) Collected: 08/24/24 0425    Specimen: Blood Updated: 08/24/24 0501     Glucose 97 mg/dL      BUN 10 mg/dL      Creatinine 0.61 mg/dL      Sodium 141 mmol/L      Potassium 3.5 mmol/L       Chloride 109 mmol/L      CO2 22.0 mmol/L      Calcium 8.1 mg/dL      Total Protein 6.0 g/dL      Albumin 3.7 g/dL      ALT (SGPT) 23 U/L      AST (SGOT) 26 U/L      Alkaline Phosphatase 64 U/L      Total Bilirubin 0.3 mg/dL      Globulin 2.3 gm/dL      A/G Ratio 1.6 g/dL      BUN/Creatinine Ratio 16.4     Anion Gap 10.0 mmol/L      eGFR 122.0 mL/min/1.73     Narrative:      GFR Normal >60  Chronic Kidney Disease <60  Kidney Failure <15      CBC & Differential [[id number]]  (Abnormal) Collected: 08/24/24 0425    Specimen: Blood Updated: 08/24/24 0438    Narrative:      The following orders were created for panel order CBC & Differential.  Procedure                               Abnormality         Status                     ---------                               -----------         ------                     CBC Auto Differential[[id number]]        Abnormal            Final result                 Please view results for these tests on the individual orders.    CBC Auto Differential [[id number]]  (Abnormal) Collected: 08/24/24 0425    Specimen: Blood Updated: 08/24/24 0438     WBC 10.59 10*3/mm3      RBC 3.75 10*6/mm3      Hemoglobin 11.6 g/dL      Hematocrit 34.1 %      MCV 90.9 fL      MCH 30.9 pg      MCHC 34.0 g/dL      RDW 12.6 %      RDW-SD 41.3 fl      MPV 9.6 fL      Platelets 272 10*3/mm3      Neutrophil % 57.7 %      Lymphocyte % 34.9 %      Monocyte % 5.8 %      Eosinophil % 0.8 %      Basophil % 0.4 %      Immature Grans % 0.4 %      Neutrophils, Absolute 6.12 10*3/mm3      Lymphocytes, Absolute 3.70 10*3/mm3      Monocytes, Absolute 0.61 10*3/mm3      Eosinophils, Absolute 0.08 10*3/mm3      Basophils, Absolute 0.04 10*3/mm3      Immature Grans, Absolute 0.04 10*3/mm3      nRBC 0.0 /100 WBC     Magnesium [[id number]]  (Normal) Collected: 08/23/24 1927    Specimen: Blood Updated: 08/23/24 1947     Magnesium 1.9 mg/dL     CBC & Differential [[id number]]  (Abnormal) Collected: 08/23/24 1927    Specimen: Blood  Updated: 08/23/24 1937    Narrative:      The following orders were created for panel order CBC & Differential.  Procedure                               Abnormality         Status                     ---------                               -----------         ------                     CBC Auto Differential[[id number]]        Abnormal            Final result                 Please view results for these tests on the individual orders.    CBC Auto Differential [[id number]]  (Abnormal) Collected: 08/23/24 1927    Specimen: Blood Updated: 08/23/24 1937     WBC 13.58 10*3/mm3      RBC 4.18 10*6/mm3      Hemoglobin 12.8 g/dL      Hematocrit 37.8 %      MCV 90.4 fL      MCH 30.6 pg      MCHC 33.9 g/dL      RDW 12.6 %      RDW-SD 41.2 fl      MPV 9.4 fL      Platelets 318 10*3/mm3      Neutrophil % 67.6 %      Lymphocyte % 26.5 %      Monocyte % 5.0 %      Eosinophil % 0.2 %      Basophil % 0.3 %      Immature Grans % 0.4 %      Neutrophils, Absolute 9.18 10*3/mm3      Lymphocytes, Absolute 3.60 10*3/mm3      Monocytes, Absolute 0.68 10*3/mm3      Eosinophils, Absolute 0.03 10*3/mm3      Basophils, Absolute 0.04 10*3/mm3      Immature Grans, Absolute 0.05 10*3/mm3      nRBC 0.0 /100 WBC     Urinalysis With Microscopic If Indicated (No Culture) - Urine, Clean Catch [[id number]]  (Abnormal) Collected: 08/23/24 1529    Specimen: Urine, Clean Catch Updated: 08/23/24 1543     Color, UA Dark Yellow     Appearance, UA Clear     pH, UA 6.0     Specific Gravity, UA >1.030     Glucose, UA Negative     Ketones, UA 40 mg/dL (2+)     Bilirubin, UA Negative     Blood, UA Negative     Protein, UA Trace     Leuk Esterase, UA Negative     Nitrite, UA Negative     Urobilinogen, UA 0.2 E.U./dL    Narrative:      Urine microscopic not indicated.    COVID-19 and FLU A/B PCR, 1 HR TAT - Swab, Nasopharynx [[id number]]  (Normal) Collected: 08/23/24 1339    Specimen: Swab from Nasopharynx Updated: 08/23/24 1416     COVID19 Not Detected     Influenza A  PCR Not Detected     Influenza B PCR Not Detected    Narrative:      Fact sheet for providers: https://www.fda.gov/media/920550/download    Fact sheet for patients: https://www.fda.gov/media/211791/download    Test performed by PCR.    Comprehensive Metabolic Panel [[id number]]  (Abnormal) Collected: 08/23/24 1337    Specimen: Blood Updated: 08/23/24 1413     Glucose 106 mg/dL      BUN 11 mg/dL      Creatinine 0.62 mg/dL      Sodium 141 mmol/L      Potassium 3.2 mmol/L      Chloride 106 mmol/L      CO2 21.0 mmol/L      Calcium 9.5 mg/dL      Total Protein 7.4 g/dL      Albumin 4.4 g/dL      ALT (SGPT) 13 U/L      AST (SGOT) 16 U/L      Alkaline Phosphatase 76 U/L      Total Bilirubin 0.5 mg/dL      Globulin 3.0 gm/dL      A/G Ratio 1.5 g/dL      BUN/Creatinine Ratio 17.7     Anion Gap 14.0 mmol/L      eGFR 121.5 mL/min/1.73     Narrative:      GFR Normal >60  Chronic Kidney Disease <60  Kidney Failure <15      Lipase [[id number]]  (Normal) Collected: 08/23/24 1337    Specimen: Blood Updated: 08/23/24 1408     Lipase 52 U/L     CBC & Differential [[id number]]  (Abnormal) Collected: 08/23/24 1337    Specimen: Blood Updated: 08/23/24 1355    Narrative:      The following orders were created for panel order CBC & Differential.  Procedure                               Abnormality         Status                     ---------                               -----------         ------                     CBC Auto Differential[[id number]]        Abnormal            Final result                 Please view results for these tests on the individual orders.    CBC Auto Differential [[id number]]  (Abnormal) Collected: 08/23/24 1337    Specimen: Blood Updated: 08/23/24 1355     WBC 12.94 10*3/mm3      RBC 4.50 10*6/mm3      Hemoglobin 13.8 g/dL      Hematocrit 39.9 %      MCV 88.7 fL      MCH 30.7 pg      MCHC 34.6 g/dL      RDW 12.6 %      RDW-SD 40.8 fl      MPV 9.7 fL      Platelets 337 10*3/mm3      Neutrophil % 73.7 %       Lymphocyte % 20.2 %      Monocyte % 5.1 %      Eosinophil % 0.3 %      Basophil % 0.3 %      Immature Grans % 0.4 %      Neutrophils, Absolute 9.53 10*3/mm3      Lymphocytes, Absolute 2.62 10*3/mm3      Monocytes, Absolute 0.66 10*3/mm3      Eosinophils, Absolute 0.04 10*3/mm3      Basophils, Absolute 0.04 10*3/mm3      Immature Grans, Absolute 0.05 10*3/mm3      nRBC 0.0 /100 WBC               Radiology Review:  Imaging Results (Last 72 Hours)       Procedure Component Value Units Date/Time    CT Abdomen Pelvis With Contrast [[id number]] Collected: 08/23/24 1436     Updated: 08/23/24 1454    Narrative:      EXAMINATION: CT ABDOMEN PELVIS W CONTRAST-      8/23/2024 1:20 PM     HISTORY: abdominal pain; llq/left flank; hx of crohn's and kidney stones     In order to have a CT radiation dose as low as reasonably achievable  Automated Exposure Control was utilized for adjustment of the mA and/or  KV according to patient size.     Total DLP = 547.91 mGy.cm     CT scan of the abdomen and pelvis is performed after intravenous  contrast enhancement.     Images are acquired in axial plane and subsequent reconstruction in  coronal and sagittal planes.     The comparison is made with the previous study dated 3/16/2020.     The lung bases included in the study are unremarkable.     A ventriculoperitoneal shunt is seen in place similar to the previous  study. It enters the right upper abdomen adjacent and anterior to the  segment 4B of the liver and distal end of the catheter is in the right  midabdomen below the liver margin.     Limited visualized cardiomediastinal structures are unremarkable.  Significant artifacts are produced by a catheter in the distal SVC.  There is mild cardiomegaly.     The liver and spleen are normal.     The gallbladder is surgically absent. There is no significant dilatation  of common bile duct.     The pancreas is normal.     The adrenal glands bilaterally are normal.     There is moderate  lobulation of renal contour bilaterally more severe on  the left side. The tiny low-density nodule in the right kidney was not  noted in the previous study. These are too small to be further  characterized. There is significant irregularity of the left renal  cortex suggesting chronic renal cortical scarring. No discrete mass.  There are several small radiopaque calculi in the left kidney the  largest one in the lower pole measuring 5 mm in diameter. There is  evidence of left hydronephrosis. The left ureter is not dilated. No  calculi in the left ureter. The right collecting system is normal. The  urinary bladder is poorly distended. No intrinsic abnormality or  calculus.     The stomach is decompressed. No focal abnormality. Duodenum is minimally  dilated with fluid. Fluid-filled nondistended and nondilated small bowel  loops are seen. Maximum lumen diameter of the distal ileum is 2.2 cm. No  zone of transition. Normal retrocecal appendix is seen. Moderate gas  fluid and stool are seen in the colon. No finding to suggest  obstruction. Moderate thickening of the wall of the distal/descending  and sigmoid colon is probably due to incomplete distention. No  surrounding peritoneal infiltration are stranding to suggest  inflammatory/infectious process.     There is trace free fluid in the pelvis.     There is a tiny fat-containing umbilical hernia.     Normal abdominal aorta and iliac arteries.     There is no evidence of abdominal or pelvic lymphadenopathy.     Images reviewed in bone window show no acute bony abnormality.    Impression:      1. A moderate left hydronephrosis with a normal left ureter without  evidence of ureteral calculi. This may represent chronic inflammatory  process/chronic pyelonephritis. This was not noted in the previous study  in 2020.  2. Several nonobstructing left renal calculi.  3. Normal appendix.  4. Fluid-filled nondistended nondilated small bowel loops and proximal  large bowel may  represent ileus. No finding to suggest obstruction.  5. Ventriculoperitoneal shunt in place. A trace free fluid in the pelvis  may be due to ventriculoperitoneal shunt.                       This report was signed and finalized on 8/23/2024 2:51 PM by Dr. Glenys Yancey MD.               Impression:  Acute onset nausea, vomiting, diarrhea. With sudden onset of her symptoms and CT findings, this could be acute infectious enteritis. Her stool culture is negative. Doubt that this is a Crohn's flare with sudden onset of her symptoms and colonoscopy earlier this year with reported remission of her Crohn's disease. Does not appear to have bowel obstruction, but could have mild ileus along with infectious gastroenteritis or kidney stones.  Recurrent kidney stones. CT scan with small stones in the left kidney and left hydronephrosis, but no obvious stone in the left ureter.    Plan:  She is going to try clear liquid diet today. Continue Zofran as needed. She as also give dose of Reglan, so will see if she has some improvement with this. Will try adding Dicyclomine 20 mg as needed for her abdominal pain. I do not think that she needs colonoscopy at this time and likely would not be able to tolerate a prep anyway. She will need outpatient follow up wit her primary gastroenterologist after discharge. Urology has been consulted regarding her recurrent kidney stones and hydronephrosis on CT scan. Will continue to follow and make further recommendations pending her clinical course.      Raffaele Russ MD  08/24/24   09:16 CDT     Electronically signed by Raffaele Russ MD at 08/24/24 1020        Shakira Carrizales, RN   Registered Nurse     Plan of Care     Signed     Date of Service: 08/24/24 0600  Creation Time: 08/24/24 0600     Signed         Goal Outcome Evaluation:   Patient A/O x4 this shift. Patient treated for pain/nausea per MAR. Patient rec'd a one time dose of IV robaxin--this helped a lot and the patient was able  to get some rest. Patient maintained strict NPO overnight. Patient rec'd IVF per MAR. Patient able to ambulate to the bathroom independently. Potassium replaced. Stool collected for GI PCR panel as ordered, pending results. Will update oncoming dayshift nurse at bedside shift report in the a.m. as appropriate.

## 2024-08-25 LAB
ALBUMIN SERPL-MCNC: 3.6 G/DL (ref 3.5–5.2)
ALBUMIN/GLOB SERPL: 1.6 G/DL
ALP SERPL-CCNC: 66 U/L (ref 39–117)
ALT SERPL W P-5'-P-CCNC: 26 U/L (ref 1–33)
ANION GAP SERPL CALCULATED.3IONS-SCNC: 7 MMOL/L (ref 5–15)
AST SERPL-CCNC: 22 U/L (ref 1–32)
BASOPHILS # BLD AUTO: 0.02 10*3/MM3 (ref 0–0.2)
BASOPHILS NFR BLD AUTO: 0.3 % (ref 0–1.5)
BILIRUB SERPL-MCNC: 0.3 MG/DL (ref 0–1.2)
BUN SERPL-MCNC: 8 MG/DL (ref 6–20)
BUN/CREAT SERPL: 12.9 (ref 7–25)
CALCIUM SPEC-SCNC: 8.2 MG/DL (ref 8.6–10.5)
CHLORIDE SERPL-SCNC: 110 MMOL/L (ref 98–107)
CO2 SERPL-SCNC: 25 MMOL/L (ref 22–29)
CREAT SERPL-MCNC: 0.62 MG/DL (ref 0.57–1)
DEPRECATED RDW RBC AUTO: 41.1 FL (ref 37–54)
EGFRCR SERPLBLD CKD-EPI 2021: 121.5 ML/MIN/1.73
EOSINOPHIL # BLD AUTO: 0.3 10*3/MM3 (ref 0–0.4)
EOSINOPHIL NFR BLD AUTO: 4.1 % (ref 0.3–6.2)
ERYTHROCYTE [DISTWIDTH] IN BLOOD BY AUTOMATED COUNT: 12.5 % (ref 12.3–15.4)
GLOBULIN UR ELPH-MCNC: 2.3 GM/DL
GLUCOSE SERPL-MCNC: 102 MG/DL (ref 65–99)
HCT VFR BLD AUTO: 34.5 % (ref 34–46.6)
HGB BLD-MCNC: 11.8 G/DL (ref 12–15.9)
IMM GRANULOCYTES # BLD AUTO: 0.02 10*3/MM3 (ref 0–0.05)
IMM GRANULOCYTES NFR BLD AUTO: 0.3 % (ref 0–0.5)
LYMPHOCYTES # BLD AUTO: 2.6 10*3/MM3 (ref 0.7–3.1)
LYMPHOCYTES NFR BLD AUTO: 35.3 % (ref 19.6–45.3)
MAGNESIUM SERPL-MCNC: 1.8 MG/DL (ref 1.6–2.6)
MCH RBC QN AUTO: 31.1 PG (ref 26.6–33)
MCHC RBC AUTO-ENTMCNC: 34.2 G/DL (ref 31.5–35.7)
MCV RBC AUTO: 91 FL (ref 79–97)
MONOCYTES # BLD AUTO: 0.44 10*3/MM3 (ref 0.1–0.9)
MONOCYTES NFR BLD AUTO: 6 % (ref 5–12)
NEUTROPHILS NFR BLD AUTO: 3.99 10*3/MM3 (ref 1.7–7)
NEUTROPHILS NFR BLD AUTO: 54 % (ref 42.7–76)
NRBC BLD AUTO-RTO: 0 /100 WBC (ref 0–0.2)
PLATELET # BLD AUTO: 272 10*3/MM3 (ref 140–450)
PMV BLD AUTO: 9.9 FL (ref 6–12)
POTASSIUM SERPL-SCNC: 3.4 MMOL/L (ref 3.5–5.2)
PROT SERPL-MCNC: 5.9 G/DL (ref 6–8.5)
RBC # BLD AUTO: 3.79 10*6/MM3 (ref 3.77–5.28)
SODIUM SERPL-SCNC: 142 MMOL/L (ref 136–145)
WBC NRBC COR # BLD AUTO: 7.37 10*3/MM3 (ref 3.4–10.8)

## 2024-08-25 PROCEDURE — 25010000002 SODIUM CHLORIDE 0.9 % WITH KCL 20 MEQ 20-0.9 MEQ/L-% SOLUTION: Performed by: NURSE PRACTITIONER

## 2024-08-25 PROCEDURE — 99232 SBSQ HOSP IP/OBS MODERATE 35: CPT | Performed by: UROLOGY

## 2024-08-25 PROCEDURE — 25010000002 PROMETHAZINE PER 50 MG: Performed by: NURSE PRACTITIONER

## 2024-08-25 PROCEDURE — 80053 COMPREHEN METABOLIC PANEL: CPT | Performed by: NURSE PRACTITIONER

## 2024-08-25 PROCEDURE — 83735 ASSAY OF MAGNESIUM: CPT | Performed by: NURSE PRACTITIONER

## 2024-08-25 PROCEDURE — 85025 COMPLETE CBC W/AUTO DIFF WBC: CPT | Performed by: NURSE PRACTITIONER

## 2024-08-25 PROCEDURE — 25010000002 PROCHLORPERAZINE 10 MG/2ML SOLUTION: Performed by: NURSE PRACTITIONER

## 2024-08-25 PROCEDURE — 99232 SBSQ HOSP IP/OBS MODERATE 35: CPT | Performed by: INTERNAL MEDICINE

## 2024-08-25 PROCEDURE — 0 HYDROMORPHONE 1 MG/ML SOLUTION: Performed by: NURSE PRACTITIONER

## 2024-08-25 PROCEDURE — 25010000002 CEFTRIAXONE PER 250 MG: Performed by: NURSE PRACTITIONER

## 2024-08-25 RX ORDER — POTASSIUM CHLORIDE 750 MG/1
40 CAPSULE, EXTENDED RELEASE ORAL ONCE
Status: COMPLETED | OUTPATIENT
Start: 2024-08-25 | End: 2024-08-25

## 2024-08-25 RX ORDER — MESALAMINE 0.38 G/1
0.38 CAPSULE, EXTENDED RELEASE ORAL DAILY
COMMUNITY

## 2024-08-25 RX ORDER — NALOXONE HCL 0.4 MG/ML
0.4 VIAL (ML) INJECTION
Status: DISCONTINUED | OUTPATIENT
Start: 2024-08-25 | End: 2024-08-26 | Stop reason: HOSPADM

## 2024-08-25 RX ADMIN — POTASSIUM CHLORIDE AND SODIUM CHLORIDE 100 ML/HR: 900; 150 INJECTION, SOLUTION INTRAVENOUS at 00:55

## 2024-08-25 RX ADMIN — CLONAZEPAM 2 MG: 1 TABLET ORAL at 15:33

## 2024-08-25 RX ADMIN — POTASSIUM CHLORIDE 40 MEQ: 750 CAPSULE, EXTENDED RELEASE ORAL at 13:41

## 2024-08-25 RX ADMIN — SODIUM CHLORIDE 1000 MG: 900 INJECTION INTRAVENOUS at 16:46

## 2024-08-25 RX ADMIN — HYDROMORPHONE HYDROCHLORIDE 1 MG: 1 INJECTION, SOLUTION INTRAMUSCULAR; INTRAVENOUS; SUBCUTANEOUS at 06:31

## 2024-08-25 RX ADMIN — HYDROCODONE BITARTRATE AND ACETAMINOPHEN 1 TABLET: 10; 325 TABLET ORAL at 08:27

## 2024-08-25 RX ADMIN — HYDROMORPHONE HYDROCHLORIDE 1 MG: 1 INJECTION, SOLUTION INTRAMUSCULAR; INTRAVENOUS; SUBCUTANEOUS at 12:20

## 2024-08-25 RX ADMIN — PANTOPRAZOLE SODIUM 40 MG: 40 INJECTION, POWDER, FOR SOLUTION INTRAVENOUS at 20:15

## 2024-08-25 RX ADMIN — CLONAZEPAM 2 MG: 1 TABLET ORAL at 08:26

## 2024-08-25 RX ADMIN — PROMETHAZINE HYDROCHLORIDE 12.5 MG: 25 INJECTION INTRAMUSCULAR; INTRAVENOUS at 12:39

## 2024-08-25 RX ADMIN — HYDROMORPHONE HYDROCHLORIDE 1 MG: 1 INJECTION, SOLUTION INTRAMUSCULAR; INTRAVENOUS; SUBCUTANEOUS at 20:36

## 2024-08-25 RX ADMIN — HYDROCODONE BITARTRATE AND ACETAMINOPHEN 1 TABLET: 10; 325 TABLET ORAL at 21:44

## 2024-08-25 RX ADMIN — CLONAZEPAM 2 MG: 1 TABLET ORAL at 21:44

## 2024-08-25 RX ADMIN — PROCHLORPERAZINE EDISYLATE 5 MG: 5 INJECTION INTRAMUSCULAR; INTRAVENOUS at 16:58

## 2024-08-25 RX ADMIN — PROCHLORPERAZINE EDISYLATE 5 MG: 5 INJECTION INTRAMUSCULAR; INTRAVENOUS at 08:52

## 2024-08-25 RX ADMIN — HYDROCODONE BITARTRATE AND ACETAMINOPHEN 1 TABLET: 10; 325 TABLET ORAL at 13:56

## 2024-08-25 RX ADMIN — PANTOPRAZOLE SODIUM 40 MG: 40 INJECTION, POWDER, FOR SOLUTION INTRAVENOUS at 08:17

## 2024-08-25 RX ADMIN — HYDROMORPHONE HYDROCHLORIDE 1 MG: 1 INJECTION, SOLUTION INTRAMUSCULAR; INTRAVENOUS; SUBCUTANEOUS at 00:02

## 2024-08-25 RX ADMIN — PROMETHAZINE HYDROCHLORIDE 12.5 MG: 25 INJECTION INTRAMUSCULAR; INTRAVENOUS at 20:15

## 2024-08-25 RX ADMIN — Medication 10 ML: at 20:15

## 2024-08-25 RX ADMIN — VENLAFAXINE HYDROCHLORIDE 75 MG: 75 CAPSULE, EXTENDED RELEASE ORAL at 08:17

## 2024-08-25 RX ADMIN — HYDROMORPHONE HYDROCHLORIDE 1 MG: 1 INJECTION, SOLUTION INTRAMUSCULAR; INTRAVENOUS; SUBCUTANEOUS at 16:46

## 2024-08-25 RX ADMIN — PROMETHAZINE HYDROCHLORIDE 12.5 MG: 25 INJECTION INTRAMUSCULAR; INTRAVENOUS at 06:31

## 2024-08-25 RX ADMIN — Medication 10 ML: at 08:18

## 2024-08-25 NOTE — DISCHARGE SUMMARY
[facility] Medicine Services  DISCHARGE SUMMARY     Date of Admission: 8/23/2024  Date of Discharge:  8/26/2024  Primary Care Physician: Sol Lopez APRN    Presenting Problem/History of Present Illness:  Nausea, vomiting, diarrhea, abdominal pain    Final Discharge Diagnoses:  Active Hospital Problems    Diagnosis     **Ileus     Hypokalemia due to nausea, vomiting, diarrhea     Hydronephrosis of left kidney     Nonobstructing left renal calculi     Gastroenteritis        Consults:   Dr. Russ, gastroenterology  Dr. Al, urology    Procedures Performed: None    Pertinent Test Results:   Results for orders placed in visit on 05/07/19    SCANNED - ECHOCARDIOGRAM      Imaging Results (All)       Procedure Component Value Units Date/Time    NM Renal With Flow & Function With Pharmacological Intervention [[id number]] Collected: 08/24/24 1435     Updated: 08/24/24 1451    Narrative:      EXAMINATION:  NM RENAL W FLOW AND FUNCTION W PHARMACOLOGICAL  INTERVENTION-  8/24/2024 12:11 PM     HISTORY: Mild left hydronephrosis without obvious obstruction to be done  routine Saturday days 8/24/2024 per radiology; K56.7-Ileus, unspecified.     COMPARISON: No comparison study.     TECHNIQUE: The patient was injected with 10.3 uCi of technetium 99 M  MAG3 intravenously. The patient was given 40 mg of Lasix IV at 20  minutes.     FINDINGS: The vascular phase is normal and symmetric bilaterally. The  time to peak activity on the right side is a little over 5 minutes.  There is progressive increase in activity within the left kidney with  the time to peak activity at about 20 minutes. The split function  analysis is approximately 49% of the function by the left kidney and 51  by the right kidney. The right kidney is excreting contrast from 5  minutes to 20 minutes. After the administration of Lasix, there is more  accelerated emptying of the collecting systems bilaterally. The  T1/2  emptying time on the left side is about 10 minutes. The T1/2 emptying  time on the right side is around 4-5 minutes. Therefore, no significant  obstructive changes seen.          Impression:      1. Vascular phase is normal.  2. Split function analysis demonstrates approximately equal renal  function bilaterally.  3. There is progressive uptake within the left renal collecting system  until the administration of Lasix. This suggest that there probably is  some UPJ narrowing on this side. However, after Lasix administration,  the T1/2 emptying time is about 10 minutes. 20 minutes or greater is  considered significant.  4. No significant obstruction on the right side.     This report was signed and finalized on 8/24/2024 2:48 PM by Dr. Leonardo Adams MD.       CT Abdomen Pelvis With Contrast [[id number]] Collected: 08/23/24 1436     Updated: 08/23/24 1454    Narrative:      EXAMINATION: CT ABDOMEN PELVIS W CONTRAST-      8/23/2024 1:20 PM     HISTORY: abdominal pain; llq/left flank; hx of crohn's and kidney stones     In order to have a CT radiation dose as low as reasonably achievable  Automated Exposure Control was utilized for adjustment of the mA and/or  KV according to patient size.     Total DLP = 547.91 mGy.cm     CT scan of the abdomen and pelvis is performed after intravenous  contrast enhancement.     Images are acquired in axial plane and subsequent reconstruction in  coronal and sagittal planes.     The comparison is made with the previous study dated 3/16/2020.     The lung bases included in the study are unremarkable.     A ventriculoperitoneal shunt is seen in place similar to the previous  study. It enters the right upper abdomen adjacent and anterior to the  segment 4B of the liver and distal end of the catheter is in the right  midabdomen below the liver margin.     Limited visualized cardiomediastinal structures are unremarkable.  Significant artifacts are produced by a catheter in the distal  SVC.  There is mild cardiomegaly.     The liver and spleen are normal.     The gallbladder is surgically absent. There is no significant dilatation  of common bile duct.     The pancreas is normal.     The adrenal glands bilaterally are normal.     There is moderate lobulation of renal contour bilaterally more severe on  the left side. The tiny low-density nodule in the right kidney was not  noted in the previous study. These are too small to be further  characterized. There is significant irregularity of the left renal  cortex suggesting chronic renal cortical scarring. No discrete mass.  There are several small radiopaque calculi in the left kidney the  largest one in the lower pole measuring 5 mm in diameter. There is  evidence of left hydronephrosis. The left ureter is not dilated. No  calculi in the left ureter. The right collecting system is normal. The  urinary bladder is poorly distended. No intrinsic abnormality or  calculus.     The stomach is decompressed. No focal abnormality. Duodenum is minimally  dilated with fluid. Fluid-filled nondistended and nondilated small bowel  loops are seen. Maximum lumen diameter of the distal ileum is 2.2 cm. No  zone of transition. Normal retrocecal appendix is seen. Moderate gas  fluid and stool are seen in the colon. No finding to suggest  obstruction. Moderate thickening of the wall of the distal/descending  and sigmoid colon is probably due to incomplete distention. No  surrounding peritoneal infiltration are stranding to suggest  inflammatory/infectious process.     There is trace free fluid in the pelvis.     There is a tiny fat-containing umbilical hernia.     Normal abdominal aorta and iliac arteries.     There is no evidence of abdominal or pelvic lymphadenopathy.     Images reviewed in bone window show no acute bony abnormality.    Impression:      1. A moderate left hydronephrosis with a normal left ureter without  evidence of ureteral calculi. This may  represent chronic inflammatory  process/chronic pyelonephritis. This was not noted in the previous study  in 2020.  2. Several nonobstructing left renal calculi.  3. Normal appendix.  4. Fluid-filled nondistended nondilated small bowel loops and proximal  large bowel may represent ileus. No finding to suggest obstruction.  5. Ventriculoperitoneal shunt in place. A trace free fluid in the pelvis  may be due to ventriculoperitoneal shunt.   This report was signed and finalized on 8/23/2024 2:51 PM by Dr. Glenys Yancey MD.             LAB RESULTS:      Lab 08/25/24 0612 08/24/24 0425 08/23/24 1927 08/23/24  1337   WBC 7.37 10.59 13.58* 12.94*   HEMOGLOBIN 11.8* 11.6* 12.8 13.8   HEMATOCRIT 34.5 34.1 37.8 39.9   PLATELETS 272 272 318 337   NEUTROS ABS 3.99 6.12 9.18* 9.53*   IMMATURE GRANS (ABS) 0.02 0.04 0.05 0.05   LYMPHS ABS 2.60 3.70* 3.60* 2.62   MONOS ABS 0.44 0.61 0.68 0.66   EOS ABS 0.30 0.08 0.03 0.04   MCV 91.0 90.9 90.4 88.7         Lab 08/26/24  0600 08/25/24 0612 08/24/24 0425 08/23/24 1927 08/23/24  1337   SODIUM 142 142 141  --  141   POTASSIUM 4.2 3.4* 3.5  --  3.2*   CHLORIDE 107 110* 109*  --  106   CO2 27.0 25.0 22.0  --  21.0*   ANION GAP 8.0 7.0 10.0  --  14.0   BUN 9 8 10  --  11   CREATININE 0.55* 0.62 0.61  --  0.62   EGFR 125.1 121.5 122.0  --  121.5   GLUCOSE 114* 102* 97  --  106*   CALCIUM 9.0 8.2* 8.1*  --  9.5   MAGNESIUM  --  1.8 1.8 1.9  --          Lab 08/26/24  0600 08/25/24  0612 08/24/24 0425 08/23/24  1337   TOTAL PROTEIN 6.0 5.9* 6.0 7.4   ALBUMIN 3.7 3.6 3.7 4.4   GLOBULIN 2.3 2.3 2.3 3.0   ALT (SGPT) 27 26 23 13   AST (SGOT) 23 22 26 16   BILIRUBIN <0.2 0.3 0.3 0.5   ALK PHOS 66 66 64 76   LIPASE  --   --   --  52                     Brief Urine Lab Results  (Last result in the past 365 days)        Color   Clarity   Blood   Leuk Est   Nitrite   Protein   CREAT   Urine HCG        08/23/24 1529 Dark Yellow   Clear   Negative   Negative   Negative   Trace                  Microbiology Results (last 10 days)       Procedure Component Value - Date/Time    Gastrointestinal Panel, PCR - Stool, Per Rectum [[id number]]  (Normal) Collected: 08/24/24 0449    Lab Status: Final result Specimen: Stool from Per Rectum Updated: 08/24/24 0611     Campylobacter Not Detected     Plesiomonas shigelloides Not Detected     Salmonella Not Detected     Vibrio Not Detected     Vibrio cholerae Not Detected     Yersinia enterocolitica Not Detected     Enteroaggregative E. coli (EAEC) Not Detected     Enteropathogenic E. coli (EPEC) Not Detected     Enterotoxigenic E. coli (ETEC) lt/st Not Detected     Shiga-like toxin-producing E. coli (STEC) stx1/stx2 Not Detected     Shigella/Enteroinvasive E. coli (EIEC) Not Detected     Cryptosporidium Not Detected     Cyclospora cayetanensis Not Detected     Entamoeba histolytica Not Detected     Giardia lamblia Not Detected     Adenovirus F40/41 Not Detected     Astrovirus Not Detected     Norovirus GI/GII Not Detected     Rotavirus A Not Detected     Sapovirus (I, II, IV or V) Not Detected    Narrative:      If Aeromonas, Staphylococcus aureus or Bacillus cereus are suspected, please order [id number]: Stool Culture, Aeromonas, S aureus, B Cereus.    COVID-19 and FLU A/B PCR, 1 HR TAT - Swab, Nasopharynx [[id number]]  (Normal) Collected: 08/23/24 1339    Lab Status: Final result Specimen: Swab from Nasopharynx Updated: 08/23/24 1416     COVID19 Not Detected     Influenza A PCR Not Detected     Influenza B PCR Not Detected    Narrative:      Fact sheet for providers: https://www.fda.gov/media/910442/download    Fact sheet for patients: https://www.fda.gov/media/127488/download    Test performed by PCR.            Hospital Course: Kelsey Mcqueen is a 33-year-old  female with past medical history significant for kidney stones, depression, and Crohn's disease.  She presented to [facility] emergency room 8/23/2024 with complaints of nausea, vomiting and  abdominal pain.  Patient reported last week she had back pain and cramping down to the pelvic area and felt as though she passed a kidney stone as she has passed multiple kidney stones in the past.  She reported left flank pain and abdominal pain consistent with kidney stone.  She was prescribed Macrobid and Pyridium by primary care provider and completed treatment.  On 8/19 patient began experiencing nausea, vomiting, abdominal pain, diarrhea and she was unable to keep down oral intake despite taking Zofran.  Patient reported losing 10 pounds the past week.  She described decreased appetite, abdominal pain associated as sharp, knot-like and twisting.  She reported vomiting bile content.  She developed nonbloody diarrhea. Patient reported being concerned regarding possible Crohn's flare.  She is followed by Dr. Santiago gastroenterology in [place].  She had been on Humira and Entyvio in the past but these were stopped and was treated with mesalamine. Last colonoscopy earlier this year and was told Crohn's disease in remission.     Lab potassium 3.2, WBC 12.94, lipase 52.  Urinalysis trace protein.  CT abdomen showed moderate left hydronephrosis with normal left ureter without evidence of ureteral calculi.  May represent chronic inflammatory process/chronic pyelonephritis.  This was not noted previous study 2020.  Several nonobstructing left renal calculi.  Normal appendix.  Fluid-filled nondistended nondilated small bowel loops and proximal large bowel may represent ileus.  No findings to suggest obstruction.  Ventriculoperitoneal shunt.  Normal saline fluid bolus, Reglan, Dilaudid given in ER.    She was admitted to the medical floor with possible ileus and gastroenteritis.  She presented to ER 8/23 with nausea, vomiting, diarrhea and abdominal pain.  Patient had renal colic and felt as though she passed kidney stone in the past week as she experienced a left flank pain, abdominal pain consistent with previous kidney  stones.  On 8/18 she developed nausea, vomiting and was unable to keep down any liquid.  She reported subjective fever and concern for Crohn's flare.  CT abdomen showed fluid-filled nondistended nondilated small bowel loops and proximal large bowel loops represents ileus.  GI PCR panel negative.  She was hydrated with IV fluids, given Zofran for nausea.  Prophylactic Rocephin ordered.  Initially, she was made nothing by mouth and started on clear liquids on 8/24 and tolerated.  She was advanced to full liquids and then GI bland soft diet on 8/25.    She was given Zofran for nausea, Dilaudid IV for severe pain.  Gastroenterology consulted and Dr. Russ doubts Crohn's flare due to sudden onset of symptoms and colonoscopy earlier this year with reported remission of Crohn's.  No bowel obstruction noted and likely ileus with gastroenteritis.  She was given Reglan and dicyclomine added for abdominal pain.  Per Dr. Russ no need for colonoscopy at this time as patient would not be able to tolerate prep.  Patient was cleared to advance to soft diet and follow-up with primary gastroenterology after discharge and consider outpatient colonoscopy.    She was treated for gastroenteritis presenting with nausea, vomiting, diarrhea and abdominal pain for the past 4 days.  GI PCR panel negative.  She was given IV fluids, Zofran, Compazine and Phenergan.  Patient reported Phenergan more effective for nausea.  Diet slowly advanced from clear liquid, full liquid to GI soft bland diet.  Patient reported stools loose at discharge but not watery diarrhea.    Moderate left hydronephrosis noted with normal left ureter.  This may represent chronic inflammatory process/chronic pyelonephritis.  Not noted in 2020.  Urology consulted for severe nonobstructing left renal calculi.  Discussed with Dr. Al and recommended Lasix scan to confirm no significant obstruction.  Given appearance of ureter and renal pelvis unlikely stent would be  "necessary.  Nuclear med renal scan split function equal renal function bilaterally.  Progressive uptake within the left renal collecting system until administration of Lasix suggest probable some UPJ narrowing at this site.  After Lasix T1/2 emptying time 10 minutes.  No significant obstruction on the right noted.    Potassium 3.2 and replaced.  Follow-up potassium level 3.4 and replaced 40 mEq x 2 doses.  Potassium 4.2 at discharge.    She has a history of Crohn's disease presenting with nausea, vomiting, abdominal pain and diarrhea.  GI PCR panel negative.  Gastroenterology consulted and Dr. Russ doubted Crohn's flare due to sudden onset of symptoms and colonoscopy not recommended at this time.  Patient previously on Humira and Entyvio currently on mesalamine.  Patient will follow-up with primary gastroenterology after discharge and consider colonoscopy when recovered from acute illness.    SCDs ordered for deep vein thrombosis prophylaxis.    On 8/26/2024, she is stable for discharge.  Nausea and vomiting have improved.  Diarrhea improved and reports stools are loose but not watery diarrhea.  Patient tolerating GI bland soft diet.  No antibiotics needed.  She will follow-up with her primary care provider in 1 week and follow-up with Dr. Santiago gastroenterology after discharge.      Physical Exam on Discharge:  /61 (BP Location: Right arm, Patient Position: Lying)   Pulse 59   Temp 98.7 °F (37.1 °C) (Oral)   Resp 18   Ht 157.5 cm (62\")   Wt 94.6 kg (208 lb 8 oz)   LMP 01/21/2019 (Approximate)   SpO2 95%   BMI 38.14 kg/m²   Physical Exam  Vitals and nursing note reviewed.   Constitutional:       Comments: Lying in bed.  No oxygen use.   HENT:      Head: Normocephalic and atraumatic.      Nose: No congestion.      Mouth/Throat:      Pharynx: Oropharynx is clear. No oropharyngeal exudate or posterior oropharyngeal erythema.   Eyes:      Extraocular Movements: Extraocular movements intact.      Pupils: " Pupils are equal, round, and reactive to light.   Cardiovascular:      Rate and Rhythm: Normal rate and regular rhythm.      Heart sounds: No murmur heard.  Pulmonary:      Breath sounds: No wheezing, rhonchi or rales.      Comments: No oxygen in use.  Abdominal:      General: There is no distension.      Palpations: Abdomen is soft.   Genitourinary:     Comments: Voiding.  Musculoskeletal:         General: No swelling or tenderness.      Cervical back: Normal range of motion and neck supple.   Skin:     General: Skin is warm and dry.   Neurological:      Mental Status: She is alert and oriented to person, place, and time.   Psychiatric:         Mood and Affect: Mood normal.         Behavior: Behavior normal.         Thought Content: Thought content normal.         Judgment: Judgment normal.         Condition on Discharge: Stable for discharge home    Discharge Disposition:  Home or Self Care    Discharge Medications:     Discharge Medications        New Medications        Instructions Start Date   dicyclomine 10 MG capsule  Commonly known as: BENTYL   20 mg, Oral, 2 Times Daily PRN      prochlorperazine 5 MG tablet  Commonly known as: COMPAZINE   5 mg, Oral, Every 8 Hours PRN             Continue These Medications        Instructions Start Date   clonazePAM 2 MG tablet  Commonly known as: KlonoPIN   2 mg, Oral, 3 Times Daily PRN      HYDROcodone-acetaminophen  MG per tablet  Commonly known as: NORCO   1 tablet, Oral, Every 4 Hours PRN      loperamide 2 MG capsule  Commonly known as: IMODIUM   1 capsule, Oral, 3 Times Daily PRN      mesalamine 0.375 g 24 hr capsule  Commonly known as: APRISO   0.375 mg, Oral, Daily, 4 tablets daily in the morning       omeprazole 40 MG capsule  Commonly known as: priLOSEC   Take 1 capsule by mouth 2 (Two) Times a Day.      ondansetron 4 MG tablet  Commonly known as: ZOFRAN   Take 1 tablet by mouth Every 8 (Eight) Hours As Needed for Nausea or Vomiting.      propranolol 80 MG  tablet  Commonly known as: INDERAL   80 mg, Oral, Daily PRN      psyllium 58.6 % packet  Commonly known as: METAMUCIL   1 packet, Oral, Daily, 1 packet mixed with liquid every morning 30 mins before food      tiZANidine 4 MG tablet  Commonly known as: ZANAFLEX   Take 1 tablet by mouth Daily As Needed for Muscle Spasms.      venlafaxine XR 75 MG 24 hr capsule  Commonly known as: EFFEXOR-XR   75 mg, Oral, Daily               Discharge Diet:   Diet Instructions       Diet: Gastrointestinal Diets; Low Irritant; Regular (IDDSI 7); Thin (IDDSI 0)      Discharge Diet: Gastrointestinal Diets    Gastrointestinal Diet: Low Irritant    Texture: Regular (IDDSI 7)    Fluid Consistency: Thin (IDDSI 0)            Activity at Discharge:   Activity Instructions       Activity as Tolerated              Discharge instructions:  1.  For worsening, returning nausea, vomiting, diarrhea seek medical attention.  2.  Follow-up with Dr. Santiago, consider colonoscopy when coverage from acute illness.  9/17/2024  3.  Follow-up with GREYSON Thompson 1 week, 8/27/2024  4.  [place] diet    Follow-up Appointments:   GREYSON Thompson 1 week, 8/27/2024  Dr. Santiago 2 to 3 weeks, 9/17/2024      Test Results Pending at Discharge: None    Electronically signed by GREYSON Salter, 08/26/24, 08:00 CDT.    Time: 35 minutes.  Discussed with Dr. Howard and patient.    The above documentation resulted from a face-to-face encounter by me Jen RUBI, [facility].

## 2024-08-25 NOTE — PLAN OF CARE
Goal Outcome Evaluation:  Plan of Care Reviewed With: patient        Progress: improving  Outcome Evaluation: Pt A&O X4. C/o pain and nausea; see mar. GI fiber diet tolerated for dinner hopeful to d/c tomorrow. IVF and abx infusing per orders. Voiding per BRP. Up ad gregory. VSS safety maintained.

## 2024-08-25 NOTE — PROGRESS NOTES
Urology  Length of Stay: 2  Patient Care Team:  Sol Lopez APRN as PCP - General (Family Medicine)  Romero Ellison MD as Consulting Physician (Urology)  Obey Garcia MD as Consulting Physician (Ophthalmology)  Ankur Izaguirre MD as Consulting Physician (Physical Medicine and Rehabilitation)  Curtis Beasley DO as Consulting Physician (Pain Medicine)  No Mcqueen APRN as Referring Physician (Certified Clinical Nurse Specialist)  Claude Zamora MD as Cardiologist (Cardiology)  Ean Treviño APRN as Nurse Practitioner (Nurse Practitioner)    Chief Complaint:  Abdominal pain    Subjective     Interval History:   Pain stable. No hematuria, dysuria, frequency or urgency.    Review of Systems:   Review of Systems   All other systems reviewed and are negative.      Objective       Intake/Output Summary (Last 24 hours) at 8/25/2024 0923  Last data filed at 8/24/2024 1314  Gross per 24 hour   Intake 240 ml   Output --   Net 240 ml       Vital Signs  Temp:  [98.2 °F (36.8 °C)-98.7 °F (37.1 °C)] 98.6 °F (37 °C)  Heart Rate:  [68-89] 81  Resp:  [18] 18  BP: ()/(51-67) 114/63    Physical Exam:  Physical Exam  Constitutional:       Appearance: Normal appearance.   HENT:      Head: Normocephalic and atraumatic.   Pulmonary:      Effort: Pulmonary effort is normal.   Neurological:      General: No focal deficit present.      Mental Status: She is alert.          Results Review:       I reviewed the patient's new clinical results.  Lab Results (last 24 hours)       Procedure Component Value Units Date/Time    Comprehensive Metabolic Panel [[id number]]  (Abnormal) Collected: 08/25/24 0612    Specimen: Blood Updated: 08/25/24 0640     Glucose 102 mg/dL      BUN 8 mg/dL      Creatinine 0.62 mg/dL      Sodium 142 mmol/L      Potassium 3.4 mmol/L      Chloride 110 mmol/L      CO2 25.0 mmol/L      Calcium 8.2 mg/dL      Total Protein 5.9 g/dL      Albumin 3.6 g/dL      ALT (SGPT) 26 U/L       AST (SGOT) 22 U/L      Alkaline Phosphatase 66 U/L      Total Bilirubin 0.3 mg/dL      Globulin 2.3 gm/dL      A/G Ratio 1.6 g/dL      BUN/Creatinine Ratio 12.9     Anion Gap 7.0 mmol/L      eGFR 121.5 mL/min/1.73     Narrative:      GFR Normal >60  Chronic Kidney Disease <60  Kidney Failure <15      Magnesium [[id number]]  (Normal) Collected: 08/25/24 0612    Specimen: Blood Updated: 08/25/24 0640     Magnesium 1.8 mg/dL     CBC & Differential [[id number]]  (Abnormal) Collected: 08/25/24 0612    Specimen: Blood Updated: 08/25/24 0621    Narrative:      The following orders were created for panel order CBC & Differential.  Procedure                               Abnormality         Status                     ---------                               -----------         ------                     CBC Auto Differential[[id number]]        Abnormal            Final result                 Please view results for these tests on the individual orders.    CBC Auto Differential [[id number]]  (Abnormal) Collected: 08/25/24 0612    Specimen: Blood Updated: 08/25/24 0621     WBC 7.37 10*3/mm3      RBC 3.79 10*6/mm3      Hemoglobin 11.8 g/dL      Hematocrit 34.5 %      MCV 91.0 fL      MCH 31.1 pg      MCHC 34.2 g/dL      RDW 12.5 %      RDW-SD 41.1 fl      MPV 9.9 fL      Platelets 272 10*3/mm3      Neutrophil % 54.0 %      Lymphocyte % 35.3 %      Monocyte % 6.0 %      Eosinophil % 4.1 %      Basophil % 0.3 %      Immature Grans % 0.3 %      Neutrophils, Absolute 3.99 10*3/mm3      Lymphocytes, Absolute 2.60 10*3/mm3      Monocytes, Absolute 0.44 10*3/mm3      Eosinophils, Absolute 0.30 10*3/mm3      Basophils, Absolute 0.02 10*3/mm3      Immature Grans, Absolute 0.02 10*3/mm3      nRBC 0.0 /100 WBC           Imaging Results (Last 24 Hours)       Procedure Component Value Units Date/Time    NM Renal With Flow & Function With Pharmacological Intervention [[id number]] Collected: 08/24/24 1435     Updated: 08/24/24 1451    Narrative:       EXAMINATION:  NM RENAL W FLOW AND FUNCTION W PHARMACOLOGICAL  INTERVENTION-  8/24/2024 12:11 PM     HISTORY: Mild left hydronephrosis without obvious obstruction to be done  routine Saturday days 8/24/2024 per radiology; K56.7-Ileus, unspecified.     COMPARISON: No comparison study.     TECHNIQUE: The patient was injected with 10.3 uCi of technetium 99 M  MAG3 intravenously. The patient was given 40 mg of Lasix IV at 20  minutes.     FINDINGS: The vascular phase is normal and symmetric bilaterally. The  time to peak activity on the right side is a little over 5 minutes.  There is progressive increase in activity within the left kidney with  the time to peak activity at about 20 minutes. The split function  analysis is approximately 49% of the function by the left kidney and 51  by the right kidney. The right kidney is excreting contrast from 5  minutes to 20 minutes. After the administration of Lasix, there is more  accelerated emptying of the collecting systems bilaterally. The T1/2  emptying time on the left side is about 10 minutes. The T1/2 emptying  time on the right side is around 4-5 minutes. Therefore, no significant  obstructive changes seen.          Impression:      1. Vascular phase is normal.  2. Split function analysis demonstrates approximately equal renal  function bilaterally.  3. There is progressive uptake within the left renal collecting system  until the administration of Lasix. This suggest that there probably is  some UPJ narrowing on this side. However, after Lasix administration,  the T1/2 emptying time is about 10 minutes. 20 minutes or greater is  considered significant.  4. No significant obstruction on the right side.     This report was signed and finalized on 8/24/2024 2:48 PM by Dr. Leonardo Adams MD.               Medication Review:     Current Facility-Administered Medications:     acetaminophen (TYLENOL) tablet 650 mg, 650 mg, Oral, Q4H PRN **OR** acetaminophen (TYLENOL) 160  MG/5ML oral solution 650 mg, 650 mg, Oral, Q4H PRN **OR** acetaminophen (TYLENOL) suppository 650 mg, 650 mg, Rectal, Q4H PRN, Jen Vergara APRN    cefTRIAXone (ROCEPHIN) 1,000 mg in sodium chloride 0.9 % 100 mL MBP, 1,000 mg, Intravenous, Q24H, Jen Vergara APRN, Last Rate: 200 mL/hr at 08/24/24 1616, 1,000 mg at 08/24/24 1616    clonazePAM (KlonoPIN) tablet 2 mg, 2 mg, Oral, TID PRN, Jen Vergara APRN, 2 mg at 08/25/24 0826    dicyclomine (BENTYL) capsule 20 mg, 20 mg, Oral, BID PRN, Raffaele Russ MD, 20 mg at 08/24/24 1412    HYDROcodone-acetaminophen (NORCO)  MG per tablet 1 tablet, 1 tablet, Oral, Q4H PRN, Jen Vergara APRN, 1 tablet at 08/25/24 0827    HYDROmorphone (DILAUDID) injection 1 mg, 1 mg, Intravenous, Q4H PRN, 1 mg at 08/25/24 0631 **AND** naloxone (NARCAN) injection 0.4 mg, 0.4 mg, Intravenous, Q5 Min PRN, Jen Vergara APRN    ondansetron (ZOFRAN) injection 4 mg, 4 mg, Intravenous, Q6H PRN, Jen Vergara APRN, 4 mg at 08/24/24 1248    pantoprazole (PROTONIX) injection 40 mg, 40 mg, Intravenous, Q12H, Jen Vergara APRN, 40 mg at 08/25/24 0817    prochlorperazine (COMPAZINE) injection 5 mg, 5 mg, Intravenous, Q6H PRN, Jen Vergara APRN, 5 mg at 08/25/24 0852    promethazine (PHENERGAN) 12.5 mg in sodium chloride 0.9 % 50 mL, 12.5 mg, Intravenous, Q6H PRN, Jen Vergara APRN, 12.5 mg at 08/25/24 0631    [COMPLETED] Insert Peripheral IV, , , Once **AND** sodium chloride 0.9 % flush 10 mL, 10 mL, Intravenous, PRN, Monika Pizarro APRN    sodium chloride 0.9 % flush 10 mL, 10 mL, Intravenous, Q12H, Jen Vergara APRN, 10 mL at 08/25/24 0818    sodium chloride 0.9 % flush 10 mL, 10 mL, Intravenous, PRN, Jen Vergara APRN    sodium chloride 0.9 % infusion 40 mL, 40 mL, Intravenous, PRN, Jen Vergara APRN    sodium chloride 0.9 % with KCl 20 mEq/L infusion, 100 mL/hr, Intravenous, Continuous, Jen Vergara APRN, Last  Rate: 100 mL/hr at 08/25/24 0055, 100 mL/hr at 08/25/24 0055    tiZANidine (ZANAFLEX) tablet 4 mg, 4 mg, Oral, Q12H PRN, Osmani Beaulieu MD    venlafaxine XR (EFFEXOR-XR) 24 hr capsule 75 mg, 75 mg, Oral, Daily, Jen Vergara APRN, 75 mg at 08/25/24 0817    Problem List:    Ileus    Hypokalemia due to nausea, vomiting, diarrhea    Hydronephrosis of left kidney    Nonobstructing left renal calculi    Gastroenteritis     Assessment/Plan:   Mild left hydronephrosis without evidence of obstruction. Likely recently passed left urteral stone, but not currently the source of her abdominal pain. Follow up as outpatient with her regular urologist.      (Please note that portions of this note were completed with a voice recognition program.)  Jeffry Al MD  08/25/24  09:23 CDT

## 2024-08-25 NOTE — PROGRESS NOTES
[facility] Medicine Services  INPATIENT PROGRESS NOTE    Length of Stay: 2  Date of Admission: 8/23/2024  Primary Care Physician: Sol Lopez APRN    Subjective   Chief Complaint: Nausea, vomiting, diarrhea, abdominal pain    HPI     Kelsey Mcqueen is a 33-year-old  female with past medical history significant for kidney stones, depression, and reported Crohn's disease.  She presented to [facility] emergency room 8/23/2024 with complaints of nausea, vomiting and abdominal pain.  Patient reported last week she had back pain and cramping down to the pelvic area and felt as though she passed a kidney stone as she has passed multiple kidney stones in the past.  She reported left flank pain and abdominal pain consistent with kidney stone.  She was prescribed Macrobid and Pyridium by primary care provider and completed treatment.  On 8/19 patient began experiencing nausea, vomiting, abdominal pain, diarrhea and she was unable to keep down oral intake despite taking Zofran.  Patient reported losing 10 pounds the past week.  She described decreased appetite, abdominal pain associated as sharp, knot-like and twisting.  She reported vomiting bile content.  She developed nonbloody diarrhea. Patient reported being concerned regarding possible Crohn's flare.  She is followed by Dr. Santiago gastroenterology in [place].  She had been on Humira and Entyvio in the past but these were stopped and was treated with mesalamine no longer taking.  Last colonoscopy earlier this year and was told Crohn's disease in remission.     Lab potassium 3.2, WBC 12.94, lipase 52.  Urinalysis trace protein.  CT abdomen showed moderate left hydronephrosis with normal left ureter without evidence of ureteral calculi.  May represent chronic inflammatory process/chronic pyelonephritis.  This was not noted previous study 2020.  Several nonobstructing left renal calculi.  Normal appendix.   Fluid-filled nondistended nondilated small bowel loops and proximal large bowel may represent ileus.  No findings to suggest obstruction.  Ventriculoperitoneal shunt.  Normal saline fluid bolus, Reglan, Dilaudid given in ER.     Today  Lying in bed.  She feels better today but still has intermittent nausea.  No vomiting.  She indicated the Phenergan worked better than Compazine or Zofran.  She tolerated clear liquids yesterday.  Will increase to full liquid today.  Still complains of some lower abdominal discomfort and loose stools.  No fever.  GI following and okay to advance to soft diet tonight or in the morning.  No plans for EGD. Follow-up with primary GI doctor.  Doubt Crohn's flare.    Review of Systems   Constitutional:  Positive for appetite change and fatigue.   HENT:  Negative for congestion and trouble swallowing.    Eyes:  Negative for photophobia and visual disturbance.   Respiratory:  Negative for shortness of breath and wheezing.    Cardiovascular:  Negative for chest pain, palpitations and leg swelling.   Gastrointestinal:  Positive for abdominal pain (Improving), diarrhea (Improved) and nausea.   Endocrine: Negative for cold intolerance, heat intolerance and polyuria.   Genitourinary:  Negative for dysuria, frequency and urgency.   Musculoskeletal:  Negative for gait problem.   Skin:  Negative for color change, pallor, rash and wound.   Allergic/Immunologic: Negative for immunocompromised state.   Neurological:  Positive for weakness. Negative for light-headedness.   Hematological:  Negative for adenopathy. Does not bruise/bleed easily.   Psychiatric/Behavioral:  Negative for agitation, behavioral problems and confusion.           All pertinent negatives and positives are as above. All other systems have been reviewed and are negative unless otherwise stated.     Objective    Temp:  [98.2 °F (36.8 °C)-98.6 °F (37 °C)] 98.6 °F (37 °C)  Heart Rate:  [68-89] 72  Resp:  [16-18] 16  BP: ()/(51-67)  110/55    Physical Exam  Vitals and nursing note reviewed.   Constitutional:       Appearance: She is obese.      Comments: Sitting up in bed.  No oxygen use.  Friend in room.   HENT:      Head: Normocephalic and atraumatic.      Mouth/Throat:      Pharynx: No oropharyngeal exudate or posterior oropharyngeal erythema.   Eyes:      Extraocular Movements: Extraocular movements intact.      Pupils: Pupils are equal, round, and reactive to light.   Cardiovascular:      Rate and Rhythm: Normal rate and regular rhythm.      Heart sounds: No murmur heard.  Pulmonary:      Breath sounds: No wheezing, rhonchi or rales.      Comments: No oxygen use.  Abdominal:      General: There is no distension.      Palpations: Abdomen is soft.      Tenderness: There is abdominal tenderness (Improved).   Genitourinary:     Comments: Voiding.  Musculoskeletal:         General: No swelling or tenderness.      Cervical back: Normal range of motion and neck supple.   Skin:     General: Skin is warm and dry.   Neurological:      General: No focal deficit present.      Mental Status: She is alert and oriented to person, place, and time.   Psychiatric:         Mood and Affect: Mood normal.         Behavior: Behavior normal.         Thought Content: Thought content normal.         Judgment: Judgment normal.           Results Review:  I have reviewed the labs, radiology results, and diagnostic studies.    Laboratory Data:      Results from last 7 days   Lab Units 08/25/24  0612 08/24/24  0425 08/23/24 1927 08/23/24  1337   WBC 10*3/mm3 7.37 10.59 13.58* 12.94*   HEMOGLOBIN g/dL 11.8* 11.6* 12.8 13.8   HEMATOCRIT % 34.5 34.1 37.8 39.9   PLATELETS 10*3/mm3 272 272 318 337        Results from last 7 days   Lab Units 08/25/24  0612 08/24/24  0425 08/23/24  1337   SODIUM mmol/L 142 141 141   POTASSIUM mmol/L 3.4* 3.5 3.2*   CHLORIDE mmol/L 110* 109* 106   CO2 mmol/L 25.0 22.0 21.0*   BUN mg/dL 8 10 11   CREATININE mg/dL 0.62 0.61 0.62   GLUCOSE mg/dL  102* 97 106*   CALCIUM mg/dL 8.2* 8.1* 9.5   ALT (SGPT) U/L 26 23 13         Culture Data:      Microbiology Results (last 10 days)       Procedure Component Value - Date/Time    Gastrointestinal Panel, PCR - Stool, Per Rectum [[id number]]  (Normal) Collected: 08/24/24 0449    Lab Status: Final result Specimen: Stool from Per Rectum Updated: 08/24/24 0611     Campylobacter Not Detected     Plesiomonas shigelloides Not Detected     Salmonella Not Detected     Vibrio Not Detected     Vibrio cholerae Not Detected     Yersinia enterocolitica Not Detected     Enteroaggregative E. coli (EAEC) Not Detected     Enteropathogenic E. coli (EPEC) Not Detected     Enterotoxigenic E. coli (ETEC) lt/st Not Detected     Shiga-like toxin-producing E. coli (STEC) stx1/stx2 Not Detected     Shigella/Enteroinvasive E. coli (EIEC) Not Detected     Cryptosporidium Not Detected     Cyclospora cayetanensis Not Detected     Entamoeba histolytica Not Detected     Giardia lamblia Not Detected     Adenovirus F40/41 Not Detected     Astrovirus Not Detected     Norovirus GI/GII Not Detected     Rotavirus A Not Detected     Sapovirus (I, II, IV or V) Not Detected    Narrative:      If Aeromonas, Staphylococcus aureus or Bacillus cereus are suspected, please order [id number]: Stool Culture, Aeromonas, S aureus, B Cereus.    COVID-19 and FLU A/B PCR, 1 HR TAT - Swab, Nasopharynx [[id number]]  (Normal) Collected: 08/23/24 1339    Lab Status: Final result Specimen: Swab from Nasopharynx Updated: 08/23/24 1416     COVID19 Not Detected     Influenza A PCR Not Detected     Influenza B PCR Not Detected    Narrative:      Fact sheet for providers: https://www.fda.gov/media/826747/download    Fact sheet for patients: https://www.fda.gov/media/186290/download    Test performed by PCR.              Radiology Data:   Imaging Results (Last 72 Hours)       Procedure Component Value Units Date/Time    NM Renal With Flow & Function With Pharmacological Intervention  [[id number]] Collected: 08/24/24 1435     Updated: 08/24/24 1451    Narrative:      EXAMINATION:  NM RENAL W FLOW AND FUNCTION W PHARMACOLOGICAL  INTERVENTION-  8/24/2024 12:11 PM     HISTORY: Mild left hydronephrosis without obvious obstruction to be done  routine Saturday days 8/24/2024 per radiology; K56.7-Ileus, unspecified.     COMPARISON: No comparison study.     TECHNIQUE: The patient was injected with 10.3 uCi of technetium 99 M  MAG3 intravenously. The patient was given 40 mg of Lasix IV at 20  minutes.     FINDINGS: The vascular phase is normal and symmetric bilaterally. The  time to peak activity on the right side is a little over 5 minutes.  There is progressive increase in activity within the left kidney with  the time to peak activity at about 20 minutes. The split function  analysis is approximately 49% of the function by the left kidney and 51  by the right kidney. The right kidney is excreting contrast from 5  minutes to 20 minutes. After the administration of Lasix, there is more  accelerated emptying of the collecting systems bilaterally. The T1/2  emptying time on the left side is about 10 minutes. The T1/2 emptying  time on the right side is around 4-5 minutes. Therefore, no significant  obstructive changes seen.          Impression:      1. Vascular phase is normal.  2. Split function analysis demonstrates approximately equal renal  function bilaterally.  3. There is progressive uptake within the left renal collecting system  until the administration of Lasix. This suggest that there probably is  some UPJ narrowing on this side. However, after Lasix administration,  the T1/2 emptying time is about 10 minutes. 20 minutes or greater is  considered significant.  4. No significant obstruction on the right side.     This report was signed and finalized on 8/24/2024 2:48 PM by Dr. Leonardo Adams MD.       CT Abdomen Pelvis With Contrast [[id number]] Collected: 08/23/24 1436     Updated: 08/23/24 4896     Narrative:      EXAMINATION: CT ABDOMEN PELVIS W CONTRAST-      8/23/2024 1:20 PM     HISTORY: abdominal pain; llq/left flank; hx of crohn's and kidney stones     In order to have a CT radiation dose as low as reasonably achievable  Automated Exposure Control was utilized for adjustment of the mA and/or  KV according to patient size.     Total DLP = 547.91 mGy.cm     CT scan of the abdomen and pelvis is performed after intravenous  contrast enhancement.     Images are acquired in axial plane and subsequent reconstruction in  coronal and sagittal planes.     The comparison is made with the previous study dated 3/16/2020.     The lung bases included in the study are unremarkable.     A ventriculoperitoneal shunt is seen in place similar to the previous  study. It enters the right upper abdomen adjacent and anterior to the  segment 4B of the liver and distal end of the catheter is in the right  midabdomen below the liver margin.     Limited visualized cardiomediastinal structures are unremarkable.  Significant artifacts are produced by a catheter in the distal SVC.  There is mild cardiomegaly.     The liver and spleen are normal.     The gallbladder is surgically absent. There is no significant dilatation  of common bile duct.     The pancreas is normal.     The adrenal glands bilaterally are normal.     There is moderate lobulation of renal contour bilaterally more severe on  the left side. The tiny low-density nodule in the right kidney was not  noted in the previous study. These are too small to be further  characterized. There is significant irregularity of the left renal  cortex suggesting chronic renal cortical scarring. No discrete mass.  There are several small radiopaque calculi in the left kidney the  largest one in the lower pole measuring 5 mm in diameter. There is  evidence of left hydronephrosis. The left ureter is not dilated. No  calculi in the left ureter. The right collecting system is normal.  The  urinary bladder is poorly distended. No intrinsic abnormality or  calculus.     The stomach is decompressed. No focal abnormality. Duodenum is minimally  dilated with fluid. Fluid-filled nondistended and nondilated small bowel  loops are seen. Maximum lumen diameter of the distal ileum is 2.2 cm. No  zone of transition. Normal retrocecal appendix is seen. Moderate gas  fluid and stool are seen in the colon. No finding to suggest  obstruction. Moderate thickening of the wall of the distal/descending  and sigmoid colon is probably due to incomplete distention. No  surrounding peritoneal infiltration are stranding to suggest  inflammatory/infectious process.     There is trace free fluid in the pelvis.     There is a tiny fat-containing umbilical hernia.     Normal abdominal aorta and iliac arteries.     There is no evidence of abdominal or pelvic lymphadenopathy.     Images reviewed in bone window show no acute bony abnormality.    Impression:      1. A moderate left hydronephrosis with a normal left ureter without  evidence of ureteral calculi. This may represent chronic inflammatory  process/chronic pyelonephritis. This was not noted in the previous study  in 2020.  2. Several nonobstructing left renal calculi.  3. Normal appendix.  4. Fluid-filled nondistended nondilated small bowel loops and proximal  large bowel may represent ileus. No finding to suggest obstruction.  5. Ventriculoperitoneal shunt in place. A trace free fluid in the pelvis  may be due to ventriculoperitoneal shunt.                       This report was signed and finalized on 8/23/2024 2:51 PM by Dr. Glenys Yancey MD.               Intake/Output    Intake/Output Summary (Last 24 hours) at 8/25/2024 1220  Last data filed at 8/24/2024 1314  Gross per 24 hour   Intake 240 ml   Output --   Net 240 ml       Scheduled Meds  cefTRIAXone, 1,000 mg, Intravenous, Q24H  pantoprazole, 40 mg, Intravenous, Q12H  sodium chloride, 10 mL, Intravenous,  Q12H  venlafaxine XR, 75 mg, Oral, Daily        I have reviewed the patient current medications.     Assessment/Plan     Active Hospital Problems    Diagnosis     **Ileus     Hypokalemia due to nausea, vomiting, diarrhea     Hydronephrosis of left kidney     Nonobstructing left renal calculi     Gastroenteritis       Treatment Plan     1.  Ileus.  Patient presented to ER 8/23 with nausea, vomiting, diarrhea, abdominal pain.  1 week ago she noted renal colic and has passed numerous kidney stones in the past.  She experienced left flank pain, abdominal pain consistent with additional kidney stone.  On 8/18 she developed nausea with vomiting and unable to keep down any liquid.  Patient reported subjective fever.  There was concern for Crohn's flare.  Patient is followed by Dr. Pamela MYRICK in [place].  CT abdomen shows fluid-filled nondistended nondilated small bowel loops and proximal large bowel loops represents ileus.  GI PCR panel negative.  Clear liquid started yesterday.  Advance to full liquids today.  Continue IV fluids, IV Protonix, Rocephin IV     Zofran for nausea, Dilaudid IV for severe pain, Tylenol for mild pain.  Repeat CBC, CMP in AM.  Rocephin IV every 24 hours. Gastroenterology consulted and Dr. Russ doubts Crohn's flare due with sudden onset of symptoms and colonoscopy earlier this year reported remission of Crohn's.  No bowel obstruction.  Likely ileus with gastroenteritis.  Reglan given and dicyclomine added for abdominal pain.  No need for colonoscopy and would not be able to tolerate prep.  GI okay to advance to soft diet later today in the morning follow-up with primary GI after discharge and consider outpatient colonoscopy.    2.  Gastroenteritis.  Presented with nausea, vomiting, diarrhea and abdominal pain for the past 4 days.  GI PCR panel negative.  Continue IV fluids.  Full liquid diet.  Use Zofran, Compazine and Phenergan.  Phenergan more effective for nausea she reported    3.  Moderate  left hydronephrosis with normal left ureter.  May represent chronic inflammatory process/chronic pyelonephritis.  Not present in 2020.  Urology consulted.     4.  Severe nonobstructing left renal calculi.  Urology consulted and discussed with Dr. Al today who recommended nuclear med Lasix scan to confirm no significant obstruction.  Given appearance of ureter and renal pelvis unlikely stent would be necessary.  Nuclear med renal scan splint function equal renal function bilaterally.  Progressive uptake within the left renal collecting system until administration of Lasix suggest probably some UPJ narrowing on the side.  After Lasix T1/2 emptying time 10 minutes.  No significant obstruction on the right.     5.  Hypokalemia.  Potassium 3.2.  Place potassium 40 mEq IV x 4 doses.  Potassium 3.4.  Replaced potassium milliequivalents x 1 dose.  Repeat CMP in AM.     6.  History of Crohn's disease.  Presented with nausea, vomiting, abdominal pain, diarrhea.  PCR panel negative.  GI consulted and doubts Crohn's flare due to sudden onset of symptoms.  No need for colonoscopy.  Follow-up with primary gastroenterologist after discharge. She was previously on Humira and Entyvio, currently on Apriso.     7.  SCDs for deep vein thrombosis prophylaxis.      Medical Decision Making  Number and Complexity of problems: 6  Ileus: Acute, high complexity poses threat to life and bodily function, improving  Gastroenteritis: Acute, high complexity poses threat to life and bodily function, improving  Moderate left hydronephrosis: Chronic, moderate complexity, stable  Severe nonobstructing left renal calculi: Acute/chronic, moderate complexity hypokalemia: Acute, high complexity, stable  Crohn's disease: Chronic, high complexity, stable  Hypokalemia: Acute, moderate complexity, stable     Differential Diagnosis: None     Conditions and Status        Condition is improving     [facility] Data  External documents reviewed: Epic, care  everywhere  Cardiac tracing (EKG, telemetry) interpretation: None  Radiology interpretation: Reviewed radiology interpretation CT abdomen  Labs reviewed:   CMP 8/25/2024.  Repeat CMP in AM.  CBC 8/25/2024.    Urinalysis  GI PCR panel negative    Decision rules/scores evaluated (example [id number], [place], etc): None     Discussed with: Dr. Llamas and patient     Care Planning  Shared decision making: Dr. Llamas, Dr. Al urology and patient.  Patient agrees to IV fluids, GI consult, advance to full liquid diet and soft diet if tolerates.  Agree to nuclear med Sharron scan.  Continue Compazine.  Code status and discussions: Full code  Patient surrogate decision maker is her mother, [place]    Disposition  Social Determinants of Health that impact treatment or disposition: None  I expect the patient to be discharged to home in 1 day.     Electronically signed by GREYSON Salter, 08/25/24, 12:20 CDT.    The above documentation resulted from a face-to-face encounter by me Jen RUBI, [facility].

## 2024-08-25 NOTE — PLAN OF CARE
Goal Outcome Evaluation:  Plan of Care Reviewed With: patient      Progress: no change     Pt A&O x4. C/o pain this shift; see MAR. Intermittent nausea; see MAR. IV fluids infusing per order. Up ad gregory. SCD's. L port accessed; clean, dry, and intact. Clear liquid diet. VSS. Safety maintained.

## 2024-08-25 NOTE — PROGRESS NOTES
[facility] Gastroenterology  Inpatient Progress Note  Today's date:  08/25/24    Kelsey Mcqueen  1990       Reason for Follow Up:   Nausea and Vomiting  Diarrhea  History of Crohn's disease    Subjective:   Feeling better. Still with nausea but no vomiting. Tolerated liquids. Has some lower abdominal discomfort. Still complains of loose stools. No fever.      Current Facility-Administered Medications:     acetaminophen (TYLENOL) tablet 650 mg, 650 mg, Oral, Q4H PRN **OR** acetaminophen (TYLENOL) 160 MG/5ML oral solution 650 mg, 650 mg, Oral, Q4H PRN **OR** acetaminophen (TYLENOL) suppository 650 mg, 650 mg, Rectal, Q4H PRN, Jen Vergara APRN    cefTRIAXone (ROCEPHIN) 1,000 mg in sodium chloride 0.9 % 100 mL MBP, 1,000 mg, Intravenous, Q24H, Jen Vergara APRN, Last Rate: 200 mL/hr at 08/24/24 1616, 1,000 mg at 08/24/24 1616    clonazePAM (KlonoPIN) tablet 2 mg, 2 mg, Oral, TID PRN, Jen Vergara APRN, 2 mg at 08/25/24 0826    dicyclomine (BENTYL) capsule 20 mg, 20 mg, Oral, BID PRN, Raffaele Russ MD, 20 mg at 08/24/24 1412    HYDROcodone-acetaminophen (NORCO)  MG per tablet 1 tablet, 1 tablet, Oral, Q4H PRN, Jen Vergara APRN, 1 tablet at 08/25/24 0827    HYDROmorphone (DILAUDID) injection 1 mg, 1 mg, Intravenous, Q4H PRN, 1 mg at 08/25/24 0631 **AND** naloxone (NARCAN) injection 0.4 mg, 0.4 mg, Intravenous, Q5 Min PRN, Jen Vergara APRN    ondansetron (ZOFRAN) injection 4 mg, 4 mg, Intravenous, Q6H PRN, Jen Vergara APRN, 4 mg at 08/24/24 1248    pantoprazole (PROTONIX) injection 40 mg, 40 mg, Intravenous, Q12H, Jen Vergara APRN, 40 mg at 08/25/24 0817    prochlorperazine (COMPAZINE) injection 5 mg, 5 mg, Intravenous, Q6H PRN, Jen Vergara APRN, 5 mg at 08/25/24 0852    promethazine (PHENERGAN) 12.5 mg in sodium chloride 0.9 % 50 mL, 12.5 mg, Intravenous, Q6H PRN, Jen Vergara APRN, 12.5 mg at 08/25/24 0631    [COMPLETED] Insert  Peripheral IV, , , Once **AND** sodium chloride 0.9 % flush 10 mL, 10 mL, Intravenous, PRN, Monika Pizarro APRN    sodium chloride 0.9 % flush 10 mL, 10 mL, Intravenous, Q12H, Jen Vergara APRN, 10 mL at 08/25/24 0818    sodium chloride 0.9 % flush 10 mL, 10 mL, Intravenous, PRN, Jen Vergara APRN    sodium chloride 0.9 % infusion 40 mL, 40 mL, Intravenous, PRN, Jen Vergara APRN    sodium chloride 0.9 % with KCl 20 mEq/L infusion, 100 mL/hr, Intravenous, Continuous, Jen Vergara APRN, Last Rate: 100 mL/hr at 08/25/24 0055, 100 mL/hr at 08/25/24 0055    tiZANidine (ZANAFLEX) tablet 4 mg, 4 mg, Oral, Q12H PRN, Osmani Beaulieu MD    venlafaxine XR (EFFEXOR-XR) 24 hr capsule 75 mg, 75 mg, Oral, Daily, Jen Vergara APRN, 75 mg at 08/25/24 0817      Vital Signs:  Temp:  [98.2 °F (36.8 °C)-98.7 °F (37.1 °C)] 98.6 °F (37 °C)  Heart Rate:  [68-89] 81  Resp:  [18] 18  BP: ()/(51-67) 114/63    Physical Exam:  General: no acute distress, alert and oriented  HEENT: perrl, no sclera icterus  CV: rrr, no murmur  Resp: lungs clear to auscultation, no wheeze or rhonchi  Abd: soft, nontender, nondistended, no rebound our guarding  Skin: no rash, no jaundice     Results Review:   I have reviewed all of the patient's current test results    Results from last 7 days   Lab Units 08/25/24  0612 08/24/24  0425 08/23/24  1927   WBC 10*3/mm3 7.37 10.59 13.58*   HEMOGLOBIN g/dL 11.8* 11.6* 12.8   HEMATOCRIT % 34.5 34.1 37.8   PLATELETS 10*3/mm3 272 272 318       Results from last 7 days   Lab Units 08/25/24  0612 08/24/24  0425 08/23/24  1337   SODIUM mmol/L 142 141 141   POTASSIUM mmol/L 3.4* 3.5 3.2*   CHLORIDE mmol/L 110* 109* 106   CO2 mmol/L 25.0 22.0 21.0*   BUN mg/dL 8 10 11   CREATININE mg/dL 0.62 0.61 0.62   CALCIUM mg/dL 8.2* 8.1* 9.5   BILIRUBIN mg/dL 0.3 0.3 0.5   ALK PHOS U/L 66 64 76   ALT (SGPT) U/L 26 23 13   AST (SGOT) U/L 22 26 16   GLUCOSE mg/dL 102* 97 106*             Lab  Results   Lab Value Date/Time    LIPASE 52 08/23/2024 1337    LIPASE 39 12/18/2019 2227    LIPASE 17 04/07/2018 1350    LIPASE 138 02/06/2017 1834    LIPASE 36 09/01/2014 1340    LIPASE 106 03/24/2014 1005           Impression:  Acute onset nausea, vomiting, diarrhea. With sudden onset of her symptoms and CT findings, this could be acute infectious gastroenteritis. Her stool culture is negative. Does not appear to have bowel obstruction, but could have mild ileus along with infectious gastroenteritis or kidney stones. Overall, seems to be slowly improving.  History of Crohn's disease. Currently on Apriso. Previously on Humira and Enytvio.   Recurrent kidney stones. CT scan with small stones in the left kidney and left hydronephrosis, but no obvious stone in the left ureter.    Plan:  Tolerating liquid diet. If she tolerates the liquids today, ok to advance to soft diet later this afternoon or evening. Continue antiemetics as needed. No plan for EGD or colonoscopy at this time. However, I think that she will need follow up with her primary gastroenterologist after discharge. Can consider outpatient colonoscopy at that time to further assess her Crohn's disease.    Raffaele Russ MD  08/25/24  09:32 CDT

## 2024-08-26 VITALS
HEIGHT: 62 IN | BODY MASS INDEX: 38.37 KG/M2 | RESPIRATION RATE: 18 BRPM | OXYGEN SATURATION: 95 % | HEART RATE: 59 BPM | DIASTOLIC BLOOD PRESSURE: 61 MMHG | SYSTOLIC BLOOD PRESSURE: 100 MMHG | WEIGHT: 208.5 LBS | TEMPERATURE: 98.7 F

## 2024-08-26 LAB
ALBUMIN SERPL-MCNC: 3.7 G/DL (ref 3.5–5.2)
ALBUMIN/GLOB SERPL: 1.6 G/DL
ALP SERPL-CCNC: 66 U/L (ref 39–117)
ALT SERPL W P-5'-P-CCNC: 27 U/L (ref 1–33)
ANION GAP SERPL CALCULATED.3IONS-SCNC: 8 MMOL/L (ref 5–15)
AST SERPL-CCNC: 23 U/L (ref 1–32)
BILIRUB SERPL-MCNC: <0.2 MG/DL (ref 0–1.2)
BUN SERPL-MCNC: 9 MG/DL (ref 6–20)
BUN/CREAT SERPL: 16.4 (ref 7–25)
CALCIUM SPEC-SCNC: 9 MG/DL (ref 8.6–10.5)
CHLORIDE SERPL-SCNC: 107 MMOL/L (ref 98–107)
CO2 SERPL-SCNC: 27 MMOL/L (ref 22–29)
CREAT SERPL-MCNC: 0.55 MG/DL (ref 0.57–1)
EGFRCR SERPLBLD CKD-EPI 2021: 125.1 ML/MIN/1.73
GLOBULIN UR ELPH-MCNC: 2.3 GM/DL
GLUCOSE SERPL-MCNC: 114 MG/DL (ref 65–99)
POTASSIUM SERPL-SCNC: 4.2 MMOL/L (ref 3.5–5.2)
PROT SERPL-MCNC: 6 G/DL (ref 6–8.5)
SODIUM SERPL-SCNC: 142 MMOL/L (ref 136–145)

## 2024-08-26 PROCEDURE — 0 HYDROMORPHONE 1 MG/ML SOLUTION: Performed by: NURSE PRACTITIONER

## 2024-08-26 PROCEDURE — 25010000002 SODIUM CHLORIDE 0.9 % WITH KCL 20 MEQ 20-0.9 MEQ/L-% SOLUTION: Performed by: NURSE PRACTITIONER

## 2024-08-26 PROCEDURE — 25010000002 PROMETHAZINE PER 50 MG: Performed by: NURSE PRACTITIONER

## 2024-08-26 PROCEDURE — 80053 COMPREHEN METABOLIC PANEL: CPT | Performed by: NURSE PRACTITIONER

## 2024-08-26 PROCEDURE — 25010000002 PROCHLORPERAZINE 10 MG/2ML SOLUTION: Performed by: NURSE PRACTITIONER

## 2024-08-26 RX ORDER — SODIUM CHLORIDE 9 MG/ML
40 INJECTION, SOLUTION INTRAVENOUS AS NEEDED
Status: DISCONTINUED | OUTPATIENT
Start: 2024-08-26 | End: 2024-08-26 | Stop reason: HOSPADM

## 2024-08-26 RX ORDER — PROCHLORPERAZINE MALEATE 5 MG
5 TABLET ORAL EVERY 8 HOURS PRN
Qty: 15 TABLET | Refills: 0 | Status: SHIPPED | OUTPATIENT
Start: 2024-08-26

## 2024-08-26 RX ORDER — SODIUM CHLORIDE 0.9 % (FLUSH) 0.9 %
20 SYRINGE (ML) INJECTION AS NEEDED
Status: DISCONTINUED | OUTPATIENT
Start: 2024-08-26 | End: 2024-08-26 | Stop reason: HOSPADM

## 2024-08-26 RX ORDER — DICYCLOMINE HYDROCHLORIDE 10 MG/1
20 CAPSULE ORAL 2 TIMES DAILY PRN
Qty: 10 CAPSULE | Refills: 0 | Status: SHIPPED | OUTPATIENT
Start: 2024-08-26

## 2024-08-26 RX ORDER — SODIUM CHLORIDE 0.9 % (FLUSH) 0.9 %
10 SYRINGE (ML) INJECTION EVERY 12 HOURS SCHEDULED
Status: DISCONTINUED | OUTPATIENT
Start: 2024-08-26 | End: 2024-08-26 | Stop reason: HOSPADM

## 2024-08-26 RX ORDER — SODIUM CHLORIDE 0.9 % (FLUSH) 0.9 %
10 SYRINGE (ML) INJECTION AS NEEDED
Status: DISCONTINUED | OUTPATIENT
Start: 2024-08-26 | End: 2024-08-26 | Stop reason: HOSPADM

## 2024-08-26 RX ORDER — HEPARIN SODIUM (PORCINE) LOCK FLUSH IV SOLN 100 UNIT/ML 100 UNIT/ML
5 SOLUTION INTRAVENOUS AS NEEDED
Status: DISCONTINUED | OUTPATIENT
Start: 2024-08-26 | End: 2024-08-26 | Stop reason: HOSPADM

## 2024-08-26 RX ADMIN — HYDROMORPHONE HYDROCHLORIDE 1 MG: 1 INJECTION, SOLUTION INTRAMUSCULAR; INTRAVENOUS; SUBCUTANEOUS at 01:41

## 2024-08-26 RX ADMIN — PANTOPRAZOLE SODIUM 40 MG: 40 INJECTION, POWDER, FOR SOLUTION INTRAVENOUS at 08:18

## 2024-08-26 RX ADMIN — HYDROMORPHONE HYDROCHLORIDE 1 MG: 1 INJECTION, SOLUTION INTRAMUSCULAR; INTRAVENOUS; SUBCUTANEOUS at 05:38

## 2024-08-26 RX ADMIN — PROCHLORPERAZINE EDISYLATE 5 MG: 5 INJECTION INTRAMUSCULAR; INTRAVENOUS at 04:04

## 2024-08-26 RX ADMIN — HYDROCODONE BITARTRATE AND ACETAMINOPHEN 1 TABLET: 10; 325 TABLET ORAL at 08:18

## 2024-08-26 RX ADMIN — PROMETHAZINE HYDROCHLORIDE 12.5 MG: 25 INJECTION INTRAMUSCULAR; INTRAVENOUS at 01:40

## 2024-08-26 RX ADMIN — VENLAFAXINE HYDROCHLORIDE 75 MG: 75 CAPSULE, EXTENDED RELEASE ORAL at 08:18

## 2024-08-26 RX ADMIN — HYDROCODONE BITARTRATE AND ACETAMINOPHEN 1 TABLET: 10; 325 TABLET ORAL at 03:34

## 2024-08-26 RX ADMIN — CLONAZEPAM 2 MG: 1 TABLET ORAL at 08:29

## 2024-08-26 RX ADMIN — POTASSIUM CHLORIDE AND SODIUM CHLORIDE 50 ML/HR: 900; 150 INJECTION, SOLUTION INTRAVENOUS at 02:23

## 2024-08-26 NOTE — PLAN OF CARE
Goal Outcome Evaluation:  Plan of Care Reviewed With: patient      Progress: no change     Pt A&O x4; C/O pain and nausea this shift; see MAR. Up ad gregory. SCD's. GI soft diet. IV fluids infusing per order. L port accessed; clean, dry, and intact. VSS. Safety maintained.

## 2024-08-27 NOTE — PAYOR COMM NOTE
"REF:  [id number]     [facility]  FAX   [phone]     Johnny Cervantes (33 y.o. Female)       Date of Birth   1990    Social Security Number       Address   [address] [place] [address]    Home Phone   [phone]    MRN   [id number]       [place]   [facility]    Marital Status   Single                            Admission Date   8/23/24    Admission Type   Emergency    Admitting Provider   Babatunde Howard MD    Attending Provider       Department, Room/Bed   [facility] 3C, 362/1       Discharge Date   8/26/2024    Discharge Disposition   Home or Self Care    Discharge Destination                                 Attending Provider: (none)   Allergies: Morphine And Codeine, Other, Sumatriptan, Buspirone, Ketorolac, Ketorolac Tromethamine, Morphine, Demerol [Meperidine]    Isolation: None   Infection: None   Code Status: Prior    Ht: 157.5 cm (62\")   Wt: 94.6 kg (208 lb 8 oz)    Admission Cmt: None   Principal Problem: Ileus [K56.7]                   Active Insurance as of 8/23/2024       Primary Coverage       Payor Plan Insurance Group Employer/Plan Group    [employer] [place] [employer] [place]ICA[place]        Payor Plan Address Payor Plan Phone Number Payor Plan Fax Number Effective Dates    [address] [address] [phone]  1/8/2018 - None Entered    [facility] [id number]         Subscriber Name Subscriber Birth Date Member ID       JOHNNY CERVANTES 1990 [id number]                     Emergency Contacts        (Rel.) Home Phone Work Phone Mobile Phone    [place] (Mother) -- -- [phone]    abi cervantes (Father) -- -- [phone]    sissy handy (Significant Other) -- -- [phone]                 Discharge Summary        Jen Vergara, APRN at 08/26/24 0748                [facility]  DISCHARGE SUMMARY     Date of Admission: 8/23/2024  Date of Discharge:  " 8/26/2024  Primary Care Physician: oSl Lopez APRN    Presenting Problem/History of Present Illness:  Nausea, vomiting, diarrhea, abdominal pain    Final Discharge Diagnoses:  Active Hospital Problems    Diagnosis     **Ileus     Hypokalemia due to nausea, vomiting, diarrhea     Hydronephrosis of left kidney     Nonobstructing left renal calculi     Gastroenteritis        Consults:   Dr. Russ, gastroenterology  Dr. Al, urology    Procedures Performed: None    Pertinent Test Results:   Results for orders placed in visit on 05/07/19    SCANNED - ECHOCARDIOGRAM      Imaging Results (All)       Procedure Component Value Units Date/Time    NM Renal With Flow & Function With Pharmacological Intervention [[id number]] Collected: 08/24/24 1435     Updated: 08/24/24 1451    Narrative:      EXAMINATION:  NM RENAL W FLOW AND FUNCTION W PHARMACOLOGICAL  INTERVENTION-  8/24/2024 12:11 PM     HISTORY: Mild left hydronephrosis without obvious obstruction to be done  routine Saturday days 8/24/2024 per radiology; K56.7-Ileus, unspecified.     COMPARISON: No comparison study.     TECHNIQUE: The patient was injected with 10.3 uCi of technetium 99 M  MAG3 intravenously. The patient was given 40 mg of Lasix IV at 20  minutes.     FINDINGS: The vascular phase is normal and symmetric bilaterally. The  time to peak activity on the right side is a little over 5 minutes.  There is progressive increase in activity within the left kidney with  the time to peak activity at about 20 minutes. The split function  analysis is approximately 49% of the function by the left kidney and 51  by the right kidney. The right kidney is excreting contrast from 5  minutes to 20 minutes. After the administration of Lasix, there is more  accelerated emptying of the collecting systems bilaterally. The T1/2  emptying time on the left side is about 10 minutes. The T1/2 emptying  time on the right side is around 4-5 minutes. Therefore, no  significant  obstructive changes seen.          Impression:      1. Vascular phase is normal.  2. Split function analysis demonstrates approximately equal renal  function bilaterally.  3. There is progressive uptake within the left renal collecting system  until the administration of Lasix. This suggest that there probably is  some UPJ narrowing on this side. However, after Lasix administration,  the T1/2 emptying time is about 10 minutes. 20 minutes or greater is  considered significant.  4. No significant obstruction on the right side.     This report was signed and finalized on 8/24/2024 2:48 PM by Dr. Leonardo Adams MD.       CT Abdomen Pelvis With Contrast [[id number]] Collected: 08/23/24 1436     Updated: 08/23/24 1454    Narrative:      EXAMINATION: CT ABDOMEN PELVIS W CONTRAST-      8/23/2024 1:20 PM     HISTORY: abdominal pain; llq/left flank; hx of crohn's and kidney stones     In order to have a CT radiation dose as low as reasonably achievable  Automated Exposure Control was utilized for adjustment of the mA and/or  KV according to patient size.     Total DLP = 547.91 mGy.cm     CT scan of the abdomen and pelvis is performed after intravenous  contrast enhancement.     Images are acquired in axial plane and subsequent reconstruction in  coronal and sagittal planes.     The comparison is made with the previous study dated 3/16/2020.     The lung bases included in the study are unremarkable.     A ventriculoperitoneal shunt is seen in place similar to the previous  study. It enters the right upper abdomen adjacent and anterior to the  segment 4B of the liver and distal end of the catheter is in the right  midabdomen below the liver margin.     Limited visualized cardiomediastinal structures are unremarkable.  Significant artifacts are produced by a catheter in the distal SVC.  There is mild cardiomegaly.     The liver and spleen are normal.     The gallbladder is surgically absent. There is no significant  dilatation  of common bile duct.     The pancreas is normal.     The adrenal glands bilaterally are normal.     There is moderate lobulation of renal contour bilaterally more severe on  the left side. The tiny low-density nodule in the right kidney was not  noted in the previous study. These are too small to be further  characterized. There is significant irregularity of the left renal  cortex suggesting chronic renal cortical scarring. No discrete mass.  There are several small radiopaque calculi in the left kidney the  largest one in the lower pole measuring 5 mm in diameter. There is  evidence of left hydronephrosis. The left ureter is not dilated. No  calculi in the left ureter. The right collecting system is normal. The  urinary bladder is poorly distended. No intrinsic abnormality or  calculus.     The stomach is decompressed. No focal abnormality. Duodenum is minimally  dilated with fluid. Fluid-filled nondistended and nondilated small bowel  loops are seen. Maximum lumen diameter of the distal ileum is 2.2 cm. No  zone of transition. Normal retrocecal appendix is seen. Moderate gas  fluid and stool are seen in the colon. No finding to suggest  obstruction. Moderate thickening of the wall of the distal/descending  and sigmoid colon is probably due to incomplete distention. No  surrounding peritoneal infiltration are stranding to suggest  inflammatory/infectious process.     There is trace free fluid in the pelvis.     There is a tiny fat-containing umbilical hernia.     Normal abdominal aorta and iliac arteries.     There is no evidence of abdominal or pelvic lymphadenopathy.     Images reviewed in bone window show no acute bony abnormality.    Impression:      1. A moderate left hydronephrosis with a normal left ureter without  evidence of ureteral calculi. This may represent chronic inflammatory  process/chronic pyelonephritis. This was not noted in the previous study  in 2020.  2. Several nonobstructing  left renal calculi.  3. Normal appendix.  4. Fluid-filled nondistended nondilated small bowel loops and proximal  large bowel may represent ileus. No finding to suggest obstruction.  5. Ventriculoperitoneal shunt in place. A trace free fluid in the pelvis  may be due to ventriculoperitoneal shunt.   This report was signed and finalized on 8/23/2024 2:51 PM by Dr. Glenys Yancey MD.             LAB RESULTS:      Lab 08/25/24 0612 08/24/24 0425 08/23/24 1927 08/23/24  1337   WBC 7.37 10.59 13.58* 12.94*   HEMOGLOBIN 11.8* 11.6* 12.8 13.8   HEMATOCRIT 34.5 34.1 37.8 39.9   PLATELETS 272 272 318 337   NEUTROS ABS 3.99 6.12 9.18* 9.53*   IMMATURE GRANS (ABS) 0.02 0.04 0.05 0.05   LYMPHS ABS 2.60 3.70* 3.60* 2.62   MONOS ABS 0.44 0.61 0.68 0.66   EOS ABS 0.30 0.08 0.03 0.04   MCV 91.0 90.9 90.4 88.7         Lab 08/26/24  0600 08/25/24 0612 08/24/24 0425 08/23/24 1927 08/23/24  1337   SODIUM 142 142 141  --  141   POTASSIUM 4.2 3.4* 3.5  --  3.2*   CHLORIDE 107 110* 109*  --  106   CO2 27.0 25.0 22.0  --  21.0*   ANION GAP 8.0 7.0 10.0  --  14.0   BUN 9 8 10  --  11   CREATININE 0.55* 0.62 0.61  --  0.62   EGFR 125.1 121.5 122.0  --  121.5   GLUCOSE 114* 102* 97  --  106*   CALCIUM 9.0 8.2* 8.1*  --  9.5   MAGNESIUM  --  1.8 1.8 1.9  --          Lab 08/26/24  0600 08/25/24 0612 08/24/24 0425 08/23/24  1337   TOTAL PROTEIN 6.0 5.9* 6.0 7.4   ALBUMIN 3.7 3.6 3.7 4.4   GLOBULIN 2.3 2.3 2.3 3.0   ALT (SGPT) 27 26 23 13   AST (SGOT) 23 22 26 16   BILIRUBIN <0.2 0.3 0.3 0.5   ALK PHOS 66 66 64 76   LIPASE  --   --   --  52                     Brief Urine Lab Results  (Last result in the past 365 days)        Color   Clarity   Blood   Leuk Est   Nitrite   Protein   CREAT   Urine HCG        08/23/24 1529 Dark Yellow   Clear   Negative   Negative   Negative   Trace                 Microbiology Results (last 10 days)       Procedure Component Value - Date/Time    Gastrointestinal Panel, PCR - Stool, Per Rectum  [[id number]]  (Normal) Collected: 08/24/24 0449    Lab Status: Final result Specimen: Stool from Per Rectum Updated: 08/24/24 0611     Campylobacter Not Detected     Plesiomonas shigelloides Not Detected     Salmonella Not Detected     Vibrio Not Detected     Vibrio cholerae Not Detected     Yersinia enterocolitica Not Detected     Enteroaggregative E. coli (EAEC) Not Detected     Enteropathogenic E. coli (EPEC) Not Detected     Enterotoxigenic E. coli (ETEC) lt/st Not Detected     Shiga-like toxin-producing E. coli (STEC) stx1/stx2 Not Detected     Shigella/Enteroinvasive E. coli (EIEC) Not Detected     Cryptosporidium Not Detected     Cyclospora cayetanensis Not Detected     Entamoeba histolytica Not Detected     Giardia lamblia Not Detected     Adenovirus F40/41 Not Detected     Astrovirus Not Detected     Norovirus GI/GII Not Detected     Rotavirus A Not Detected     Sapovirus (I, II, IV or V) Not Detected    Narrative:      If Aeromonas, Staphylococcus aureus or Bacillus cereus are suspected, please order [id number]: Stool Culture, Aeromonas, S aureus, B Cereus.    COVID-19 and FLU A/B PCR, 1 HR TAT - Swab, Nasopharynx [[id number]]  (Normal) Collected: 08/23/24 1339    Lab Status: Final result Specimen: Swab from Nasopharynx Updated: 08/23/24 1416     COVID19 Not Detected     Influenza A PCR Not Detected     Influenza B PCR Not Detected    Narrative:      Fact sheet for providers: https://www.fda.gov/media/056848/download    Fact sheet for patients: https://www.fda.gov/media/094084/download    Test performed by PCR.            Hospital Course: Kelsey Mcqueen is a 33-year-old  female with past medical history significant for kidney stones, depression, and Crohn's disease.  She presented to [facility] emergency room 8/23/2024 with complaints of nausea, vomiting and abdominal pain.  Patient reported last week she had back pain and cramping down to the pelvic area and felt as though she passed a  kidney stone as she has passed multiple kidney stones in the past.  She reported left flank pain and abdominal pain consistent with kidney stone.  She was prescribed Macrobid and Pyridium by primary care provider and completed treatment.  On 8/19 patient began experiencing nausea, vomiting, abdominal pain, diarrhea and she was unable to keep down oral intake despite taking Zofran.  Patient reported losing 10 pounds the past week.  She described decreased appetite, abdominal pain associated as sharp, knot-like and twisting.  She reported vomiting bile content.  She developed nonbloody diarrhea. Patient reported being concerned regarding possible Crohn's flare.  She is followed by Dr. Santiago gastroenterology in [place].  She had been on Humira and Entyvio in the past but these were stopped and was treated with mesalamine. Last colonoscopy earlier this year and was told Crohn's disease in remission.     Lab potassium 3.2, WBC 12.94, lipase 52.  Urinalysis trace protein.  CT abdomen showed moderate left hydronephrosis with normal left ureter without evidence of ureteral calculi.  May represent chronic inflammatory process/chronic pyelonephritis.  This was not noted previous study 2020.  Several nonobstructing left renal calculi.  Normal appendix.  Fluid-filled nondistended nondilated small bowel loops and proximal large bowel may represent ileus.  No findings to suggest obstruction.  Ventriculoperitoneal shunt.  Normal saline fluid bolus, Reglan, Dilaudid given in ER.    She was admitted to the medical floor with possible ileus and gastroenteritis.  She presented to ER 8/23 with nausea, vomiting, diarrhea and abdominal pain.  Patient had renal colic and felt as though she passed kidney stone in the past week as she experienced a left flank pain, abdominal pain consistent with previous kidney stones.  On 8/18 she developed nausea, vomiting and was unable to keep down any liquid.  She reported subjective fever and concern  for Crohn's flare.  CT abdomen showed fluid-filled nondistended nondilated small bowel loops and proximal large bowel loops represents ileus.  GI PCR panel negative.  She was hydrated with IV fluids, given Zofran for nausea.  Prophylactic Rocephin ordered.  Initially, she was made nothing by mouth and started on clear liquids on 8/24 and tolerated.  She was advanced to full liquids and then GI bland soft diet on 8/25.    She was given Zofran for nausea, Dilaudid IV for severe pain.  Gastroenterology consulted and Dr. Russ doubts Crohn's flare due to sudden onset of symptoms and colonoscopy earlier this year with reported remission of Crohn's.  No bowel obstruction noted and likely ileus with gastroenteritis.  She was given Reglan and dicyclomine added for abdominal pain.  Per Dr. Russ no need for colonoscopy at this time as patient would not be able to tolerate prep.  Patient was cleared to advance to soft diet and follow-up with primary gastroenterology after discharge and consider outpatient colonoscopy.    She was treated for gastroenteritis presenting with nausea, vomiting, diarrhea and abdominal pain for the past 4 days.  GI PCR panel negative.  She was given IV fluids, Zofran, Compazine and Phenergan.  Patient reported Phenergan more effective for nausea.  Diet slowly advanced from clear liquid, full liquid to GI soft bland diet.  Patient reported stools loose at discharge but not watery diarrhea.    Moderate left hydronephrosis noted with normal left ureter.  This may represent chronic inflammatory process/chronic pyelonephritis.  Not noted in 2020.  Urology consulted for severe nonobstructing left renal calculi.  Discussed with Dr. Al and recommended Lasix scan to confirm no significant obstruction.  Given appearance of ureter and renal pelvis unlikely stent would be necessary.  Nuclear med renal scan split function equal renal function bilaterally.  Progressive uptake within the left renal  "collecting system until administration of Lasix suggest probable some UPJ narrowing at this site.  After Lasix T1/2 emptying time 10 minutes.  No significant obstruction on the right noted.    Potassium 3.2 and replaced.  Follow-up potassium level 3.4 and replaced 40 mEq x 2 doses.  Potassium 4.2 at discharge.    She has a history of Crohn's disease presenting with nausea, vomiting, abdominal pain and diarrhea.  GI PCR panel negative.  Gastroenterology consulted and Dr. Russ doubted Crohn's flare due to sudden onset of symptoms and colonoscopy not recommended at this time.  Patient previously on Humira and Entyvio currently on mesalamine.  Patient will follow-up with primary gastroenterology after discharge and consider colonoscopy when recovered from acute illness.    SCDs ordered for deep vein thrombosis prophylaxis.    On 8/26/2024, she is stable for discharge.  Nausea and vomiting have improved.  Diarrhea improved and reports stools are loose but not watery diarrhea.  Patient tolerating GI bland soft diet.  No antibiotics needed.  She will follow-up with her primary care provider in 1 week and follow-up with Dr. Santiago gastroenterology after discharge.      Physical Exam on Discharge:  /61 (BP Location: Right arm, Patient Position: Lying)   Pulse 59   Temp 98.7 °F (37.1 °C) (Oral)   Resp 18   Ht 157.5 cm (62\")   Wt 94.6 kg (208 lb 8 oz)   LMP 01/21/2019 (Approximate)   SpO2 95%   BMI 38.14 kg/m²   Physical Exam  Vitals and nursing note reviewed.   Constitutional:       Comments: Lying in bed.  No oxygen use.   HENT:      Head: Normocephalic and atraumatic.      Nose: No congestion.      Mouth/Throat:      Pharynx: Oropharynx is clear. No oropharyngeal exudate or posterior oropharyngeal erythema.   Eyes:      Extraocular Movements: Extraocular movements intact.      Pupils: Pupils are equal, round, and reactive to light.   Cardiovascular:      Rate and Rhythm: Normal rate and regular rhythm.      " Heart sounds: No murmur heard.  Pulmonary:      Breath sounds: No wheezing, rhonchi or rales.      Comments: No oxygen in use.  Abdominal:      General: There is no distension.      Palpations: Abdomen is soft.   Genitourinary:     Comments: Voiding.  Musculoskeletal:         General: No swelling or tenderness.      Cervical back: Normal range of motion and neck supple.   Skin:     General: Skin is warm and dry.   Neurological:      Mental Status: She is alert and oriented to person, place, and time.   Psychiatric:         Mood and Affect: Mood normal.         Behavior: Behavior normal.         Thought Content: Thought content normal.         Judgment: Judgment normal.         Condition on Discharge: Stable for discharge home    Discharge Disposition:  Home or Self Care    Discharge Medications:     Discharge Medications        New Medications        Instructions Start Date   dicyclomine 10 MG capsule  Commonly known as: BENTYL   20 mg, Oral, 2 Times Daily PRN      prochlorperazine 5 MG tablet  Commonly known as: COMPAZINE   5 mg, Oral, Every 8 Hours PRN             Continue These Medications        Instructions Start Date   clonazePAM 2 MG tablet  Commonly known as: KlonoPIN   2 mg, Oral, 3 Times Daily PRN      HYDROcodone-acetaminophen  MG per tablet  Commonly known as: NORCO   1 tablet, Oral, Every 4 Hours PRN      loperamide 2 MG capsule  Commonly known as: IMODIUM   1 capsule, Oral, 3 Times Daily PRN      mesalamine 0.375 g 24 hr capsule  Commonly known as: APRISO   0.375 mg, Oral, Daily, 4 tablets daily in the morning       omeprazole 40 MG capsule  Commonly known as: priLOSEC   Take 1 capsule by mouth 2 (Two) Times a Day.      ondansetron 4 MG tablet  Commonly known as: ZOFRAN   Take 1 tablet by mouth Every 8 (Eight) Hours As Needed for Nausea or Vomiting.      propranolol 80 MG tablet  Commonly known as: INDERAL   80 mg, Oral, Daily PRN      psyllium 58.6 % packet  Commonly known as: METAMUCIL   1  packet, Oral, Daily, 1 packet mixed with liquid every morning 30 mins before food      tiZANidine 4 MG tablet  Commonly known as: ZANAFLEX   Take 1 tablet by mouth Daily As Needed for Muscle Spasms.      venlafaxine XR 75 MG 24 hr capsule  Commonly known as: EFFEXOR-XR   75 mg, Oral, Daily               Discharge Diet:   Diet Instructions       Diet: Gastrointestinal Diets; Low Irritant; Regular (IDDSI 7); Thin (IDDSI 0)      Discharge Diet: Gastrointestinal Diets    Gastrointestinal Diet: Low Irritant    Texture: Regular (IDDSI 7)    Fluid Consistency: Thin (IDDSI 0)            Activity at Discharge:   Activity Instructions       Activity as Tolerated              Discharge instructions:  1.  For worsening, returning nausea, vomiting, diarrhea seek medical attention.  2.  Follow-up with Dr. Santiago, consider colonoscopy when coverage from acute illness.  9/17/2024  3.  Follow-up with GREYSON Thompson 1 week, 8/27/2024  4.  [place] diet    Follow-up Appointments:   GREYSON Thompson 1 week, 8/27/2024  Dr. Santiago 2 to 3 weeks, 9/17/2024      Test Results Pending at Discharge: None    Electronically signed by GREYSON Salter, 08/26/24, 08:00 CDT.    Time: 35 minutes.  Discussed with Dr. Howard and patient.    The above documentation resulted from a face-to-face encounter by me Jen RUBI, [facility].         Electronically signed by Jen Vergara APRN at 08/26/24 0839       Discharge Order (From admission, onward)       Start     Ordered    08/26/24 0754  Discharge patient  Once        Expected Discharge Date: 08/26/24   Discharge Disposition: Home or Self Care   Physician of Record for Attribution - Please select from Treatment Team: HANNAH TAYLOR [[id number]]   Review needed by CMO to determine Physician of Record: No      Question Answer Comment   Physician of Record for Attribution - Please select from Treatment Team HANNAH TAYLOR    Review needed by CMO to determine  Physician of Record No        08/26/24 0757

## 2024-10-04 ENCOUNTER — APPOINTMENT (OUTPATIENT)
Dept: CT IMAGING | Facility: HOSPITAL | Age: 34
End: 2024-10-04
Payer: COMMERCIAL

## 2024-10-04 ENCOUNTER — HOSPITAL ENCOUNTER (EMERGENCY)
Facility: HOSPITAL | Age: 34
Discharge: HOME | End: 2024-10-04
Payer: COMMERCIAL

## 2024-10-04 VITALS
WEIGHT: 207 LBS | OXYGEN SATURATION: 99 % | TEMPERATURE: 98.8 F | SYSTOLIC BLOOD PRESSURE: 120 MMHG | HEART RATE: 93 BPM | BODY MASS INDEX: 38.09 KG/M2 | DIASTOLIC BLOOD PRESSURE: 86 MMHG | HEIGHT: 62 IN | RESPIRATION RATE: 22 BRPM

## 2024-10-04 DIAGNOSIS — K52.9 ENTERITIS: ICD-10-CM

## 2024-10-04 DIAGNOSIS — N12 PYELONEPHRITIS: Primary | ICD-10-CM

## 2024-10-04 LAB
ALBUMIN SERPL-MCNC: 4.6 G/DL (ref 3.5–5.2)
ALBUMIN/GLOB SERPL: 1.5 G/DL
ALP SERPL-CCNC: 102 U/L (ref 39–117)
ALT SERPL W P-5'-P-CCNC: 17 U/L (ref 1–33)
ANION GAP SERPL CALCULATED.3IONS-SCNC: 14 MMOL/L (ref 5–15)
AST SERPL-CCNC: 17 U/L (ref 1–32)
B-HCG UR QL: NEGATIVE
BACTERIA UR QL AUTO: ABNORMAL /HPF
BASOPHILS # BLD AUTO: 0.03 10*3/MM3 (ref 0–0.2)
BASOPHILS NFR BLD AUTO: 0.3 % (ref 0–1.5)
BILIRUB SERPL-MCNC: 0.7 MG/DL (ref 0–1.2)
BILIRUB UR QL STRIP: NEGATIVE
BUN SERPL-MCNC: 11 MG/DL (ref 6–20)
BUN/CREAT SERPL: 16.7 (ref 7–25)
CALCIUM SPEC-SCNC: 9.9 MG/DL (ref 8.6–10.5)
CHLORIDE SERPL-SCNC: 101 MMOL/L (ref 98–107)
CLARITY UR: CLEAR
CO2 SERPL-SCNC: 23 MMOL/L (ref 22–29)
COLOR UR: ABNORMAL
CREAT SERPL-MCNC: 0.66 MG/DL (ref 0.57–1)
CRP SERPL-MCNC: 0.77 MG/DL (ref 0–0.5)
D-LACTATE SERPL-SCNC: 1 MMOL/L (ref 0.5–2)
DEPRECATED RDW RBC AUTO: 41.1 FL (ref 37–54)
EGFRCR SERPLBLD CKD-EPI 2021: 119.7 ML/MIN/1.73
EOSINOPHIL # BLD AUTO: 0.13 10*3/MM3 (ref 0–0.4)
EOSINOPHIL NFR BLD AUTO: 1.4 % (ref 0.3–6.2)
ERYTHROCYTE [DISTWIDTH] IN BLOOD BY AUTOMATED COUNT: 12.4 % (ref 12.3–15.4)
EXPIRATION DATE: NORMAL
GLOBULIN UR ELPH-MCNC: 3 GM/DL
GLUCOSE SERPL-MCNC: 109 MG/DL (ref 65–99)
GLUCOSE UR STRIP-MCNC: NEGATIVE MG/DL
HCT VFR BLD AUTO: 42.3 % (ref 34–46.6)
HGB BLD-MCNC: 14.4 G/DL (ref 12–15.9)
HGB UR QL STRIP.AUTO: ABNORMAL
HYALINE CASTS UR QL AUTO: ABNORMAL /LPF
IMM GRANULOCYTES # BLD AUTO: 0.03 10*3/MM3 (ref 0–0.05)
IMM GRANULOCYTES NFR BLD AUTO: 0.3 % (ref 0–0.5)
INR PPP: 1.11 (ref 0.91–1.09)
INTERNAL NEGATIVE CONTROL: NEGATIVE
INTERNAL POSITIVE CONTROL: POSITIVE
KETONES UR QL STRIP: ABNORMAL
LEUKOCYTE ESTERASE UR QL STRIP.AUTO: ABNORMAL
LYMPHOCYTES # BLD AUTO: 2.1 10*3/MM3 (ref 0.7–3.1)
LYMPHOCYTES NFR BLD AUTO: 22.3 % (ref 19.6–45.3)
Lab: NORMAL
MCH RBC QN AUTO: 30.6 PG (ref 26.6–33)
MCHC RBC AUTO-ENTMCNC: 34 G/DL (ref 31.5–35.7)
MCV RBC AUTO: 90 FL (ref 79–97)
MONOCYTES # BLD AUTO: 0.4 10*3/MM3 (ref 0.1–0.9)
MONOCYTES NFR BLD AUTO: 4.2 % (ref 5–12)
NEUTROPHILS NFR BLD AUTO: 6.73 10*3/MM3 (ref 1.7–7)
NEUTROPHILS NFR BLD AUTO: 71.5 % (ref 42.7–76)
NITRITE UR QL STRIP: POSITIVE
NRBC BLD AUTO-RTO: 0 /100 WBC (ref 0–0.2)
PH UR STRIP.AUTO: 6 [PH] (ref 5–8)
PLATELET # BLD AUTO: 334 10*3/MM3 (ref 140–450)
PMV BLD AUTO: 9.7 FL (ref 6–12)
POTASSIUM SERPL-SCNC: 4 MMOL/L (ref 3.5–5.2)
PROCALCITONIN SERPL-MCNC: 0.03 NG/ML (ref 0–0.25)
PROT SERPL-MCNC: 7.6 G/DL (ref 6–8.5)
PROT UR QL STRIP: ABNORMAL
PROTHROMBIN TIME: 14.7 SECONDS (ref 11.8–14.8)
RBC # BLD AUTO: 4.7 10*6/MM3 (ref 3.77–5.28)
RBC # UR STRIP: ABNORMAL /HPF
REF LAB TEST METHOD: ABNORMAL
SODIUM SERPL-SCNC: 138 MMOL/L (ref 136–145)
SP GR UR STRIP: 1.03 (ref 1–1.03)
SQUAMOUS #/AREA URNS HPF: ABNORMAL /HPF
UROBILINOGEN UR QL STRIP: ABNORMAL
WBC # UR STRIP: ABNORMAL /HPF
WBC NRBC COR # BLD AUTO: 9.42 10*3/MM3 (ref 3.4–10.8)

## 2024-10-04 PROCEDURE — 83605 ASSAY OF LACTIC ACID: CPT | Performed by: NURSE PRACTITIONER

## 2024-10-04 PROCEDURE — 86140 C-REACTIVE PROTEIN: CPT | Performed by: NURSE PRACTITIONER

## 2024-10-04 PROCEDURE — 81001 URINALYSIS AUTO W/SCOPE: CPT | Performed by: NURSE PRACTITIONER

## 2024-10-04 PROCEDURE — 25010000002 METOCLOPRAMIDE PER 10 MG: Performed by: NURSE PRACTITIONER

## 2024-10-04 PROCEDURE — 25510000001 IOPAMIDOL 61 % SOLUTION: Performed by: NURSE PRACTITIONER

## 2024-10-04 PROCEDURE — 84145 PROCALCITONIN (PCT): CPT | Performed by: NURSE PRACTITIONER

## 2024-10-04 PROCEDURE — 25810000003 LACTATED RINGERS SOLUTION: Performed by: NURSE PRACTITIONER

## 2024-10-04 PROCEDURE — 96376 TX/PRO/DX INJ SAME DRUG ADON: CPT

## 2024-10-04 PROCEDURE — 96365 THER/PROPH/DIAG IV INF INIT: CPT

## 2024-10-04 PROCEDURE — 25010000002 CEFTRIAXONE PER 250 MG: Performed by: NURSE PRACTITIONER

## 2024-10-04 PROCEDURE — 74177 CT ABD & PELVIS W/CONTRAST: CPT

## 2024-10-04 PROCEDURE — 87086 URINE CULTURE/COLONY COUNT: CPT | Performed by: NURSE PRACTITIONER

## 2024-10-04 PROCEDURE — 81025 URINE PREGNANCY TEST: CPT | Performed by: NURSE PRACTITIONER

## 2024-10-04 PROCEDURE — 36415 COLL VENOUS BLD VENIPUNCTURE: CPT

## 2024-10-04 PROCEDURE — 87147 CULTURE TYPE IMMUNOLOGIC: CPT | Performed by: NURSE PRACTITIONER

## 2024-10-04 PROCEDURE — 96375 TX/PRO/DX INJ NEW DRUG ADDON: CPT

## 2024-10-04 PROCEDURE — 80053 COMPREHEN METABOLIC PANEL: CPT | Performed by: NURSE PRACTITIONER

## 2024-10-04 PROCEDURE — 99285 EMERGENCY DEPT VISIT HI MDM: CPT

## 2024-10-04 PROCEDURE — 85610 PROTHROMBIN TIME: CPT | Performed by: NURSE PRACTITIONER

## 2024-10-04 PROCEDURE — 87040 BLOOD CULTURE FOR BACTERIA: CPT | Performed by: NURSE PRACTITIONER

## 2024-10-04 PROCEDURE — 85025 COMPLETE CBC W/AUTO DIFF WBC: CPT | Performed by: NURSE PRACTITIONER

## 2024-10-04 RX ORDER — PROMETHAZINE HYDROCHLORIDE 25 MG/1
25 TABLET ORAL EVERY 8 HOURS PRN
Qty: 12 TABLET | Refills: 0 | Status: SHIPPED | OUTPATIENT
Start: 2024-10-04 | End: 2024-10-08

## 2024-10-04 RX ORDER — METOCLOPRAMIDE HYDROCHLORIDE 5 MG/ML
10 INJECTION INTRAMUSCULAR; INTRAVENOUS ONCE
Status: COMPLETED | OUTPATIENT
Start: 2024-10-04 | End: 2024-10-04

## 2024-10-04 RX ORDER — SODIUM CHLORIDE 0.9 % (FLUSH) 0.9 %
10 SYRINGE (ML) INJECTION AS NEEDED
Status: DISCONTINUED | OUTPATIENT
Start: 2024-10-04 | End: 2024-10-04 | Stop reason: HOSPADM

## 2024-10-04 RX ORDER — IOPAMIDOL 612 MG/ML
100 INJECTION, SOLUTION INTRAVASCULAR
Status: COMPLETED | OUTPATIENT
Start: 2024-10-04 | End: 2024-10-04

## 2024-10-04 RX ORDER — CEFDINIR 300 MG/1
300 CAPSULE ORAL 2 TIMES DAILY
Qty: 20 CAPSULE | Refills: 0 | Status: SHIPPED | OUTPATIENT
Start: 2024-10-04 | End: 2024-10-14

## 2024-10-04 RX ADMIN — HYDROMORPHONE HYDROCHLORIDE 1 MG: 1 INJECTION, SOLUTION INTRAMUSCULAR; INTRAVENOUS; SUBCUTANEOUS at 13:15

## 2024-10-04 RX ADMIN — SODIUM CHLORIDE, POTASSIUM CHLORIDE, SODIUM LACTATE AND CALCIUM CHLORIDE 1000 ML: 600; 310; 30; 20 INJECTION, SOLUTION INTRAVENOUS at 13:15

## 2024-10-04 RX ADMIN — METOCLOPRAMIDE 10 MG: 5 INJECTION, SOLUTION INTRAMUSCULAR; INTRAVENOUS at 13:14

## 2024-10-04 RX ADMIN — SODIUM CHLORIDE 1000 MG: 900 INJECTION INTRAVENOUS at 16:29

## 2024-10-04 RX ADMIN — IOPAMIDOL 100 ML: 612 INJECTION, SOLUTION INTRAVENOUS at 14:57

## 2024-10-04 RX ADMIN — METOCLOPRAMIDE HYDROCHLORIDE 10 MG: 5 INJECTION INTRAMUSCULAR; INTRAVENOUS at 15:39

## 2024-10-04 RX ADMIN — HYDROMORPHONE HYDROCHLORIDE 1 MG: 1 INJECTION, SOLUTION INTRAMUSCULAR; INTRAVENOUS; SUBCUTANEOUS at 15:40

## 2024-10-04 NOTE — ED PROVIDER NOTES
Subjective   History of Present Illness  33 yof presents with c/o abdomen pain, nausea and loose stools.  She reports she was started on a new medication this week by her GI doctor.        Review of Systems    Past Medical History:   Diagnosis Date    ADHD (attention deficit hyperactivity disorder)     Crohn disease     recently diagnosis, going to start infusion therapy    Deep vein thrombosis     Depression with anxiety 2018    Fibromyalgia, primary 2016    Headache     Intracranial hypertension     Kidney stones     Lactation disorder     Pseudotumor cerebri 2016    Tobacco abuse 2016       Allergies   Allergen Reactions    Morphine And Codeine Shortness Of Breath    Other Other (See Comments)     Other reaction(s): Other (See Comments)  Dissolvable sutures. She developed an abscess from the last ones. Ended up in ER.   Dissolvable sutures. She developed an abscess from the last ones. Ended up in ER.     Sumatriptan Hives    Buspirone Headache    Ketorolac Unknown - Low Severity     Has Crohn's Disease.    Ketorolac Tromethamine Unknown - Low Severity    Morphine Itching    Demerol [Meperidine] Rash       Past Surgical History:   Procedure Laterality Date    BRAIN SURGERY      COLONOSCOPY      CYSTOSCOPY ELECTROHYDRAULIC LITHOTRIPSY      HYSTERECTOMY  2019    KIDNEY STONE SURGERY      OTHER SURGICAL HISTORY      Intracranial shunt     SHUNT INSERTION  2015       Family History   Problem Relation Age of Onset    Hypertension Mother     Hypertension Father     Prolactinoma Neg Hx        Social History     Socioeconomic History    Marital status: Single    Number of children: 1   Tobacco Use    Smoking status: Every Day     Current packs/day: 0.00     Average packs/day: 0.3 packs/day for 3.8 years (1.0 ttl pk-yrs)     Types: Cigarettes     Start date: 2019     Last attempt to quit: 2022     Years since quittin.9     Passive exposure: Current    Smokeless tobacco:  Never    Tobacco comments:     Smoking currently 3.19.24     AVS   Vaping Use    Vaping status: Never Used   Substance and Sexual Activity    Alcohol use: No    Drug use: No    Sexual activity: Yes     Partners: Male     Birth control/protection: Hysterectomy           Objective   Physical Exam  Vitals and nursing note reviewed.   Constitutional:       Appearance: She is well-developed.   HENT:      Head: Normocephalic.   Eyes:      Pupils: Pupils are equal, round, and reactive to light.   Cardiovascular:      Rate and Rhythm: Normal rate and regular rhythm.      Heart sounds: No murmur heard.  Pulmonary:      Effort: Pulmonary effort is normal.      Breath sounds: Normal breath sounds.   Abdominal:      General: There is distension.      Palpations: Abdomen is soft.      Tenderness: There is generalized abdominal tenderness.   Musculoskeletal:         General: Normal range of motion.      Cervical back: Normal range of motion and neck supple.   Skin:     General: Skin is warm and dry.   Neurological:      Mental Status: She is alert and oriented to person, place, and time.         Procedures           ED Course  ED Course as of 10/04/24 1729   Fri Oct 04, 2024   1532 CT of the head - IMPRESSION: 1. Ventriculoperitoneal shunt in place. A moderate amount of free fluid within the pelvis is likely related to shunting. Previous hysterectomy. No adnexal mass present. 2. Nonspecific bowel gas pattern with several fluid-filled bowel loops  with scattered air-fluid levels. This may represent an ongoing enteritis. Normal appearance of colon. Normal appendix. 3. Left-sided nonobstructing nephrolithiasis. There is mild dilatation of the upper tracts of the left kidney. Within the mid and left ureter  the ureter is increased in size in comparison with the contralateral ureter and increased in density within its lumen. This could represent  an inflammatory process within the ureter with mild periureteral stranding present. No  evidence of a discrete ureteral stone. Follow-up recommended.  Lab work reviewed.  UA + for nitrites, trace leukocytes and 2+ bacteria.  CBC, CMP, CRP, lactic acid and procal unremarkable.  She has had no vomiting or diarrhea while here.  She will be given IV Rocephin and dc'd home with po antiemetics and antibiotics.  She voiced understanding of results and instructions including strict return precautions.  [KS]      ED Course User Index  [KS] Aaron Santana APRN                                             Medical Decision Making  CT of the head - IMPRESSION: 1. Ventriculoperitoneal shunt in place. A moderate amount of free fluid within the pelvis is likely related to shunting. Previous hysterectomy. No adnexal mass present. 2. Nonspecific bowel gas pattern with several fluid-filled bowel loops  with scattered air-fluid levels. This may represent an ongoing enteritis. Normal appearance of colon. Normal appendix. 3. Left-sided nonobstructing nephrolithiasis. There is mild dilatation of the upper tracts of the left kidney. Within the mid and left ureter  the ureter is increased in size in comparison with the contralateral ureter and increased in density within its lumen. This could represent  an inflammatory process within the ureter with mild periureteral stranding present. No evidence of a discrete ureteral stone. Follow-up recommended.  Lab work reviewed.  UA + for nitrites, trace leukocytes and 2+ bacteria.  CBC, CMP, CRP, lactic acid and procal unremarkable.  She has had no vomiting or diarrhea while here.  She will be given IV Rocephin and dc'd home with po antiemetics and antibiotics.  She voiced understanding of results and instructions including strict return precautions.       Amount and/or Complexity of Data Reviewed  Labs: ordered.  Radiology: ordered.    Risk  Prescription drug management.        Final diagnoses:   None       ED Disposition  ED Disposition       None            No follow-up  provider specified.       Medication List      No changes were made to your prescriptions during this visit.          ordered.  Radiology: ordered.    Risk  Prescription drug management.          Final diagnoses:   Pyelonephritis   Enteritis       ED Disposition  ED Disposition       ED Disposition   Discharge    Condition   Stable    Comment   --               Sol Lopez, APRN  [address] [facility] [address]  [phone]    Call in 3 days  Routine ED followup    Marie Santiago MD  [address] [place] [address]  [address]  [facility] [id number] [phone]    Call in 3 days  Routine ED follow up         Medication List        New Prescriptions      cefdinir 300 MG capsule  Commonly known as: OMNICEF  Take 1 capsule by mouth 2 (Two) Times a Day for 10 days.            Stop      ondansetron 4 MG tablet  Commonly known as: ZOFRAN            ASK your doctor about these medications      promethazine 25 MG tablet  Commonly known as: PHENERGAN  Take 1 tablet by mouth Every 8 (Eight) Hours As Needed for Nausea or Vomiting for up to 4 days.  Ask about: Should I take this medication?               Where to Get Your Medications        These medications were sent to [facility]/pharmacy #[id number] - [place], [place] - [address] [facility] - [phone] [place] [phone]   [address] [id number]      Phone: [phone]   cefdinir 300 MG capsule  promethazine 25 MG tablet            Aaron Santana, GREYSON  10/13/24 2021

## 2024-10-04 NOTE — DISCHARGE INSTRUCTIONS
Drink plenty of fluid.  Continue home medication.  New medication as ordered. Follow up with Pcp and GI first of the week - call Monday for appointment. Return to ED if condition does not improve or worsens

## 2024-10-06 ENCOUNTER — APPOINTMENT (OUTPATIENT)
Dept: CT IMAGING | Facility: HOSPITAL | Age: 34
End: 2024-10-06
Payer: COMMERCIAL

## 2024-10-06 ENCOUNTER — NURSE TRIAGE (OUTPATIENT)
Dept: CALL CENTER | Facility: HOSPITAL | Age: 34
End: 2024-10-06
Payer: COMMERCIAL

## 2024-10-06 ENCOUNTER — HOSPITAL ENCOUNTER (EMERGENCY)
Facility: HOSPITAL | Age: 34
Discharge: HOME | End: 2024-10-06
Admitting: EMERGENCY MEDICINE
Payer: COMMERCIAL

## 2024-10-06 VITALS
HEART RATE: 75 BPM | WEIGHT: 207 LBS | TEMPERATURE: 98.9 F | OXYGEN SATURATION: 98 % | RESPIRATION RATE: 18 BRPM | SYSTOLIC BLOOD PRESSURE: 116 MMHG | BODY MASS INDEX: 38.09 KG/M2 | HEIGHT: 62 IN | DIASTOLIC BLOOD PRESSURE: 66 MMHG

## 2024-10-06 DIAGNOSIS — R11.10 VOMITING AND DIARRHEA: ICD-10-CM

## 2024-10-06 DIAGNOSIS — R10.9 ABDOMINAL PAIN, UNSPECIFIED ABDOMINAL LOCATION: Primary | ICD-10-CM

## 2024-10-06 DIAGNOSIS — R19.7 VOMITING AND DIARRHEA: ICD-10-CM

## 2024-10-06 LAB
ALBUMIN SERPL-MCNC: 4.5 G/DL (ref 3.5–5.2)
ALBUMIN/GLOB SERPL: 1.4 G/DL
ALP SERPL-CCNC: 96 U/L (ref 39–117)
ALT SERPL W P-5'-P-CCNC: 18 U/L (ref 1–33)
ANION GAP SERPL CALCULATED.3IONS-SCNC: 15 MMOL/L (ref 5–15)
AST SERPL-CCNC: 15 U/L (ref 1–32)
BACTERIA SPEC AEROBE CULT: ABNORMAL
BACTERIA UR QL AUTO: ABNORMAL /HPF
BASOPHILS # BLD AUTO: 0.04 10*3/MM3 (ref 0–0.2)
BASOPHILS NFR BLD AUTO: 0.3 % (ref 0–1.5)
BILIRUB SERPL-MCNC: 0.5 MG/DL (ref 0–1.2)
BILIRUB UR QL STRIP: ABNORMAL
BUN SERPL-MCNC: 9 MG/DL (ref 6–20)
BUN/CREAT SERPL: 13.4 (ref 7–25)
CALCIUM SPEC-SCNC: 9.5 MG/DL (ref 8.6–10.5)
CHLORIDE SERPL-SCNC: 104 MMOL/L (ref 98–107)
CLARITY UR: ABNORMAL
CO2 SERPL-SCNC: 21 MMOL/L (ref 22–29)
COLOR UR: ABNORMAL
CREAT SERPL-MCNC: 0.67 MG/DL (ref 0.57–1)
DEPRECATED RDW RBC AUTO: 39.8 FL (ref 37–54)
EGFRCR SERPLBLD CKD-EPI 2021: 119.3 ML/MIN/1.73
EOSINOPHIL # BLD AUTO: 0.06 10*3/MM3 (ref 0–0.4)
EOSINOPHIL NFR BLD AUTO: 0.5 % (ref 0.3–6.2)
ERYTHROCYTE [DISTWIDTH] IN BLOOD BY AUTOMATED COUNT: 12.1 % (ref 12.3–15.4)
GLOBULIN UR ELPH-MCNC: 3.3 GM/DL
GLUCOSE SERPL-MCNC: 101 MG/DL (ref 65–99)
GLUCOSE UR STRIP-MCNC: NEGATIVE MG/DL
HCT VFR BLD AUTO: 39.5 % (ref 34–46.6)
HGB BLD-MCNC: 13.6 G/DL (ref 12–15.9)
HGB UR QL STRIP.AUTO: NEGATIVE
HYALINE CASTS UR QL AUTO: ABNORMAL /LPF
IMM GRANULOCYTES # BLD AUTO: 0.03 10*3/MM3 (ref 0–0.05)
IMM GRANULOCYTES NFR BLD AUTO: 0.2 % (ref 0–0.5)
KETONES UR QL STRIP: ABNORMAL
LEUKOCYTE ESTERASE UR QL STRIP.AUTO: ABNORMAL
LIPASE SERPL-CCNC: 34 U/L (ref 13–60)
LYMPHOCYTES # BLD AUTO: 2.15 10*3/MM3 (ref 0.7–3.1)
LYMPHOCYTES NFR BLD AUTO: 16.7 % (ref 19.6–45.3)
MAGNESIUM SERPL-MCNC: 2 MG/DL (ref 1.6–2.6)
MCH RBC QN AUTO: 30.8 PG (ref 26.6–33)
MCHC RBC AUTO-ENTMCNC: 34.4 G/DL (ref 31.5–35.7)
MCV RBC AUTO: 89.4 FL (ref 79–97)
MONOCYTES # BLD AUTO: 0.5 10*3/MM3 (ref 0.1–0.9)
MONOCYTES NFR BLD AUTO: 3.9 % (ref 5–12)
NEUTROPHILS NFR BLD AUTO: 10.08 10*3/MM3 (ref 1.7–7)
NEUTROPHILS NFR BLD AUTO: 78.4 % (ref 42.7–76)
NITRITE UR QL STRIP: NEGATIVE
NRBC BLD AUTO-RTO: 0 /100 WBC (ref 0–0.2)
PH UR STRIP.AUTO: 5.5 [PH] (ref 5–8)
PLATELET # BLD AUTO: 339 10*3/MM3 (ref 140–450)
PMV BLD AUTO: 9.8 FL (ref 6–12)
POTASSIUM SERPL-SCNC: 3.5 MMOL/L (ref 3.5–5.2)
PROCALCITONIN SERPL-MCNC: 0.02 NG/ML (ref 0–0.25)
PROT SERPL-MCNC: 7.8 G/DL (ref 6–8.5)
PROT UR QL STRIP: ABNORMAL
RBC # BLD AUTO: 4.42 10*6/MM3 (ref 3.77–5.28)
RBC # UR STRIP: ABNORMAL /HPF
REF LAB TEST METHOD: ABNORMAL
SODIUM SERPL-SCNC: 140 MMOL/L (ref 136–145)
SP GR UR STRIP: >1.03 (ref 1–1.03)
SQUAMOUS #/AREA URNS HPF: ABNORMAL /HPF
UROBILINOGEN UR QL STRIP: ABNORMAL
WBC # UR STRIP: ABNORMAL /HPF
WBC NRBC COR # BLD AUTO: 12.86 10*3/MM3 (ref 3.4–10.8)
YEAST URNS QL MICRO: ABNORMAL /HPF

## 2024-10-06 PROCEDURE — 25010000002 METOCLOPRAMIDE PER 10 MG: Performed by: EMERGENCY MEDICINE

## 2024-10-06 PROCEDURE — 81001 URINALYSIS AUTO W/SCOPE: CPT

## 2024-10-06 PROCEDURE — 99285 EMERGENCY DEPT VISIT HI MDM: CPT

## 2024-10-06 PROCEDURE — 96374 THER/PROPH/DIAG INJ IV PUSH: CPT

## 2024-10-06 PROCEDURE — 36415 COLL VENOUS BLD VENIPUNCTURE: CPT

## 2024-10-06 PROCEDURE — 85025 COMPLETE CBC W/AUTO DIFF WBC: CPT

## 2024-10-06 PROCEDURE — 25510000001 IOPAMIDOL 61 % SOLUTION

## 2024-10-06 PROCEDURE — 96375 TX/PRO/DX INJ NEW DRUG ADDON: CPT

## 2024-10-06 PROCEDURE — 83690 ASSAY OF LIPASE: CPT

## 2024-10-06 PROCEDURE — 80053 COMPREHEN METABOLIC PANEL: CPT

## 2024-10-06 PROCEDURE — 83735 ASSAY OF MAGNESIUM: CPT

## 2024-10-06 PROCEDURE — 84145 PROCALCITONIN (PCT): CPT

## 2024-10-06 PROCEDURE — 74177 CT ABD & PELVIS W/CONTRAST: CPT

## 2024-10-06 RX ORDER — IOPAMIDOL 612 MG/ML
100 INJECTION, SOLUTION INTRAVASCULAR
Status: COMPLETED | OUTPATIENT
Start: 2024-10-06 | End: 2024-10-06

## 2024-10-06 RX ORDER — SODIUM CHLORIDE 0.9 % (FLUSH) 0.9 %
10 SYRINGE (ML) INJECTION AS NEEDED
Status: DISCONTINUED | OUTPATIENT
Start: 2024-10-06 | End: 2024-10-06 | Stop reason: HOSPADM

## 2024-10-06 RX ORDER — METOCLOPRAMIDE HYDROCHLORIDE 5 MG/ML
10 INJECTION INTRAMUSCULAR; INTRAVENOUS ONCE
Status: COMPLETED | OUTPATIENT
Start: 2024-10-06 | End: 2024-10-06

## 2024-10-06 RX ADMIN — METOCLOPRAMIDE 10 MG: 5 INJECTION, SOLUTION INTRAMUSCULAR; INTRAVENOUS at 14:30

## 2024-10-06 RX ADMIN — IOPAMIDOL 100 ML: 612 INJECTION, SOLUTION INTRAVENOUS at 15:55

## 2024-10-06 RX ADMIN — HYDROMORPHONE HYDROCHLORIDE 1 MG: 1 INJECTION, SOLUTION INTRAMUSCULAR; INTRAVENOUS; SUBCUTANEOUS at 14:29

## 2024-10-06 NOTE — ED PROVIDER NOTES
Subjective   History of Present Illness  Patient is a 33-year-old female that presents to the emergency department for abdominal pain.  PMH significant for Crohn's disease, ADHD, DVT, fibromyalgia, headache, intracranial hypertension, kidney stones, and pseudotumor cerebri.  Patient states abdominal pain originally began on Wednesday, 10/2/2024 but patient felt symptoms would resolve on their own.  Patient presented to the ED on 10/4/2024 for similar complaints of today due to abdominal pain persisting.  Patient reports at that time during ED visit abdominal pain was more isolated to the left lower quadrant with associated diarrhea and nausea.  Patient also reports that she had recently started a new medication ordered by GI the same week.  During ED visit patient was administered Reglan, Dilaudid, and IV Rocephin for treatment of urinary tract infection.  Patient was discharged with prescriptions for Phenergan and cefdinir.  She states that since discharge she has not been able to hold anything down by mouth due to persistent vomiting.  Patient reports abdominal pain is now spreading to left lower quadrant as well as left upper quadrant and left-sided flank.  Patient also reporting subjective fever yesterday.  Patient denies any chest pain or shortness of breath.  Denies any lightheadedness, dizziness, blurred vision, headache or near syncope.  Denies any body aches, chills, cough, congestion or sore throat.  Denies any abnormal vaginal bleeding or discharge.  Denies any hematemesis or bloody stools.      Review of Systems   Constitutional:  Positive for activity change, appetite change and fever.   Gastrointestinal:  Positive for abdominal pain, diarrhea, nausea and vomiting.   Genitourinary:  Positive for flank pain.   All other systems reviewed and are negative.      Past Medical History:   Diagnosis Date    ADHD (attention deficit hyperactivity disorder) 2012    Crohn disease     recently diagnosis, going to  start infusion therapy    Deep vein thrombosis     Depression with anxiety 2018    Fibromyalgia, primary 2016    Headache     Intracranial hypertension     Kidney stones     Lactation disorder     Pseudotumor cerebri 2016    Tobacco abuse 2016       Allergies   Allergen Reactions    Morphine And Codeine Shortness Of Breath    Other Other (See Comments)     Other reaction(s): Other (See Comments)  Dissolvable sutures. She developed an abscess from the last ones. Ended up in ER.   Dissolvable sutures. She developed an abscess from the last ones. Ended up in ER.     Sumatriptan Hives    Buspirone Headache    Ketorolac Unknown - Low Severity     Has Crohn's Disease.    Ketorolac Tromethamine Unknown - Low Severity    Morphine Itching    Demerol [Meperidine] Rash       Past Surgical History:   Procedure Laterality Date    BRAIN SURGERY      COLONOSCOPY      CYSTOSCOPY ELECTROHYDRAULIC LITHOTRIPSY      HYSTERECTOMY  2019    KIDNEY STONE SURGERY      OTHER SURGICAL HISTORY      Intracranial shunt     SHUNT INSERTION  2015       Family History   Problem Relation Age of Onset    Hypertension Mother     Hypertension Father     Prolactinoma Neg Hx        Social History     Socioeconomic History    Marital status: Single    Number of children: 1   Tobacco Use    Smoking status: Every Day     Current packs/day: 0.00     Average packs/day: 0.3 packs/day for 3.8 years (1.0 ttl pk-yrs)     Types: Cigarettes     Start date: 2019     Last attempt to quit: 2022     Years since quittin.9     Passive exposure: Current    Smokeless tobacco: Never    Tobacco comments:     Smoking currently 3..24     AVS   Vaping Use    Vaping status: Never Used   Substance and Sexual Activity    Alcohol use: No    Drug use: No    Sexual activity: Yes     Partners: Male     Birth control/protection: Hysterectomy           Objective   Physical Exam  Vitals and nursing note reviewed.   Constitutional:        Appearance: Normal appearance.      Comments: Nontoxic appearing. In no acute distress.    HENT:      Head: Normocephalic and atraumatic.      Right Ear: External ear normal.      Left Ear: External ear normal.      Nose: Nose normal.      Mouth/Throat:      Mouth: Mucous membranes are moist.      Pharynx: Oropharynx is clear.   Eyes:      Extraocular Movements: Extraocular movements intact.      Conjunctiva/sclera: Conjunctivae normal.      Pupils: Pupils are equal, round, and reactive to light.   Cardiovascular:      Rate and Rhythm: Normal rate and regular rhythm.      Pulses: Normal pulses.      Heart sounds: Normal heart sounds.   Pulmonary:      Effort: Pulmonary effort is normal. No respiratory distress.      Breath sounds: Normal breath sounds. No wheezing.   Chest:      Chest wall: No tenderness.   Abdominal:      General: Abdomen is protuberant. Bowel sounds are normal. There is no distension.      Palpations: Abdomen is soft.      Tenderness: There is abdominal tenderness in the periumbilical area, left upper quadrant and left lower quadrant. There is no right CVA tenderness, left CVA tenderness, guarding or rebound.   Musculoskeletal:         General: Normal range of motion.      Cervical back: Normal range of motion and neck supple.      Right lower leg: No edema.      Left lower leg: No edema.   Skin:     General: Skin is warm and dry.      Capillary Refill: Capillary refill takes less than 2 seconds.   Neurological:      General: No focal deficit present.      Mental Status: She is alert and oriented to person, place, and time. Mental status is at baseline.   Psychiatric:         Mood and Affect: Mood normal.         Behavior: Behavior normal.         Thought Content: Thought content normal.         Judgment: Judgment normal.       Labs Reviewed   COMPREHENSIVE METABOLIC PANEL - Abnormal; Notable for the following components:       Result Value    Glucose 101 (*)     CO2 21.0 (*)     All other  "components within normal limits    Narrative:     GFR Normal >60  Chronic Kidney Disease <60  Kidney Failure <15     URINALYSIS W/ CULTURE IF INDICATED - Abnormal; Notable for the following components:    Color, UA Dark Yellow (*)     Appearance, UA Cloudy (*)     Specific Gravity, UA >1.030 (*)     Ketones, UA 15 mg/dL (1+) (*)     Bilirubin, UA Small (1+) (*)     Protein, UA Trace (*)     Leuk Esterase, UA Small (1+) (*)     All other components within normal limits    Narrative:     In absence of clinical symptoms, the presence of pyuria, bacteria, and/or nitrites on the urinalysis result does not correlate with infection.   CBC WITH AUTO DIFFERENTIAL - Abnormal; Notable for the following components:    WBC 12.86 (*)     RDW 12.1 (*)     Neutrophil % 78.4 (*)     Lymphocyte % 16.7 (*)     Monocyte % 3.9 (*)     Neutrophils, Absolute 10.08 (*)     All other components within normal limits   URINALYSIS, MICROSCOPIC ONLY - Abnormal; Notable for the following components:    WBC, UA 3-5 (*)     Bacteria, UA Trace (*)     Squamous Epithelial Cells, UA 21-30 (*)     Yeast, UA Small/1+ Budding Yeast (*)     All other components within normal limits   LIPASE - Normal   PROCALCITONIN - Normal    Narrative:     As a Marker for Sepsis (Non-Neonates):    1. <0.5 ng/mL represents a low risk of severe sepsis and/or septic shock.  2. >2 ng/mL represents a high risk of severe sepsis and/or septic shock.    As a Marker for Lower Respiratory Tract Infections that require antibiotic therapy:    PCT on Admission    Antibiotic Therapy       6-12 Hrs later    >0.5                Strongly Recommended  >0.25 - <0.5        Recommended   0.1 - 0.25          Discouraged              Remeasure/reassess PCT  <0.1                Strongly Discouraged     Remeasure/reassess PCT    As 28 day mortality risk marker: \"Change in Procalcitonin Result\" (>80% or <=80%) if Day 0 (or Day 1) and Day 4 values are available. Refer to " http://www.[facility]-pct-calculator.com    Change in PCT <=80%  A decrease of PCT levels below or equal to 80% defines a positive change in PCT test result representing a higher risk for 28-day all-cause mortality of patients diagnosed with severe sepsis for septic shock.    Change in PCT >80%  A decrease of PCT levels of more than 80% defines a negative change in PCT result representing a lower risk for 28-day all-cause mortality of patients diagnosed with severe sepsis or septic shock.      MAGNESIUM - Normal   CBC AND DIFFERENTIAL    Narrative:     The following orders were created for panel order CBC & Differential.  Procedure                               Abnormality         Status                     ---------                               -----------         ------                     CBC Auto Differential[[id number]]        Abnormal            Final result                 Please view results for these tests on the individual orders.      CT Abdomen Pelvis With Contrast   Final Result       No acute findings in the abdomen/pelvis.       Nonobstructing left nephrolithiasis.       Stable mild left calyceal dilation. Decreased thickening/hyperdensity of   the left mid to distal ureter, likely improved infectious or   inflammatory process.           Normal appearance of the  shunt catheter. Free fluid within the pelvis   is likely related to shunting.           This report was signed and finalized on 10/6/2024 4:12 PM by Ra Arzola.               Procedures           ED Course  ED Course as of 10/06/24 1734   Sun Oct 06, 2024   1445 Upon reevaluation of the patient patient reports [KF]      ED Course User Index  [KF] Guilherme Solano, APRN                                             Medical Decision Making  Kelsey Mcqueen is a 33 y.o. female who presents to the ED for abdominal pain.  PMH significant for Crohn's disease, ADHD, DVT, fibromyalgia, headache, intracranial hypertension, kidney stones, and  pseudotumor cerebri.  Patient states abdominal pain originally began on Wednesday, 10/2/2024 but patient felt symptoms would resolve on their own.  Patient presented to the ED on 10/4/2024 for similar complaints of today due to abdominal pain persisting.  Patient reports at that time during ED visit abdominal pain was more isolated to the left lower quadrant with associated diarrhea and nausea.  Patient also reports that she had recently started a new medication ordered by GI the same week.  During ED visit patient was administered Reglan, Dilaudid, and IV Rocephin for treatment of urinary tract infection.  Patient was discharged with prescriptions for Phenergan and cefdinir.  She states that since discharge she has not been able to hold anything down by mouth due to persistent vomiting.  Patient reports abdominal pain is now spreading to left lower quadrant as well as left upper quadrant and left-sided flank.  Patient also reporting subjective fever yesterday.  Patient denies any chest pain or shortness of breath.  Denies any lightheadedness, dizziness, blurred vision, headache or near syncope.  Denies any body aches, chills, cough, congestion or sore throat.  Denies any abnormal vaginal bleeding or discharge.  Denies any hematemesis or bloody stools.    Patient was non-toxic appearing on arrival. No acute distress was noted.  Vital signs stable.    Patient's presentation raises suspicion for differentials including, but not limited to, urinary tract infection, pyelonephritis, nephrolithiasis, medication side effect, obstruction, Crohn's disease flareup.    Past medical history, surgical history, and medication regimen reviewed.     Previous notes, labs, imaging, and more reviewed.    It is documented multiple times in patient's chart ongoing abdominal pain, vomiting and diarrhea that appears to be chronic from Crohn's disease. Patient followed up with GI in [place] on 9/20/2024.  Note reveals Ms. Mcqueen presents  for hospital f/u. She was last seen here in April of this year and had scopes in Jan of this year for abdominal pain, altered bowel, heartburn, nausea, and history of gastritis and colitis. Colonoscopy found diverticular disease without evidence of diverticulitis and internal hemorrhoids; random colonic biopsies were benign. She was advised to take daily fiber supplement after the scopes and continue Apriso. EGD found gastritis and irregular zline; small bowel bx was benign, gastric bx c/w reactive changes, and GEJ bx c/w reactive changes. She was advised to continue omeprazole and started on Carafate after scopes. Her IBD serology was c/w Crohn's disease in Feb of this year. She ended up in the hospital at [facility] from 8/23/24-8/26/24 for c/o n/v, diarrhea and abdominal pain. Imaging done showed possible ileus and  shunt that was not a new finding, previous cholecystectomy but no dilatation of CBD. She had GI panel done as well that was negative. She notes today that she was seek for a week and half before she went to the ER. She notes her kids were sick and she thought she had GI bug. She notes she also was trying to pass a kidney stone. She notes she didn't have to have surgery and didn't have to have NG tube either, noting she had nothing to eat or drink for a few days but then was finally given some cream and wheat and tolerated it and notes she didn't have to have a NG tube or surgery. She notes she has been out of her Apriso for about a month now, nothing the pharmacy told her she needed refills and notes she asked them to call this office but she isn't sure if they did. She notes she has moved and has switched pharmacy. She notes she is seeing specialist in [place] for problems with her  shunt.    Medications administered,   metoclopramide (REGLAN) injection 10 mg (10 mg Intravenous Given 10/6/24 1430)  HYDROmorphone (DILAUDID) injection 1 mg (1 mg Intravenous Given 10/6/24 1429)  iopamidol  (ISOVUE-300) 61 % injection 100 mL (100 mL Intravenous Given 10/6/24 0397)     Patient was able to tolerate p.o. challenge without evidence of vomiting.    Please refer to above section of note for lab and imaging results that were reviewed and interpreted by radiology as well as attending physician.     Given findings described above, patient's presentation is likely consistent with abdominal pain with vomiting and diarrhea.     I had an in-depth discussion with the patient as well as family present at bedside regarding all lab and imaging results completed during today's ED encounter.  We also compared lab and imaging results completed today to previous ED encounters results.  I discussed that urine culture was negative from previous ED encounter and urinalysis does reveal improvement today in addition to CT imaging reveals improvement as well.  I discussed that these symptoms may be related to medication side effects as patient had no symptoms prior to starting new medication.  I discussed that patient will need to follow-up closely with gastroenterology and primary care provider for further evaluation and treatment and medication management.  Patient and family were educated on concerning signs and symptoms that would warrant a quick return to the ED and verbalized understanding of this. I answered all the questions regarding the emergency department evaluation, diagnosis, and treatment plan in plain and simple language that was understandable. We discussed that due to always having some diagnostic uncertainty while in the ER, there is always a chance that symptoms may change or new symptoms may reveal themselves after being discharged. Because of this, I stressed the importance of Kelsey following up with their PCP and gastroenterology. Patient informed that appointment will need to be done by calling their office to set up an appointment within the next few days or as soon as reasonably possible so that the  symptoms can be re-evaluated for improvement or for any other questions. I also gave Kelsey common sense return precautions and prompted patient to return to the emergency department within 24 - 48hrs if there are any new, worsening, or concerning symptoms. The patient verbalized understanding of the discharge instructions and agreed with them. Kelsey was discharged in stable condition.     Dragon disclaimer:  Parts of this note may be an electronic transcription/translation of spoken language to printed text using the Dragon dictation system.    Problems Addressed:  Abdominal pain, unspecified abdominal location: complicated acute illness or injury  Vomiting and diarrhea: complicated acute illness or injury    Amount and/or Complexity of Data Reviewed  Labs: ordered.  Radiology: ordered.    Risk  Prescription drug management.        Final diagnoses:   Abdominal pain, unspecified abdominal location   Vomiting and diarrhea       ED Disposition  ED Disposition       ED Disposition   Discharge    Condition   Stable    Comment   --               Sol Lopez, APRN  [address] [facility] [address]  [phone]    Schedule an appointment as soon as possible for a visit       [facility] EMERGENCY DEPARTMENT  [address] [place] [id number] [phone]    If symptoms worsen         Medication List      No changes were made to your prescriptions during this visit.            Guilherme Solano, APRN  10/06/24 1738

## 2024-10-06 NOTE — TELEPHONE ENCOUNTER
"Patient states that she has not been able to hold anything down since Friday. She is either vomiting or has diarrhea. She feels weak, has a fever, a headache. She is worried this may be related to the kidney infection or possible an ileus that she recently had.   Triage completed and patient advised to go to the ED. She said she will go now.   Reason for Disposition   [1] Drinking very little AND [2] dehydration suspected (e.g., no urine > 12 hours, very dry mouth, very lightheaded)    Additional Information   Negative: Shock suspected (e.g., cold/pale/clammy skin, too weak to stand, low BP, rapid pulse)   Negative: Difficult to awaken or acting confused (e.g., disoriented, slurred speech)   Negative: Sounds like a life-threatening emergency to the triager   Negative: Vomiting occurs only while coughing   Negative: [1] Pregnant < 20 Weeks AND [2] nausea/vomiting began in early pregnancy (i.e., 4-8 weeks pregnant)   Negative: Chest pain   Negative: Headache is main symptom   Negative: Vomiting (or Nausea) in a cancer patient who is currently (or recently) receiving chemotherapy or radiation therapy, or cancer patient who has metastatic or end-stage cancer and is receiving palliative care   Negative: [1] Vomiting AND [2] contains red blood or black (\"coffee ground\") material  (Exception: Few red streaks in vomit that only happened once.)   Negative: Severe pain in one eye   Negative: Recent head injury (within last 3 days)   Negative: Recent abdominal injury (within last 3 days)   Negative: [1] Insulin-dependent diabetes (Type I) AND [2] glucose > 400 mg/dl (22 mmol/l)   Negative: [1] Vomiting AND [2] hernia is more painful or swollen than usual   Negative: [1] SEVERE vomiting (e.g., 6 or more times/day) AND [2] present > 8 hours (Exception: Patient sounds well, is drinking liquids, does not sound dehydrated, and vomiting has lasted less than 24 hours.)   Negative: [1] MODERATE vomiting (e.g., 3 - 5 times/day) AND [2] " "age > 60 years   Negative: Severe headache (e.g., excruciating)  (Exception: Similar to previous migraines.)   Negative: High-risk adult (e.g., diabetes mellitus, brain tumor, V-P shunt, hernia)    Answer Assessment - Initial Assessment Questions  1. VOMITING SEVERITY: \"How many times have you vomited in the past 24 hours?\"      - MILD:  1 - 2 times/day     - MODERATE: 3 - 5 times/day, decreased oral intake without significant weight loss or symptoms of dehydration     - SEVERE: 6 or more times/day, vomits everything or nearly everything, with significant weight loss, symptoms of dehydration       3 times   2. ONSET: \"When did the vomiting begin?\"       Friday   3. FLUIDS: \"What fluids or food have you vomited up today?\" \"Have you been able to keep any fluids down?\"   Not able to hold anything down   4. ABDOMEN PAIN: \"Are your having any abdomen pain?\" If Yes : \"How bad is it and what does it feel like?\" (e.g., crampy, dull, intermittent, constant)       Twisty cramping feeling   5. DIARRHEA: \"Is there any diarrhea?\" If Yes, ask: \"How many times today?\"       Over 10 times per day   6. CONTACTS: \"Is there anyone else in the family with the same symptoms?\"       No   7. CAUSE: \"What do you think is causing your vomiting?\"      Nephrosis and kidney infection, recent ileus   8. HYDRATION STATUS: \"Any signs of dehydration?\" (e.g., dry mouth [not only dry lips], too weak to stand) \"When did you last urinate?\"      Weakness, dry mouth, this morning   9. OTHER SYMPTOMS: \"Do you have any other symptoms?\" (e.g., fever, headache, vertigo, vomiting blood or coffee grounds, recent head injury)    Fever, headache,   10. PREGNANCY: \"Is there any chance you are pregnant?\" \"When was your last menstrual period?\"        No    Protocols used: Vomiting-ADULT-AH    "

## 2024-10-09 LAB
BACTERIA SPEC AEROBE CULT: NORMAL
BACTERIA SPEC AEROBE CULT: NORMAL

## 2025-01-21 ENCOUNTER — TELEPHONE (OUTPATIENT)
Dept: VASCULAR SURGERY | Age: 35
End: 2025-01-21

## 2025-04-18 NOTE — TELEPHONE ENCOUNTER
----- Message from Iman Harley DO sent at 3/8/2018  9:22 PM CST -----  Please prescribe Cipro 500 mg twice daily ×10 days #20 with no refills Did not order this test

## 2025-05-19 ENCOUNTER — TELEPHONE (OUTPATIENT)
Dept: VASCULAR SURGERY | Age: 35
End: 2025-05-19
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
## 2025-05-21 ENCOUNTER — TELEPHONE (OUTPATIENT)
Dept: VASCULAR SURGERY | Age: 35
End: 2025-05-21

## 2025-05-21 NOTE — TELEPHONE ENCOUNTER
I called the pt to schedule an appt to come in and address the issues that she is having with port.  I left a message

## 2025-08-06 ENCOUNTER — HOSPITAL ENCOUNTER (EMERGENCY)
Facility: HOSPITAL | Age: 35
Discharge: HOME | End: 2025-08-06
Attending: FAMILY MEDICINE | Admitting: FAMILY MEDICINE
Payer: COMMERCIAL

## 2025-08-06 ENCOUNTER — APPOINTMENT (OUTPATIENT)
Dept: CT IMAGING | Facility: HOSPITAL | Age: 35
End: 2025-08-06
Payer: COMMERCIAL

## 2025-08-06 VITALS
OXYGEN SATURATION: 96 % | BODY MASS INDEX: 39.38 KG/M2 | HEIGHT: 62 IN | WEIGHT: 214 LBS | DIASTOLIC BLOOD PRESSURE: 50 MMHG | HEART RATE: 73 BPM | SYSTOLIC BLOOD PRESSURE: 106 MMHG | RESPIRATION RATE: 16 BRPM | TEMPERATURE: 97.8 F

## 2025-08-06 DIAGNOSIS — R10.9 LEFT FLANK PAIN: Primary | ICD-10-CM

## 2025-08-06 DIAGNOSIS — N20.0 KIDNEY STONES: ICD-10-CM

## 2025-08-06 DIAGNOSIS — N30.01 ACUTE CYSTITIS WITH HEMATURIA: ICD-10-CM

## 2025-08-06 LAB
ALBUMIN SERPL-MCNC: 4.5 G/DL (ref 3.5–5.2)
ALBUMIN/GLOB SERPL: 1.3 G/DL
ALP SERPL-CCNC: 115 U/L (ref 39–117)
ALT SERPL W P-5'-P-CCNC: 59 U/L (ref 1–33)
ANION GAP SERPL CALCULATED.3IONS-SCNC: 16 MMOL/L (ref 5–15)
AST SERPL-CCNC: 21 U/L (ref 1–32)
BACTERIA UR QL AUTO: ABNORMAL /HPF
BASOPHILS # BLD AUTO: 0.04 10*3/MM3 (ref 0–0.2)
BASOPHILS NFR BLD AUTO: 0.4 % (ref 0–1.5)
BILIRUB SERPL-MCNC: 0.5 MG/DL (ref 0–1.2)
BILIRUB UR QL STRIP: ABNORMAL
BUN SERPL-MCNC: 10.7 MG/DL (ref 6–20)
BUN/CREAT SERPL: 14.3 (ref 7–25)
CALCIUM SPEC-SCNC: 9.8 MG/DL (ref 8.6–10.5)
CHLORIDE SERPL-SCNC: 101 MMOL/L (ref 98–107)
CLARITY UR: ABNORMAL
CO2 SERPL-SCNC: 21 MMOL/L (ref 22–29)
COLOR UR: ABNORMAL
CREAT SERPL-MCNC: 0.75 MG/DL (ref 0.57–1)
DEPRECATED RDW RBC AUTO: 40.6 FL (ref 37–54)
EGFRCR SERPLBLD CKD-EPI 2021: 108 ML/MIN/1.73
EOSINOPHIL # BLD AUTO: 0.08 10*3/MM3 (ref 0–0.4)
EOSINOPHIL NFR BLD AUTO: 0.8 % (ref 0.3–6.2)
ERYTHROCYTE [DISTWIDTH] IN BLOOD BY AUTOMATED COUNT: 12.1 % (ref 12.3–15.4)
GLOBULIN UR ELPH-MCNC: 3.4 GM/DL
GLUCOSE SERPL-MCNC: 124 MG/DL (ref 65–99)
GLUCOSE UR STRIP-MCNC: NEGATIVE MG/DL
HCG SERPL QL: NEGATIVE
HCT VFR BLD AUTO: 42.1 % (ref 34–46.6)
HGB BLD-MCNC: 14.5 G/DL (ref 12–15.9)
HGB UR QL STRIP.AUTO: ABNORMAL
IMM GRANULOCYTES # BLD AUTO: 0.03 10*3/MM3 (ref 0–0.05)
IMM GRANULOCYTES NFR BLD AUTO: 0.3 % (ref 0–0.5)
KETONES UR QL STRIP: ABNORMAL
LEUKOCYTE ESTERASE UR QL STRIP.AUTO: ABNORMAL
LIPASE SERPL-CCNC: 19 U/L (ref 13–60)
LYMPHOCYTES # BLD AUTO: 1.42 10*3/MM3 (ref 0.7–3.1)
LYMPHOCYTES NFR BLD AUTO: 14.9 % (ref 19.6–45.3)
MCH RBC QN AUTO: 31.5 PG (ref 26.6–33)
MCHC RBC AUTO-ENTMCNC: 34.4 G/DL (ref 31.5–35.7)
MCV RBC AUTO: 91.3 FL (ref 79–97)
MONOCYTES # BLD AUTO: 0.38 10*3/MM3 (ref 0.1–0.9)
MONOCYTES NFR BLD AUTO: 4 % (ref 5–12)
NEUTROPHILS NFR BLD AUTO: 7.55 10*3/MM3 (ref 1.7–7)
NEUTROPHILS NFR BLD AUTO: 79.6 % (ref 42.7–76)
NITRITE UR QL STRIP: POSITIVE
NRBC BLD AUTO-RTO: 0 /100 WBC (ref 0–0.2)
PH UR STRIP.AUTO: <=5 [PH] (ref 5–8)
PLATELET # BLD AUTO: 309 10*3/MM3 (ref 140–450)
PMV BLD AUTO: 9.9 FL (ref 6–12)
POTASSIUM SERPL-SCNC: 4 MMOL/L (ref 3.5–5.2)
PROT SERPL-MCNC: 7.9 G/DL (ref 6–8.5)
PROT UR QL STRIP: ABNORMAL
RBC # BLD AUTO: 4.61 10*6/MM3 (ref 3.77–5.28)
RBC # UR STRIP: ABNORMAL /HPF
REF LAB TEST METHOD: ABNORMAL
SODIUM SERPL-SCNC: 138 MMOL/L (ref 136–145)
SP GR UR STRIP: 1.02 (ref 1–1.03)
SQUAMOUS #/AREA URNS HPF: ABNORMAL /HPF
UROBILINOGEN UR QL STRIP: ABNORMAL
WBC # UR STRIP: ABNORMAL /HPF
WBC NRBC COR # BLD AUTO: 9.5 10*3/MM3 (ref 3.4–10.8)
YEAST URNS QL MICRO: ABNORMAL /HPF

## 2025-08-06 PROCEDURE — 96365 THER/PROPH/DIAG IV INF INIT: CPT

## 2025-08-06 PROCEDURE — 74176 CT ABD & PELVIS W/O CONTRAST: CPT

## 2025-08-06 PROCEDURE — 87086 URINE CULTURE/COLONY COUNT: CPT | Performed by: NURSE PRACTITIONER

## 2025-08-06 PROCEDURE — 25810000003 SODIUM CHLORIDE 0.9 % SOLUTION: Performed by: NURSE PRACTITIONER

## 2025-08-06 PROCEDURE — 25010000002 PROCHLORPERAZINE 10 MG/2ML SOLUTION: Performed by: FAMILY MEDICINE

## 2025-08-06 PROCEDURE — 83690 ASSAY OF LIPASE: CPT | Performed by: NURSE PRACTITIONER

## 2025-08-06 PROCEDURE — 25010000002 HYDROMORPHONE PER 4 MG: Performed by: FAMILY MEDICINE

## 2025-08-06 PROCEDURE — 87186 SC STD MICRODIL/AGAR DIL: CPT | Performed by: NURSE PRACTITIONER

## 2025-08-06 PROCEDURE — 96376 TX/PRO/DX INJ SAME DRUG ADON: CPT

## 2025-08-06 PROCEDURE — 99284 EMERGENCY DEPT VISIT MOD MDM: CPT | Performed by: FAMILY MEDICINE

## 2025-08-06 PROCEDURE — 96375 TX/PRO/DX INJ NEW DRUG ADDON: CPT

## 2025-08-06 PROCEDURE — 80053 COMPREHEN METABOLIC PANEL: CPT | Performed by: NURSE PRACTITIONER

## 2025-08-06 PROCEDURE — 85025 COMPLETE CBC W/AUTO DIFF WBC: CPT | Performed by: NURSE PRACTITIONER

## 2025-08-06 PROCEDURE — 81001 URINALYSIS AUTO W/SCOPE: CPT | Performed by: NURSE PRACTITIONER

## 2025-08-06 PROCEDURE — 25010000002 ONDANSETRON PER 1 MG: Performed by: NURSE PRACTITIONER

## 2025-08-06 PROCEDURE — 84703 CHORIONIC GONADOTROPIN ASSAY: CPT | Performed by: NURSE PRACTITIONER

## 2025-08-06 PROCEDURE — 25010000002 CEFTRIAXONE PER 250 MG: Performed by: FAMILY MEDICINE

## 2025-08-06 PROCEDURE — 87088 URINE BACTERIA CULTURE: CPT | Performed by: NURSE PRACTITIONER

## 2025-08-06 RX ORDER — DIAZEPAM 10 MG/1
10 TABLET ORAL 2 TIMES DAILY
COMMUNITY

## 2025-08-06 RX ORDER — PROCHLORPERAZINE EDISYLATE 5 MG/ML
10 INJECTION INTRAMUSCULAR; INTRAVENOUS ONCE
Status: COMPLETED | OUTPATIENT
Start: 2025-08-06 | End: 2025-08-06

## 2025-08-06 RX ORDER — HYDROMORPHONE HYDROCHLORIDE 1 MG/ML
0.5 INJECTION, SOLUTION INTRAMUSCULAR; INTRAVENOUS; SUBCUTANEOUS ONCE
Refills: 0 | Status: COMPLETED | OUTPATIENT
Start: 2025-08-06 | End: 2025-08-06

## 2025-08-06 RX ORDER — ONDANSETRON 2 MG/ML
4 INJECTION INTRAMUSCULAR; INTRAVENOUS ONCE
Status: COMPLETED | OUTPATIENT
Start: 2025-08-06 | End: 2025-08-06

## 2025-08-06 RX ORDER — CIPROFLOXACIN 500 MG/1
500 TABLET, FILM COATED ORAL 2 TIMES DAILY
Qty: 14 TABLET | Refills: 0 | Status: SHIPPED | OUTPATIENT
Start: 2025-08-06 | End: 2025-08-13

## 2025-08-06 RX ADMIN — CEFTRIAXONE SODIUM 2000 MG: 2 INJECTION, POWDER, FOR SOLUTION INTRAMUSCULAR; INTRAVENOUS at 20:03

## 2025-08-06 RX ADMIN — HYDROMORPHONE HYDROCHLORIDE 0.5 MG: 1 INJECTION, SOLUTION INTRAMUSCULAR; INTRAVENOUS; SUBCUTANEOUS at 17:33

## 2025-08-06 RX ADMIN — PROCHLORPERAZINE EDISYLATE 10 MG: 5 INJECTION INTRAMUSCULAR; INTRAVENOUS at 18:45

## 2025-08-06 RX ADMIN — ONDANSETRON 4 MG: 2 INJECTION INTRAMUSCULAR; INTRAVENOUS at 17:31

## 2025-08-06 RX ADMIN — SODIUM CHLORIDE 1000 ML: 9 INJECTION, SOLUTION INTRAVENOUS at 17:32

## 2025-08-06 RX ADMIN — HYDROMORPHONE HYDROCHLORIDE 0.5 MG: 1 INJECTION, SOLUTION INTRAMUSCULAR; INTRAVENOUS; SUBCUTANEOUS at 20:01

## 2025-08-08 LAB — BACTERIA SPEC AEROBE CULT: ABNORMAL

## (undated) DEVICE — C-ARM: Brand: UNBRANDED

## (undated) DEVICE — 3M™ STERI-STRIP™ REINFORCED ADHESIVE SKIN CLOSURES, R1546, 1/4 IN X 4 IN (6 MM X 100 MM), 10 STRIPS/ENVELOPE: Brand: 3M™ STERI-STRIP™

## (undated) DEVICE — SUTURE VCRL + SZ 4-0 L27IN ABSRB UD L26MM SH 1/2 CIR VCP415H

## (undated) DEVICE — SOLUTION IV IRRIG POUR BRL 0.9% SODIUM CHL 2F7124

## (undated) DEVICE — ROYAL SILK SURGICAL GOWN, XXL: Brand: CONVERTORS

## (undated) DEVICE — MINOR CDS: Brand: MEDLINE INDUSTRIES, INC.

## (undated) DEVICE — PROVE COVER: Brand: UNBRANDED

## (undated) DEVICE — TOWEL,OR,DSP,ST,BLUE,DLX,4/PK,20PK/CS: Brand: MEDLINE

## (undated) DEVICE — ADHESIVE SKIN CLSR 0.7ML TOP DERMBND ADV

## (undated) DEVICE — CATHETER KIT 5 FR 21 GAX7 CM MICROINTRODUCER GUIDEWIRE STIFF

## (undated) DEVICE — AMBU AURA-I U SIZE 4, DISPOSABLE LARYNGEAL MASK: Brand: AURA-I

## (undated) DEVICE — PACK,UNIVERSAL,NO GOWNS: Brand: MEDLINE

## (undated) DEVICE — SUTURE PROL SZ 2-0 L36IN NONABSORBABLE BLU V-7 L26MM 1/2 8977H

## (undated) DEVICE — 3M™ STERI-DRAPE™ 2 INCISE DRAPE 2035: Brand: STERI-DRAPE™

## (undated) DEVICE — SUTURE VCRL SZ 3-0 L18IN ABSRB UD L26MM SH 1/2 CIR J864D